# Patient Record
Sex: MALE | Race: OTHER | HISPANIC OR LATINO | Employment: FULL TIME | URBAN - METROPOLITAN AREA
[De-identification: names, ages, dates, MRNs, and addresses within clinical notes are randomized per-mention and may not be internally consistent; named-entity substitution may affect disease eponyms.]

---

## 2017-01-19 ENCOUNTER — ALLSCRIPTS OFFICE VISIT (OUTPATIENT)
Dept: OTHER | Facility: OTHER | Age: 62
End: 2017-01-19

## 2017-01-19 ENCOUNTER — TRANSCRIBE ORDERS (OUTPATIENT)
Dept: ADMINISTRATIVE | Facility: HOSPITAL | Age: 62
End: 2017-01-19

## 2017-01-19 ENCOUNTER — APPOINTMENT (OUTPATIENT)
Dept: LAB | Facility: HOSPITAL | Age: 62
End: 2017-01-19
Payer: COMMERCIAL

## 2017-01-19 DIAGNOSIS — N20.0 KIDNEY STONE: Primary | ICD-10-CM

## 2017-01-19 DIAGNOSIS — R31.9 HEMATURIA: ICD-10-CM

## 2017-01-19 LAB
BILIRUB UR QL STRIP: NEGATIVE
CLARITY UR: NORMAL
COLOR UR: YELLOW
GLUCOSE (HISTORICAL): NEGATIVE
HGB UR QL STRIP.AUTO: NORMAL
KETONES UR STRIP-MCNC: NEGATIVE MG/DL
LEUKOCYTE ESTERASE UR QL STRIP: NEGATIVE
NITRITE UR QL STRIP: NEGATIVE
PH UR STRIP.AUTO: 6 [PH]
PROT UR STRIP-MCNC: NORMAL MG/DL
SP GR UR STRIP.AUTO: 1.01
UROBILINOGEN UR QL STRIP.AUTO: 0.2

## 2017-01-19 PROCEDURE — 87086 URINE CULTURE/COLONY COUNT: CPT

## 2017-01-20 ENCOUNTER — GENERIC CONVERSION - ENCOUNTER (OUTPATIENT)
Dept: OTHER | Facility: OTHER | Age: 62
End: 2017-01-20

## 2017-01-20 LAB — BACTERIA UR CULT: NORMAL

## 2017-01-27 ENCOUNTER — HOSPITAL ENCOUNTER (OUTPATIENT)
Dept: RADIOLOGY | Facility: HOSPITAL | Age: 62
Discharge: HOME | End: 2017-01-27
Payer: COMMERCIAL

## 2017-01-27 DIAGNOSIS — N20.0 KIDNEY STONE: ICD-10-CM

## 2017-01-27 PROCEDURE — 74176 CT ABD & PELVIS W/O CONTRAST: CPT

## 2017-01-30 ENCOUNTER — GENERIC CONVERSION - ENCOUNTER (OUTPATIENT)
Dept: OTHER | Facility: OTHER | Age: 62
End: 2017-01-30

## 2017-02-06 ENCOUNTER — ALLSCRIPTS OFFICE VISIT (OUTPATIENT)
Dept: OTHER | Facility: OTHER | Age: 62
End: 2017-02-06

## 2017-02-16 ENCOUNTER — ALLSCRIPTS OFFICE VISIT (OUTPATIENT)
Dept: OTHER | Facility: OTHER | Age: 62
End: 2017-02-16

## 2017-02-16 LAB
BILIRUB UR QL STRIP: NORMAL
CLARITY UR: NORMAL
COLOR UR: YELLOW
GLUCOSE (HISTORICAL): NORMAL
HGB UR QL STRIP.AUTO: NORMAL
KETONES UR STRIP-MCNC: 5 MG/DL
LEUKOCYTE ESTERASE UR QL STRIP: NORMAL
NITRITE UR QL STRIP: NORMAL
PH UR STRIP.AUTO: 7 [PH]
PROT UR STRIP-MCNC: NORMAL MG/DL
SP GR UR STRIP.AUTO: 1
UROBILINOGEN UR QL STRIP.AUTO: NORMAL

## 2017-02-23 ENCOUNTER — ALLSCRIPTS OFFICE VISIT (OUTPATIENT)
Dept: OTHER | Facility: OTHER | Age: 62
End: 2017-02-23

## 2017-02-24 ENCOUNTER — ALLSCRIPTS OFFICE VISIT (OUTPATIENT)
Dept: OTHER | Facility: OTHER | Age: 62
End: 2017-02-24

## 2017-03-07 RX ORDER — ASPIRIN 325 MG
TABLET ORAL DAILY
COMMUNITY
End: 2017-07-28 | Stop reason: HOSPADM

## 2017-03-07 RX ORDER — LISINOPRIL 10 MG/1
TABLET ORAL
COMMUNITY
Start: 2016-12-26 | End: 2018-05-29 | Stop reason: SDUPTHER

## 2017-03-07 RX ORDER — ATORVASTATIN CALCIUM 40 MG/1
TABLET, FILM COATED ORAL
COMMUNITY
Start: 2014-02-07 | End: 2018-07-12 | Stop reason: SDUPTHER

## 2017-03-11 ENCOUNTER — TRANSCRIBE ORDERS (OUTPATIENT)
Dept: RADIOLOGY | Facility: HOSPITAL | Age: 62
End: 2017-03-11

## 2017-03-11 ENCOUNTER — APPOINTMENT (OUTPATIENT)
Dept: LAB | Facility: HOSPITAL | Age: 62
End: 2017-03-11
Attending: UROLOGY
Payer: COMMERCIAL

## 2017-03-11 ENCOUNTER — TRANSCRIBE ORDERS (OUTPATIENT)
Dept: LAB | Facility: HOSPITAL | Age: 62
End: 2017-03-11

## 2017-03-11 ENCOUNTER — HOSPITAL ENCOUNTER (OUTPATIENT)
Dept: RADIOLOGY | Facility: HOSPITAL | Age: 62
Discharge: HOME | End: 2017-03-11
Attending: UROLOGY
Payer: COMMERCIAL

## 2017-03-11 DIAGNOSIS — N20.0 URIC ACID NEPHROLITHIASIS: Primary | ICD-10-CM

## 2017-03-11 DIAGNOSIS — N20.0 CALCULUS OF KIDNEY: ICD-10-CM

## 2017-03-11 DIAGNOSIS — N20.0 URIC ACID NEPHROLITHIASIS: ICD-10-CM

## 2017-03-11 DIAGNOSIS — N20.0 CALCULUS OF KIDNEY: Primary | ICD-10-CM

## 2017-03-11 LAB
ANION GAP SERPL CALCULATED.3IONS-SCNC: 6 MMOL/L (ref 4–13)
APTT PPP: 27 SECONDS (ref 24–36)
BASOPHILS # BLD AUTO: 0.05 THOUSANDS/ΜL (ref 0–0.1)
BASOPHILS NFR BLD AUTO: 1 % (ref 0–1)
BUN SERPL-MCNC: 16 MG/DL (ref 5–25)
CALCIUM SERPL-MCNC: 8.7 MG/DL (ref 8.3–10.1)
CHLORIDE SERPL-SCNC: 108 MMOL/L (ref 100–108)
CO2 SERPL-SCNC: 31 MMOL/L (ref 21–32)
CREAT SERPL-MCNC: 0.93 MG/DL (ref 0.6–1.3)
EOSINOPHIL # BLD AUTO: 0.26 THOUSAND/ΜL (ref 0–0.61)
EOSINOPHIL NFR BLD AUTO: 4 % (ref 0–6)
ERYTHROCYTE [DISTWIDTH] IN BLOOD BY AUTOMATED COUNT: 13.8 % (ref 11.6–15.1)
GFR SERPL CREATININE-BSD FRML MDRD: >60 ML/MIN/1.73SQ M
GLUCOSE SERPL-MCNC: 128 MG/DL (ref 65–140)
HCT VFR BLD AUTO: 38.9 % (ref 36.5–49.3)
HGB BLD-MCNC: 13.1 G/DL (ref 12–17)
INR PPP: 0.92 (ref 0.86–1.16)
LYMPHOCYTES # BLD AUTO: 3.52 THOUSANDS/ΜL (ref 0.6–4.47)
LYMPHOCYTES NFR BLD AUTO: 50 % (ref 14–44)
MCH RBC QN AUTO: 31 PG (ref 26.8–34.3)
MCHC RBC AUTO-ENTMCNC: 33.7 G/DL (ref 31.4–37.4)
MCV RBC AUTO: 92 FL (ref 82–98)
MONOCYTES # BLD AUTO: 0.39 THOUSAND/ΜL (ref 0.17–1.22)
MONOCYTES NFR BLD AUTO: 6 % (ref 4–12)
NEUTROPHILS # BLD AUTO: 2.74 THOUSANDS/ΜL (ref 1.85–7.62)
NEUTS SEG NFR BLD AUTO: 39 % (ref 43–75)
NRBC BLD AUTO-RTO: 0 /100 WBCS
PLATELET # BLD AUTO: 282 THOUSANDS/UL (ref 149–390)
PMV BLD AUTO: 10.2 FL (ref 8.9–12.7)
POTASSIUM SERPL-SCNC: 4.1 MMOL/L (ref 3.5–5.3)
PROTHROMBIN TIME: 12.5 SECONDS (ref 12–14.3)
RBC # BLD AUTO: 4.22 MILLION/UL (ref 3.88–5.62)
SODIUM SERPL-SCNC: 145 MMOL/L (ref 136–145)
WBC # BLD AUTO: 6.96 THOUSAND/UL (ref 4.31–10.16)

## 2017-03-11 PROCEDURE — 85730 THROMBOPLASTIN TIME PARTIAL: CPT

## 2017-03-11 PROCEDURE — 71020 HB CHEST X-RAY 2VW FRONTAL&LATL: CPT

## 2017-03-11 PROCEDURE — 80048 BASIC METABOLIC PNL TOTAL CA: CPT

## 2017-03-11 PROCEDURE — 36415 COLL VENOUS BLD VENIPUNCTURE: CPT

## 2017-03-11 PROCEDURE — 85610 PROTHROMBIN TIME: CPT

## 2017-03-11 PROCEDURE — 85025 COMPLETE CBC W/AUTO DIFF WBC: CPT

## 2017-03-24 ENCOUNTER — ANESTHESIA EVENT (OUTPATIENT)
Dept: PERIOP | Facility: HOSPITAL | Age: 62
End: 2017-03-24
Payer: COMMERCIAL

## 2017-03-24 ENCOUNTER — HOSPITAL ENCOUNTER (OUTPATIENT)
Facility: HOSPITAL | Age: 62
Setting detail: OUTPATIENT SURGERY
Discharge: HOME | End: 2017-03-24
Attending: UROLOGY | Admitting: UROLOGY
Payer: COMMERCIAL

## 2017-03-24 ENCOUNTER — APPOINTMENT (OUTPATIENT)
Dept: RADIOLOGY | Facility: HOSPITAL | Age: 62
End: 2017-03-24
Payer: COMMERCIAL

## 2017-03-24 ENCOUNTER — ANESTHESIA (OUTPATIENT)
Dept: PERIOP | Facility: HOSPITAL | Age: 62
End: 2017-03-24
Payer: COMMERCIAL

## 2017-03-24 VITALS
RESPIRATION RATE: 16 BRPM | DIASTOLIC BLOOD PRESSURE: 67 MMHG | HEART RATE: 80 BPM | OXYGEN SATURATION: 96 % | WEIGHT: 196 LBS | TEMPERATURE: 98.5 F | HEIGHT: 66 IN | BODY MASS INDEX: 31.5 KG/M2 | SYSTOLIC BLOOD PRESSURE: 121 MMHG

## 2017-03-24 PROCEDURE — C1769 GUIDE WIRE: HCPCS | Performed by: UROLOGY

## 2017-03-24 PROCEDURE — 74450 X-RAY URETHRA/BLADDER: CPT

## 2017-03-24 PROCEDURE — C2617 STENT, NON-COR, TEM W/O DEL: HCPCS | Performed by: UROLOGY

## 2017-03-24 DEVICE — STENT URETERAL 6 FR 26CM INLAY OPTIMA
Type: IMPLANTABLE DEVICE | Site: URETER | Status: NON-FUNCTIONAL
Removed: 2017-07-28

## 2017-03-24 RX ORDER — METOCLOPRAMIDE HYDROCHLORIDE 5 MG/ML
10 INJECTION INTRAMUSCULAR; INTRAVENOUS ONCE AS NEEDED
Status: DISCONTINUED | OUTPATIENT
Start: 2017-03-24 | End: 2017-03-24 | Stop reason: HOSPADM

## 2017-03-24 RX ORDER — GLYCOPYRROLATE 0.2 MG/ML
INJECTION INTRAMUSCULAR; INTRAVENOUS AS NEEDED
Status: DISCONTINUED | OUTPATIENT
Start: 2017-03-24 | End: 2017-03-24 | Stop reason: SURG

## 2017-03-24 RX ORDER — FENTANYL CITRATE 50 UG/ML
INJECTION, SOLUTION INTRAMUSCULAR; INTRAVENOUS AS NEEDED
Status: DISCONTINUED | OUTPATIENT
Start: 2017-03-24 | End: 2017-03-24 | Stop reason: SURG

## 2017-03-24 RX ORDER — PROPOFOL 10 MG/ML
INJECTION, EMULSION INTRAVENOUS AS NEEDED
Status: DISCONTINUED | OUTPATIENT
Start: 2017-03-24 | End: 2017-03-24 | Stop reason: SURG

## 2017-03-24 RX ORDER — HYDROCODONE BITARTRATE AND ACETAMINOPHEN 5; 325 MG/1; MG/1
1 TABLET ORAL EVERY 4 HOURS PRN
Qty: 30 TABLET | Refills: 0 | Status: SHIPPED | OUTPATIENT
Start: 2017-03-24 | End: 2017-04-03

## 2017-03-24 RX ORDER — PROMETHAZINE HYDROCHLORIDE 25 MG/ML
25 INJECTION, SOLUTION INTRAMUSCULAR; INTRAVENOUS ONCE AS NEEDED
Status: DISCONTINUED | OUTPATIENT
Start: 2017-03-24 | End: 2017-03-24 | Stop reason: HOSPADM

## 2017-03-24 RX ORDER — MIDAZOLAM HYDROCHLORIDE 1 MG/ML
INJECTION INTRAMUSCULAR; INTRAVENOUS AS NEEDED
Status: DISCONTINUED | OUTPATIENT
Start: 2017-03-24 | End: 2017-03-24 | Stop reason: SURG

## 2017-03-24 RX ORDER — EPHEDRINE SULFATE 50 MG/ML
INJECTION, SOLUTION INTRAVENOUS AS NEEDED
Status: DISCONTINUED | OUTPATIENT
Start: 2017-03-24 | End: 2017-03-24 | Stop reason: SURG

## 2017-03-24 RX ORDER — CIPROFLOXACIN 500 MG/1
500 TABLET, FILM COATED ORAL 2 TIMES DAILY
Qty: 6 TABLET | Refills: 0 | Status: SHIPPED | OUTPATIENT
Start: 2017-03-24 | End: 2017-03-27

## 2017-03-24 RX ORDER — OXYBUTYNIN CHLORIDE 5 MG/1
5 TABLET ORAL ONCE
Status: COMPLETED | OUTPATIENT
Start: 2017-03-24 | End: 2017-03-24

## 2017-03-24 RX ORDER — ONDANSETRON 2 MG/ML
4 INJECTION INTRAMUSCULAR; INTRAVENOUS ONCE
Status: DISCONTINUED | OUTPATIENT
Start: 2017-03-24 | End: 2017-03-24 | Stop reason: HOSPADM

## 2017-03-24 RX ORDER — SODIUM CHLORIDE, SODIUM LACTATE, POTASSIUM CHLORIDE, CALCIUM CHLORIDE 600; 310; 30; 20 MG/100ML; MG/100ML; MG/100ML; MG/100ML
5 INJECTION, SOLUTION INTRAVENOUS CONTINUOUS
Status: DISCONTINUED | OUTPATIENT
Start: 2017-03-24 | End: 2017-03-24 | Stop reason: HOSPADM

## 2017-03-24 RX ORDER — HYDROCODONE BITARTRATE AND ACETAMINOPHEN 5; 325 MG/1; MG/1
1-2 TABLET ORAL EVERY 4 HOURS PRN
Status: DISCONTINUED | OUTPATIENT
Start: 2017-03-24 | End: 2017-03-24 | Stop reason: HOSPADM

## 2017-03-24 RX ORDER — FENTANYL CITRATE/PF 50 MCG/ML
25 SYRINGE (ML) INJECTION
Status: DISCONTINUED | OUTPATIENT
Start: 2017-03-24 | End: 2017-03-24 | Stop reason: HOSPADM

## 2017-03-24 RX ORDER — ONDANSETRON 2 MG/ML
INJECTION INTRAMUSCULAR; INTRAVENOUS AS NEEDED
Status: DISCONTINUED | OUTPATIENT
Start: 2017-03-24 | End: 2017-03-24 | Stop reason: SURG

## 2017-03-24 RX ORDER — ROCURONIUM BROMIDE 10 MG/ML
INJECTION, SOLUTION INTRAVENOUS AS NEEDED
Status: DISCONTINUED | OUTPATIENT
Start: 2017-03-24 | End: 2017-03-24 | Stop reason: SURG

## 2017-03-24 RX ORDER — OXYBUTYNIN CHLORIDE 5 MG/1
5 TABLET ORAL 3 TIMES DAILY PRN
Qty: 30 TABLET | Refills: 0 | Status: SHIPPED | OUTPATIENT
Start: 2017-03-24 | End: 2018-07-25 | Stop reason: HOSPADM

## 2017-03-24 RX ORDER — MAGNESIUM HYDROXIDE 1200 MG/15ML
LIQUID ORAL AS NEEDED
Status: DISCONTINUED | OUTPATIENT
Start: 2017-03-24 | End: 2017-03-24 | Stop reason: HOSPADM

## 2017-03-24 RX ADMIN — ROCURONIUM BROMIDE 10 MG: 10 INJECTION, SOLUTION INTRAVENOUS at 13:34

## 2017-03-24 RX ADMIN — ONDANSETRON 4 MG: 2 INJECTION INTRAMUSCULAR; INTRAVENOUS at 15:13

## 2017-03-24 RX ADMIN — MIDAZOLAM HYDROCHLORIDE 2 MG: 1 INJECTION, SOLUTION INTRAMUSCULAR; INTRAVENOUS at 12:45

## 2017-03-24 RX ADMIN — SODIUM CHLORIDE, SODIUM LACTATE, POTASSIUM CHLORIDE, AND CALCIUM CHLORIDE: .6; .31; .03; .02 INJECTION, SOLUTION INTRAVENOUS at 13:45

## 2017-03-24 RX ADMIN — FENTANYL CITRATE 25 MCG: 50 INJECTION, SOLUTION INTRAMUSCULAR; INTRAVENOUS at 16:01

## 2017-03-24 RX ADMIN — ROCURONIUM BROMIDE 10 MG: 10 INJECTION, SOLUTION INTRAVENOUS at 14:46

## 2017-03-24 RX ADMIN — FENTANYL CITRATE 50 MCG: 50 INJECTION, SOLUTION INTRAMUSCULAR; INTRAVENOUS at 13:16

## 2017-03-24 RX ADMIN — EPHEDRINE SULFATE 5 MG: 50 INJECTION, SOLUTION INTRAMUSCULAR; INTRAVENOUS; SUBCUTANEOUS at 13:30

## 2017-03-24 RX ADMIN — PROPOFOL 150 MG: 10 INJECTION, EMULSION INTRAVENOUS at 12:52

## 2017-03-24 RX ADMIN — FENTANYL CITRATE 25 MCG: 50 INJECTION, SOLUTION INTRAMUSCULAR; INTRAVENOUS at 16:16

## 2017-03-24 RX ADMIN — FENTANYL CITRATE 25 MCG: 50 INJECTION, SOLUTION INTRAMUSCULAR; INTRAVENOUS at 15:54

## 2017-03-24 RX ADMIN — ROCURONIUM BROMIDE 10 MG: 10 INJECTION, SOLUTION INTRAVENOUS at 14:17

## 2017-03-24 RX ADMIN — SODIUM CHLORIDE, SODIUM LACTATE, POTASSIUM CHLORIDE, AND CALCIUM CHLORIDE 5 ML/HR: .6; .31; .03; .02 INJECTION, SOLUTION INTRAVENOUS at 11:05

## 2017-03-24 RX ADMIN — EPHEDRINE SULFATE 5 MG: 50 INJECTION, SOLUTION INTRAMUSCULAR; INTRAVENOUS; SUBCUTANEOUS at 13:05

## 2017-03-24 RX ADMIN — SODIUM CHLORIDE, SODIUM LACTATE, POTASSIUM CHLORIDE, AND CALCIUM CHLORIDE 5 ML/HR: .6; .31; .03; .02 INJECTION, SOLUTION INTRAVENOUS at 16:19

## 2017-03-24 RX ADMIN — GLYCOPYRROLATE 0.6 MG: 0.2 INJECTION, SOLUTION INTRAMUSCULAR; INTRAVENOUS at 15:21

## 2017-03-24 RX ADMIN — FENTANYL CITRATE 25 MCG: 50 INJECTION, SOLUTION INTRAMUSCULAR; INTRAVENOUS at 15:46

## 2017-03-24 RX ADMIN — EPHEDRINE SULFATE 5 MG: 50 INJECTION, SOLUTION INTRAMUSCULAR; INTRAVENOUS; SUBCUTANEOUS at 14:25

## 2017-03-24 RX ADMIN — HYDROCODONE BITARTRATE AND ACETAMINOPHEN 1 TABLET: 5; 325 TABLET ORAL at 16:39

## 2017-03-24 RX ADMIN — OXYBUTYNIN CHLORIDE 5 MG: 5 TABLET ORAL at 16:26

## 2017-03-24 RX ADMIN — CEFAZOLIN SODIUM 2000 MG: 2 SOLUTION INTRAVENOUS at 12:57

## 2017-03-24 RX ADMIN — FENTANYL CITRATE 50 MCG: 50 INJECTION, SOLUTION INTRAMUSCULAR; INTRAVENOUS at 12:45

## 2017-03-24 RX ADMIN — ROCURONIUM BROMIDE 50 MG: 10 INJECTION, SOLUTION INTRAVENOUS at 12:52

## 2017-03-24 RX ADMIN — ROCURONIUM BROMIDE 10 MG: 10 INJECTION, SOLUTION INTRAVENOUS at 14:58

## 2017-03-24 RX ADMIN — NEOSTIGMINE METHYLSULFATE 3 MG: 1 INJECTION INTRAMUSCULAR; INTRAVENOUS; SUBCUTANEOUS at 15:21

## 2017-03-24 RX ADMIN — ROCURONIUM BROMIDE 10 MG: 10 INJECTION, SOLUTION INTRAVENOUS at 13:57

## 2017-03-27 DIAGNOSIS — N20.0 CALCULUS OF KIDNEY: ICD-10-CM

## 2017-04-07 ENCOUNTER — TRANSCRIBE ORDERS (OUTPATIENT)
Dept: RADIOLOGY | Facility: HOSPITAL | Age: 62
End: 2017-04-07

## 2017-04-07 ENCOUNTER — HOSPITAL ENCOUNTER (OUTPATIENT)
Dept: RADIOLOGY | Facility: HOSPITAL | Age: 62
Discharge: HOME | End: 2017-04-07
Attending: UROLOGY
Payer: COMMERCIAL

## 2017-04-07 DIAGNOSIS — N20.0 CALCULUS OF KIDNEY: ICD-10-CM

## 2017-04-07 PROCEDURE — 74000 HB X-RAY EXAM OF ABDOMEN (SINGLE ANTEROPOSTERIOR VIEW): CPT

## 2017-04-13 ENCOUNTER — ALLSCRIPTS OFFICE VISIT (OUTPATIENT)
Dept: OTHER | Facility: OTHER | Age: 62
End: 2017-04-13

## 2017-05-01 DIAGNOSIS — E66.9 OBESITY: ICD-10-CM

## 2017-05-01 DIAGNOSIS — N20.0 CALCULUS OF KIDNEY: ICD-10-CM

## 2017-05-01 DIAGNOSIS — E11.9 TYPE 2 DIABETES MELLITUS WITHOUT COMPLICATIONS (HCC): ICD-10-CM

## 2017-05-01 DIAGNOSIS — E78.5 HYPERLIPIDEMIA: ICD-10-CM

## 2017-05-06 ENCOUNTER — TRANSCRIBE ORDERS (OUTPATIENT)
Dept: LAB | Facility: HOSPITAL | Age: 62
End: 2017-05-06

## 2017-05-06 ENCOUNTER — APPOINTMENT (OUTPATIENT)
Dept: LAB | Facility: HOSPITAL | Age: 62
End: 2017-05-06
Attending: UROLOGY
Payer: COMMERCIAL

## 2017-05-06 DIAGNOSIS — N20.0 CALCULUS OF KIDNEY: ICD-10-CM

## 2017-05-06 LAB
ANION GAP SERPL CALCULATED.3IONS-SCNC: 3 MMOL/L (ref 4–13)
APTT PPP: 26 SECONDS (ref 23–35)
BACTERIA UR QL AUTO: ABNORMAL /HPF
BASOPHILS # BLD AUTO: 0.06 THOUSANDS/ΜL (ref 0–0.1)
BASOPHILS NFR BLD AUTO: 1 % (ref 0–1)
BILIRUB UR QL STRIP: NEGATIVE
BUN SERPL-MCNC: 11 MG/DL (ref 5–25)
CALCIUM SERPL-MCNC: 9.3 MG/DL (ref 8.3–10.1)
CHLORIDE SERPL-SCNC: 107 MMOL/L (ref 100–108)
CLARITY UR: CLEAR
CO2 SERPL-SCNC: 31 MMOL/L (ref 21–32)
COLOR UR: YELLOW
CREAT SERPL-MCNC: 1.03 MG/DL (ref 0.6–1.3)
EOSINOPHIL # BLD AUTO: 0.24 THOUSAND/ΜL (ref 0–0.61)
EOSINOPHIL NFR BLD AUTO: 4 % (ref 0–6)
ERYTHROCYTE [DISTWIDTH] IN BLOOD BY AUTOMATED COUNT: 13.7 % (ref 11.6–15.1)
GFR SERPL CREATININE-BSD FRML MDRD: >60 ML/MIN/1.73SQ M
GLUCOSE P FAST SERPL-MCNC: 131 MG/DL (ref 65–99)
GLUCOSE UR STRIP-MCNC: NEGATIVE MG/DL
HCT VFR BLD AUTO: 39.6 % (ref 36.5–49.3)
HGB BLD-MCNC: 13.2 G/DL (ref 12–17)
HGB UR QL STRIP.AUTO: ABNORMAL
HYALINE CASTS #/AREA URNS LPF: ABNORMAL /LPF
INR PPP: 0.91 (ref 0.86–1.16)
KETONES UR STRIP-MCNC: NEGATIVE MG/DL
LEUKOCYTE ESTERASE UR QL STRIP: ABNORMAL
LYMPHOCYTES # BLD AUTO: 2.04 THOUSANDS/ΜL (ref 0.6–4.47)
LYMPHOCYTES NFR BLD AUTO: 36 % (ref 14–44)
MCH RBC QN AUTO: 31.1 PG (ref 26.8–34.3)
MCHC RBC AUTO-ENTMCNC: 33.3 G/DL (ref 31.4–37.4)
MCV RBC AUTO: 93 FL (ref 82–98)
MONOCYTES # BLD AUTO: 0.38 THOUSAND/ΜL (ref 0.17–1.22)
MONOCYTES NFR BLD AUTO: 7 % (ref 4–12)
NEUTROPHILS # BLD AUTO: 2.95 THOUSANDS/ΜL (ref 1.85–7.62)
NEUTS SEG NFR BLD AUTO: 52 % (ref 43–75)
NITRITE UR QL STRIP: NEGATIVE
NON-SQ EPI CELLS URNS QL MICRO: ABNORMAL /HPF
NRBC BLD AUTO-RTO: 0 /100 WBCS
PH UR STRIP.AUTO: 7.5 [PH] (ref 4.5–8)
PLATELET # BLD AUTO: 310 THOUSANDS/UL (ref 149–390)
PMV BLD AUTO: 9.7 FL (ref 8.9–12.7)
POTASSIUM SERPL-SCNC: 4.9 MMOL/L (ref 3.5–5.3)
PROT UR STRIP-MCNC: ABNORMAL MG/DL
PROTHROMBIN TIME: 12.3 SECONDS (ref 12.1–14.4)
RBC # BLD AUTO: 4.24 MILLION/UL (ref 3.88–5.62)
RBC #/AREA URNS AUTO: ABNORMAL /HPF
SODIUM SERPL-SCNC: 141 MMOL/L (ref 136–145)
SP GR UR STRIP.AUTO: 1.01 (ref 1–1.03)
UROBILINOGEN UR QL STRIP.AUTO: 0.2 E.U./DL
WBC # BLD AUTO: 5.68 THOUSAND/UL (ref 4.31–10.16)
WBC #/AREA URNS AUTO: ABNORMAL /HPF

## 2017-05-06 PROCEDURE — 85610 PROTHROMBIN TIME: CPT

## 2017-05-06 PROCEDURE — 85025 COMPLETE CBC W/AUTO DIFF WBC: CPT

## 2017-05-06 PROCEDURE — 85730 THROMBOPLASTIN TIME PARTIAL: CPT

## 2017-05-06 PROCEDURE — 80048 BASIC METABOLIC PNL TOTAL CA: CPT

## 2017-05-06 PROCEDURE — 36415 COLL VENOUS BLD VENIPUNCTURE: CPT

## 2017-05-06 PROCEDURE — 81001 URINALYSIS AUTO W/SCOPE: CPT

## 2017-05-19 ENCOUNTER — ANESTHESIA (OUTPATIENT)
Dept: PERIOP | Facility: HOSPITAL | Age: 62
End: 2017-05-19

## 2017-05-19 ENCOUNTER — TRANSCRIBE ORDERS (OUTPATIENT)
Dept: ADMINISTRATIVE | Facility: HOSPITAL | Age: 62
End: 2017-05-19

## 2017-05-19 ENCOUNTER — ANESTHESIA EVENT (OUTPATIENT)
Dept: PERIOP | Facility: HOSPITAL | Age: 62
End: 2017-05-19

## 2017-05-19 DIAGNOSIS — N20.0 URIC ACID NEPHROLITHIASIS: Primary | ICD-10-CM

## 2017-05-25 ENCOUNTER — TRANSCRIBE ORDERS (OUTPATIENT)
Dept: RADIOLOGY | Facility: HOSPITAL | Age: 62
End: 2017-05-25

## 2017-05-25 ENCOUNTER — HOSPITAL ENCOUNTER (OUTPATIENT)
Dept: RADIOLOGY | Facility: HOSPITAL | Age: 62
Discharge: HOME | End: 2017-05-25
Attending: UROLOGY
Payer: COMMERCIAL

## 2017-05-25 DIAGNOSIS — N20.0 CALCULUS OF KIDNEY: ICD-10-CM

## 2017-05-25 PROCEDURE — 74000 HB X-RAY EXAM OF ABDOMEN (SINGLE ANTEROPOSTERIOR VIEW): CPT

## 2017-05-30 ENCOUNTER — ALLSCRIPTS OFFICE VISIT (OUTPATIENT)
Dept: OTHER | Facility: OTHER | Age: 62
End: 2017-05-30

## 2017-07-06 ENCOUNTER — GENERIC CONVERSION - ENCOUNTER (OUTPATIENT)
Dept: OTHER | Facility: OTHER | Age: 62
End: 2017-07-06

## 2017-07-17 ENCOUNTER — ALLSCRIPTS OFFICE VISIT (OUTPATIENT)
Dept: OTHER | Facility: OTHER | Age: 62
End: 2017-07-17

## 2017-07-28 ENCOUNTER — APPOINTMENT (OUTPATIENT)
Dept: RADIOLOGY | Facility: HOSPITAL | Age: 62
End: 2017-07-28
Payer: COMMERCIAL

## 2017-07-28 ENCOUNTER — ANESTHESIA EVENT (OUTPATIENT)
Dept: PERIOP | Facility: HOSPITAL | Age: 62
End: 2017-07-28
Payer: COMMERCIAL

## 2017-07-28 ENCOUNTER — GENERIC CONVERSION - ENCOUNTER (OUTPATIENT)
Dept: OTHER | Facility: OTHER | Age: 62
End: 2017-07-28

## 2017-07-28 ENCOUNTER — ANESTHESIA (OUTPATIENT)
Dept: PERIOP | Facility: HOSPITAL | Age: 62
End: 2017-07-28
Payer: COMMERCIAL

## 2017-07-28 ENCOUNTER — APPOINTMENT (OUTPATIENT)
Dept: RADIOLOGY | Facility: HOSPITAL | Age: 62
End: 2017-07-28
Attending: UROLOGY
Payer: COMMERCIAL

## 2017-07-28 ENCOUNTER — HOSPITAL ENCOUNTER (OUTPATIENT)
Facility: HOSPITAL | Age: 62
Setting detail: OUTPATIENT SURGERY
Discharge: HOME | End: 2017-07-28
Attending: UROLOGY | Admitting: UROLOGY
Payer: COMMERCIAL

## 2017-07-28 VITALS
HEART RATE: 60 BPM | WEIGHT: 189 LBS | DIASTOLIC BLOOD PRESSURE: 81 MMHG | RESPIRATION RATE: 16 BRPM | TEMPERATURE: 97.8 F | BODY MASS INDEX: 30.37 KG/M2 | OXYGEN SATURATION: 100 % | HEIGHT: 66 IN | SYSTOLIC BLOOD PRESSURE: 147 MMHG

## 2017-07-28 DIAGNOSIS — N20.0 CALCULUS OF KIDNEY: ICD-10-CM

## 2017-07-28 LAB
GLUCOSE SERPL-MCNC: 110 MG/DL (ref 65–140)
GLUCOSE SERPL-MCNC: 137 MG/DL (ref 65–140)

## 2017-07-28 PROCEDURE — 74000 HB X-RAY EXAM OF ABDOMEN (SINGLE ANTEROPOSTERIOR VIEW): CPT

## 2017-07-28 PROCEDURE — C1758 CATHETER, URETERAL: HCPCS | Performed by: UROLOGY

## 2017-07-28 PROCEDURE — C2617 STENT, NON-COR, TEM W/O DEL: HCPCS | Performed by: UROLOGY

## 2017-07-28 PROCEDURE — C1769 GUIDE WIRE: HCPCS | Performed by: UROLOGY

## 2017-07-28 PROCEDURE — 82948 REAGENT STRIP/BLOOD GLUCOSE: CPT

## 2017-07-28 DEVICE — STENT URETERAL 6 FR 26CM INLAY OPTIMA: Type: IMPLANTABLE DEVICE | Site: URETER | Status: FUNCTIONAL

## 2017-07-28 RX ORDER — ONDANSETRON 2 MG/ML
4 INJECTION INTRAMUSCULAR; INTRAVENOUS EVERY 4 HOURS PRN
Status: CANCELLED | OUTPATIENT
Start: 2017-07-28

## 2017-07-28 RX ORDER — GLYCOPYRROLATE 0.2 MG/ML
INJECTION INTRAMUSCULAR; INTRAVENOUS AS NEEDED
Status: DISCONTINUED | OUTPATIENT
Start: 2017-07-28 | End: 2017-07-28 | Stop reason: SURG

## 2017-07-28 RX ORDER — ONDANSETRON 2 MG/ML
INJECTION INTRAMUSCULAR; INTRAVENOUS AS NEEDED
Status: DISCONTINUED | OUTPATIENT
Start: 2017-07-28 | End: 2017-07-28 | Stop reason: SURG

## 2017-07-28 RX ORDER — PROPOFOL 10 MG/ML
INJECTION, EMULSION INTRAVENOUS AS NEEDED
Status: DISCONTINUED | OUTPATIENT
Start: 2017-07-28 | End: 2017-07-28 | Stop reason: SURG

## 2017-07-28 RX ORDER — TRAMADOL HYDROCHLORIDE 50 MG/1
50 TABLET ORAL EVERY 6 HOURS PRN
Qty: 30 TABLET | Refills: 0 | Status: SHIPPED | OUTPATIENT
Start: 2017-07-28 | End: 2017-08-07

## 2017-07-28 RX ORDER — FENTANYL CITRATE 50 UG/ML
INJECTION, SOLUTION INTRAMUSCULAR; INTRAVENOUS AS NEEDED
Status: DISCONTINUED | OUTPATIENT
Start: 2017-07-28 | End: 2017-07-28 | Stop reason: SURG

## 2017-07-28 RX ORDER — CIPROFLOXACIN 500 MG/1
500 TABLET, FILM COATED ORAL 2 TIMES DAILY
Qty: 6 TABLET | Refills: 0 | Status: SHIPPED | OUTPATIENT
Start: 2017-07-28 | End: 2017-07-31

## 2017-07-28 RX ORDER — ONDANSETRON 2 MG/ML
4 INJECTION INTRAMUSCULAR; INTRAVENOUS ONCE AS NEEDED
Status: DISCONTINUED | OUTPATIENT
Start: 2017-07-28 | End: 2017-07-28 | Stop reason: HOSPADM

## 2017-07-28 RX ORDER — SODIUM CHLORIDE, SODIUM LACTATE, POTASSIUM CHLORIDE, CALCIUM CHLORIDE 600; 310; 30; 20 MG/100ML; MG/100ML; MG/100ML; MG/100ML
50 INJECTION, SOLUTION INTRAVENOUS CONTINUOUS
Status: DISCONTINUED | OUTPATIENT
Start: 2017-07-28 | End: 2017-07-28 | Stop reason: HOSPADM

## 2017-07-28 RX ORDER — EPHEDRINE SULFATE 50 MG/ML
INJECTION, SOLUTION INTRAVENOUS AS NEEDED
Status: DISCONTINUED | OUTPATIENT
Start: 2017-07-28 | End: 2017-07-28 | Stop reason: SURG

## 2017-07-28 RX ORDER — MAGNESIUM HYDROXIDE 1200 MG/15ML
LIQUID ORAL AS NEEDED
Status: DISCONTINUED | OUTPATIENT
Start: 2017-07-28 | End: 2017-07-28 | Stop reason: HOSPADM

## 2017-07-28 RX ORDER — SODIUM CHLORIDE, SODIUM LACTATE, POTASSIUM CHLORIDE, CALCIUM CHLORIDE 600; 310; 30; 20 MG/100ML; MG/100ML; MG/100ML; MG/100ML
50 INJECTION, SOLUTION INTRAVENOUS CONTINUOUS
Status: CANCELLED | OUTPATIENT
Start: 2017-07-28

## 2017-07-28 RX ORDER — ACETAMINOPHEN 325 MG/1
650 TABLET ORAL EVERY 4 HOURS PRN
Status: CANCELLED | OUTPATIENT
Start: 2017-07-28

## 2017-07-28 RX ORDER — FENTANYL CITRATE/PF 50 MCG/ML
25 SYRINGE (ML) INJECTION
Status: DISCONTINUED | OUTPATIENT
Start: 2017-07-28 | End: 2017-07-28 | Stop reason: HOSPADM

## 2017-07-28 RX ADMIN — GLYCOPYRROLATE 0.2 MG: 0.2 INJECTION, SOLUTION INTRAMUSCULAR; INTRAVENOUS at 08:52

## 2017-07-28 RX ADMIN — SODIUM CHLORIDE, SODIUM LACTATE, POTASSIUM CHLORIDE, AND CALCIUM CHLORIDE: .6; .31; .03; .02 INJECTION, SOLUTION INTRAVENOUS at 08:46

## 2017-07-28 RX ADMIN — FENTANYL CITRATE 50 MCG: 50 INJECTION, SOLUTION INTRAMUSCULAR; INTRAVENOUS at 08:52

## 2017-07-28 RX ADMIN — SODIUM CHLORIDE, SODIUM LACTATE, POTASSIUM CHLORIDE, AND CALCIUM CHLORIDE 50 ML/HR: .6; .31; .03; .02 INJECTION, SOLUTION INTRAVENOUS at 08:24

## 2017-07-28 RX ADMIN — EPHEDRINE SULFATE 2.5 MG: 50 INJECTION, SOLUTION INTRAMUSCULAR; INTRAVENOUS; SUBCUTANEOUS at 09:23

## 2017-07-28 RX ADMIN — DEXAMETHASONE SODIUM PHOSPHATE 4 MG: 10 INJECTION INTRAMUSCULAR; INTRAVENOUS at 08:46

## 2017-07-28 RX ADMIN — ONDANSETRON 4 MG: 2 INJECTION INTRAMUSCULAR; INTRAVENOUS at 08:52

## 2017-07-28 RX ADMIN — CEFAZOLIN SODIUM 2000 MG: 2 SOLUTION INTRAVENOUS at 08:46

## 2017-07-28 RX ADMIN — FENTANYL CITRATE 50 MCG: 50 INJECTION, SOLUTION INTRAMUSCULAR; INTRAVENOUS at 09:14

## 2017-07-28 RX ADMIN — PROPOFOL 200 MG: 10 INJECTION, EMULSION INTRAVENOUS at 08:52

## 2017-08-01 ENCOUNTER — GENERIC CONVERSION - ENCOUNTER (OUTPATIENT)
Dept: OTHER | Facility: OTHER | Age: 62
End: 2017-08-01

## 2017-08-07 ENCOUNTER — GENERIC CONVERSION - ENCOUNTER (OUTPATIENT)
Dept: OTHER | Facility: OTHER | Age: 62
End: 2017-08-07

## 2017-08-07 DIAGNOSIS — N20.0 CALCULUS OF KIDNEY: ICD-10-CM

## 2017-08-19 ENCOUNTER — GENERIC CONVERSION - ENCOUNTER (OUTPATIENT)
Dept: OTHER | Facility: OTHER | Age: 62
End: 2017-08-19

## 2017-08-19 ENCOUNTER — APPOINTMENT (OUTPATIENT)
Dept: LAB | Facility: HOSPITAL | Age: 62
End: 2017-08-19
Payer: COMMERCIAL

## 2017-08-19 ENCOUNTER — TRANSCRIBE ORDERS (OUTPATIENT)
Dept: LAB | Facility: HOSPITAL | Age: 62
End: 2017-08-19

## 2017-08-19 DIAGNOSIS — N20.0 CALCULUS OF KIDNEY: ICD-10-CM

## 2017-08-19 DIAGNOSIS — E78.5 HYPERLIPIDEMIA: ICD-10-CM

## 2017-08-19 DIAGNOSIS — E66.9 OBESITY: ICD-10-CM

## 2017-08-19 DIAGNOSIS — E11.9 TYPE 2 DIABETES MELLITUS WITHOUT COMPLICATIONS (HCC): ICD-10-CM

## 2017-08-19 LAB
ALBUMIN SERPL BCP-MCNC: 3.8 G/DL (ref 3.5–5)
ALP SERPL-CCNC: 73 U/L (ref 46–116)
ALT SERPL W P-5'-P-CCNC: 22 U/L (ref 12–78)
ANION GAP SERPL CALCULATED.3IONS-SCNC: 6 MMOL/L (ref 4–13)
AST SERPL W P-5'-P-CCNC: 18 U/L (ref 5–45)
BILIRUB SERPL-MCNC: 0.43 MG/DL (ref 0.2–1)
BUN SERPL-MCNC: 11 MG/DL (ref 5–25)
CALCIUM SERPL-MCNC: 8.7 MG/DL (ref 8.3–10.1)
CHLORIDE SERPL-SCNC: 103 MMOL/L (ref 100–108)
CHOLEST SERPL-MCNC: 173 MG/DL (ref 50–200)
CO2 SERPL-SCNC: 28 MMOL/L (ref 21–32)
CREAT SERPL-MCNC: 1 MG/DL (ref 0.6–1.3)
EST. AVERAGE GLUCOSE BLD GHB EST-MCNC: 166 MG/DL
GFR SERPL CREATININE-BSD FRML MDRD: 81 ML/MIN/1.73SQ M
GLUCOSE P FAST SERPL-MCNC: 117 MG/DL (ref 65–99)
HBA1C MFR BLD: 7.4 % (ref 4.2–6.3)
HDLC SERPL-MCNC: 63 MG/DL (ref 40–60)
LDLC SERPL CALC-MCNC: 92 MG/DL (ref 0–100)
POTASSIUM SERPL-SCNC: 3.9 MMOL/L (ref 3.5–5.3)
PROT SERPL-MCNC: 7.1 G/DL (ref 6.4–8.2)
SODIUM SERPL-SCNC: 137 MMOL/L (ref 136–145)
TRIGL SERPL-MCNC: 92 MG/DL
TSH SERPL DL<=0.05 MIU/L-ACNC: 0.69 UIU/ML (ref 0.36–3.74)

## 2017-08-19 PROCEDURE — 80053 COMPREHEN METABOLIC PANEL: CPT

## 2017-08-19 PROCEDURE — 80061 LIPID PANEL: CPT

## 2017-08-19 PROCEDURE — 36415 COLL VENOUS BLD VENIPUNCTURE: CPT

## 2017-08-19 PROCEDURE — 84443 ASSAY THYROID STIM HORMONE: CPT

## 2017-08-19 PROCEDURE — 83036 HEMOGLOBIN GLYCOSYLATED A1C: CPT

## 2017-09-05 ENCOUNTER — TRANSCRIBE ORDERS (OUTPATIENT)
Dept: LAB | Facility: HOSPITAL | Age: 62
End: 2017-09-05

## 2017-09-05 ENCOUNTER — APPOINTMENT (OUTPATIENT)
Dept: LAB | Facility: HOSPITAL | Age: 62
End: 2017-09-05
Attending: UROLOGY
Payer: COMMERCIAL

## 2017-09-05 DIAGNOSIS — N20.0 CALCULUS OF KIDNEY: ICD-10-CM

## 2017-09-05 LAB
ALBUMIN SERPL BCP-MCNC: 3.7 G/DL (ref 3.5–5)
ALP SERPL-CCNC: 73 U/L (ref 46–116)
ALT SERPL W P-5'-P-CCNC: 29 U/L (ref 12–78)
ANION GAP SERPL CALCULATED.3IONS-SCNC: 5 MMOL/L (ref 4–13)
AST SERPL W P-5'-P-CCNC: 20 U/L (ref 5–45)
BASOPHILS # BLD AUTO: 0.04 THOUSANDS/ΜL (ref 0–0.1)
BASOPHILS NFR BLD AUTO: 1 % (ref 0–1)
BILIRUB SERPL-MCNC: 0.28 MG/DL (ref 0.2–1)
BUN SERPL-MCNC: 16 MG/DL (ref 5–25)
CALCIUM SERPL-MCNC: 8.8 MG/DL (ref 8.3–10.1)
CHLORIDE SERPL-SCNC: 106 MMOL/L (ref 100–108)
CO2 SERPL-SCNC: 26 MMOL/L (ref 21–32)
CREAT SERPL-MCNC: 1.02 MG/DL (ref 0.6–1.3)
EOSINOPHIL # BLD AUTO: 0.11 THOUSAND/ΜL (ref 0–0.61)
EOSINOPHIL NFR BLD AUTO: 1 % (ref 0–6)
ERYTHROCYTE [DISTWIDTH] IN BLOOD BY AUTOMATED COUNT: 13.8 % (ref 11.6–15.1)
EST. AVERAGE GLUCOSE BLD GHB EST-MCNC: 163 MG/DL
GFR SERPL CREATININE-BSD FRML MDRD: 79 ML/MIN/1.73SQ M
GLUCOSE P FAST SERPL-MCNC: 168 MG/DL (ref 65–99)
HBA1C MFR BLD: 7.3 % (ref 4.2–6.3)
HCT VFR BLD AUTO: 37.5 % (ref 36.5–49.3)
HGB BLD-MCNC: 12.5 G/DL (ref 12–17)
LYMPHOCYTES # BLD AUTO: 2.52 THOUSANDS/ΜL (ref 0.6–4.47)
LYMPHOCYTES NFR BLD AUTO: 29 % (ref 14–44)
MCH RBC QN AUTO: 30.7 PG (ref 26.8–34.3)
MCHC RBC AUTO-ENTMCNC: 33.3 G/DL (ref 31.4–37.4)
MCV RBC AUTO: 92 FL (ref 82–98)
MONOCYTES # BLD AUTO: 0.63 THOUSAND/ΜL (ref 0.17–1.22)
MONOCYTES NFR BLD AUTO: 7 % (ref 4–12)
NEUTROPHILS # BLD AUTO: 5.36 THOUSANDS/ΜL (ref 1.85–7.62)
NEUTS SEG NFR BLD AUTO: 62 % (ref 43–75)
NRBC BLD AUTO-RTO: 0 /100 WBCS
PLATELET # BLD AUTO: 298 THOUSANDS/UL (ref 149–390)
PMV BLD AUTO: 9.7 FL (ref 8.9–12.7)
POTASSIUM SERPL-SCNC: 3.8 MMOL/L (ref 3.5–5.3)
PROT SERPL-MCNC: 7.2 G/DL (ref 6.4–8.2)
RBC # BLD AUTO: 4.07 MILLION/UL (ref 3.88–5.62)
SODIUM SERPL-SCNC: 137 MMOL/L (ref 136–145)
WBC # BLD AUTO: 8.68 THOUSAND/UL (ref 4.31–10.16)

## 2017-09-05 PROCEDURE — 85025 COMPLETE CBC W/AUTO DIFF WBC: CPT

## 2017-09-05 PROCEDURE — 80053 COMPREHEN METABOLIC PANEL: CPT

## 2017-09-05 PROCEDURE — 83036 HEMOGLOBIN GLYCOSYLATED A1C: CPT

## 2017-09-05 PROCEDURE — 36415 COLL VENOUS BLD VENIPUNCTURE: CPT

## 2017-09-15 ENCOUNTER — ANESTHESIA EVENT (OUTPATIENT)
Dept: PERIOP | Facility: HOSPITAL | Age: 62
End: 2017-09-15
Payer: COMMERCIAL

## 2017-09-15 ENCOUNTER — HOSPITAL ENCOUNTER (OUTPATIENT)
Facility: HOSPITAL | Age: 62
Setting detail: OUTPATIENT SURGERY
Discharge: HOME | End: 2017-09-15
Attending: UROLOGY | Admitting: UROLOGY
Payer: COMMERCIAL

## 2017-09-15 ENCOUNTER — ANESTHESIA (OUTPATIENT)
Dept: PERIOP | Facility: HOSPITAL | Age: 62
End: 2017-09-15
Payer: COMMERCIAL

## 2017-09-15 ENCOUNTER — APPOINTMENT (OUTPATIENT)
Dept: RADIOLOGY | Facility: HOSPITAL | Age: 62
End: 2017-09-15
Attending: UROLOGY
Payer: COMMERCIAL

## 2017-09-15 VITALS
SYSTOLIC BLOOD PRESSURE: 157 MMHG | OXYGEN SATURATION: 100 % | HEART RATE: 62 BPM | HEIGHT: 66 IN | RESPIRATION RATE: 16 BRPM | WEIGHT: 186 LBS | DIASTOLIC BLOOD PRESSURE: 89 MMHG | TEMPERATURE: 97.7 F | BODY MASS INDEX: 29.89 KG/M2

## 2017-09-15 DIAGNOSIS — R51.9 HEADACHE: ICD-10-CM

## 2017-09-15 DIAGNOSIS — N20.0 CALCULUS OF KIDNEY: ICD-10-CM

## 2017-09-15 DIAGNOSIS — R39.15 URGENCY OF URINATION: ICD-10-CM

## 2017-09-15 LAB
ATRIAL RATE: 60 BPM
ATRIAL RATE: 60 BPM
BACTERIA UR QL AUTO: ABNORMAL /HPF
BILIRUB UR QL STRIP: NEGATIVE
CLARITY UR: ABNORMAL
COLOR UR: ABNORMAL
GLUCOSE SERPL-MCNC: 102 MG/DL (ref 65–140)
GLUCOSE SERPL-MCNC: 96 MG/DL (ref 65–140)
GLUCOSE UR STRIP-MCNC: NEGATIVE MG/DL
HGB UR QL STRIP.AUTO: ABNORMAL
HYALINE CASTS #/AREA URNS LPF: ABNORMAL /LPF
KETONES UR STRIP-MCNC: NEGATIVE MG/DL
LEUKOCYTE ESTERASE UR QL STRIP: ABNORMAL
NITRITE UR QL STRIP: NEGATIVE
NON-SQ EPI CELLS URNS QL MICRO: ABNORMAL /HPF
P AXIS: 54 DEGREES
P AXIS: 63 DEGREES
PH UR STRIP.AUTO: 7 [PH] (ref 4.5–8)
PR INTERVAL: 180 MS
PR INTERVAL: 182 MS
PROT UR STRIP-MCNC: ABNORMAL MG/DL
QRS AXIS: -2 DEGREES
QRS AXIS: -6 DEGREES
QRSD INTERVAL: 150 MS
QRSD INTERVAL: 152 MS
QT INTERVAL: 478 MS
QT INTERVAL: 478 MS
QTC INTERVAL: 478 MS
QTC INTERVAL: 478 MS
RBC #/AREA URNS AUTO: ABNORMAL /HPF
SP GR UR STRIP.AUTO: 1.02 (ref 1–1.03)
T WAVE AXIS: 29 DEGREES
T WAVE AXIS: 31 DEGREES
UROBILINOGEN UR QL STRIP.AUTO: 0.2 E.U./DL
VENTRICULAR RATE: 60 BPM
VENTRICULAR RATE: 60 BPM
WBC #/AREA URNS AUTO: ABNORMAL /HPF

## 2017-09-15 PROCEDURE — 93005 ELECTROCARDIOGRAM TRACING: CPT

## 2017-09-15 PROCEDURE — 82948 REAGENT STRIP/BLOOD GLUCOSE: CPT

## 2017-09-15 PROCEDURE — 74000 HB X-RAY EXAM OF ABDOMEN (SINGLE ANTEROPOSTERIOR VIEW): CPT

## 2017-09-15 PROCEDURE — 81001 URINALYSIS AUTO W/SCOPE: CPT | Performed by: UROLOGY

## 2017-09-15 RX ORDER — SODIUM CHLORIDE, SODIUM LACTATE, POTASSIUM CHLORIDE, CALCIUM CHLORIDE 600; 310; 30; 20 MG/100ML; MG/100ML; MG/100ML; MG/100ML
20 INJECTION, SOLUTION INTRAVENOUS CONTINUOUS
Status: DISCONTINUED | OUTPATIENT
Start: 2017-09-15 | End: 2017-09-15 | Stop reason: HOSPADM

## 2017-09-15 RX ORDER — ONDANSETRON 2 MG/ML
4 INJECTION INTRAMUSCULAR; INTRAVENOUS EVERY 4 HOURS PRN
Status: DISCONTINUED | OUTPATIENT
Start: 2017-09-15 | End: 2017-09-15 | Stop reason: HOSPADM

## 2017-09-15 RX ORDER — CIPROFLOXACIN 500 MG/1
500 TABLET, FILM COATED ORAL 2 TIMES DAILY
Qty: 6 TABLET | Refills: 0 | Status: SHIPPED | OUTPATIENT
Start: 2017-09-15 | End: 2017-09-18

## 2017-09-15 RX ORDER — ONDANSETRON 2 MG/ML
4 INJECTION INTRAMUSCULAR; INTRAVENOUS ONCE AS NEEDED
Status: DISCONTINUED | OUTPATIENT
Start: 2017-09-15 | End: 2017-09-15 | Stop reason: HOSPADM

## 2017-09-15 RX ORDER — TRAMADOL HYDROCHLORIDE 50 MG/1
50 TABLET ORAL EVERY 4 HOURS PRN
Status: DISCONTINUED | OUTPATIENT
Start: 2017-09-15 | End: 2017-09-15 | Stop reason: HOSPADM

## 2017-09-15 RX ORDER — PROPOFOL 10 MG/ML
INJECTION, EMULSION INTRAVENOUS AS NEEDED
Status: DISCONTINUED | OUTPATIENT
Start: 2017-09-15 | End: 2017-09-15 | Stop reason: SURG

## 2017-09-15 RX ORDER — FENTANYL CITRATE/PF 50 MCG/ML
50 SYRINGE (ML) INJECTION
Status: DISCONTINUED | OUTPATIENT
Start: 2017-09-15 | End: 2017-09-15 | Stop reason: HOSPADM

## 2017-09-15 RX ORDER — TRAMADOL HYDROCHLORIDE 50 MG/1
50 TABLET ORAL EVERY 6 HOURS PRN
Qty: 20 TABLET | Refills: 0 | Status: SHIPPED | OUTPATIENT
Start: 2017-09-15 | End: 2017-09-25

## 2017-09-15 RX ORDER — ONDANSETRON 2 MG/ML
INJECTION INTRAMUSCULAR; INTRAVENOUS AS NEEDED
Status: DISCONTINUED | OUTPATIENT
Start: 2017-09-15 | End: 2017-09-15 | Stop reason: SURG

## 2017-09-15 RX ORDER — ACETAMINOPHEN 325 MG/1
650 TABLET ORAL EVERY 4 HOURS PRN
Status: DISCONTINUED | OUTPATIENT
Start: 2017-09-15 | End: 2017-09-15 | Stop reason: HOSPADM

## 2017-09-15 RX ADMIN — PROPOFOL 200 MG: 10 INJECTION, EMULSION INTRAVENOUS at 10:15

## 2017-09-15 RX ADMIN — SODIUM CHLORIDE, SODIUM LACTATE, POTASSIUM CHLORIDE, AND CALCIUM CHLORIDE 20 ML/HR: .6; .31; .03; .02 INJECTION, SOLUTION INTRAVENOUS at 08:36

## 2017-09-15 RX ADMIN — PROPOFOL 100 MG: 10 INJECTION, EMULSION INTRAVENOUS at 10:16

## 2017-09-15 RX ADMIN — SODIUM CHLORIDE, SODIUM LACTATE, POTASSIUM CHLORIDE, AND CALCIUM CHLORIDE: .6; .31; .03; .02 INJECTION, SOLUTION INTRAVENOUS at 10:03

## 2017-09-15 RX ADMIN — SODIUM CHLORIDE, SODIUM LACTATE, POTASSIUM CHLORIDE, AND CALCIUM CHLORIDE 20 ML/HR: .6; .31; .03; .02 INJECTION, SOLUTION INTRAVENOUS at 12:10

## 2017-09-15 RX ADMIN — ONDANSETRON 4 MG: 2 INJECTION INTRAMUSCULAR; INTRAVENOUS at 10:27

## 2017-09-15 RX ADMIN — PROPOFOL 50 MG: 10 INJECTION, EMULSION INTRAVENOUS at 10:17

## 2017-10-04 ENCOUNTER — ALLSCRIPTS OFFICE VISIT (OUTPATIENT)
Dept: OTHER | Facility: OTHER | Age: 62
End: 2017-10-04

## 2017-10-05 NOTE — PROCEDURES
Assessment  1  Kidney stone (592 0) (N20 0)    Plan  Hematuria, Kidney stone    · (1) PSA, DIAGNOSTIC (FOLLOW-UP); Status:Active; Requested for:04Apr2018; Perform:[facility]; FCP:[id number];UMEKTYC;OYN:WEQZHFILJ, Kidney stone; Ordered By:Seb Rodriguez;  Kidney stone    · * XR ABDOMEN 1 VIEW KUB; Status:Active; Requested for:04Apr2018; Perform:[facility]; PWK:84AMD4308;EHCQVLO; For:Kidney stone; Ordered By:Seb Rodriguez;   · Follow-up visit in 6 months Evaluation and Treatment  Follow-up  Status: Complete   Done: 04Apr2018   Ordered; For: Kidney stone; Ordered By: Luna Calixto Performed:  Due: 46RPX5745; Last Updated By: Gerardo Everett; 10/4/2017 12:37:49 PM    Discussion/Summary  Discussion Summary:   Patient had uneventful cystoscopic extraction of the left ureteral stent  Dietary and hydration recommendations provided to help minimize future stone risk  He will return in 6 months with KUB and PSA prior to visit  Understands and agrees with treatment plan: The treatment plan was reviewed with the patient/guardian  The patient/guardian understands and agrees with the treatment plan      Chief Complaint  Chief Complaint Free Text Note Form: Patient presents for cysto / stent removal s/p (L) ESWL (9/15/2017)      History of Present Illness  HPI: 63 y/o male with a left ureteral calculus s/p left ureteral stent insertion (3/24/2017) and left ESWL presents for stent extraction  He complains of urinary frequency, incontinence, and intermittent hematuria  He denies any fevers  He is otherwise doing well  Review of Systems  Complete-Male Urology:   Constitutional: No fever or chills, feels well, no tiredness, no recent weight gain or weight loss  Respiratory: No complaints of shortness of breath, no wheezing, no cough, no SOB on exertion, no orthopnea or PND     Cardiovascular: No complaints of slow heart rate, no fast heart rate, no chest pain, no palpitations, no leg claudication, no lower extremity  Gastrointestinal: No complaints of abdominal pain, no constipation, no nausea or vomiting, no diarrhea or bloody stools  Genitourinary: No complaints of dysuria, no incontinence, no hesitancy, no nocturia, no genital lesion, no testicular pain-and-as noted in HPI  Musculoskeletal: No complaints of arthralgia, no myalgias, no joint swelling or stiffness, no limb pain or swelling  Integumentary: No complaints of skin rash or skin lesions, no itching, no skin wound, no dry skin  Hematologic/Lymphatic: No complaints of swollen glands, no swollen glands in the neck, does not bleed easily, no easy bruising  Neurological: No compliants of headache, no confusion, no convulsions, no numbness or tingling, no dizziness or fainting, no limb weakness, no difficulty walking  Active Problems  1  Arteriosclerosis of coronary artery (414 00) (I25 10)   2  CABG   3  Colon cancer screening (V76 51) (Z12 11)   4  Coronary artery disease involving native coronary artery of native heart without angina   pectoris (414 01) (I25 10)   5  Dermatitis (692 9) (L30 9)   6  Hematuria (599 70) (R31 9)   7  Hyperlipidemia (272 4) (E78 5)   8  Kidney stone (592 0) (N20 0)   9  Microalbuminuria (791 0) (R80 9)   10  Muscle spasm (728 85) (M62 838)   11  Need for influenza vaccination (V04 81) (Z23)   12  Non compliance with medical treatment (V15 81) (Z91 19)   13  Obesity (BMI 30 0-34 9) (278 00) (E66 9)   14  Onychomycosis (110 1) (B35 1)   15  Prostate cancer screening (V76 44) (Z12 5)   16  Stasis dermatitis (454 1) (I87 2)   17  Type 2 diabetes mellitus (250 00) (E11 9)   18  Type 2 Diabetes Mellitus With Manifestations (250 80)   19  Weak carotid pulse (785 9) (R09 89)    Past Medical History  1  History of Carpal tunnel syndrome, unspecified laterality (354 0) (G56 00)   2  History of Costochondritis (733 6) (M94 0)   3  History of chest pain (V13 89) (Z87 898)   4   History of shortness of breath (V13 89) (X47 852)   5  History of Nephrolithiasis (592 0) (N20 0)  Active Problems And Past Medical History Reviewed: The active problems and past medical history were reviewed and updated today  Surgical History  1  History of Arthroscopy Shoulder   2  CABG   3  History of Cataract Surgery   4  History of Cath Stent Placement Number Of Stents Placed:   5  History of Oral Surgery Tooth Extraction   6  History of Renal Lithotripsy   7  History of Rotator Cuff Repair  Surgical History Reviewed: The surgical history was reviewed and updated today  Family History  Sister    1  Family history of Type 2 Diabetes Mellitus   2  Family history of Type 2 Diabetes Mellitus   3  Family history of Type 2 Diabetes Mellitus  Maternal Uncle    4  Family history of Coronary Artery Disease (V17 49)   5  Family history of Diabetes Mellitus (V18 0)  Family History Reviewed: The family history was reviewed and updated today  Social History   · Being A Social Drinker   · Former smoker (E39 26) (U77 931)   · Former smoker (N05 66) (B22 333)  Social History Reviewed: The social history was reviewed and updated today  Current Meds   1  Aspirin 325 MG Oral Tablet; TAKE 1 TABLET DAILY; Therapy: (Recorded:48Jvc7365) to Recorded   2  Atorvastatin Calcium 40 MG Oral Tablet; TAKE 1 TABLET DAILY FOR CHOLESTEROL; Therapy: 30GNN0790 to (Evaluate:38Udk5734)  Requested for: 41Zfq4905; Last   Rx:58Lro5983 Ordered   3  Lisinopril 10 MG Oral Tablet; Take 1 tablet daily; Therapy: 19Kcq7043 to (Last Rx:66Qjo8505)  Requested for: 74Ddg4036 Ordered   4  MetFORMIN HCl - 500 MG Oral Tablet; take 2 tablet twice daily; Therapy: 96JTP7409 to (Evaluate:05Hxi6391)  Requested for: 79BEC4989; Last   Rx:07Dbw2842 Ordered   5  Triamcinolone Acetonide 0 5 % External Ointment; APPLY 2-3 TIMES DAILY TO   AFFECTED AREA(S);    Therapy: 46BDY4290 to (Evaluate:16Mar2017)  Requested for: 15Gxb0388; Last   Rx:81Qix4121 Ordered  Medication List Reviewed: The medication list was reviewed and updated today  Allergies  1  No Known Drug Allergies    Vitals  Vital Signs    Recorded: 26AIY2794 12:08PM   Heart Rate 66   Systolic 381   Diastolic 80   Height 5 ft 6 in   Weight 186 lb    BMI Calculated 30 02   BSA Calculated 1 94     Physical Exam    Constitutional   General appearance: No acute distress, well appearing and well nourished  Pulmonary   Respiratory effort: No increased work of breathing or signs of respiratory distress  Auscultation of lungs: Clear to auscultation  Cardiovascular   Palpation of heart: Normal PMI, no thrills  Auscultation of heart: Normal rate and rhythm, normal S1 and S2, without murmurs  Examination of extremities for edema and/or varicosities: Normal     Abdomen   Abdomen: Non-tender, no masses  Genitourinary   Scrotum contents: Normal size, no masses  Epididymis: Normal, no masses  Testes: Normal testes, no masses  Urethral meatus: Normal, no lesions  Penis: Normal, no lesions  Musculoskeletal   Gait and station: Normal     Skin   Skin and subcutaneous tissue: Normal without rashes or lesions  Procedure      Procedure: Cystoscopy / Stent Removal   After consent was obtained, the patient was placed in the supine position and prepped in the usual fashion  Flexible cystourethroscopy was performed and the indwelling left ureteral stent was identified  The flexible grasping forceps were then used to remove the stent without difficulty  The patient tolerated the procedure well and without complications  Future Appointments    Date/Time Provider Specialty Site   12/01/2017 08:20 AM VincentRanjith Roberts   11/17/2017 08:40 AM JABIER Moise   Cardiology [facility] CARDIOLOGY [place]   04/04/2018 09:00 AM Lennie Dee[address] FOR UROLOGY [facility]     Signatures   Electronically signed by : JABIER Mary ; Oct  4 2017 12:42PM EST                       (Author)

## 2017-10-13 ENCOUNTER — TRANSCRIBE ORDERS (OUTPATIENT)
Dept: LAB | Facility: HOSPITAL | Age: 62
End: 2017-10-13

## 2017-10-13 ENCOUNTER — APPOINTMENT (OUTPATIENT)
Dept: LAB | Facility: HOSPITAL | Age: 62
End: 2017-10-13
Attending: UROLOGY
Payer: COMMERCIAL

## 2017-10-13 ENCOUNTER — ALLSCRIPTS OFFICE VISIT (OUTPATIENT)
Dept: OTHER | Facility: OTHER | Age: 62
End: 2017-10-13

## 2017-10-13 DIAGNOSIS — R39.15 URGENCY OF URINATION: ICD-10-CM

## 2017-10-13 LAB
BACTERIA UR QL AUTO: NORMAL /HPF
BILIRUB UR QL STRIP: NEGATIVE
CLARITY UR: CLEAR
COLOR UR: YELLOW
GLUCOSE UR STRIP-MCNC: NEGATIVE MG/DL
HGB UR QL STRIP.AUTO: NEGATIVE
KETONES UR STRIP-MCNC: NEGATIVE MG/DL
LEUKOCYTE ESTERASE UR QL STRIP: NEGATIVE
NITRITE UR QL STRIP: NEGATIVE
NON-SQ EPI CELLS URNS QL MICRO: NORMAL /HPF
PH UR STRIP.AUTO: 6.5 [PH] (ref 4.5–8)
PROT UR STRIP-MCNC: NEGATIVE MG/DL
RBC #/AREA URNS AUTO: NORMAL /HPF
SP GR UR STRIP.AUTO: 1.02 (ref 1–1.03)
UROBILINOGEN UR QL STRIP.AUTO: 1 E.U./DL
WBC #/AREA URNS AUTO: NORMAL /HPF

## 2017-10-13 PROCEDURE — 81001 URINALYSIS AUTO W/SCOPE: CPT

## 2017-10-13 PROCEDURE — 87086 URINE CULTURE/COLONY COUNT: CPT

## 2017-10-14 LAB — BACTERIA UR CULT: NORMAL

## 2017-10-14 NOTE — PROGRESS NOTES
Assessment  1  Temporal headache (784 0) (R51)   2  Need for influenza vaccination (V04 81) (Z23)    Plan  Hyperlipidemia    · Atorvastatin Calcium 40 MG Oral Tablet; TAKE 1 TABLET DAILY FOR  CHOLESTEROL  Microalbuminuria, Type 2 diabetes mellitus    · Lisinopril 10 MG Oral Tablet; Take 1 tablet daily  Need for influenza vaccination, Temporal headache    · Fluzone Quadrivalent Intramuscular Suspension; INJECT 0 5  ML  Intramuscular; To Be Done: 22MGJ8082  Temporal headache    · (1) C-REACTIVE PROTEIN; Status:Active; Requested for:13Oct2017;    · (1) SED RATE; Status:Active; Requested for:13Oct2017;    · VAS TEMPORAL ARTERY DUPLEX; SIDE:Bilateral; Status:Hold For - Scheduling;  Requested for:13Oct2017;   Type 2 diabetes mellitus    · MetFORMIN HCl - 500 MG Oral Tablet; take 2 tablet twice daily  Unlinked    · Aspirin 325 MG Oral Tablet; TAKE 1 TABLET DAILY    Discussion/Summary    Will check labs  check temporal artery doppler to r/o temporal arteritis  take tylenol for pain  flu shot today  as scheduled  Possible side effects of new medications were reviewed with the patient/guardian today  The treatment plan was reviewed with the patient/guardian  The patient/guardian understands and agrees with the treatment plan      Chief Complaint  Patient presents with head pain, a headache for a week on and off, and he has trouble sleeping  History of Present Illness  HPI: Pt is here by himself  temporal headache for 1 week  Come and go  Shooting pain  Worse during night while working  He is working night shift  Always feels tired  some vision change recently  Saw opthalmology and got new glasses  right neck pain because he slept wrong way per pt  fever, SOB, CP, n/v/abd pain  numbness/tingling or weakness  OTC aspirin but no help  Review of Systems    Constitutional: no fever or chills, feels well, no tiredness, no recent weight loss or weight gain     Cardiovascular: no complaints of slow or fast heart rate, no chest pain, no palpitations, no leg claudication or lower extremity edema  Respiratory: no complaints of shortness of breath, no wheezing or cough, no dyspnea on exertion, no orthopnea or PND  Gastrointestinal: no complaints of abdominal pain, no constipation, no nausea or vomiting, no diarrhea or bloody stools  Musculoskeletal: no complaints of arthralgia, no myalgia, no joint swelling or stiffness, no limb pain or swelling  Neurological: as noted in HPI  Active Problems  1  Arteriosclerosis of coronary artery (414 00) (I25 10)   2  CABG   3  Colon cancer screening (V76 51) (Z12 11)   4  Coronary artery disease involving native coronary artery of native heart without angina   pectoris (414 01) (I25 10)   5  Dermatitis (692 9) (L30 9)   6  Hematuria (599 70) (R31 9)   7  Hyperlipidemia (272 4) (E78 5)   8  Kidney stone (592 0) (N20 0)   9  Microalbuminuria (791 0) (R80 9)   10  Muscle spasm (728 85) (M62 838)   11  Need for influenza vaccination (V04 81) (Z23)   12  Non compliance with medical treatment (V15 81) (Z91 19)   13  Obesity (BMI 30 0-34 9) (278 00) (E66 9)   14  Onychomycosis (110 1) (B35 1)   15  Prostate cancer screening (V76 44) (Z12 5)   16  Stasis dermatitis (454 1) (I87 2)   17  Type 2 diabetes mellitus (250 00) (E11 9)   18  Type 2 Diabetes Mellitus With Manifestations (250 80)   19  Urinary urgency (788 63) (R39 15)   20  Weak carotid pulse (785 9) (R09 89)    Past Medical History  1  History of Carpal tunnel syndrome, unspecified laterality (354 0) (G56 00)   2  History of Costochondritis (733 6) (M94 0)   3  History of chest pain (V13 89) (Z87 898)   4  History of headache (V13 89) (Z87 898)   5  History of shortness of breath (V13 89) (Z87 898)   6  History of Nephrolithiasis (592 0) (N20 0)    Family History  Sister    1  Family history of Type 2 Diabetes Mellitus   2  Family history of Type 2 Diabetes Mellitus   3  Family history of Type 2 Diabetes Mellitus  Maternal Uncle    4  Family history of Coronary Artery Disease (V17 49)   5  Family history of Diabetes Mellitus (V18 0)    Social History   · Being A Social Drinker   · Former smoker (S38 43) (E39 611)   · Former smoker (C81 12) (P62 213)    Surgical History  1  History of Arthroscopy Shoulder   2  CABG   3  History of Cataract Surgery   4  History of Cath Stent Placement Number Of Stents Placed:   5  History of Oral Surgery Tooth Extraction   6  History of Renal Lithotripsy   7  History of Rotator Cuff Repair    Current Meds   1  Aspirin 325 MG Oral Tablet; TAKE 1 TABLET DAILY; Therapy: (Recorded:13Oct2017) to Recorded   2  Atorvastatin Calcium 40 MG Oral Tablet; TAKE 1 TABLET DAILY FOR CHOLESTEROL; Therapy: 80FDR4370 to (Evaluate:89Nls8235)  Requested for: 92Hqe7145; Last   Rx:31Nyn0974 Ordered   3  Lisinopril 10 MG Oral Tablet; Take 1 tablet daily; Therapy: 53Lgb2729 to (Last Rx:75Rjp1407)  Requested for: 18Ony1685 Ordered   4  MetFORMIN HCl - 500 MG Oral Tablet; take 2 tablet twice daily; Therapy: 69TFR6729 to (Evaluate:13Afe4371)  Requested for: 39EHY1088; Last   Rx:16Jaw2112 Ordered   5  Triamcinolone Acetonide 0 5 % External Ointment; APPLY 2-3 TIMES DAILY TO   AFFECTED AREA(S); Therapy: 68ULE3544 to (Evaluate:16Mar2017)  Requested for: 44NXU0258; Last   Rx:00Olh3560 Ordered    Allergies  1  No Known Drug Allergies    Vitals   Recorded: 42MXP4722 11:02AM   Temperature 97 9 F, Tympanic   Heart Rate 84, L Radial   Pulse Quality Normal, L Radial   Respiration Quality Normal   Respiration 16   Systolic 746, LUE, Sitting   Diastolic 74, LUE, Sitting   Height 5 ft 6 in   Weight 191 lb 8 oz   BMI Calculated 30 91   BSA Calculated 1 96   Pain Scale 0     Physical Exam    Constitutional   General appearance: No acute distress, well appearing and well nourished  Ears, Nose, Mouth, and Throat   External inspection of ears and nose: Normal     Otoscopic examination: Tympanic membrance translucent with normal light reflex  Canals patent without erythema  Nasal mucosa, septum, and turbinates: Normal without edema or erythema  Oropharynx: Normal with no erythema, edema, exudate or lesions  Pulmonary   Respiratory effort: No increased work of breathing or signs of respiratory distress  Auscultation of lungs: Clear to auscultation, equal breath sounds bilaterally, no wheezes, no rales, no rhonci  Cardiovascular   Auscultation of heart: Normal rate and rhythm, normal S1 and S2, without murmurs  Examination of extremities for edema and/or varicosities: Normal     Carotid pulses: Normal     Abdomen   Abdomen: Non-tender, no masses  Liver and spleen: No hepatomegaly or splenomegaly  Lymphatic   Palpation of lymph nodes in neck: No lymphadenopathy  Musculoskeletal   Gait and station: Normal     Neurologic   Cranial nerves: Cranial nerves 2-12 intact  Additional Exam:  Temporal headache, no swollen or erythema          Results/Data  (1) URINALYSIS WITH MICROSCOPIC 13Oct2017 08:44AM Emily Holguin    Order Number: [id number]     Test Name Result Flag Reference   COLOR Yellow     CLARITY Clear     PH UA 6 5  4 5-8 0   LEUKOCYTE ESTERASE UA Negative  Negative   NITRITE UA Negative  Negative   PROTEIN UA Negative mg/dl  Negative   GLUCOSE UA Negative mg/dl  Negative   KETONES UA Negative mg/dl  Negative   UROBILINOGEN UA 1 0 E U /dl  0 2, 1 0 E U /dl   BILIRUBIN UA Negative  Negative   BLOOD UA Negative  Negative   SPECIFIC GRAVITY UA 1 020  1 003-1 030   WBC UA None Seen /hpf  None Seen, 0-5, 5-55, 5-65   RBC UA None Seen /hpf  None Seen, 0-5   BACTERIA None Seen /hpf  None Seen, Occasional   OTHER OBSERVATIONS      Occasional sperm present   Occasional sperm present   EPITHELIAL CELLS None Seen /hpf  None Seen, Occasional   COLOR Yellow     CLARITY Clear     PH UA 6 5  4 5-8 0   LEUKOCYTE ESTERASE UA Negative  Negative   NITRITE UA Negative  Negative   PROTEIN UA Negative mg/dl  Negative   GLUCOSE UA Negative mg/dl  Negative   KETONES UA Negative mg/dl  Negative   UROBILINOGEN UA 1 0 E U /dl  0 2, 1 0 E U /dl   BILIRUBIN UA Negative  Negative   BLOOD UA Negative  Negative   SPECIFIC GRAVITY UA 1 020  1 003-1 030   WBC UA None Seen /hpf  None Seen, 0-5, 5-55, 5-65   RBC UA None Seen /hpf  None Seen, 0-5   BACTERIA None Seen /hpf  None Seen, Occasional   OTHER OBSERVATIONS      Occasional sperm present   Occasional sperm present   EPITHELIAL CELLS None Seen /hpf  None Seen, Occasional                 Future Appointments    Date/Time Provider Specialty Site   12/01/2017 08:20 AM Shiv Hare MD [facility]   11/17/2017 08:40 AM JABIER Valero  Cardiology [facility] CARDIOLOGY [place]   04/04/2018 09:00 AM [facility] FOR UROLOGY Jannette Colón     Signatures   Electronically signed by :  Roxana Boyce MD; Oct 13 2017 11:34AM EST                       (Author)

## 2017-10-16 ENCOUNTER — TRANSCRIBE ORDERS (OUTPATIENT)
Dept: LAB | Facility: HOSPITAL | Age: 62
End: 2017-10-16

## 2017-10-16 ENCOUNTER — APPOINTMENT (OUTPATIENT)
Dept: LAB | Facility: HOSPITAL | Age: 62
End: 2017-10-16
Payer: COMMERCIAL

## 2017-10-16 DIAGNOSIS — R51.9 HEADACHE: ICD-10-CM

## 2017-10-16 LAB
CRP SERPL QL: <3 MG/L
ERYTHROCYTE [SEDIMENTATION RATE] IN BLOOD: 12 MM/HOUR (ref 0–10)

## 2017-10-16 PROCEDURE — 36415 COLL VENOUS BLD VENIPUNCTURE: CPT

## 2017-10-16 PROCEDURE — 86140 C-REACTIVE PROTEIN: CPT

## 2017-10-16 PROCEDURE — 85652 RBC SED RATE AUTOMATED: CPT

## 2017-10-17 ENCOUNTER — ALLSCRIPTS OFFICE VISIT (OUTPATIENT)
Dept: OTHER | Facility: OTHER | Age: 62
End: 2017-10-17

## 2017-11-02 ENCOUNTER — HOSPITAL ENCOUNTER (OUTPATIENT)
Dept: NON INVASIVE DIAGNOSTICS | Facility: CLINIC | Age: 62
Discharge: HOME | End: 2017-11-02
Payer: COMMERCIAL

## 2017-11-02 DIAGNOSIS — R51.9 HEADACHE: ICD-10-CM

## 2017-11-02 PROCEDURE — 93882 EXTRACRANIAL UNI/LTD STUDY: CPT

## 2017-11-03 ENCOUNTER — GENERIC CONVERSION - ENCOUNTER (OUTPATIENT)
Dept: OTHER | Facility: OTHER | Age: 62
End: 2017-11-03

## 2017-12-29 ENCOUNTER — ALLSCRIPTS OFFICE VISIT (OUTPATIENT)
Dept: OTHER | Facility: OTHER | Age: 62
End: 2017-12-29

## 2017-12-29 DIAGNOSIS — E66.9 OBESITY: ICD-10-CM

## 2017-12-29 DIAGNOSIS — E11.65 TYPE 2 DIABETES MELLITUS WITH HYPERGLYCEMIA (HCC): ICD-10-CM

## 2017-12-29 DIAGNOSIS — E78.5 HYPERLIPIDEMIA: ICD-10-CM

## 2018-01-09 NOTE — RESULT NOTES
Verified Results  (1) COMPREHENSIVE METABOLIC PANEL [id number] 51:36GT Osiris Beasleyine   TW Order Number: [id number]     Test Name Result Flag Reference   SODIUM 137 mmol/L  136-145   POTASSIUM 3 9 mmol/L  3 5-5 3   CHLORIDE 103 mmol/L  100-108   CARBON DIOXIDE 28 mmol/L  21-32   ANION GAP (CALC) 6 mmol/L  4-13   BLOOD UREA NITROGEN 11 mg/dL  5-25   CREATININE 1 00 mg/dL  0 60-1 30   Standardized to IDMS reference method   CALCIUM 8 7 mg/dL  8 3-10 1   BILI, TOTAL 0 43 mg/dL  0 20-1 00   ALK PHOSPHATAS 73 U/L     ALT (SGPT) 22 U/L  12-78   Specimen collection should occur prior to Sulfasalazine and/or Sulfapyridine administration due to the potential for falsely depressed results  AST(SGOT) 18 U/L  5-45   Specimen collection should occur prior to Sulfasalazine administration due to the potential for falsely depressed results  ALBUMIN 3 8 g/dL  3 5-5 0   TOTAL PROTEIN 7 1 g/dL  6 4-8 2   eGFR 81 ml/min/1 73sq m     National Kidney Disease Education Program recommendations are as follows:  GFR calculation is accurate only with a steady state creatinine  Chronic Kidney disease less than 60 ml/min/1 73 sq  meters  Kidney failure less than 15 ml/min/1 73 sq  meters  GLUCOSE FASTING 117 mg/dL H 65-99   Specimen collection should occur prior to Sulfasalazine administration due to the potential for falsely depressed results  Specimen collection should occur prior to Sulfapyridine administration due to the potential for falsely elevated results  (1) LIPID PANEL FASTING W DIRECT LDL REFLEX 27KFD7802 08:04AM Osiris Tabitha   TW Order Number: [id number]     Test Name Result Flag Reference   CHOLESTEROL 173 mg/dL     LDL CHOLESTEROL CALCULATED 92 mg/dL  0-100   Triglyceride:        Normal ??? ??? ??? ??? ??? ??? ??? <150 mg/dl   ??? ??? ???Borderline High ??? ??? 150-199 mg/dl   ??? ??? ? ?? High ??? ??? ??? ??? ??? ??? ??? 200-499 mg/dl   ??? ??? ? ??Very High ??? ??? ??? ??? ??? >499 mg/dl      Cholesterol:       Desirable ??? ??? ??? ??? <200 mg/dl   ??? ??? Borderline High ??? 200-239 mg/dl   ??? ??? High ??? ??? ??? ??? ??? ??? >239 mg/dl      HDL Cholesterol:       High ??? ???>59 mg/dL   ??? ??? Low ??? ??? <41 mg/dL      HDL Cholesterol:       High ??? ???>59 mg/dL   ??? ??? Low ??? ??? <41 mg/dL      This screening LDL is a calculated result  It does not have the accuracy of the Direct Measured LDL in the monitoring of patients with hyperlipidemia and/or statin therapy  Direct Measure LDL (LMV761) must be ordered separately in these patients  TRIGLYCERIDES 92 mg/dL  <=150   Specimen collection should occur prior to N-Acetylcysteine or Metamizole administration due to the potential for falsely depressed results  HDL,DIRECT 63 mg/dL H 40-60   Specimen collection should occur prior to Metamizole administration due to the potential for falsley depressed results  (1) TSH WITH FT4 REFLEX 27Olc4355 08:04AM [facility] Number: [id number]     Test Name Result Flag Reference   TSH 0 686 uIU/mL  0 358-3 740   Patients undergoing fluorescein dye angiography may retain small amounts of fluorescein in the body for 48-72 hours post procedure  Samples containing fluorescein can produce falsely depressed TSH values  If the patient had this procedure,a specimen should be resubmitted post fluorescein clearance  (1) HEMOGLOBIN A1C 27Iyw3465 08:04 [place] [facility] Order Number: [id number]     Test Name Result Flag Reference   HEMOGLOBIN A1C 7 4 % H 4 2-6 3   EST  AVG   GLUCOSE 166 mg/dl         Plan  Type 2 diabetes mellitus    · From  MetFORMIN HCl - 500 MG Oral Tablet Take 1 tablet every 12 hours To  MetFORMIN HCl - 500 MG Oral Tablet take 2 tablet twice daily

## 2018-01-10 NOTE — MISCELLANEOUS
Message  Return to work or school:   Soto Alicea is under my professional care  He was seen in my office on 7/28/17        Dr Meghana Diallo        Signatures   Electronically signed by : Octavia Majano, ; Jul 28 2017 12:30PM EST                       (Author)

## 2018-01-11 NOTE — RESULT NOTES
Verified Results  VAS TEMPORAL ARTERY DUPLEX 25CEK6972 04:17PM Tia Scanlon Order Number: [id number]    - Patient Instructions: To schedule this appointment, please contact Central Scheduling at [phone]  Test Name Result Flag Reference   VAS TEMPORAL ARTERY DUPLEX (Report)     [facility] REPORT   CLINICAL:   Indications:   Patient presents with off and on headaches that are mostly at night when he is   working  Denies any vision or jaw claudication  Recent blood work showed a   normal ESR at 12mm/hr and CRP <3mg/L  Risk Factors   The patient has history of HTN, Diabetes (NIDDM (oral meds)), hyperlipidemia,   CAD and smoking (current) 1 ppd  FINDINGS:      Segment           Rig    Left                              PSV EDV PSV EDV    Com  Carotid         96  36  97  28    ICA              66  22  55  25    Ext Carotid          72  20 102  25    Common Superficial Temporal       88  22    Middle Temporal Artery    52  21  45  14    Frontal            33  8  53  13    Parietal           54  17  77  25             CONCLUSION:   Impression   RIGHT SIDE:   There is no evidence of giant cell arteritis, aneurysm, or significant stenosis   noted in the superficial temporal artery and its branches  LEFT SIDE:   There is no evidence of giant cell arteritis, aneurysm, or significant stenosis   noted in the superficial temporal artery and its branches        SIGNATURE:   Electronically Signed by: Jerad Renee MD, [address] on 2017-11-02 11:03:09 PM

## 2018-01-12 VITALS
HEART RATE: 60 BPM | RESPIRATION RATE: 14 BRPM | HEIGHT: 66 IN | TEMPERATURE: 97.4 F | SYSTOLIC BLOOD PRESSURE: 122 MMHG | DIASTOLIC BLOOD PRESSURE: 80 MMHG | BODY MASS INDEX: 31.4 KG/M2 | WEIGHT: 195.4 LBS

## 2018-01-12 VITALS
WEIGHT: 191.5 LBS | HEART RATE: 84 BPM | RESPIRATION RATE: 16 BRPM | BODY MASS INDEX: 30.78 KG/M2 | SYSTOLIC BLOOD PRESSURE: 130 MMHG | DIASTOLIC BLOOD PRESSURE: 74 MMHG | TEMPERATURE: 97.9 F | HEIGHT: 66 IN

## 2018-01-13 VITALS
SYSTOLIC BLOOD PRESSURE: 132 MMHG | HEIGHT: 66 IN | DIASTOLIC BLOOD PRESSURE: 78 MMHG | BODY MASS INDEX: 31.1 KG/M2 | HEART RATE: 78 BPM | WEIGHT: 193.5 LBS

## 2018-01-13 VITALS
WEIGHT: 186 LBS | BODY MASS INDEX: 29.89 KG/M2 | DIASTOLIC BLOOD PRESSURE: 80 MMHG | HEART RATE: 66 BPM | HEIGHT: 66 IN | SYSTOLIC BLOOD PRESSURE: 130 MMHG

## 2018-01-13 VITALS
DIASTOLIC BLOOD PRESSURE: 76 MMHG | HEART RATE: 78 BPM | SYSTOLIC BLOOD PRESSURE: 126 MMHG | WEIGHT: 191 LBS | BODY MASS INDEX: 30.7 KG/M2 | HEIGHT: 66 IN | RESPIRATION RATE: 16 BRPM | TEMPERATURE: 97.6 F

## 2018-01-13 VITALS
WEIGHT: 192.5 LBS | HEIGHT: 66 IN | BODY MASS INDEX: 30.94 KG/M2 | RESPIRATION RATE: 16 BRPM | DIASTOLIC BLOOD PRESSURE: 80 MMHG | HEART RATE: 78 BPM | SYSTOLIC BLOOD PRESSURE: 126 MMHG | TEMPERATURE: 98.3 F

## 2018-01-13 NOTE — PROGRESS NOTES
Preliminary Nursing Report                Patient Information    Initial Encounter Entry Date:   2017 10:32 AM EST (Automated Transmission Automated Transmission)       Last Modified:   {Debo Martinez}              Legal Name: Du Conrad Number:        YOB: 1955        Age (years): 62        Gender: M        Body Mass Index (BMI): 31 kg/m2        Height: 66 in  Weight: 195 lbs (88 kgs)           Address:   [place] [address] [place]              Phone: [phone]   (consent to leave messages)        Email:        Ethnicity: Decline to State        Cheondoism:        Marital Status:        Preferred Language: English        Race: Other Race                    Patient Insurance Information        Primary Insurance Information Carrier Name: {Primary  CarrierName}           Carrier Address:   {Primary  CarrierAddress}              Carrier Phone: {Primary  CarrierPhone}          Group Number: {Primary  GroupNumber}          Policy Number: {Primary  PolicyNumber}          Insured Name: {Primary  InsuredName}          Insured : {Primary  InsuredDOB}          Relationship to Insured: {Primary  RelationshiptoInsured}           Secondary Insurance Information Carrier Name: {Secondary  CarrierName}           Carrier Address:   {Secondary  CarrierAddress}              Carrier Phone: {Secondary  CarrierPhone}          Group Number: {Secondary  GroupNumber}          Policy Number: {Secondary  PolicyNumber}          Insured Name: {Secondary  InsuredName}          Insured : {Secondary  InsuredDOB}          Relationship to Insured: {Secondary  RelationshiptoInsured}                       Health Profile   Booking #:   Nealjudith Corral #: [id number]               DOS: 2017    Surgery : CYSTOURETHROSCOPY, WITH URETEROSCOPY AND/OR PYELOSCOPY;    Add'l Procedures/Notes:     Surgery Risk: Minor          Precautions     Arteriosclerosis of coronary artery Coronary artery disease involving native coronary artery of native heart without angina pectoris       Type 2 diabetes mellitus                   Allergies    No Known Drug Allergies             Medications    Aspirin 325 MG Oral Tablet       Atorvastatin Calcium 40 MG Oral Tablet       Lisinopril 10 MG Oral Tablet       MetFORMIN HCl - 500 MG Oral Tablet       Triamcinolone Acetonide 0 5 % External Ointment               Conditions    Arteriosclerosis of coronary artery       Colon cancer screening       Coronary artery disease involving native coronary artery of native heart without angina pectoris       Dermatitis       Hematuria       Hyperlipidemia       Kidney stone       Microalbuminuria       Muscle spasm       Need for influenza vaccination       Non compliance with medical treatment       Obesity (BMI 30 0-34 9)       Onychomycosis       Prostate cancer screening       Stasis dermatitis       Type 2 diabetes mellitus       Type 2 Diabetes Mellitus With Manifestations       Weak carotid pulse               Family History    None             Surgical History    None             Social History    Being A Social Drinker       Former smoker                               Patient Instructions       Medical Procedure Risk  NPO Instructions   The day before surgery it is recommended to have a light dinner at your usual time and you are allowed a light snack early in the evening  Do not eat anything heavy or eat a big meal after 7pm  Do not eat or drink anything after midnight prior to your surgery  If you are supposed to take any of your medications, do so with a sip of water  Failure to follow these instructions can lead to an increased risk of lung complications and may result in a delay or cancellation of your procedure  If you have any questions, contact your institution for further instructions   No candy, no gum, no mints, no chewing tobacco          Lisinopril 10 MG Oral Tablet  Medication Instruction (ACE/ARB - Blood Pressure Medication) 1  Please continue the following medications up to the evening before surgery, but do not take it on the day of surgery  Please restart your medications as soon as clinically feasible  Aspirin 325 MG Oral Tablet  Medication Instruction (Aspirin - Blood Thinners) 112, 104, 105, 114, 113, 103, 110, 107, 108, 109, 111, 106  Please continue to take this medication on your normal schedule  If this is an oral medication and you take in the morning, you may do so with a sip of water  However, your surgeon may have you stop aspirin up to a week early if you are having intracranial, middle ear, posterior eye, spine surgery or prostate surgery  Triamcinolone Acetonide 0 5 % External Ointment  Medication Instruction (Steroids) 83, 84, 102  Please continue your steroid medications up to and including the day of surgery (with a sip of water, if oral medication)  Testing Considerations       ? Blood Glucose on Day of Surgery t  Please check the blood sugar on the morning of surgery  Triggered by: Type 2 diabetes mellitus               Consultations       ? Cardiac Consult (Major MI) c  If the patient has had a Myocardial Infarction within 30 days then a cardiac consult is indicated  Also, if the patient is having increasing frequency or intensity of chest pain then a cardiac consult is indicated  Otherwise, optimization of medical therapy is recommended under the direction of the PCP or cardiologist   Triggered by: Arteriosclerosis of coronary artery, Coronary artery disease involving native coronary artery of native heart without angina pectoris               Miscellaneous Questions         Question: Are you able to walk up a flight of stairs, walk up a hill or do heavy housework WITHOUT having chest pain or shortness of breath?        Answer: YES                   Allergies/Conditions/Medications Not Found        The following were not recognized by our system when generating the recommendations  Please consider if this would impact any preoperative protocols  ? Being A Social Drinker       ? Onychomycosis       ? Type 2 Diabetes Mellitus With Manifestations       ? Atorvastatin Calcium 40 MG Oral Tablet       ? MetFORMIN HCl - 500 MG Oral Tablet                  Appointment Information         Date:    07/28/2017        Location:    [facility]        Address:           Directions:                      Footnotes revision 14      ?? Denotes a free-text entry  Legal Disclaimer: Any and all recommendations and services provided herein are designed to assist in the preoperative care of the patient  Nothing contained herein is designed to replace, eliminate or alleviate the responsibility of the attending physician to supervise and determine the patient?s preoperative care and course of treatment  Failure to provide complete, accurate information may negatively impact the system?s ability to recommend the proper preoperative protocol  THE ATTENDING PHYSICIAN IS RESPONSIBLE TO REVIEW THE SUGGESTED PREOPERATIVE PROTOCOLS/COURSE OF TREATMENT AND PRESCRIBE THE FINAL COURSE OF PREOPERATIVE TREATMENT IN CONSULTATION WITH THE PATIENT  THE ePREOP SYSTEM AND ITS MATERIALS ARE PROVIDED ? AS IS? WITHOUT WARRANTY OF ANY KIND, EXPRESS OR IMPLIED, INCLUDING, BUT NOT LIMITED TO, WARRANTIES OF PERFORMANCE OR MERCHANTABILITY OR FITNESS FOR A PARTICULAR PURPOSE  PATIENT AND PHYSICIANS HEREBY AGREE THAT THEIR USE OF THE MATERIALS AND RESOURCES ACT AS A CONSENT TO RELEASE AND WAIVE ePREOP FROM ANY AND ALL CLAIMS OF WARRANTY, TORT OR CONTRACT LAW OF ANY KIND  Electronically signed Ann PATEL    Jul 7 2017 12:24PM EST

## 2018-01-14 VITALS
BODY MASS INDEX: 31.05 KG/M2 | SYSTOLIC BLOOD PRESSURE: 132 MMHG | WEIGHT: 193.19 LBS | HEART RATE: 86 BPM | TEMPERATURE: 98 F | RESPIRATION RATE: 16 BRPM | HEIGHT: 66 IN | DIASTOLIC BLOOD PRESSURE: 84 MMHG

## 2018-01-14 VITALS
HEIGHT: 66 IN | HEART RATE: 72 BPM | BODY MASS INDEX: 30.41 KG/M2 | SYSTOLIC BLOOD PRESSURE: 150 MMHG | WEIGHT: 189.25 LBS | DIASTOLIC BLOOD PRESSURE: 90 MMHG

## 2018-01-14 VITALS
WEIGHT: 193.38 LBS | SYSTOLIC BLOOD PRESSURE: 126 MMHG | HEIGHT: 66 IN | DIASTOLIC BLOOD PRESSURE: 84 MMHG | BODY MASS INDEX: 31.08 KG/M2 | HEART RATE: 76 BPM

## 2018-01-14 VITALS
HEART RATE: 60 BPM | BODY MASS INDEX: 30.37 KG/M2 | SYSTOLIC BLOOD PRESSURE: 124 MMHG | HEIGHT: 66 IN | DIASTOLIC BLOOD PRESSURE: 78 MMHG | WEIGHT: 189 LBS

## 2018-01-14 NOTE — RESULT NOTES
Message   Urine culture normal  Please ask pt to follow urology as discussed in [address]        Verified Results  (1) URINE CULTURE [id number] 02:32PM Tia Garrick   TW Order Number: [id number]     Test Name Result Flag Reference   CLINICAL REPORT (Report)     Test:        Urine culture  Specimen Type:   Urine  Specimen Date:   1/19/2017 2:32 PM  Result Date:    1/20/2017 10:56 AM  Result Status:   Final result  Resulting Lab:   [facility] [address] [place] [address]            Tel: [phone]      CULTURE                                       ------------------                                   No Growth <1000 cfu/mL

## 2018-01-14 NOTE — RESULT NOTES
Message   WBC normal   hg normal   Platelet normal   electrolytes normal   kidney function normal   liver enzymes normal   TSH normal   Total cholesterol 171 normal   triglycerides 54 normal   HDL 66 good   LDL 94 normal   HgA1C 6 9 Pt should follow low carb diet  Verified Results  (1) CBC/PLT/DIFF [id number] 08:34AM Shelia Sheikh   TW Order Number: [id number]     Test Name Result Flag Reference   WBC COUNT 7 92 Thousand/uL  4 31-10 16   RBC COUNT 4 24 Million/uL  3 88-5 62   HEMOGLOBIN 13 3 g/dL  12 0-17 0   HEMATOCRIT 39 1 %  36 5-49 3   MCV 92 fL  82-98   MCH 31 4 pg  26 8-34 3   MCHC 34 0 g/dL  31 4-37 4   RDW 13 3 %  11 6-15 1   MPV 10 4 fL  8 9-12 7   PLATELET COUNT 325 Thousands/uL  149-390   nRBC AUTOMATED 0 /100 WBCs     NEUTROPHILS RELATIVE PERCENT 53 %  43-75   LYMPHOCYTES RELATIVE PERCENT 39 %  14-44   MONOCYTES RELATIVE PERCENT 5 %  4-12   EOSINOPHILS RELATIVE PERCENT 2 %  0-6   BASOPHILS RELATIVE PERCENT 1 %  0-1   NEUTROPHILS ABSOLUTE COUNT 4 22 Thousands/?L  1 85-7 62   LYMPHOCYTES ABSOLUTE COUNT 3 07 Thousands/?L  0 60-4 47   MONOCYTES ABSOLUTE COUNT 0 37 Thousand/?L  0 17-1 22   EOSINOPHILS ABSOLUTE COUNT 0 19 Thousand/?L  0 00-0 61   BASOPHILS ABSOLUTE COUNT 0 05 Thousands/?L  0 00-0 10   - Patient Instructions: This bloodwork is non-fasting  Please drink two glasses of water morning of bloodwork  - Patient Instructions: This bloodwork is non-fasting  Please drink two glasses of water morning of bloodwork  (1) COMPREHENSIVE METABOLIC PANEL [id number] 08:98GK Shelia Sheikh    Order Number: [id number]     Test Name Result Flag Reference   GLUCOSE,RANDM 117 mg/dL     If the patient is fasting, the ADA then defines impaired fasting glucose as > 100 mg/dL and diabetes as > or equal to 123 mg/dL     SODIUM 136 mmol/L  136-145   POTASSIUM 3 6 mmol/L  3 5-5 3   CHLORIDE 101 mmol/L  100-108   CARBON DIOXIDE 29 mmol/L  21-32   ANION GAP (CALC) 6 mmol/L  4-13   BLOOD UREA NITROGEN 16 mg/dL  5-25   CREATININE 0 96 mg/dL  0 60-1 30   Standardized to IDMS reference method   CALCIUM 9 2 mg/dL  8 3-10 1   BILI, TOTAL 0 57 mg/dL  0 20-1 00   ALK PHOSPHATAS 63 U/L     ALT (SGPT) 35 U/L  12-78   AST(SGOT) 25 U/L  5-45   ALBUMIN 4 2 g/dL  3 5-5 0   TOTAL PROTEIN 7 3 g/dL  6 4-8 2   eGFR Non-African American      >60 0 ml/min/1 73sq m   - Patient Instructions: This is a fasting blood test  Water, black tea or black coffee only after 9:00pm the night before test Drink 2 glasses of water the morning of test   National Kidney Disease Education Program recommendations are as follows:  GFR calculation is accurate only with a steady state creatinine  Chronic Kidney disease less than 60 ml/min/1 73 sq  meters  Kidney failure less than 15 ml/min/1 73 sq  meters  (1) LIPID PANEL FASTING W DIRECT LDL REFLEX [id number] 08:34AM Jayla Strong    Order Number: [id number]     Test Name Result Flag Reference   CHOLESTEROL 171 mg/dL     LDL CHOLESTEROL CALCULATED 94 mg/dL  0-100   - Patient Instructions: This is a fasting blood test  Water, black tea or black coffee only after 9:00pm the night before test   Drink 2 glasses of water the morning of test     - Patient Instructions:  This is a fasting blood test  Water, black tea or black coffee only after 9:00pm the night before test Drink 2 glasses of water the morning of test   Triglyceride:         Normal              <150 mg/dl       Borderline High    150-199 mg/dl       High               200-499 mg/dl       Very High          >499 mg/dl  Cholesterol:         Desirable        <200 mg/dl      Borderline High  200-239 mg/dl      High             >239 mg/dl  HDL Cholesterol:        High    >59 mg/dL      Low     <41 mg/dL  LDL Cholesterol:        Optimal          <100 mg/dl        Near Optimal     100-129 mg/dl        Above Optimal          Borderline High   130-159 mg/dl          High              160-189 mg/dl          Very High >189 mg/dl  LDL CALCULATED:    This screening LDL is a calculated result  It does not have the accuracy of the Direct Measured LDL in the monitoring of patients with hyperlipidemia and/or statin therapy  Direct Measure LDL (XVK597) must be ordered separately in these patients  TRIGLYCERIDES 54 mg/dL  <=150   Specimen collection should occur prior to N-Acetylcysteine or Metamizole administration due to the potential for falsely depressed results  HDL,DIRECT 66 mg/dL H 40-60   Specimen collection should occur prior to Metamizole administration due to the potential for falsely depressed results  (1) TSH WITH FT4 REFLEX [id number] 08:34AM [facility] Order Number: [id number]     Test Name Result Flag Reference   TSH 0 991 uIU/mL  0 358-3 740   - Patient Instructions: This is a fasting blood test  Water, black tea or black coffee only after 9:00pm the night before test Drink 2 glasses of water the morning of test   Patients undergoing fluorescein dye angiography may retain small amounts of fluorescein in the body for 48-72 hours post procedure  Samples containing fluorescein can produce falsely depressed TSH values  If the patient had this procedure,a specimen should be resubmitted post fluorescein clearance  (1) HEMOGLOBIN A1C [id number] 08:34AM [facility] Order Number: [id number]     Test Name Result Flag Reference   HEMOGLOBIN A1C 6 9 % H 4 2-6 3   EST  AVG   GLUCOSE 151 mg/dl

## 2018-01-14 NOTE — RESULT NOTES
Message   HgA1C 7 1 still elevated   Pt should follow low carb diet  I will strongly suggest pt to start metformin  If pt agreed, let me know  electrolytes normal   liver enzymes normal   kidney function normal   Lipid panel normal     Verified Results  (1) COMPREHENSIVE METABOLIC PANEL [id number] 57:54VB [facility] Order Number: [id number]  TW Order Number: [id number] Order Number: [id number]     Test Name Result Flag Reference   GLUCOSE,RANDM 122 mg/dL     If the patient is fasting, the ADA then defines impaired fasting glucose as > 100 mg/dL and diabetes as > or equal to 123 mg/dL  SODIUM 140 mmol/L  136-145   POTASSIUM 4 2 mmol/L  3 5-5 3   CHLORIDE 105 mmol/L  100-108   CARBON DIOXIDE 29 mmol/L  21-32   ANION GAP (CALC) 6 mmol/L  4-13   BLOOD UREA NITROGEN 11 mg/dL  5-25   CREATININE 1 04 mg/dL  0 60-1 30   Standardized to IDMS reference method   CALCIUM 8 8 mg/dL  8 3-10 1   BILI, TOTAL 0 56 mg/dL  0 20-1 00   ALK PHOSPHATAS 68 U/L     ALT (SGPT) 29 U/L  12-78   AST(SGOT) 21 U/L  5-45   ALBUMIN 3 7 g/dL  3 5-5 0   TOTAL PROTEIN 7 1 g/dL  6 4-8 2   eGFR Non-African American      >60 0 ml/min/1 73sq [facility] Disease Education Program recommendations are as follows:  GFR calculation is accurate only with a steady state creatinine  Chronic Kidney disease less than 60 ml/min/1 73 sq  meters  Kidney failure less than 15 ml/min/1 73 sq  meters  (1) HEMOGLOBIN A1C [id number] 08:44AM [facility] Order Number: [id number]  TW Order Number: [id number]     Test Name Result Flag Reference   HEMOGLOBIN A1C 7 1 % H 4 2-6 3   EST  AVG   GLUCOSE 157 mg/dl       (1) LIPID PANEL FASTING W DIRECT LDL REFLEX [id number] 08:44AM [facility] Order Number: [id number]  TW Order Number: [id number] Order Number: [id number]     Test Name Result Flag Reference   CHOLESTEROL 153 mg/dL     LDL CHOLESTEROL CALCULATED 84 mg/dL  0-100   Triglyceride:         Normal              <150 mg/dl       Borderline High    150-199 mg/dl       High               200-499 mg/dl       Very High          >499 mg/dl  Cholesterol:         Desirable        <200 mg/dl      Borderline High  200-239 mg/dl      High             >239 mg/dl  HDL Cholesterol:        High    >59 mg/dL      Low     <41 mg/dL  LDL Cholesterol:        Optimal          <100 mg/dl        Near Optimal     100-129 mg/dl        Above Optimal          Borderline High   130-159 mg/dl          High              160-189 mg/dl          Very High        >189 mg/dl  LDL CALCULATED:    This screening LDL is a calculated result  It does not have the accuracy of the Direct Measured LDL in the monitoring of patients with hyperlipidemia and/or statin therapy  Direct Measure LDL (LHP132) must be ordered separately in these patients  TRIGLYCERIDES 76 mg/dL  <=150   Specimen collection should occur prior to N-Acetylcysteine or Metamizole administration due to the potential for falsely depressed results  HDL,DIRECT 54 mg/dL  40-60   Specimen collection should occur prior to Metamizole administration due to the potential for falsely depressed results

## 2018-01-15 NOTE — RESULT NOTES
Verified Results  (1) URINE CULTURE [id number] 12:02PM Chapin Freire Order Number: [id number]     Test Name Result Flag Reference   CLINICAL REPORT (Report)     Test:        Urine culture  Specimen Type:   Urine  Specimen Date:   8/23/2016 12:02 PM  Result Date:    8/24/2016 10:34 AM  Result Status:   Final result  Resulting Lab:   [address] [address]            Tel: [phone]      CULTURE                                       ------------------                                   No Growth <1000 cfu/mL

## 2018-01-15 NOTE — PROGRESS NOTES
Chief Complaint  Patient came in for his flu shot, patient tolerated the injection in his left deltoid  Lot #- [id number], [id number], Exp-6/30/18      Active Problems    1  Arteriosclerosis of coronary artery (414 00) (I25 10)   2  CABG   3  Colon cancer screening (V76 51) (Z12 11)   4  Coronary artery disease involving native coronary artery of native heart without angina   pectoris (414 01) (I25 10)   5  Dermatitis (692 9) (L30 9)   6  Hematuria (599 70) (R31 9)   7  Hyperlipidemia (272 4) (E78 5)   8  Kidney stone (592 0) (N20 0)   9  Microalbuminuria (791 0) (R80 9)   10  Muscle spasm (728 85) (M62 838)   11  Need for influenza vaccination (V04 81) (Z23)   12  Non compliance with medical treatment (V15 81) (Z91 19)   13  Obesity (BMI 30 0-34 9) (278 00) (E66 9)   14  Onychomycosis (110 1) (B35 1)   15  Prostate cancer screening (V76 44) (Z12 5)   16  Stasis dermatitis (454 1) (I87 2)   17  Temporal headache (784 0) (R51)   18  Type 2 diabetes mellitus (250 00) (E11 9)   19  Type 2 Diabetes Mellitus With Manifestations (250 80)   20  Urinary urgency (788 63) (R39 15)   21  Weak carotid pulse (785 9) (R09 89)    Current Meds   1  Aspirin 325 MG Oral Tablet; TAKE 1 TABLET DAILY; Therapy: (Recorded:13Oct2017) to Recorded   2  Atorvastatin Calcium 40 MG Oral Tablet; TAKE 1 TABLET DAILY FOR CHOLESTEROL; Therapy: 38POP8136 to ([facility])  Requested for: 33KSM2641; Last   Rx:43Tgs5512 Ordered   3  Lisinopril 10 MG Oral Tablet; Take 1 tablet daily; Therapy: 56Hfm1031 to (Last Rx:70Axy4159)  Requested for: 13Oct2017 Ordered   4  MetFORMIN HCl - 500 MG Oral Tablet; take 2 tablet twice daily; Therapy: 78CTM7480 to (Evaluate:72Ptj4553)  Requested for: 10ZIQ2246; Last   Rx:58Tpe3130 Ordered    Allergies    1   No Known Drug Allergies    Plan  Need for influenza vaccination, Temporal headache    · Fluzone Quadrivalent Intramuscular Suspension    Future Appointments    Date/Time Provider Specialty Site 12/01/2017 08:20 AM Radha Rosario  [facility]   11/17/2017 08:40 AM JABIER Rodrigez  Cardiology [facility] CARDIOLOGY [place]   04/04/2018 09:00 AM Cielo Blackwell, [address]  Urology [facility] FOR UROLOGY Erika Hand     Signatures   Electronically signed by :  Haroon Moncada MD; Oct 17 2017  4:54PM EST                       (Review)

## 2018-01-16 NOTE — RESULT NOTES
Message   CT renal study showed kidney stones, 1 4X1  9X3 cm, please ask pt to follow up to urology  Verified Results  CT RENAL STONE STUDY ABDOMEN PELVIS WO CONTRAST [id number] 09:31AM Yuliya Pasha     Test Name Result Flag Reference   CT RENAL STONE STUDY ABDOMEN PELVIS WO CONTRAST (Report)     CT ABDOMEN AND PELVIS WITHOUT IV CONTRAST - LOW DOSE RENAL STONE      INDICATION: 71-year-old male, left-sided flank pain, kidney stones    COMPARISON: 10/14/2016 CT     TECHNIQUE: Low dose thin section CT examination of the abdomen and pelvis was performed without intravenous or oral contrast according to a protocol specifically designed to evaluate for urinary tract calculus  Axial, sagittal and coronal reformatted    images were submitted for interpretation  This examination, like all CT scans performed in the [facility], was performed utilizing techniques to minimize radiation dose exposure, including the use of iterative reconstruction and    automated exposure control  Evaluation for pathology in the abdomen and pelvis that is unrelated to urinary tract calculi is limited  FINDINGS:     RIGHT KIDNEY AND URETER:   No urinary tract calculi  No hydronephrosis or hydroureter  No perinephric collection  LEFT KIDNEY AND URETER:   Persistent nonobstructive mid to lower pole calculus measuring approximately 1 4 cm x 1 9 cm x 3 cm, unchanged  Additional 3 mm nonobstructive lower pole calculus  No hydronephrosis or hydroureter  No perinephric collection  URINARY BLADDER:   Increased bladder wall thickening which is suspicious for hypertrophy and possible superimposed cystitis     No significant abnormality in the visualized lung bases  Several hepatic hypodensities, likely simple cysts, unchanged, although largest of which measures approximately 4 4 cm at the inferior right lobe       Limited low radiation dose noncontrast CT evaluation demonstrates no clinically significant abnormality of spleen, pancreas, or adrenal glands  No calcified gallstones or gallbladder wall thickening noted  No bowel obstruction  No ascites or lymphadenopathy  Limited evaluation demonstrates no evidence to suggest acute appendicitis  No acute fracture or destructive osseous lesion is identified           Mild degenerative spondylosis, bilateral foraminal stenosis, L4-5 and L5-S1, consistent with 10/20/2006 MRI       IMPRESSION:   Persistent left renal calculus measuring1 4 cm x 1 9 cm x 3 cm     3 mm lower pole left renal calculus     No evidence of hydronephrosis or hydroureter     Several persistent hepatic hypodensities, likely simple cysts          ##sigslh##sigslh       Workstation performed: [id number]     Signed by:   Gemma Nichols MD   1/30/17

## 2018-01-18 ENCOUNTER — ALLSCRIPTS OFFICE VISIT (OUTPATIENT)
Dept: OTHER | Facility: OTHER | Age: 63
End: 2018-01-18

## 2018-01-19 NOTE — PROGRESS NOTES
Assessment   Assessed    1  Coronary artery disease involving native coronary artery of native heart without angina     pectoris (414 01) (I25 10)   2  CABG   3  Hyperlipidemia (272 4) (E78 5)   4  Benign hypertension (401 1) (I10)    Plan   Benign hypertension, CABG, Coronary artery disease involving native coronary artery of    native heart without angina pectoris, Hyperlipidemia    · Follow-Up visit in 9 months Evaluation and Treatment  Follow-up  Status: Hold For -    Scheduling  Requested for: [id number]   Ordered; For: Benign hypertension, CABG, Coronary artery disease involving native coronary artery of native heart without angina pectoris, Hyperlipidemia; Ordered By: Sarah Mejia Performed:  Due: 71RWQ3524   · (1) LIPID PANEL FASTING W DIRECT LDL REFLEX; Status:Active; Requested    for:2018; Perform:[facility]; [id number];AFZFNIW; For:Benign hypertension, CABG, Coronary artery disease involving native coronary artery of native heart without angina pectoris, Hyperlipidemia; Ordered By:Elena Perez;  CABG, Coronary artery disease involving native coronary artery of native heart without    angina pectoris    · Rosuvastatin Calcium 40 MG Oral Tablet (Crestor); TAKE 1 TABLET DAILY   Rx By: Sarah Mejia; Dispense: 90 Days ; #:90 Tablet; Refill: 3;For: CABG, Coronary artery disease involving native coronary artery of native heart without angina pectoris; SARTHAK = N; Record  Coronary artery disease involving native coronary artery of native heart without angina    pectoris    · EKG/ECG- POC; Status:Complete;   Done: [id number]   Perform: In Office; Due:2019; Last Updated By:Brianna Humphreys; 2018 9:02:53 AM;Ordered; For:Coronary artery disease involving native coronary artery of native heart without angina pectoris; Ordered By:Elena Perze;  Hyperlipidemia    · Atorvastatin Calcium 40 MG Oral Tablet   Rx By: Sarah Mejia; Dispense: 90 Days ; #:90 TAB;  Refill: 3;For: Hyperlipidemia; SARTHAK = N; Sent To: [email or web address]    Discussion/Summary   Counseling Documentation With Imm: The patient was counseled regarding diagnostic results,-- instructions for management,-- risk factor reductions,-- prognosis,-- patient and family education,-- impressions,-- risks and benefits of treatment options,-- importance of compliance with treatment  Discussion Summary:    I had the pleasure of seeing Mr Sally Good presents for a followup visit  He is a very pleasant 70-year-old  gentleman with a history of dyslipidemia, coronary artery disease, prior percutaneous coronary interventions (last in 2005) and coronary artery bypass grafting X 3, in November 2012  We did not have any records regarding his bypass or coronary anatomy from [place]  baseline he was physically very active working out at the gym doing weights as well as running on the treadmill 30 minutes a few times a week without any symptoms, now busy with working night shift and not able to exercise but free of symptoms  Has had some renal calculi issues and needed a stent and has not exercised recently  started smoking about 1/2-3/4 ppd  He also had erectile dysfunction  physical exam is unremarkable  His lipid profile shows an elevated LDL at 92 in 8/17  His ECG shows a right bundle branch block     artery disease status post CABG: Continue aspirin, atorvastatin 40 - would change to Crestor 40 once his runs out of his current supply  Does not have HTN  Restart exercise  Last stress test in 2011 without ischemia and normal function  Physically quite active without symptoms   continue Atorvastatin, change to Crestor 40 once he runs out of his Atorvastatin, then check lipids   left carotid pulse: < 50% BL on dopplers - Dec 15   dysfunction: stable   tobacco use, started again recently - wants to do it himself -BY FEB 18TH 2018   in 6 months        Chief Complaint   Chief Complaint Free Text Note Form: Patient is here for 9 month follow up  Patient has no cardiac complaint  History of Present Illness   Cardiology HPI Free Text Note Form [facility]: I had the pleasure of seeing Mr  Marley Schneider presents for a followup visit  He is a very pleasant 23-year-old  gentleman with a history of dyslipidemia, coronary artery disease, prior percutaneous coronary interventions (last in 2005) and coronary artery bypass grafting X 3, in November 2012  We did not have any records regarding his bypass or coronary anatomy from [place]  baseline he was physically very active working out at the gym doing weights as well as running on the treadmill 30 minutes a few times a week without any symptoms, now busy with working night shift and not able to exercise but free of symptoms  Has had some renal calculi issues and needed a stent and has not exercised recently  started smoking about 1/2-3/4 ppd  He also had erectile dysfunction  Review of Systems   Cardiology Male ROS:         Cardiac: No complaints of chest pain, no palpitations, no fainiting  Skin: No complaints of nonhealing sores or skin rash  Genitourinary: No complaints of recurrent urinary tract infections, frequent urination at night, difficult urination, blood in urine, kidney stones, loss of bladder control, no kidney or prostate problems, no erectile dysfunction  Psychological: No complaints of feeling depressed, anxiety, panic attacks, or difficulty concentrating  General: No complaints of trouble sleeping, lack of energy, fatigue, appetite changes, weight changes, fever, frequent infections, or night sweats  Respiratory: No complaints of shortness of breath, cough with sputum, or wheezing  HEENT: No complaints of serious problems, hearing problems, nose problems, throat problems, or snoring        Gastrointestinal: No complaints of liver problems, nausea, vomiting, heartburn, constipation, bloody stools, diarrhea, problems swallowing, adbominal pain, or rectal bleeding  Hematologic: No complaints of bleeding disorders, anemia, blood clots, or excessive brusing  Neurological: No complaints of numbness, tingling, dizziness, weakness, seizures, headaches, syncope or fainting, AM fatigue, daytime sleepiness, no witnessed apnea episodes  Musculoskeletal: No complaints of arthritis, back pain, or painfull swelling  ROS Reviewed:    ROS reviewed  Active Problems   Problems    1  Arteriosclerosis of coronary artery (414 00) (I25 10)   2  CABG   3  Colon cancer screening (V76 51) (Z12 11)   4  Coronary artery disease involving native coronary artery of native heart without angina     pectoris (414 01) (I25 10)   5  Dermatitis (692 9) (L30 9)   6  Diabetes mellitus type 2, uncontrolled (250 02) (E11 65)   7  Hematuria (599 70) (R31 9)   8  Hyperlipidemia (272 4) (E78 5)   9  Kidney stone (592 0) (N20 0)   10  Microalbuminuria (791 0) (R80 9)   11  Muscle spasm (728 85) (M62 838)   12  Need for influenza vaccination (V04 81) (Z23)   13  Non compliance with medical treatment (V15 81) (Z91 19)   14  Obesity (BMI 30 0-34 9) (278 00) (E66 9)   15  Onychomycosis (110 1) (B35 1)   16  Prostate cancer screening (V76 44) (Z12 5)   17  Stasis dermatitis (454 1) (I87 2)   18  Temporal headache (784 0) (R51)   19  Type 2 Diabetes Mellitus With Manifestations (250 80)   20  Urinary urgency (788 63) (R39 15)   21  Weak carotid pulse (785 9) (R09 89)    Past Medical History   Problems    1  History of Carpal tunnel syndrome, unspecified laterality (354 0) (G56 00)   2  History of Costochondritis (733 6) (M94 0)   3  History of chest pain (V13 89) (Z87 898)   4  History of headache (V13 89) (Z87 898)   5  History of shortness of breath (V13 89) (Z87 898)   6  History of Nephrolithiasis (592 0) (N20 0)  Active Problems And Past Medical History Reviewed: The active problems and past medical history were reviewed and updated today        Surgical History   Problems 1  History of Arthroscopy Shoulder   2  CABG   3  History of Cataract Surgery   4  History of Cath Stent Placement Number Of Stents Placed:   5  History of Oral Surgery Tooth Extraction   6  History of Renal Lithotripsy   7  History of Rotator Cuff Repair  Surgical History Reviewed: The surgical history was reviewed and updated today  Family History   Sister    1  Family history of Type 2 Diabetes Mellitus   2  Family history of Type 2 Diabetes Mellitus   3  Family history of Type 2 Diabetes Mellitus  Maternal Uncle    4  Family history of Coronary Artery Disease (V17 49)   5  Family history of Diabetes Mellitus (V18 0)  Family History Reviewed: The family history was reviewed and updated today  Social History   Problems    · Being A Social Drinker   · Former smoker (J96 42) (Q46 407)   · Former smoker (O51 32) (K71 684)  Social History Reviewed: The social history was reviewed and updated today  The social history was reviewed and is unchanged  Current Meds    1  Aspirin 325 MG Oral Tablet; TAKE 1 TABLET DAILY; Therapy: (Recorded:13Oct2017) to Recorded   2  Atorvastatin Calcium 40 MG Oral Tablet; TAKE 1 TABLET DAILY FOR CHOLESTEROL; Therapy: 46GFI9120 to ([facility])  Requested for: 67Riy1817; Last     Rx:64Xrl2779 Ordered   3  Clobetasol Propionate 0 05 % External Cream; apply sparingly to areas BID x maximum     of 2 weeks; Therapy: 37FIN5456 to (96 [id number])  Requested for: 86Inu1725; Last     Rx:72Qbu7806 Ordered   4  Lisinopril 10 MG Oral Tablet; Take 1 tablet daily; Therapy: 60Olo4126 to (Last Rx:86Ugz7285)  Requested for: 09Jop0200 Ordered   5  MetFORMIN HCl - 500 MG Oral Tablet; take 2 tablet twice daily; Therapy: 72QMY5096 to (Evaluate:90Iij3554)  Requested for: 02) 4218 5224; Last     Rx:[id number] Ordered  Medication List Reviewed: The medication list was reviewed and updated today  Allergies   Medication    1   No Known Drug Allergies    Vitals   Vital Signs    Recorded: 78VIQ6207 08:50AM   Heart Rate 65   Systolic 421, RUE, Sitting   Diastolic 72, RUE, Sitting   Height 5 ft 6 in   Weight 194 lb    BMI Calculated 31 31   BSA Calculated 1 97     Physical Exam        Constitutional      General appearance: No acute distress, well appearing and well nourished  Eyes      Conjunctiva and Sclera examination: Conjunctiva pink, sclera anicteric  Ears, Nose, Mouth, and Throat - Oropharynx: Clear, nares are clear, mucous membranes are moist       Neck      Neck and thyroid: Normal, supple, trachea midline, no thyromegaly  Pulmonary      Respiratory effort: No increased work of breathing or signs of respiratory distress  Auscultation of lungs: Clear to auscultation, no rales, no rhonchi, no wheezing, good air movement  Cardiovascular      Auscultation of heart: Normal rate and rhythm, normal S1 and S2, no murmurs  Carotid pulses: Normal, 2+ bilaterally  Peripheral vascular exam: Normal pulses throughout, no tenderness, erythema or swelling  Pedal pulses: Normal, 2+ bilaterally  Examination of extremities for edema and/or varicosities: Normal        Abdomen      Abdomen: Non-tender and no distention  Liver and spleen: No hepatomegaly or splenomegaly  Musculoskeletal Gait and station: Normal gait  -- Digits and nails: Normal without clubbing or cyanosis  -- Inspection/palpation of joints, bones, and muscles: Normal, ROM normal        Skin - Skin and subcutaneous tissue: Normal without rashes or lesions  Skin is warm and well perfused, normal turgor  Neurologic - Cranial nerves: II - XII intact  -- Speech: Normal        Psychiatric - Orientation to person, place, and time: Normal -- Mood and affect: Normal       Results/Data   ECG Report:      Rhythm and rate:  normal sinus rhythm        QRS: right bundle branch block      Future Appointments      Date/Time Provider Specialty Site 07/03/2018 09:00 AM Madelin Solis MD Family Medicine [address] [place] [address] Drive   04/04/2018 09:00 AM Niurka Gallagher[address] [facility]     Signatures    Electronically signed by :  JABIER Cullen ; Jan 18 2018  9:28AM EST                       (Author)

## 2018-01-22 VITALS
HEART RATE: 65 BPM | DIASTOLIC BLOOD PRESSURE: 72 MMHG | BODY MASS INDEX: 31.18 KG/M2 | SYSTOLIC BLOOD PRESSURE: 130 MMHG | HEIGHT: 66 IN | WEIGHT: 194 LBS

## 2018-01-23 VITALS
RESPIRATION RATE: 16 BRPM | HEART RATE: 83 BPM | BODY MASS INDEX: 31.18 KG/M2 | SYSTOLIC BLOOD PRESSURE: 110 MMHG | TEMPERATURE: 97.4 F | OXYGEN SATURATION: 98 % | HEIGHT: 66 IN | DIASTOLIC BLOOD PRESSURE: 72 MMHG | WEIGHT: 194 LBS

## 2018-02-03 ENCOUNTER — APPOINTMENT (OUTPATIENT)
Dept: LAB | Facility: HOSPITAL | Age: 63
End: 2018-02-03
Payer: COMMERCIAL

## 2018-02-03 ENCOUNTER — TRANSCRIBE ORDERS (OUTPATIENT)
Dept: LAB | Facility: HOSPITAL | Age: 63
End: 2018-02-03

## 2018-02-03 DIAGNOSIS — E11.65 TYPE 2 DIABETES MELLITUS WITH HYPERGLYCEMIA (HCC): ICD-10-CM

## 2018-02-03 DIAGNOSIS — I25.10 ATHEROSCLEROTIC HEART DISEASE OF NATIVE CORONARY ARTERY WITHOUT ANGINA PECTORIS: ICD-10-CM

## 2018-02-03 DIAGNOSIS — I10 ESSENTIAL (PRIMARY) HYPERTENSION: ICD-10-CM

## 2018-02-03 DIAGNOSIS — E78.5 HYPERLIPIDEMIA: ICD-10-CM

## 2018-02-03 DIAGNOSIS — E66.9 OBESITY: ICD-10-CM

## 2018-02-03 LAB
ALBUMIN SERPL BCP-MCNC: 4.3 G/DL (ref 3.5–5)
ALP SERPL-CCNC: 83 U/L (ref 46–116)
ALT SERPL W P-5'-P-CCNC: 26 U/L (ref 12–78)
ANION GAP SERPL CALCULATED.3IONS-SCNC: 4 MMOL/L (ref 4–13)
AST SERPL W P-5'-P-CCNC: 20 U/L (ref 5–45)
BILIRUB SERPL-MCNC: 0.5 MG/DL (ref 0.2–1)
BUN SERPL-MCNC: 12 MG/DL (ref 5–25)
CALCIUM SERPL-MCNC: 8.9 MG/DL (ref 8.3–10.1)
CHLORIDE SERPL-SCNC: 103 MMOL/L (ref 100–108)
CHOLEST SERPL-MCNC: 167 MG/DL (ref 50–200)
CO2 SERPL-SCNC: 31 MMOL/L (ref 21–32)
CREAT SERPL-MCNC: 0.87 MG/DL (ref 0.6–1.3)
CREAT UR-MCNC: 54.2 MG/DL
EST. AVERAGE GLUCOSE BLD GHB EST-MCNC: 151 MG/DL
GFR SERPL CREATININE-BSD FRML MDRD: 93 ML/MIN/1.73SQ M
GLUCOSE P FAST SERPL-MCNC: 132 MG/DL (ref 65–99)
HBA1C MFR BLD: 6.9 % (ref 4.2–6.3)
HDLC SERPL-MCNC: 75 MG/DL (ref 40–60)
LDLC SERPL CALC-MCNC: 81 MG/DL (ref 0–100)
MICROALBUMIN UR-MCNC: 6.1 MG/L (ref 0–20)
MICROALBUMIN/CREAT 24H UR: 11 MG/G CREATININE (ref 0–30)
POTASSIUM SERPL-SCNC: 3.9 MMOL/L (ref 3.5–5.3)
PROT SERPL-MCNC: 8 G/DL (ref 6.4–8.2)
PSA SERPL-MCNC: 1.2 NG/ML (ref 0–4)
SODIUM SERPL-SCNC: 138 MMOL/L (ref 136–145)
TRIGL SERPL-MCNC: 55 MG/DL

## 2018-02-03 PROCEDURE — 80053 COMPREHEN METABOLIC PANEL: CPT

## 2018-02-03 PROCEDURE — 82043 UR ALBUMIN QUANTITATIVE: CPT

## 2018-02-03 PROCEDURE — G0103 PSA SCREENING: HCPCS

## 2018-02-03 PROCEDURE — 36415 COLL VENOUS BLD VENIPUNCTURE: CPT

## 2018-02-03 PROCEDURE — 82570 ASSAY OF URINE CREATININE: CPT

## 2018-02-03 PROCEDURE — 83036 HEMOGLOBIN GLYCOSYLATED A1C: CPT

## 2018-02-03 PROCEDURE — 3061F NEG MICROALBUMINURIA REV: CPT | Performed by: FAMILY MEDICINE

## 2018-02-03 PROCEDURE — 80061 LIPID PANEL: CPT

## 2018-02-27 DIAGNOSIS — E11.8 TYPE 2 DIABETES MELLITUS WITH COMPLICATION, WITHOUT LONG-TERM CURRENT USE OF INSULIN (HCC): Primary | ICD-10-CM

## 2018-04-04 DIAGNOSIS — R31.9 HEMATURIA: ICD-10-CM

## 2018-04-04 DIAGNOSIS — I25.10 ATHEROSCLEROTIC HEART DISEASE OF NATIVE CORONARY ARTERY WITHOUT ANGINA PECTORIS: ICD-10-CM

## 2018-04-04 DIAGNOSIS — E78.5 HYPERLIPIDEMIA: ICD-10-CM

## 2018-04-04 DIAGNOSIS — N20.0 CALCULUS OF KIDNEY: ICD-10-CM

## 2018-04-04 DIAGNOSIS — I10 ESSENTIAL (PRIMARY) HYPERTENSION: ICD-10-CM

## 2018-04-24 NOTE — PROGRESS NOTES
4/25/2018    Barbie Romero  1955  [id number]        Assessment  Nephrolithiasis s/p Left ESWL (9/15/2017)  Prostate cancer screening    Discussion  Balta Carrero is a 65 y o  male being managed by Dr Rodriguez  PSA is 1 2, previously 1 0  KUB was not obtained as previously ordered  He is doing well with no issues  He will return in 1 year for follow up with a PSA and KUB  He was instructed to call with any issues  History of Present Illness  65 y o  male with a history of nephrolithiasis s/p left ESWL (9/15/2017) presents today for 6 month follow up  He denies any stone episodes, gross hematuria, or flank pain  He denies any lower urinary tract symptoms  He has a good stream and complete bladder emptying  He denies any changes in his overall health  Review of Systems  Review of Systems   Constitutional: Negative  HENT: Negative  Respiratory: Negative  Cardiovascular: Negative  Gastrointestinal: Negative  Genitourinary:        As per HPI   Musculoskeletal: Negative  Skin: Negative  Neurological: Negative  Hematological: Negative  Urinary Incontinence Screening    Flowsheet Row Most Recent Value   Urinary Incontinence   Urinary Incontinence? No   Incomplete emptying? No   Urinary frequency? No   Urinary urgency? No   Urinary hesitancy? No   Dysuria (painful difficult urination)? No   Nocturia (waking up to use the bathroom)? No   Straining (having to push to go)? No   Weak stream?  No   Intermittent stream?  No   Post void dribbling?   No            Past Medical History  Past Medical History:   Diagnosis Date    Carpal tunnel syndrome     Coronary artery disease     Costochondritis     RESOLVED: [id number]    Diabetes mellitus (HCC)     Hematuria     HTN (hypertension)     Hyperlipidemia     Kidney stone     Weak carotid pulse        Past Surgical History  Past Surgical History:   Procedure Laterality Date    CATARACT EXTRACTION Left     CORONARY ANGIOPLASTY WITH STENT PLACEMENT  06/29/2005    RCA     CORONARY ANGIOPLASTY WITH STENT PLACEMENT  07/05/2005    Cx    CORONARY ARTERY BYPASS GRAFT      LAST ASSESSED: 66GVD7162    LITHOTRIPSY      RENAL    MOUTH SURGERY      OH CYSTO/URETERO W/LITHOTRIPSY &INDWELL STENT INSRT Left 3/24/2017    Procedure: CYSTOSCOPY, URETEROSCOPY, HOLMIUM LASER LITHOTRIPSY, BASKET STONE EXTRACTION, RETROGRADE PYELOGRAM, STENT INSERTION;  Surgeon: Gaviota Condon MD;  Location: [facility];  Service: Urology    OH CYSTO/URETERO W/LITHOTRIPSY &INDWELL STENT INSRT Left 7/28/2017    Procedure: CYSTOSCOPY, URETEROSCOPY WITH HOLMIUM LASER LITHOTRIPSY, AND STENT EXCHANGE;  Surgeon: Manuel Rosen MD;  [facility];  Service: Urology    OH FRAGMENT KIDNEY STONE/ ESWL Left 9/15/2017    Procedure: LITHROTRIPSY EXTRACORPORAL SHOCKWAVE (ESWL);   Surgeon: Manuel Rosen MD;  [facility];  Service: Urology    ROTATOR CUFF REPAIR Right     SHOULDER ARTHROSCOPY Left     TOOTH EXTRACTION          Past Family History  Family History   Problem Relation Age of Onset    Diabetes Sister      TYPE 2    Diabetes Mother     Heart disease Mother     Diabetes Brother     Diabetes Sister      TYPE 2    Heart disease Sister     Heart disease Sister     Coronary artery disease Maternal Uncle     Diabetes Maternal Uncle        Past Social history  Social History     Social History    Marital status: Single     Spouse name: N/A    Number of children: N/A    Years of education: N/A     Occupational History    laboror           Social History Main Topics    Smoking status: Current Every Day Smoker     Packs/day: 1 00     Years: 44 00    Smokeless tobacco: Never Used      Comment: up to 1ppd since age 13y/o; FORMER SMOKER AS PER ALL SCRIPTS     Alcohol use No      Comment: quit 23 years ago; SOCIAL DRINKER AS PER ALL SCRIPTS     Drug use: No    Sexual activity: Not on file     Other Topics Concern    Not on file     Social History Narrative    No narrative on file       Current Medications  Current Outpatient Prescriptions   Medication Sig Dispense Refill    aspirin 325 mg tablet Take 325 mg by mouth daily at bedtime        atorvastatin (LIPITOR) 40 mg tablet Take by mouth daily at bedtime        lisinopril (ZESTRIL) 10 mg tablet Take by mouth daily at bedtime        metFORMIN (GLUCOPHAGE) 500 mg tablet TAKE 2 TABLETS TWICE A  tablet 1    oxybutynin (DITROPAN) 5 mg tablet Take 1 tablet by mouth 3 (three) times a day as needed (stent discomfort) for up to 30 days 30 tablet 0     No current facility-administered medications for this visit  Allergies  Allergies   Allergen Reactions    Morphine GI Intolerance     vomiting       Past Medical History, Social History, Family History, medications and allergies were reviewed  Vitals  Vitals:    04/25/18 0907   BP: 110/60   Pulse: 80   Weight: 88 5 kg (195 lb)   Height: 5' 7" (1 702 m)       Physical Exam  Skin: warm, dry, intact  Cardiac:  Peripheral edema: negative  Pulmonary: Non-labored breathing  Abdomen: Soft, non-tender, non-distended  Musculoskeletal: AROM with no joint deformity or tenderness  Neurology: Alert and oriented  Genitourinary: Negative CVA tenderness, negative suprapubic tenderness      (Male):MARTI: patient refused      Results  Lab Results   Component Value Date    PSA 1 2 02/03/2018    PSA 1 0 10/14/2016    PSA 1 2 04/11/2015     Lab Results   Component Value Date    GLUCOSE 128 03/11/2017    CALCIUM 8 9 02/03/2018     02/03/2018    K 3 9 02/03/2018    CO2 31 02/03/2018     02/03/2018    BUN 12 02/03/2018    CREATININE 0 87 02/03/2018     Lab Results   Component Value Date    WBC 8 68 09/05/2017    HGB 12 5 09/05/2017    HCT 37 5 09/05/2017    MCV 92 09/05/2017     09/05/2017

## 2018-04-25 ENCOUNTER — OFFICE VISIT (OUTPATIENT)
Dept: UROLOGY | Facility: AMBULATORY SURGERY CENTER | Age: 63
End: 2018-04-25
Payer: COMMERCIAL

## 2018-04-25 VITALS
BODY MASS INDEX: 30.61 KG/M2 | DIASTOLIC BLOOD PRESSURE: 60 MMHG | WEIGHT: 195 LBS | HEART RATE: 80 BPM | HEIGHT: 67 IN | SYSTOLIC BLOOD PRESSURE: 110 MMHG

## 2018-04-25 DIAGNOSIS — N20.0 CALCULUS OF KIDNEY: ICD-10-CM

## 2018-04-25 DIAGNOSIS — Z12.5 SCREENING FOR PROSTATE CANCER: Primary | ICD-10-CM

## 2018-04-25 PROCEDURE — 99213 OFFICE O/P EST LOW 20 MIN: CPT | Performed by: NURSE PRACTITIONER

## 2018-05-14 ENCOUNTER — TELEPHONE (OUTPATIENT)
Dept: FAMILY MEDICINE CLINIC | Facility: CLINIC | Age: 63
End: 2018-05-14

## 2018-05-14 DIAGNOSIS — Z12.11 SCREEN FOR COLON CANCER: Primary | ICD-10-CM

## 2018-05-18 ENCOUNTER — TELEPHONE (OUTPATIENT)
Dept: FAMILY MEDICINE CLINIC | Facility: CLINIC | Age: 63
End: 2018-05-18

## 2018-05-29 DIAGNOSIS — I10 ESSENTIAL HYPERTENSION: Primary | ICD-10-CM

## 2018-05-29 PROCEDURE — 4010F ACE/ARB THERAPY RXD/TAKEN: CPT | Performed by: FAMILY MEDICINE

## 2018-05-29 RX ORDER — LISINOPRIL 10 MG/1
TABLET ORAL
Qty: 90 TABLET | Refills: 2 | Status: SHIPPED | OUTPATIENT
Start: 2018-05-29 | End: 2019-02-06 | Stop reason: SDUPTHER

## 2018-06-09 ENCOUNTER — TRANSCRIBE ORDERS (OUTPATIENT)
Dept: RADIOLOGY | Facility: HOSPITAL | Age: 63
End: 2018-06-09

## 2018-06-09 ENCOUNTER — HOSPITAL ENCOUNTER (OUTPATIENT)
Dept: RADIOLOGY | Facility: HOSPITAL | Age: 63
Discharge: HOME | End: 2018-06-09
Attending: UROLOGY
Payer: COMMERCIAL

## 2018-06-09 ENCOUNTER — APPOINTMENT (OUTPATIENT)
Dept: LAB | Facility: HOSPITAL | Age: 63
End: 2018-06-09
Attending: UROLOGY
Payer: COMMERCIAL

## 2018-06-09 DIAGNOSIS — R31.9 HEMATURIA: ICD-10-CM

## 2018-06-09 DIAGNOSIS — N20.0 CALCULUS OF KIDNEY: ICD-10-CM

## 2018-06-09 LAB — PSA SERPL-MCNC: 1.1 NG/ML (ref 0–4)

## 2018-06-09 PROCEDURE — 74018 RADEX ABDOMEN 1 VIEW: CPT

## 2018-06-09 PROCEDURE — 84153 ASSAY OF PSA TOTAL: CPT

## 2018-06-09 PROCEDURE — 36415 COLL VENOUS BLD VENIPUNCTURE: CPT

## 2018-06-18 ENCOUNTER — TELEPHONE (OUTPATIENT)
Dept: FAMILY MEDICINE CLINIC | Facility: CLINIC | Age: 63
End: 2018-06-18

## 2018-07-12 DIAGNOSIS — E78.5 HYPERLIPIDEMIA, UNSPECIFIED HYPERLIPIDEMIA TYPE: Primary | ICD-10-CM

## 2018-07-12 RX ORDER — ATORVASTATIN CALCIUM 40 MG/1
TABLET, FILM COATED ORAL
Qty: 90 TABLET | Refills: 3 | Status: SHIPPED | OUTPATIENT
Start: 2018-07-12 | End: 2018-11-01 | Stop reason: ALTCHOICE

## 2018-07-25 ENCOUNTER — OFFICE VISIT (OUTPATIENT)
Dept: FAMILY MEDICINE CLINIC | Facility: CLINIC | Age: 63
End: 2018-07-25
Payer: COMMERCIAL

## 2018-07-25 VITALS
HEIGHT: 67 IN | WEIGHT: 184.4 LBS | HEART RATE: 70 BPM | DIASTOLIC BLOOD PRESSURE: 78 MMHG | SYSTOLIC BLOOD PRESSURE: 124 MMHG | OXYGEN SATURATION: 97 % | RESPIRATION RATE: 12 BRPM | TEMPERATURE: 97.9 F | BODY MASS INDEX: 28.94 KG/M2

## 2018-07-25 DIAGNOSIS — I10 BENIGN HYPERTENSION: ICD-10-CM

## 2018-07-25 DIAGNOSIS — E11.65 UNCONTROLLED TYPE 2 DIABETES MELLITUS WITH HYPERGLYCEMIA, WITHOUT LONG-TERM CURRENT USE OF INSULIN (HCC): Primary | ICD-10-CM

## 2018-07-25 DIAGNOSIS — E78.5 HYPERLIPIDEMIA, UNSPECIFIED HYPERLIPIDEMIA TYPE: ICD-10-CM

## 2018-07-25 DIAGNOSIS — Z12.11 SCREEN FOR COLON CANCER: ICD-10-CM

## 2018-07-25 DIAGNOSIS — F17.210 CIGARETTE NICOTINE DEPENDENCE WITHOUT COMPLICATION: ICD-10-CM

## 2018-07-25 PROBLEM — L30.9 DERMATITIS: Status: ACTIVE | Noted: 2017-02-24

## 2018-07-25 PROBLEM — R51.9 TEMPORAL HEADACHE: Status: ACTIVE | Noted: 2017-10-13

## 2018-07-25 PROBLEM — IMO0002 DIABETES MELLITUS TYPE 2, UNCONTROLLED: Status: ACTIVE | Noted: 2017-12-29

## 2018-07-25 PROBLEM — R39.15 URINARY URGENCY: Status: ACTIVE | Noted: 2017-10-12

## 2018-07-25 LAB — SL AMB POCT HEMOGLOBIN AIC: NORMAL

## 2018-07-25 PROCEDURE — 99214 OFFICE O/P EST MOD 30 MIN: CPT | Performed by: FAMILY MEDICINE

## 2018-07-25 PROCEDURE — 3078F DIAST BP <80 MM HG: CPT | Performed by: FAMILY MEDICINE

## 2018-07-25 PROCEDURE — 83036 HEMOGLOBIN GLYCOSYLATED A1C: CPT | Performed by: FAMILY MEDICINE

## 2018-07-25 PROCEDURE — 3074F SYST BP LT 130 MM HG: CPT | Performed by: FAMILY MEDICINE

## 2018-07-25 NOTE — PROGRESS NOTES
Chief Complaint   Patient presents with    Follow-up     Routine     Assessment/Plan:  DM---Refused to do labs now  HgA1C in office today showed 6 7 better  Low carb diet  Continue metformin  FU opthalmology ASAP  HTN---controlled  Continue lisinopril  Hyperlipidemia---Low fat diet  Continue atorvastatin  Smoking---Strongly advised patient to quit smoking! Reviewed options for smoking cessation including patches, gum, zyban, chantix, "cold turkey", hotline; patient would like to try hotline  I spent 5 minutes in the room with patient to  quitting smoking  Refused pneumovax  Due for colonoscopy  Got referral to Dr Duane Yao but pt refused to go because he has to pay $80  He would like to see someone else  I give him another referral today  PSA per urology  6/2018 PSA 1 1 normal    RTO in 6 months with labs  Diagnoses and all orders for this visit:    Uncontrolled type 2 diabetes mellitus with hyperglycemia, without long-term current use of insulin (HCC)  -     POCT hemoglobin A1c  -     Comprehensive metabolic panel; Future  -     Hemoglobin A1C; Future  -     Microalbumin / creatinine urine ratio; Future    Benign hypertension  -     CBC; Future  -     Comprehensive metabolic panel; Future  -     TSH, 3rd generation; Future    Hyperlipidemia, unspecified hyperlipidemia type  -     Lipid panel; Future    Cigarette nicotine dependence without complication    Screen for colon cancer  -     Ambulatory referral to Gastroenterology; Future          Subjective:      Patient ID: Vito Lemus is a 65 y o  male  HPI    Pt is here by himself  DM2----2/2018 hgA1C 6 9  Pt states he takes metformin 1000mg bid  Denies side effects  Lost 11lbs in last 3 months  Pt states he eat healthy than before  Denies neuropathy  Tried to exercise regularly  He is legally blind of left eye since he was young  Saw opthalmology [facility]  Due for it  FU podiatry Dr Bertin Hartley before       CABG 2012, FU cardiology Dr Anayeli Gallagher Q 6 months  He is on lisinopril and atorvastatin and ASA  Denies SOB, CP, n/v/abd pain  HTN---on lisinopril  Denies SE  Does not check BP at home  BP today 124/78 good  Denies headache, chest pain, SOB or vision change  Hyperlipidemia---on atorvastatin 40mg qhs  Denies SE      FU urology for kidney stones/PSA  Smoke sometimes 1ppd for 30 years  Would like to quit but refused chantix  Quit alcohol for 1 year  No drugs  Denies depression  Feels stressful at home and at work  PHQ-9 score 8 today  The following portions of the patient's history were reviewed and updated as appropriate: allergies, current medications, past family history, past medical history, past social history, past surgical history and problem list     Review of Systems   Constitutional: Negative for appetite change, chills and fever  HENT: Negative for congestion, ear pain, sinus pain and sore throat  Eyes: Negative for discharge and itching  Respiratory: Negative for apnea, cough, chest tightness, shortness of breath and wheezing  Cardiovascular: Negative for chest pain, palpitations and leg swelling  Gastrointestinal: Negative for abdominal pain, anal bleeding, constipation, diarrhea, nausea and vomiting  Endocrine: Negative for cold intolerance, heat intolerance and polyuria  Genitourinary: Negative for difficulty urinating and dysuria  Musculoskeletal: Negative for arthralgias, back pain and myalgias  Skin: Negative for rash  Neurological: Negative for dizziness and headaches  Psychiatric/Behavioral: Negative for agitation  Objective:      /78 (BP Location: Left arm, Patient Position: Sitting, Cuff Size: Standard)   Pulse 70   Temp 97 9 °F (36 6 °C) (Tympanic)   Resp 12   Ht 5' 7" (1 702 m)   Wt 83 6 kg (184 lb 6 4 oz)   SpO2 97%   BMI 28 88 kg/m²          Physical Exam   Constitutional: He appears well-developed     HENT:   Head: Normocephalic and atraumatic  Eyes: Conjunctivae are normal  Pupils are equal, round, and reactive to light  Neck: Normal range of motion  Neck supple  Cardiovascular: Normal rate, regular rhythm, normal heart sounds and intact distal pulses  Pulses:       Dorsalis pedis pulses are 1+ on the right side, and 1+ on the left side  Pulmonary/Chest: Effort normal and breath sounds normal    Abdominal: Soft  Bowel sounds are normal    Musculoskeletal: Normal range of motion  Feet:   Right Foot:   Skin Integrity: Negative for ulcer, skin breakdown, erythema, warmth, callus or dry skin  Left Foot:   Skin Integrity: Negative for ulcer, skin breakdown, erythema, warmth, callus or dry skin  Neurological: He is alert  Patient's shoes and socks removed  Right Foot/Ankle   Right Foot Inspection  Skin Exam: skin normal and skin intact no dry skin, no warmth, no callus, no erythema, no maceration, no abnormal color, no pre-ulcer, no ulcer and no callus                            Sensory       Monofilament testing: intact  Vascular    The right DP pulse is 1+  Left Foot/Ankle  Left Foot Inspection  Skin Exam: skin normal and skin intactno dry skin, no warmth, no erythema, no maceration, normal color, no pre-ulcer, no ulcer and no callus                                         Sensory       Monofilament: intact  Vascular    The left DP pulse is 1+

## 2018-07-27 ENCOUNTER — CLINICAL SUPPORT (OUTPATIENT)
Dept: FAMILY MEDICINE CLINIC | Facility: CLINIC | Age: 63
End: 2018-07-27
Payer: COMMERCIAL

## 2018-07-27 DIAGNOSIS — Z23 NEED FOR PNEUMOCOCCAL VACCINATION: Primary | ICD-10-CM

## 2018-07-27 PROCEDURE — 90471 IMMUNIZATION ADMIN: CPT

## 2018-07-27 PROCEDURE — 90732 PPSV23 VACC 2 YRS+ SUBQ/IM: CPT

## 2018-07-27 NOTE — PROGRESS NOTES
Patient came in for his pneumovax-23 injection   Patient tolerated his injection in his left deltoid

## 2018-09-04 ENCOUNTER — TELEPHONE (OUTPATIENT)
Dept: UROLOGY | Facility: CLINIC | Age: 63
End: 2018-09-04

## 2018-09-04 NOTE — TELEPHONE ENCOUNTER
Received a message from patient stating that he wanted to talk to the nurse but he was on his way to work and just leave a message and we would talk tomorrow    LM for patient to call the office

## 2018-09-05 NOTE — TELEPHONE ENCOUNTER
Patient left message again today and tried to call him back   I had to LM again for patient to return call

## 2018-09-06 NOTE — TELEPHONE ENCOUNTER
[place] patient, managed by Dr Josiephine Kocher, patient left message returning call on [facility], returning Octavia's call and requesting call back  Contact # [phone]  Can you please follow up with patient  Thank you

## 2018-09-06 NOTE — TELEPHONE ENCOUNTER
Called and spoke with patient  He reports that he is in a relationship now and is noticing erectile dysfunction  He has been seen in the past for kidney stones and prostate cancer screening but has never discussed ED with a provider as of yet since he had not been in a relationship for many years  Since this is a new concern for the patient, suggested scheduling an appointment to discuss with a provider in the office  Patient is agreeable to this plan  He was scheduled 9/20 at 845 with Dr Brant Penn at the [address] [place] office  He needed a morning appt as he worked second shift  Patient confirmed appt details  He is not due for PSA or kub Follow up until 5/2019 and is currently asymptomatic for stones

## 2018-09-07 ENCOUNTER — TELEPHONE (OUTPATIENT)
Dept: UROLOGY | Facility: AMBULATORY SURGERY CENTER | Age: 63
End: 2018-09-07

## 2018-09-07 NOTE — TELEPHONE ENCOUNTER
Patient is scheduled to be seen by Dr Kraig Cooley at [address] [place] [address] Urology ([id number]) on 09/20/2018  Patient is in needed of a insurance referral, diagnosis to be used are as follows N52 9 and N20 0  Procedure to be used is 50945  Thanks for your time and have a great weekend

## 2018-09-13 LAB
LEFT EYE DIABETIC RETINOPATHY: NORMAL
RIGHT EYE DIABETIC RETINOPATHY: NORMAL

## 2018-09-13 PROCEDURE — 3072F LOW RISK FOR RETINOPATHY: CPT | Performed by: FAMILY MEDICINE

## 2018-09-17 NOTE — PROGRESS NOTES
9/20/2018    Dorthey Filler  1955  [id number]    Discussion and Plan    Etiology of erectile dysfunction discussed at length  In particular stressed the need for good glycemic control as well as hypertension management  Risks and benefits of Viagra reviewed and scripts sent to pharmacy  Patient will otherwise follow-up as scheduled next year with KUB and PSA prior to visit  All questions answered at this time  1  Erectile dysfunction, unspecified erectile dysfunction type  - sildenafil (VIAGRA) 50 MG tablet; Take 2 tablets (100 mg total) by mouth daily as needed for erectile dysfunction  Dispense: 6 tablet; Refill: 12    Assessment      Patient Active Problem List   Diagnosis    Screening for prostate cancer    Kidney stone    Arteriosclerosis of coronary artery    Benign hypertension    Hx of CABG    Coronary artery disease involving native coronary artery of native heart without angina pectoris    Dermatitis    Diabetes mellitus type 2, uncontrolled (HCC)    Hematuria    Hyperlipidemia    Microalbuminuria    Onychomycosis    Stasis dermatitis    Temporal headache    Type 2 diabetes mellitus (HCC)    Type 2 or unspecified type diabetes mellitus with other specified manifestations    Urinary urgency    Weak carotid pulse    Cigarette nicotine dependence without complication    Erectile dysfunction       History of Present Illness    Leslie Herr is a 59 y o  male seen today in regards to a history of nephrolithiasis s/p left ESWL (9/15/2017) presents today for follow up  He denies any lower urinary tract symptoms  He has a good stream and complete bladder emptying  He did pass a calculus since the time of his last visit which he was unable to retrieve  He denies any changes in his overall health  Seen today in regards to longstanding erectile dysfunction described as difficulty establishing and maintaining erections  He has not attempted treatment previously    Patient is a diabetic and hypertensive  Urinary Symptom Assessment        Past Medical History  Past Medical History:   Diagnosis Date    Carpal tunnel syndrome     Coronary artery disease     Costochondritis     RESOLVED: [id number]    Diabetes mellitus (HCC)     Hematuria     HTN (hypertension)     Hyperlipidemia     Kidney stone     Weak carotid pulse        Past Social History  Past Surgical History:   Procedure Laterality Date    CATARACT EXTRACTION Left     CORONARY ANGIOPLASTY WITH STENT PLACEMENT  06/29/2005    RCA     CORONARY ANGIOPLASTY WITH STENT PLACEMENT  07/05/2005    Cx    CORONARY ARTERY BYPASS GRAFT      LAST ASSESSED: [id number]    LITHOTRIPSY      RENAL    MOUTH SURGERY      KS CYSTO/URETERO W/LITHOTRIPSY &INDWELL STENT INSRT Left 3/24/2017    Procedure: CYSTOSCOPY, URETEROSCOPY, HOLMIUM LASER LITHOTRIPSY, BASKET STONE EXTRACTION, RETROGRADE PYELOGRAM, STENT INSERTION;  Surgeon: Karsten Aparicio MD;  Location: [facility];  Service: Urology    KS CYSTO/URETERO W/LITHOTRIPSY &INDWELL STENT INSRT Left 7/28/2017    Procedure: CYSTOSCOPY, URETEROSCOPY WITH HOLMIUM LASER LITHOTRIPSY, AND STENT EXCHANGE;  Surgeon: Maggie Hernández MD;  Location: [facility];  Service: Urology    [facility] ESW[facility] Left 9/15/2017    Procedure: LITHROTRIPSY EXTRACORPORAL SHOCKWAVE (ESWL);   Surgeon: Maggie Hernández MD;  [facility];  Service: Urology    ROTATOR CUFF REPAIR Right     SHOULDER ARTHROSCOPY Left     TOOTH EXTRACTION         Past Family History  Family History   Problem Relation Age of Onset    Diabetes Sister         TYPE 2    Diabetes Mother     Heart disease Mother     Diabetes Brother     Diabetes Sister         TYPE 2    Heart disease Sister     Heart disease Sister     Coronary artery disease Maternal Uncle     Diabetes Maternal Uncle        Past Social history  Social History     Social History    Marital status: Single     Spouse name: N/A    Number of children: N/A    Years of education: N/A     Occupational History    laboror           Social History Main Topics    Smoking status: Current Every Day Smoker     Packs/day: 1 00     Years: 44 00    Smokeless tobacco: Never Used      Comment: up to 1ppd since age 13y/o; FORMER SMOKER AS PER ALL SCRIPTS     Alcohol use No      Comment: quit 23 years ago; SOCIAL DRINKER AS PER ALL SCRIPTS     Drug use: No    Sexual activity: Not on file     Other Topics Concern    Not on file     Social History Narrative    No narrative on file       Current Medications  Current Outpatient Prescriptions   Medication Sig Dispense Refill    aspirin 325 mg tablet Take 325 mg by mouth daily at bedtime        atorvastatin (LIPITOR) 40 mg tablet TAKE 1 TABLET DAILY FOR CHOLESTEROL 90 tablet 3    lisinopril (ZESTRIL) 10 mg tablet TAKE 1 TABLET DAILY 90 tablet 2    metFORMIN (GLUCOPHAGE) 500 mg tablet TAKE 2 TABLETS TWICE A  tablet 1    sildenafil (VIAGRA) 50 MG tablet Take 2 tablets (100 mg total) by mouth daily as needed for erectile dysfunction 6 tablet 12     No current facility-administered medications for this visit  Allergies  Allergies   Allergen Reactions    Morphine GI Intolerance     vomiting       Past Medical History, Social History, Family History, medications and allergies were reviewed  Review of Systems  Review of Systems   Constitutional: Negative  HENT: Negative  Eyes: Negative  Respiratory: Negative  Cardiovascular: Negative  Gastrointestinal: Negative  Endocrine: Negative  Genitourinary: Negative for decreased urine volume, difficulty urinating, hematuria, testicular pain and urgency  Musculoskeletal: Negative  Skin: Negative  Neurological: Negative  Hematological: Negative  Psychiatric/Behavioral: Negative          Vitals  Vitals:    09/20/18 0816   Weight: 83 2 kg (183 lb 6 4 oz)   Height: 5' 6" (1 676 m)         Physical Exam    Physical Exam   Constitutional: He is oriented to person, place, and time  He appears well-developed and well-nourished  HENT:   Head: Normocephalic and atraumatic  Eyes: Pupils are equal, round, and reactive to light  Neck: Normal range of motion  Cardiovascular: Normal rate, regular rhythm and normal heart sounds  Pulmonary/Chest: Effort normal and breath sounds normal  No accessory muscle usage  No respiratory distress  Abdominal: Soft  Normal appearance and bowel sounds are normal  There is no tenderness  Musculoskeletal: Normal range of motion  Neurological: He is alert and oriented to person, place, and time  Skin: Skin is warm, dry and intact  Psychiatric: He has a normal mood and affect  His speech is normal  Cognition and memory are normal    Nursing note and vitals reviewed        Results    Below listed labs, pathology results, and radiology images were personally reviewed:    Lab Results   Component Value Date/Time    PSA 1 1 06/09/2018 10:44 AM    PSA 1 2 02/03/2018 08:44 AM    PSA 1 2 04/11/2015 08:23 AM     Lab Results   Component Value Date    GLUCOSE 118 10/17/2015    CALCIUM 8 9 02/03/2018     02/03/2018    K 3 9 02/03/2018    CO2 31 02/03/2018     02/03/2018    BUN 12 02/03/2018    CREATININE 0 87 02/03/2018     Lab Results   Component Value Date    WBC 8 68 09/05/2017    HGB 12 5 09/05/2017    HCT 37 5 09/05/2017    MCV 92 09/05/2017     09/05/2017       No results found for this or any previous visit (from the past 1 hour(s)) ]    Component      Latest Ref Rng & Units 10/14/2016 2/3/2018 6/9/2018   PROSTATE SPECIFIC ANTIGEN      0 0 - 4 0 ng/mL 1 0 1 2 1 1

## 2018-09-20 ENCOUNTER — OFFICE VISIT (OUTPATIENT)
Dept: UROLOGY | Facility: AMBULATORY SURGERY CENTER | Age: 63
End: 2018-09-20
Payer: COMMERCIAL

## 2018-09-20 ENCOUNTER — OFFICE VISIT (OUTPATIENT)
Dept: FAMILY MEDICINE CLINIC | Facility: CLINIC | Age: 63
End: 2018-09-20
Payer: COMMERCIAL

## 2018-09-20 VITALS
WEIGHT: 184 LBS | SYSTOLIC BLOOD PRESSURE: 112 MMHG | HEART RATE: 60 BPM | DIASTOLIC BLOOD PRESSURE: 80 MMHG | BODY MASS INDEX: 29.57 KG/M2 | OXYGEN SATURATION: 98 % | HEIGHT: 66 IN | TEMPERATURE: 97.8 F | RESPIRATION RATE: 16 BRPM

## 2018-09-20 VITALS — BODY MASS INDEX: 29.47 KG/M2 | WEIGHT: 183.4 LBS | HEIGHT: 66 IN

## 2018-09-20 DIAGNOSIS — R10.30 LOWER ABDOMINAL PAIN: Primary | ICD-10-CM

## 2018-09-20 DIAGNOSIS — N52.9 ERECTILE DYSFUNCTION, UNSPECIFIED ERECTILE DYSFUNCTION TYPE: Primary | ICD-10-CM

## 2018-09-20 LAB
SL AMB  POCT GLUCOSE, UA: NORMAL
SL AMB LEUKOCYTE ESTERASE,UA: NORMAL
SL AMB POCT BILIRUBIN,UA: NORMAL
SL AMB POCT BLOOD,UA: NORMAL
SL AMB POCT CLARITY,UA: CLEAR
SL AMB POCT COLOR,UA: YELLOW
SL AMB POCT KETONES,UA: NORMAL
SL AMB POCT NITRITE,UA: NORMAL
SL AMB POCT PH,UA: 6
SL AMB POCT SPECIFIC GRAVITY,UA: 1.01
SL AMB POCT URINE PROTEIN: NORMAL
SL AMB POCT UROBILINOGEN: NORMAL

## 2018-09-20 PROCEDURE — 99213 OFFICE O/P EST LOW 20 MIN: CPT | Performed by: FAMILY MEDICINE

## 2018-09-20 PROCEDURE — 3008F BODY MASS INDEX DOCD: CPT | Performed by: FAMILY MEDICINE

## 2018-09-20 PROCEDURE — 99214 OFFICE O/P EST MOD 30 MIN: CPT | Performed by: UROLOGY

## 2018-09-20 PROCEDURE — 81002 URINALYSIS NONAUTO W/O SCOPE: CPT | Performed by: FAMILY MEDICINE

## 2018-09-20 RX ORDER — SILDENAFIL 50 MG/1
100 TABLET, FILM COATED ORAL DAILY PRN
Qty: 6 TABLET | Refills: 12 | Status: SHIPPED | OUTPATIENT
Start: 2018-09-20 | End: 2018-10-03 | Stop reason: HOSPADM

## 2018-09-20 NOTE — PROGRESS NOTES
Chief Complaint   Patient presents with    Abdominal Pain     Under Umbilical Region     Assessment/Plan:    UA today showed neg for leuk, nit or blood  Will send for urine culture  Give work note  Call office if symptoms no improving or worse  Diagnoses and all orders for this visit:    Lower abdominal pain  -     POCT urine dip          Subjective:      Patient ID: Sanjuanita Singleton is a 59 y o  male  HPI    Pt is here by himself  C/o abdominal pain under umbilical region about 1 hour ago  Pt states he was driving to work and had lower abdominal pain suddenly  Sharp pain, 10/10  Last for 15 min  He is good now  No more pain  He states he needs a note for his work for today  Denies fever, Sob, chest pain, nausea, vomiting  Denies urinary problem  Denies BM problem  Last BM was yesterday and it was normal per pt  The following portions of the patient's history were reviewed and updated as appropriate: allergies, current medications, past family history, past medical history, past social history, past surgical history and problem list     Review of Systems   Constitutional: Negative for appetite change, chills and fever  HENT: Negative for congestion, ear pain, sinus pain and sore throat  Eyes: Negative for discharge and itching  Respiratory: Negative for apnea, cough, chest tightness, shortness of breath and wheezing  Cardiovascular: Negative for chest pain, palpitations and leg swelling  Gastrointestinal: Negative for abdominal pain, anal bleeding, constipation, diarrhea, nausea and vomiting  Endocrine: Negative for cold intolerance, heat intolerance and polyuria  Genitourinary: Negative for difficulty urinating and dysuria  Musculoskeletal: Negative for arthralgias, back pain and myalgias  Skin: Negative for rash  Neurological: Negative for dizziness and headaches  Psychiatric/Behavioral: Negative for agitation           Objective:      /80 (BP Location: Left arm, Patient Position: Sitting, Cuff Size: Adult)   Pulse 60   Temp 97 8 °F (36 6 °C) (Oral)   Resp 16   Ht 5' 6" (1 676 m)   Wt 83 5 kg (184 lb)   SpO2 98%   BMI 29 70 kg/m²          Physical Exam   Constitutional: He appears well-developed  HENT:   Head: Normocephalic and atraumatic  Eyes: Conjunctivae are normal  Pupils are equal, round, and reactive to light  Neck: Normal range of motion  Neck supple  Cardiovascular: Normal rate, regular rhythm, normal heart sounds and intact distal pulses  Pulmonary/Chest: Effort normal and breath sounds normal    Abdominal: Soft  Bowel sounds are normal  He exhibits no distension and no mass  There is no tenderness  There is no rebound and no guarding  Musculoskeletal: Normal range of motion  Neurological: He is alert

## 2018-09-20 NOTE — LETTER
September 20, 2018     Patient: Jean Rodriguez   YOB: 1955   Date of Visit: 9/20/2018       To Whom it May Concern:    Jean Rodriguez is under my professional care  He was seen in my office on 9/20/2018  He may return to work on 9/21/2018  If you have any questions or concerns, please don't hesitate to call           Sincerely,          Emmie Ceja MD        CC: No Recipients

## 2018-09-25 DIAGNOSIS — E11.8 TYPE 2 DIABETES MELLITUS WITH COMPLICATION, WITHOUT LONG-TERM CURRENT USE OF INSULIN (HCC): ICD-10-CM

## 2018-10-03 ENCOUNTER — HOSPITAL ENCOUNTER (OUTPATIENT)
Dept: RADIOLOGY | Facility: HOSPITAL | Age: 63
Discharge: HOME | End: 2018-10-03

## 2018-10-03 ENCOUNTER — OFFICE VISIT (OUTPATIENT)
Dept: FAMILY MEDICINE CLINIC | Facility: CLINIC | Age: 63
End: 2018-10-03
Payer: COMMERCIAL

## 2018-10-03 ENCOUNTER — TRANSCRIBE ORDERS (OUTPATIENT)
Dept: RADIOLOGY | Facility: HOSPITAL | Age: 63
End: 2018-10-03

## 2018-10-03 ENCOUNTER — LAB (OUTPATIENT)
Dept: LAB | Facility: HOSPITAL | Age: 63
End: 2018-10-03
Payer: COMMERCIAL

## 2018-10-03 VITALS
OXYGEN SATURATION: 98 % | HEIGHT: 66 IN | TEMPERATURE: 98.2 F | BODY MASS INDEX: 29.25 KG/M2 | SYSTOLIC BLOOD PRESSURE: 112 MMHG | WEIGHT: 182 LBS | HEART RATE: 60 BPM | RESPIRATION RATE: 16 BRPM | DIASTOLIC BLOOD PRESSURE: 78 MMHG

## 2018-10-03 DIAGNOSIS — M79.641 PAIN IN BOTH HANDS: ICD-10-CM

## 2018-10-03 DIAGNOSIS — M79.642 PAIN IN BOTH HANDS: ICD-10-CM

## 2018-10-03 DIAGNOSIS — M79.642 LEFT HAND PAIN: ICD-10-CM

## 2018-10-03 DIAGNOSIS — M79.641 PAIN OF RIGHT HAND: Primary | ICD-10-CM

## 2018-10-03 DIAGNOSIS — G56.01 CARPAL TUNNEL SYNDROME OF RIGHT WRIST: Primary | ICD-10-CM

## 2018-10-03 LAB
25(OH)D3 SERPL-MCNC: 21.8 NG/ML (ref 30–100)
ALBUMIN SERPL BCP-MCNC: 4 G/DL (ref 3.5–5)
ALP SERPL-CCNC: 75 U/L (ref 46–116)
ALT SERPL W P-5'-P-CCNC: 20 U/L (ref 12–78)
ANION GAP SERPL CALCULATED.3IONS-SCNC: 5 MMOL/L (ref 4–13)
AST SERPL W P-5'-P-CCNC: 16 U/L (ref 5–45)
BASOPHILS # BLD AUTO: 0.08 THOUSANDS/ΜL (ref 0–0.1)
BASOPHILS NFR BLD AUTO: 1 % (ref 0–1)
BILIRUB SERPL-MCNC: 0.38 MG/DL (ref 0.2–1)
BUN SERPL-MCNC: 17 MG/DL (ref 5–25)
CALCIUM SERPL-MCNC: 9.5 MG/DL (ref 8.3–10.1)
CHLORIDE SERPL-SCNC: 104 MMOL/L (ref 100–108)
CO2 SERPL-SCNC: 29 MMOL/L (ref 21–32)
CREAT SERPL-MCNC: 0.98 MG/DL (ref 0.6–1.3)
CRP SERPL QL: <3 MG/L
EOSINOPHIL # BLD AUTO: 0.13 THOUSAND/ΜL (ref 0–0.61)
EOSINOPHIL NFR BLD AUTO: 2 % (ref 0–6)
ERYTHROCYTE [DISTWIDTH] IN BLOOD BY AUTOMATED COUNT: 15 % (ref 11.6–15.1)
ERYTHROCYTE [SEDIMENTATION RATE] IN BLOOD: 19 MM/HOUR (ref 0–10)
GFR SERPL CREATININE-BSD FRML MDRD: 82 ML/MIN/1.73SQ M
GLUCOSE SERPL-MCNC: 122 MG/DL (ref 65–140)
HCT VFR BLD AUTO: 36.4 % (ref 36.5–49.3)
HGB BLD-MCNC: 11.3 G/DL (ref 12–17)
IMM GRANULOCYTES # BLD AUTO: 0.01 THOUSAND/UL (ref 0–0.2)
IMM GRANULOCYTES NFR BLD AUTO: 0 % (ref 0–2)
LYMPHOCYTES # BLD AUTO: 2.79 THOUSANDS/ΜL (ref 0.6–4.47)
LYMPHOCYTES NFR BLD AUTO: 37 % (ref 14–44)
MCH RBC QN AUTO: 28.5 PG (ref 26.8–34.3)
MCHC RBC AUTO-ENTMCNC: 31 G/DL (ref 31.4–37.4)
MCV RBC AUTO: 92 FL (ref 82–98)
MONOCYTES # BLD AUTO: 0.48 THOUSAND/ΜL (ref 0.17–1.22)
MONOCYTES NFR BLD AUTO: 6 % (ref 4–12)
NEUTROPHILS # BLD AUTO: 3.97 THOUSANDS/ΜL (ref 1.85–7.62)
NEUTS SEG NFR BLD AUTO: 54 % (ref 43–75)
NRBC BLD AUTO-RTO: 0 /100 WBCS
PLATELET # BLD AUTO: 302 THOUSANDS/UL (ref 149–390)
PMV BLD AUTO: 10.7 FL (ref 8.9–12.7)
POTASSIUM SERPL-SCNC: 4.4 MMOL/L (ref 3.5–5.3)
PROT SERPL-MCNC: 7.8 G/DL (ref 6.4–8.2)
RBC # BLD AUTO: 3.96 MILLION/UL (ref 3.88–5.62)
SODIUM SERPL-SCNC: 138 MMOL/L (ref 136–145)
WBC # BLD AUTO: 7.46 THOUSAND/UL (ref 4.31–10.16)

## 2018-10-03 PROCEDURE — 85652 RBC SED RATE AUTOMATED: CPT

## 2018-10-03 PROCEDURE — 86618 LYME DISEASE ANTIBODY: CPT

## 2018-10-03 PROCEDURE — 80053 COMPREHEN METABOLIC PANEL: CPT

## 2018-10-03 PROCEDURE — 85025 COMPLETE CBC W/AUTO DIFF WBC: CPT

## 2018-10-03 PROCEDURE — 86038 ANTINUCLEAR ANTIBODIES: CPT

## 2018-10-03 PROCEDURE — 86140 C-REACTIVE PROTEIN: CPT

## 2018-10-03 PROCEDURE — 36415 COLL VENOUS BLD VENIPUNCTURE: CPT

## 2018-10-03 PROCEDURE — 86430 RHEUMATOID FACTOR TEST QUAL: CPT

## 2018-10-03 PROCEDURE — 99214 OFFICE O/P EST MOD 30 MIN: CPT | Performed by: FAMILY MEDICINE

## 2018-10-03 PROCEDURE — 82306 VITAMIN D 25 HYDROXY: CPT

## 2018-10-03 NOTE — PROGRESS NOTES
Chief Complaint   Patient presents with    Arthritis     Carpal Tunnel, Hand pain     Health Maintenance   Topic Date Due    CRC Screening: Colonoscopy  1955    Lung Cancer Screening  08/13/2011    Diabetic Foot Exam  05/30/2018    INFLUENZA VACCINE  09/01/2018    HEMOGLOBIN A1C  01/25/2019    URINE MICROALBUMIN  02/03/2019    Depression Screening PHQ  07/25/2019    DM Eye Exam  09/13/2019    DTaP,Tdap,and Td Vaccines (2 - Td) 04/09/2026    Pneumococcal PPSV23 Medium Risk Adult  Completed     Assessment/Plan:    Carpal tunnel syndrome of right wrist---refer to orthopedics  B/l hands pain---Will check hands xrays and labs  If any labs positive, will refer to rheumatology  Diagnoses and all orders for this visit:    Carpal tunnel syndrome of right wrist    Pain in both hands  -     Ambulatory referral to Orthopedic Surgery; Future  -     XR hand 3+ vw right; Future  -     XR hand 3+ vw left; Future  -     BRYAN Screen w/ Reflex to Titer/Pattern; Future  -     CBC and differential; Future  -     Comprehensive metabolic panel; Future  -     C-reactive protein; Future  -     Lyme Antibody Profile with reflex to WB; Future  -     RF Screen w/ Reflex to Titer; Future  -     Sedimentation rate, automated; Future  -     Vitamin D 25 hydroxy; Future          Subjective:      Patient ID: Angela Mcgill is a 59 y o  male  HPI    Pt is here by himself  C/o b/l hands swollen and stiff for months  Pt states his b/l hands joints swollen and painful, come and go  No swollen now  Stiff in the morning, last for several minutes  Sometimes numbness/tingling of fingers  Hx of right capral tunnel per pt  No surgery before  Denies recent injury  Denies fever, SOB, CP, n/va/bd pain  Pt states his sister has rheumatoid arthritis        The following portions of the patient's history were reviewed and updated as appropriate: allergies, current medications, past family history, past medical history, past social history, past surgical history and problem list     Review of Systems   Constitutional: Negative for appetite change, chills and fever  HENT: Negative for congestion, ear pain, sinus pain and sore throat  Eyes: Negative for discharge and itching  Respiratory: Negative for apnea, cough, chest tightness, shortness of breath and wheezing  Cardiovascular: Negative for chest pain, palpitations and leg swelling  Gastrointestinal: Negative for abdominal pain, anal bleeding, constipation, diarrhea, nausea and vomiting  Endocrine: Negative for cold intolerance, heat intolerance and polyuria  Genitourinary: Negative for difficulty urinating and dysuria  Musculoskeletal: Positive for arthralgias  Negative for back pain and myalgias  Skin: Negative for rash  Neurological: Negative for dizziness and headaches  Psychiatric/Behavioral: Negative for agitation  Objective:      /78 (BP Location: Left arm, Patient Position: Sitting, Cuff Size: Adult)   Pulse 60   Temp 98 2 °F (36 8 °C) (Oral)   Resp 16   Ht 5' 6" (1 676 m)   Wt 82 6 kg (182 lb)   SpO2 98%   BMI 29 38 kg/m²          Physical Exam   Constitutional: He appears well-developed  HENT:   Head: Normocephalic and atraumatic  Eyes: Pupils are equal, round, and reactive to light  Conjunctivae are normal    Neck: Normal range of motion  Neck supple  Cardiovascular: Normal rate, regular rhythm, normal heart sounds and intact distal pulses  Pulmonary/Chest: Effort normal and breath sounds normal    Abdominal: Soft  Bowel sounds are normal    Musculoskeletal: Normal range of motion  Neurological: He is alert

## 2018-10-04 ENCOUNTER — HOSPITAL ENCOUNTER (OUTPATIENT)
Dept: RADIOLOGY | Facility: HOSPITAL | Age: 63
Discharge: HOME | End: 2018-10-04
Payer: COMMERCIAL

## 2018-10-04 DIAGNOSIS — M79.642 LEFT HAND PAIN: ICD-10-CM

## 2018-10-04 DIAGNOSIS — M79.641 PAIN OF RIGHT HAND: ICD-10-CM

## 2018-10-04 LAB
B BURGDOR IGG SER IA-ACNC: 0.07
B BURGDOR IGM SER IA-ACNC: 0.19
RHEUMATOID FACT SER QL LA: NEGATIVE

## 2018-10-04 PROCEDURE — 73130 X-RAY EXAM OF HAND: CPT

## 2018-10-05 DIAGNOSIS — R70.0 ELEVATED SED RATE: Primary | ICD-10-CM

## 2018-10-05 DIAGNOSIS — M79.641 PAIN IN BOTH HANDS: ICD-10-CM

## 2018-10-05 DIAGNOSIS — M79.642 PAIN IN BOTH HANDS: ICD-10-CM

## 2018-10-05 LAB — RYE IGE QN: NEGATIVE

## 2018-10-16 ENCOUNTER — TELEPHONE (OUTPATIENT)
Dept: FAMILY MEDICINE CLINIC | Facility: CLINIC | Age: 63
End: 2018-10-16

## 2018-10-16 NOTE — TELEPHONE ENCOUNTER
Dr Joseph Sampson referred No Hauser to Ortho Surgeon Dr Thedora Spatz , was diagnosed with arthritis in his hands  Could not get an appointment till 11-28-18  His hands are hurting and swollen   requesting pain medication to take till his appt  with Ortho ,unless, we can get him a sooner appt       Please advise  No Hauser at [phone]

## 2018-10-17 ENCOUNTER — OFFICE VISIT (OUTPATIENT)
Dept: FAMILY MEDICINE CLINIC | Facility: CLINIC | Age: 63
End: 2018-10-17
Payer: COMMERCIAL

## 2018-10-17 VITALS
SYSTOLIC BLOOD PRESSURE: 122 MMHG | HEART RATE: 64 BPM | WEIGHT: 183.6 LBS | BODY MASS INDEX: 29.63 KG/M2 | DIASTOLIC BLOOD PRESSURE: 88 MMHG | RESPIRATION RATE: 16 BRPM | TEMPERATURE: 98 F

## 2018-10-17 DIAGNOSIS — Z23 NEED FOR INFLUENZA VACCINATION: ICD-10-CM

## 2018-10-17 DIAGNOSIS — M79.642 PAIN IN BOTH HANDS: Primary | ICD-10-CM

## 2018-10-17 DIAGNOSIS — E55.9 VITAMIN D INSUFFICIENCY: ICD-10-CM

## 2018-10-17 DIAGNOSIS — R20.0 NUMBNESS OF RIGHT THUMB: ICD-10-CM

## 2018-10-17 DIAGNOSIS — M79.641 PAIN IN BOTH HANDS: Primary | ICD-10-CM

## 2018-10-17 PROCEDURE — 90471 IMMUNIZATION ADMIN: CPT

## 2018-10-17 PROCEDURE — 99214 OFFICE O/P EST MOD 30 MIN: CPT | Performed by: FAMILY MEDICINE

## 2018-10-17 PROCEDURE — 90682 RIV4 VACC RECOMBINANT DNA IM: CPT

## 2018-10-17 RX ORDER — IBUPROFEN 400 MG/1
400 TABLET ORAL EVERY 12 HOURS PRN
Qty: 60 TABLET | Refills: 0 | Status: SHIPPED | OUTPATIENT
Start: 2018-10-17 | End: 2020-05-14 | Stop reason: ALTCHOICE

## 2018-10-17 NOTE — PROGRESS NOTES
Chief Complaint   Patient presents with    Arthritis     Arthritis in hands, needs FMLA forms completed  Assessment/Plan:  Reviewed labs and xrays results with pt  Pain in both hands---Sed rate elevated  Pt will see rheumatology  Xray showed mild arthritis  Will see orthopedics  Give ibuprofen as needed  SE educated pt  Take it with food  Numbness of right thumb---Will check EMG  Vit D insufficiency---advised pt to take vitD 2000IU daily  Fill out FMLA form  Give flu shot today  RTO as scheduled  Diagnoses and all orders for this visit:    Pain in both hands  -     ibuprofen (MOTRIN) 400 mg tablet; Take 1 tablet (400 mg total) by mouth every 12 (twelve) hours as needed for mild pain or moderate pain for up to 30 days    Numbness of right thumb  -     EMG 2 Limb Upper Extremity; Future    Need for influenza vaccination  -     influenza vaccine, 7752-1256, quadrivalent, recombinant, PF, 0 5 mL, for patients 18 yr+ (FLUBLOK)          Subjective:      Patient ID: Elsa Thorne is a 59 y o  male  HPI    Pt is here by himself  C/o b/l hands swollen and stiff for months  Pt states his b/l hands joints swollen and painful, come and go  No swollen now  Stiff in the morning, last for several minutes  Sometimes numbness/tingling of fingers  Worse in right thumb  Hx of right capral tunnel per pt  No surgery before  Denies recent injury  Denies fever, SOB, CP, n/va/bd pain  Pt states his sister has rheumatoid arthritis  We did xray of both hands which showed osteoarthritis  We checked labs including BRYAN negative, CRP <3, Lyme negative, RF negative, but Sed rate 19 elevated and vitD 21 8 low  Pt plan to see orthopedics and rheumatology  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  His job requires him to  little things fast, sometimes if he had hands swollen and pain, he cannot do his job   Need FMLA form filled out           The following portions of the patient's history were reviewed and updated as appropriate: allergies, current medications, past family history, past medical history, past social history, past surgical history and problem list     Review of Systems   Constitutional: Negative for appetite change, chills and fever  HENT: Negative for congestion, ear pain, sinus pain and sore throat  Eyes: Negative for discharge and itching  Respiratory: Negative for apnea, cough, chest tightness, shortness of breath and wheezing  Cardiovascular: Negative for chest pain, palpitations and leg swelling  Gastrointestinal: Negative for abdominal pain, anal bleeding, constipation, diarrhea, nausea and vomiting  Endocrine: Negative for cold intolerance, heat intolerance and polyuria  Genitourinary: Negative for difficulty urinating and dysuria  Musculoskeletal: Negative for arthralgias, back pain and myalgias  Skin: Negative for rash  Neurological: Negative for dizziness and headaches  Psychiatric/Behavioral: Negative for agitation  Objective:      /88 (BP Location: Left arm, Patient Position: Sitting, Cuff Size: Standard)   Pulse 64   Temp 98 °F (36 7 °C) (Tympanic)   Resp 16   Wt 83 3 kg (183 lb 9 6 oz)   BMI 29 63 kg/m²          Physical Exam   Constitutional: He appears well-developed  HENT:   Head: Normocephalic and atraumatic  Eyes: Pupils are equal, round, and reactive to light  Conjunctivae are normal    Neck: Normal range of motion  Neck supple  Cardiovascular: Normal rate, regular rhythm, normal heart sounds and intact distal pulses  Pulmonary/Chest: Effort normal and breath sounds normal    Abdominal: Soft  Bowel sounds are normal    Musculoskeletal: Normal range of motion  No swollen today  Neurological: He is alert

## 2018-10-23 DIAGNOSIS — I10 BENIGN HYPERTENSION: Primary | ICD-10-CM

## 2018-10-23 DIAGNOSIS — E78.5 HYPERLIPIDEMIA, UNSPECIFIED HYPERLIPIDEMIA TYPE: ICD-10-CM

## 2018-10-23 DIAGNOSIS — I25.10 CORONARY ARTERY DISEASE INVOLVING NATIVE CORONARY ARTERY OF NATIVE HEART WITHOUT ANGINA PECTORIS: ICD-10-CM

## 2018-10-23 DIAGNOSIS — Z95.1 HX OF CABG: ICD-10-CM

## 2018-10-29 ENCOUNTER — OFFICE VISIT (OUTPATIENT)
Dept: FAMILY MEDICINE CLINIC | Facility: CLINIC | Age: 63
End: 2018-10-29
Payer: COMMERCIAL

## 2018-10-29 VITALS
WEIGHT: 183.8 LBS | SYSTOLIC BLOOD PRESSURE: 110 MMHG | HEIGHT: 66 IN | HEART RATE: 72 BPM | BODY MASS INDEX: 29.54 KG/M2 | TEMPERATURE: 97.7 F | RESPIRATION RATE: 16 BRPM | DIASTOLIC BLOOD PRESSURE: 74 MMHG

## 2018-10-29 DIAGNOSIS — B34.9 VIRAL SYNDROME: Primary | ICD-10-CM

## 2018-10-29 PROCEDURE — 99213 OFFICE O/P EST LOW 20 MIN: CPT | Performed by: FAMILY MEDICINE

## 2018-10-29 NOTE — LETTER
October 29, 2018     Patient: Cherrie Snell   YOB: 1955   Date of Visit: 10/29/2018       To Whom it May Concern:    Cherrie Snell is under my professional care  He was seen in my office on 10/29/2018  He may return to work on 10/30/2018  Symptoms start since 10/28/2018  If you have any questions or concerns, please don't hesitate to call           Sincerely,          Mordechai Nyhan, MD        CC: No Recipients

## 2018-10-29 NOTE — PROGRESS NOTES
Chief Complaint   Patient presents with    URI     Symptoms non productive cough for 1 week, feeling hot/cold, and diarrhea since yesterday  Assessment/Plan:    A lot of fluids and rest  Tylenol or motrin for fever  May use OTC mucinex  Give work note  Call office if symptoms no improving or worse  Diagnoses and all orders for this visit:    Viral syndrome          Subjective:      Patient ID: Erlinda Carey is a 59 y o  male  HPI  Pt is here by himself  C/o cough for 2 days  Dry cough, no wheezing  No Sob  Felt cold  Denies chest pain, n/v/abd pain  Had diarrhea once yesterday  No smoking  Denies Hx of asthma or COPD  Got flu shot 2 weeks ago  The following portions of the patient's history were reviewed and updated as appropriate: allergies, current medications, past family history, past medical history, past social history, past surgical history and problem list     Review of Systems   Constitutional: Negative for appetite change, chills and fever  HENT: Negative for congestion, ear pain, sinus pain and sore throat  Eyes: Negative for discharge and itching  Respiratory: Positive for cough  Negative for apnea, chest tightness, shortness of breath and wheezing  Cardiovascular: Negative for chest pain, palpitations and leg swelling  Gastrointestinal: Negative for abdominal pain, anal bleeding, constipation, diarrhea, nausea and vomiting  Endocrine: Negative for cold intolerance, heat intolerance and polyuria  Genitourinary: Negative for difficulty urinating and dysuria  Musculoskeletal: Negative for arthralgias, back pain and myalgias  Skin: Negative for rash  Neurological: Negative for dizziness and headaches  Psychiatric/Behavioral: Negative for agitation           Objective:      /74 (BP Location: Right arm, Patient Position: Sitting, Cuff Size: Standard)   Pulse 72   Temp 97 7 °F (36 5 °C) (Tympanic)   Resp 16   Ht 5' 6" (1 676 m) Wt 83 4 kg (183 lb 12 8 oz)   BMI 29 67 kg/m²          Physical Exam   Constitutional: He appears well-developed  HENT:   Head: Normocephalic and atraumatic  Right Ear: External ear normal    Left Ear: External ear normal    Nose: Nose normal    Mouth/Throat: Oropharynx is clear and moist    Eyes: Pupils are equal, round, and reactive to light  Conjunctivae are normal    Neck: Normal range of motion  Neck supple  Cardiovascular: Normal rate, regular rhythm, normal heart sounds and intact distal pulses  Pulmonary/Chest: Effort normal and breath sounds normal    Abdominal: Soft  Bowel sounds are normal    Musculoskeletal: Normal range of motion  Neurological: He is alert

## 2018-10-31 ENCOUNTER — TELEPHONE (OUTPATIENT)
Dept: CARDIOLOGY CLINIC | Facility: CLINIC | Age: 63
End: 2018-10-31

## 2018-10-31 NOTE — TELEPHONE ENCOUNTER
Sapphire Ghosh called, has appt tomorrow and did not receive labs- was told it was mailed  Advised Pt to come to appt tomorrow and will get blood drawn at a later time-- verbally understood

## 2018-11-01 ENCOUNTER — OFFICE VISIT (OUTPATIENT)
Dept: CARDIOLOGY CLINIC | Facility: CLINIC | Age: 63
End: 2018-11-01
Payer: COMMERCIAL

## 2018-11-01 VITALS
SYSTOLIC BLOOD PRESSURE: 120 MMHG | BODY MASS INDEX: 29.89 KG/M2 | WEIGHT: 186 LBS | HEIGHT: 66 IN | HEART RATE: 66 BPM | DIASTOLIC BLOOD PRESSURE: 76 MMHG

## 2018-11-01 DIAGNOSIS — Z95.1 HX OF CABG: ICD-10-CM

## 2018-11-01 DIAGNOSIS — R09.89 WEAK CAROTID PULSE: ICD-10-CM

## 2018-11-01 DIAGNOSIS — I25.10 CORONARY ARTERY DISEASE INVOLVING NATIVE CORONARY ARTERY OF NATIVE HEART WITHOUT ANGINA PECTORIS: Primary | ICD-10-CM

## 2018-11-01 DIAGNOSIS — I10 BENIGN HYPERTENSION: ICD-10-CM

## 2018-11-01 DIAGNOSIS — E78.5 HYPERLIPIDEMIA, UNSPECIFIED HYPERLIPIDEMIA TYPE: ICD-10-CM

## 2018-11-01 PROCEDURE — 93000 ELECTROCARDIOGRAM COMPLETE: CPT | Performed by: INTERNAL MEDICINE

## 2018-11-01 PROCEDURE — 99214 OFFICE O/P EST MOD 30 MIN: CPT | Performed by: INTERNAL MEDICINE

## 2018-11-01 RX ORDER — ROSUVASTATIN CALCIUM 40 MG/1
40 TABLET, COATED ORAL DAILY
Qty: 90 TABLET | Refills: 3 | Status: SHIPPED | OUTPATIENT
Start: 2018-11-01 | End: 2018-12-05 | Stop reason: SDUPTHER

## 2018-11-01 NOTE — PROGRESS NOTES
Cardiology Follow Up    Mandy Mcconnell  1955  [id number]  [address] [facility] [address] [place] [address]  [phone]    1  Benign hypertension  POCT ECG       I had the pleasure of seeing Mandy Mcconnell for a follow up visit  Interval History: none    History of the presenting illness, Discussion/Summary and My Plan are as follows:    He is a very pleasant 71-year-old  gentleman with a history of dyslipidemia, coronary artery disease, prior percutaneous coronary interventions (last in 2005) and coronary artery bypass grafting X 3, in November 2012  We did not have any records regarding his bypass or coronary anatomy from [place]  At   baseline he was physically very active working out at the gym doing weights as well as running on the treadmill 30 minutes a few times a week without any symptoms, now busy with working night shift and not able to exercise but free of symptoms  Has had some renal calculi issues and needed a stent and has not exercised recently  He started smoking about 1/2-3/4 ppd  He also had erectile dysfunction  Physical exam is unremarkable  His lipid profile shows an elevated LDL at 92 in 8/17  His ECG shows a right bundle branch block     Plan:    Coronary artery disease status post CABG: Continue aspirin, atorvastatin 40 - would change to Crestor 40 once his runs out of his current supply  Does not have HTN  Restart exercise  Last stress test in 2011 without ischemia and normal function  Physically quite active without symptoms     Dyslipidemia: continue Atorvastatin, change to Crestor 40 once he runs out of his Atorvastatin,Feb 2018: , TG 55, HDL 75, LDL 81    Decreased left carotid pulse: < 50% BL disease on dopplers - Dec 15     Erectile dysfunction: stable     Tobacco use:  started again but will quit TODAY    DM: a1c of 6 7    Follow up in 9 months       Patient Active Problem List   Diagnosis    Screening for prostate cancer    Kidney stone    Arteriosclerosis of coronary artery    Benign hypertension    Hx of CABG    Coronary artery disease involving native coronary artery of native heart without angina pectoris    Dermatitis    Diabetes mellitus type 2, uncontrolled (HCC)    Hematuria    Hyperlipidemia    Microalbuminuria    Onychomycosis    Stasis dermatitis    Temporal headache    Type 2 diabetes mellitus (HCC)    Type 2 or unspecified type diabetes mellitus with other specified manifestations    Urinary urgency    Weak carotid pulse    Cigarette nicotine dependence without complication    Erectile dysfunction    Elevated sed rate    Pain in both hands    Vitamin D insufficiency     Past Medical History:   Diagnosis Date    Carpal tunnel syndrome     Coronary artery disease     Costochondritis     RESOLVED: [id number]    Diabetes mellitus ([facility] [address] )     Hematuria     HTN (hypertension)     Hyperlipidemia     Kidney stone     Weak carotid pulse      Social History     Social History    Marital status: Single     Spouse name: N/A    Number of children: N/A    Years of education: N/A     Occupational History    laboror           Social History Main Topics    Smoking status: Current Every Day Smoker     Packs/day: 1 00     Years: 44 00    Smokeless tobacco: Never Used      Comment: up to 1ppd since age 13y/o; FORMER SMOKER AS PER ALL SCRIPTS     Alcohol use No      Comment: quit 23 years ago; SOCIAL DRINKER AS PER ALL SCRIPTS     Drug use: No    Sexual activity: Not on file     Other Topics Concern    Not on file     Social History Narrative    No narrative on file      Family History   Problem Relation Age of Onset    Diabetes Sister         TYPE 2    Diabetes Mother     Heart disease Mother     Diabetes Brother     Diabetes Sister         TYPE 2    Heart disease Sister     Heart disease Sister     Coronary artery disease Maternal Uncle     Diabetes Maternal Uncle      Past Surgical History:   Procedure Laterality Date    CATARACT EXTRACTION Left     CORONARY ANGIOPLASTY WITH STENT PLACEMENT  06/29/2005    RCA     CORONARY ANGIOPLASTY WITH STENT PLACEMENT  07/05/2005    Cx    CORONARY ARTERY BYPASS GRAFT      LAST ASSESSED: [id number]    LITHOTRIPSY      RENAL    MOUTH SURGERY      SC CYSTO/URETERO W/LITHOTRIPSY &INDWELL STENT INSRT Left 3/24/2017    Procedure: CYSTOSCOPY, URETEROSCOPY, HOLMIUM LASER LITHOTRIPSY, BASKET STONE EXTRACTION, RETROGRADE PYELOGRAM, STENT INSERTION;  Surgeon: Rozina Costello MD;  [facility];  Service: Urology    SC CYSTO/URETERO W/LITHOTRIPSY &INDWELL STENT INSRT Left 7/28/2017    Procedure: CYSTOSCOPY, URETEROSCOPY WITH HOLMIUM LASER LITHOTRIPSY, AND STENT EXCHANGE;  Surgeon: Marjorie Billings MD;  [facility];  Service: Urology    [facility] ESWL Left 9/15/2017    Procedure: LITHROTRIPSY EXTRACORPORAL SHOCKWAVE (ESWL);   Surgeon: Marjorie Billings MD;  [facility];  Service: Urology    ROTATOR CUFF REPAIR Right     SHOULDER ARTHROSCOPY Left     TOOTH EXTRACTION         Current Outpatient Prescriptions:     aspirin 81 MG tablet, Take 325 mg by mouth daily at bedtime  , Disp: , Rfl:     atorvastatin (LIPITOR) 40 mg tablet, TAKE 1 TABLET DAILY FOR CHOLESTEROL, Disp: 90 tablet, Rfl: 3    ibuprofen (MOTRIN) 400 mg tablet, Take 1 tablet (400 mg total) by mouth every 12 (twelve) hours as needed for mild pain or moderate pain for up to 30 days, Disp: 60 tablet, Rfl: 0    lisinopril (ZESTRIL) 10 mg tablet, TAKE 1 TABLET DAILY, Disp: 90 tablet, Rfl: 2    metFORMIN (GLUCOPHAGE) 500 mg tablet, TAKE 2 TABLETS TWICE A DAY, Disp: 360 tablet, Rfl: 1  Allergies   Allergen Reactions    Morphine GI Intolerance     vomiting       Labs:not applicable  Imaging: Xr Hand 3+ Vw Left    Result Date: 10/5/2018  Narrative: LEFT HAND INDICATION:   S36 288: Pain in left hand  COMPARISON:  Contralateral side VIEWS:  XR HAND 3+ VW LEFT For the purposes of institution wide universal language the following terms will apply: (thumb=1st digit/finger, index finger=2nd digit/finger, long finger=3rd digit/finger, ring=4th digit/finger and small finger=5th digit/finger) FINDINGS: There is no acute fracture or dislocation  There is mild joint space narrowing, sclerosis and hypertrophic changes involving the DIPs at the 2nd 3rd and 4th digits, greatest at the 2nd digit, most consistent with osteoarthritis  No erosions or subluxation  No lytic or blastic lesions are seen  Soft tissues are unremarkable  Impression: Mild degenerative changes involving the DIPs, greatest at the 2nd DIP, most consistent with the appearance of osteoarthritis  Workstation performed: [id number]     Xr Hand 3+ Vw Right    Result Date: 10/5/2018  Narrative: RIGHT HAND INDICATION:   M79 641: Pain in right hand  COMPARISON:  None VIEWS:  XR HAND 3+ VW RIGHT For the purposes of institution wide universal language the following terms will apply: (thumb=1st digit/finger, index finger=2nd digit/finger, long finger=3rd digit/finger, ring=4th digit/finger and small finger=5th digit/finger) FINDINGS: There is no acute fracture or dislocation  Very mild hypertrophic changes seen at the 1st DIP and to a much lesser degree the 2nd 3rd and 4th DIPs  There is also mild hypertrophic changes at the 5th PIP  No subluxation  Questionable tiny erosion at the radial corner distal 5th middle phalanx No lytic or blastic lesions are seen  Soft tissues are unremarkable  Impression: Degenerative changes as above  Workstation performed: [id number]       Review of Systems:  Review of Systems   Constitutional: Negative  HENT: Negative  Eyes: Negative  Respiratory: Negative  Cardiovascular: Negative  Gastrointestinal: Negative  Endocrine: Negative  Genitourinary: Negative  Musculoskeletal: Negative      Skin: Negative  Allergic/Immunologic: Negative  Neurological: Negative  Hematological: Negative  Psychiatric/Behavioral: Negative  Physical Exam:  Physical Exam   Constitutional: He appears well-developed and well-nourished  No distress  HENT:   Head: Normocephalic and atraumatic  Eyes: Pupils are equal, round, and reactive to light  Right eye exhibits no discharge  Left eye exhibits no discharge  Neck: Normal range of motion  No JVD present  No tracheal deviation present  No thyromegaly present  Cardiovascular: Normal rate and normal heart sounds  Exam reveals no friction rub  No murmur heard  Pulmonary/Chest: Effort normal  No stridor  No respiratory distress  He has no wheezes  He has no rales  Abdominal: Soft  He exhibits no distension  There is no tenderness  There is no rebound  Musculoskeletal: Normal range of motion  He exhibits no edema or deformity  Neurological: He is alert  No cranial nerve deficit  Coordination normal    Skin: Skin is warm  He is not diaphoretic

## 2018-11-28 ENCOUNTER — OFFICE VISIT (OUTPATIENT)
Dept: OBGYN CLINIC | Facility: HOSPITAL | Age: 63
End: 2018-11-28
Payer: COMMERCIAL

## 2018-11-28 VITALS
HEIGHT: 66 IN | SYSTOLIC BLOOD PRESSURE: 124 MMHG | HEART RATE: 65 BPM | DIASTOLIC BLOOD PRESSURE: 75 MMHG | BODY MASS INDEX: 29.38 KG/M2 | WEIGHT: 182.8 LBS

## 2018-11-28 DIAGNOSIS — M19.042 LOCALIZED OSTEOARTHRITIS OF HANDS, BILATERAL: Primary | ICD-10-CM

## 2018-11-28 DIAGNOSIS — M19.041 LOCALIZED OSTEOARTHRITIS OF HANDS, BILATERAL: Primary | ICD-10-CM

## 2018-11-28 PROCEDURE — 99203 OFFICE O/P NEW LOW 30 MIN: CPT | Performed by: ORTHOPAEDIC SURGERY

## 2018-11-28 NOTE — PROGRESS NOTES
ASSESSMENT/PLAN:    Assessment:   Arthritis of the PIP and DIP joints of both hands    Plan:   Resume activities as tolerated, NSAIDs, activity modification and range of motion exercises     Follow Up:  PRN    To Do Next Visit:       General Discussions:  Osteoarthritis:  The anatomy and physiology of osteoarthritis was discussed with the patient today in the office  Deterioration of the articular cartilage eventually leads to altered mobility at the joint, resulting in joint subluxation, osteophyte formation, cystic changes, as well as subchondral sclerosis  Eventually, pain, limited mobility, and compensatory hypermobility at surrounding joints may develop  While normal activity and usage of the joint may provide a painful experience to the patient, this typically does not result in damage to the limb  Treatment options include splints to decreased joint edema, pain, and inflammation  Therapy exercises to strengthen the surrounding musculature may relieve pain, but do not alter the overall continued development of osteoarthritis  Oral medications, topical medications, corticosteroid injections may decrease pain and increase overall function  Eventually, some patients may require surgical intervention  Operative Discussions:         _____________________________________________________  CHIEF COMPLAINT:  Chief Complaint   Patient presents with    Right Hand - Pain    Left Hand - Pain         SUBJECTIVE:  Saige Childs is a 59y o  year old male who presents with generalized diffuse pain of both hands related to activity  He works as a  at [employer] with significant lifting which has contributed to his symptoms  His pain is described as located in the PIP and DIP joints of multiple fingers of both hands  He has not been treated for this in the past and does not take any current medications    He has occasional numbness throughout his hands however he denies any consistent or significant neurovascular symptoms  PAST MEDICAL HISTORY:  Past Medical History:   Diagnosis Date    Carpal tunnel syndrome     Coronary artery disease     Costochondritis     RESOLVED: [id number]    Diabetes mellitus (HCC)     Hematuria     HTN (hypertension)     Hyperlipidemia     Kidney stone     Weak carotid pulse        PAST SURGICAL HISTORY:  Past Surgical History:   Procedure Laterality Date    CATARACT EXTRACTION Left     CORONARY ANGIOPLASTY WITH STENT PLACEMENT  06/29/2005    RCA     CORONARY ANGIOPLASTY WITH STENT PLACEMENT  07/05/2005    Cx    CORONARY ARTERY BYPASS GRAFT      LAST ASSESSED: [id number]    LITHOTRIPSY      RENAL    MOUTH SURGERY      [place] CYSTO/URETERO W/LITHOTRIPSY &INDWELL STENT INSRT Left 3/24/2017    Procedure: CYSTOSCOPY, URETEROSCOPY, HOLMIUM LASER LITHOTRIPSY, BASKET STONE EXTRACTION, RETROGRADE PYELOGRAM, STENT INSERTION;  Surgeon: Bossman Shankar MD;  [facility];  Service: Urology    [place] CYSTO/URETERO W/LITHOTRIPSY &INDWELL STENT INSRT Left 7/28/2017    Procedure: CYSTOSCOPY, URETEROSCOPY WITH HOLMIUM LASER LITHOTRIPSY, AND STENT EXCHANGE;  Surgeon: Linn Herrera MD;  [facility];  Service: Urology    [facility] ESWL Left 9/15/2017    Procedure: LITHROTRIPSY EXTRACORPORAL SHOCKWAVE (ESWL);   Surgeon: Linn Herrera MD;  [facility];  Service: Urology    ROTATOR CUFF REPAIR Right     SHOULDER ARTHROSCOPY Left     TOOTH EXTRACTION         FAMILY HISTORY:  Family History   Problem Relation Age of Onset    Diabetes Sister         TYPE 2    Diabetes Mother     Heart disease Mother     Diabetes Brother     Diabetes Sister         TYPE 2    Heart disease Sister     Heart disease Sister     Coronary artery disease Maternal Uncle     Diabetes Maternal Uncle        SOCIAL HISTORY:  Social History   Substance Use Topics    Smoking status: Current Every Day Smoker     Packs/day: 1 00     Years: 44 00    Smokeless tobacco: Never Used Comment: up to 1ppd since age 13y/o; FORMER SMOKER AS PER ALL SCRIPTS     Alcohol use No      Comment: quit 23 years ago; SOCIAL DRINKER AS PER ALL SCRIPTS        MEDICATIONS:    Current Outpatient Prescriptions:     aspirin 81 MG tablet, Take 325 mg by mouth daily at bedtime  , Disp: , Rfl:     lisinopril (ZESTRIL) 10 mg tablet, TAKE 1 TABLET DAILY, Disp: 90 tablet, Rfl: 2    metFORMIN (GLUCOPHAGE) 500 mg tablet, TAKE 2 TABLETS TWICE A DAY, Disp: 360 tablet, Rfl: 1    rosuvastatin (CRESTOR) 40 MG tablet, Take 1 tablet (40 mg total) by mouth daily, Disp: 90 tablet, Rfl: 3    ibuprofen (MOTRIN) 400 mg tablet, Take 1 tablet (400 mg total) by mouth every 12 (twelve) hours as needed for mild pain or moderate pain for up to 30 days, Disp: 60 tablet, Rfl: 0    ALLERGIES:  Allergies   Allergen Reactions    Morphine GI Intolerance     vomiting       REVIEW OF SYSTEMS:  Pertinent items are noted in HPI  A comprehensive review of systems was negative      LABS:  HgA1c:   Lab Results   Component Value Date    HGBA1C 6 7% 07/25/2018     BMP:   Lab Results   Component Value Date    GLUCOSE 118 10/17/2015    CALCIUM 9 5 10/03/2018     10/17/2015    K 4 4 10/03/2018    CO2 29 10/03/2018     10/03/2018    BUN 17 10/03/2018    CREATININE 0 98 10/03/2018         _____________________________________________________  PHYSICAL EXAMINATION:  General: well developed and well nourished, alert, oriented times 3 and appears comfortable  Psychiatric: Normal  HEENT: Trachea Midline, No torticollis  Cardiovascular: No discernable arrhythmia  Pulmonary: No wheezing or stridor  Skin: No masses, erthema, lacerations, fluctation, ulcerations  Neurovascular: Sensation Intact to the Median, Ulnar, Radial Nerve, Motor Intact to the Median, Ulnar, Radial Nerve and Pulses Intact    MUSCULOSKELETAL EXAMINATION:  Bilateral hands:  -diffuse pain more significant in the PIP joints of all digits, less significant pain elicited over the DIP joint, most significant at the index and long fingers bilaterally  -full range of motion all digits  -palpable pulses  -negative Tinel's over carpal tunnel bilaterally    _____________________________________________________  STUDIES REVIEWED:  Images were reviewd in PACS:  X-ray bilateral hands reviewed show mild degenerative changes at DIP and PIP joints bilaterally      PROCEDURES PERFORMED:  Procedures         I interviewed, took the history and examined the patient  I discuss the case with the resident and reviewed the resident's note  I supervised the resident and I agree with the resident management plan as it was presented to me  I was present in the clinic and examined the patient

## 2018-11-29 ENCOUNTER — OFFICE VISIT (OUTPATIENT)
Dept: FAMILY MEDICINE CLINIC | Facility: CLINIC | Age: 63
End: 2018-11-29
Payer: COMMERCIAL

## 2018-11-29 ENCOUNTER — TRANSCRIBE ORDERS (OUTPATIENT)
Dept: RADIOLOGY | Facility: HOSPITAL | Age: 63
End: 2018-11-29

## 2018-11-29 ENCOUNTER — HOSPITAL ENCOUNTER (OUTPATIENT)
Dept: RADIOLOGY | Facility: HOSPITAL | Age: 63
Discharge: HOME | End: 2018-11-29
Payer: COMMERCIAL

## 2018-11-29 VITALS
HEIGHT: 66 IN | DIASTOLIC BLOOD PRESSURE: 60 MMHG | WEIGHT: 183.2 LBS | HEART RATE: 60 BPM | TEMPERATURE: 97.6 F | SYSTOLIC BLOOD PRESSURE: 100 MMHG | BODY MASS INDEX: 29.44 KG/M2 | RESPIRATION RATE: 16 BRPM

## 2018-11-29 DIAGNOSIS — M25.561 ACUTE PAIN OF BOTH KNEES: ICD-10-CM

## 2018-11-29 DIAGNOSIS — M25.562 ACUTE PAIN OF BOTH KNEES: ICD-10-CM

## 2018-11-29 DIAGNOSIS — M25.562 ACUTE PAIN OF BOTH KNEES: Primary | ICD-10-CM

## 2018-11-29 DIAGNOSIS — B35.1 NAIL FUNGUS: ICD-10-CM

## 2018-11-29 DIAGNOSIS — M25.561 ACUTE PAIN OF BOTH KNEES: Primary | ICD-10-CM

## 2018-11-29 PROCEDURE — 73562 X-RAY EXAM OF KNEE 3: CPT

## 2018-11-29 PROCEDURE — 4004F PT TOBACCO SCREEN RCVD TLK: CPT | Performed by: NURSE PRACTITIONER

## 2018-11-29 PROCEDURE — 3008F BODY MASS INDEX DOCD: CPT | Performed by: NURSE PRACTITIONER

## 2018-11-29 PROCEDURE — 99213 OFFICE O/P EST LOW 20 MIN: CPT | Performed by: NURSE PRACTITIONER

## 2018-11-29 NOTE — PROGRESS NOTES
Chief Complaint   Patient presents with    Knee Pain     x`1 weeks and patient would like a referal to see a podiatrist     Assessment/Plan:    B/L knee pain-  Likely due to arthritic changes  Xrays of B/L knees ordered today  He would like to see Dr Jazmyn Katz with Orthopedics and I have provided him with a referral today  Declined a referral to Physical Therapy  May take OTC Ibuprofen or Tylenol prn (max tylenol of 3g/24 hours)  Rest and ice to knees    Toenail fungus- Referral for Podiatry, Dr Joo Arredondo provided to the patient today      Diagnoses and all orders for this visit:    Acute pain of both knees  -     Ambulatory referral to Orthopedic Surgery; Future  -     XR knee 4+ vw right injury; Future  -     XR knee 3 vw left non injury; Future    Nail fungus  -     Ambulatory referral to Podiatry; Future          Subjective:      Patient ID: Neha Moise is a 59 y o  male      HPI     Pt presents by himself today for an acute visit  C/O B/L knee pain for the past week  No injuries or falls   Pain is equal in both knees  No precipitating events to his knee pain but he notes he played football in high school and feels this has contributed to his knee pain   No H/o knee surgery     Described as a dull, achy pain   Worse with standing and walking   Walks/ stands at his job   Denies swelling, bruising or redness to knees   Denies knee/ leg numbness, tingling or weakness     Has Ibuprofen 400 mg for his hand pain, but hasn't taken in over a week    Pt was evaluated by Orthopedics yesterday, 11/28/18 for arthritis of the PIP and DIP joints of both hands     Pt also requesting a new referral to see Podiatry  He was following with [facility] for his toenail fungus but hasn't been seen in a few months  He would like to see a  Podiatrist       The following portions of the patient's history were reviewed and updated as appropriate: allergies, current medications, past medical history, past social history and problem list     Review of Systems   Constitutional: Negative for activity change, appetite change, chills, diaphoresis, fatigue, fever and unexpected weight change  Respiratory: Negative for chest tightness, shortness of breath and wheezing  Cardiovascular: Negative for chest pain, palpitations and leg swelling  Musculoskeletal: Positive for arthralgias  Negative for gait problem  As noted in HPI   Skin: Negative for color change, pallor, rash and wound  Neurological: Negative for dizziness, weakness, numbness and headaches  Hematological: Negative for adenopathy  Objective:      /60   Pulse 60   Temp 97 6 °F (36 4 °C) (Oral)   Resp 16   Ht 5' 6" (1 676 m)   Wt 83 1 kg (183 lb 3 2 oz)   BMI 29 57 kg/m²          Physical Exam   Constitutional: He is oriented to person, place, and time  He appears well-developed and well-nourished  No distress  HENT:   Head: Normocephalic and atraumatic  Cardiovascular: Normal rate, regular rhythm and normal heart sounds  No murmur heard  Pulmonary/Chest: Effort normal and breath sounds normal  No respiratory distress  He has no wheezes  Musculoskeletal: Normal range of motion  Bilateral knee tenderness of the anteromedial joint line  No swelling or effusions  No ecchymosis or erythema  Full ROM with B/L knees   Neurological: He is alert and oriented to person, place, and time  Skin: Skin is warm and dry  No rash noted  He is not diaphoretic  No erythema  No pallor  Psychiatric: He has a normal mood and affect

## 2018-12-05 DIAGNOSIS — I25.10 CORONARY ARTERY DISEASE INVOLVING NATIVE CORONARY ARTERY OF NATIVE HEART WITHOUT ANGINA PECTORIS: ICD-10-CM

## 2018-12-05 DIAGNOSIS — E78.5 HYPERLIPIDEMIA, UNSPECIFIED HYPERLIPIDEMIA TYPE: ICD-10-CM

## 2018-12-05 RX ORDER — ROSUVASTATIN CALCIUM 40 MG/1
40 TABLET, COATED ORAL DAILY
Qty: 90 TABLET | Refills: 0 | Status: SHIPPED | OUTPATIENT
Start: 2018-12-05 | End: 2019-05-28 | Stop reason: SDUPTHER

## 2018-12-12 ENCOUNTER — TRANSCRIBE ORDERS (OUTPATIENT)
Dept: GASTROENTEROLOGY | Facility: CLINIC | Age: 63
End: 2018-12-12

## 2018-12-20 ENCOUNTER — OFFICE VISIT (OUTPATIENT)
Dept: GASTROENTEROLOGY | Facility: CLINIC | Age: 63
End: 2018-12-20
Payer: COMMERCIAL

## 2018-12-20 VITALS
DIASTOLIC BLOOD PRESSURE: 74 MMHG | TEMPERATURE: 97.5 F | SYSTOLIC BLOOD PRESSURE: 121 MMHG | HEIGHT: 66 IN | HEART RATE: 84 BPM | BODY MASS INDEX: 29.54 KG/M2 | WEIGHT: 183.8 LBS

## 2018-12-20 DIAGNOSIS — Z12.11 COLON CANCER SCREENING: Primary | ICD-10-CM

## 2018-12-20 PROCEDURE — 99203 OFFICE O/P NEW LOW 30 MIN: CPT | Performed by: INTERNAL MEDICINE

## 2018-12-20 NOTE — PROGRESS NOTES
[facility] - Outpatient Consultation  Gerard Simmonds 59 y o  male MRN: [id number]  Encounter: [id number]          ASSESSMENT AND PLAN:    Gerard Simmonds is a 59 y o  male here for a colon cancer screening  1  Colon cancer screening  Last colonoscopy performed at age 49 was  reportedly normal  I will schedule him for screening colonoscopy to be performed at the hospital given his history of coronary artery disease  Risks and benefits of the procedure were discussed  Risks include but aren't limited to bleeding, perforation, missed lesion, infection  Patient is agreeable to the procedure  Bowel prep instructions given     ______________________________________________________________________    HPI:  Gerard Simmonds is a 59 y o  male here for a colon cancer screening  He is asymptomatic at this time, with no alarm symptoms  His last colonoscopy was at age 49 and it was negative  He had a triple bypass surgery over 10 years ago  No family history of colon or stomach cancer        REVIEW OF SYSTEMS:    CONSTITUTIONAL: Denies any fever, chills, rigors, and weight loss  HEENT: No earache or tinnitus  Denies hearing loss or visual disturbances  CARDIOVASCULAR: No chest pain or palpitations  RESPIRATORY: Denies any cough, hemoptysis, shortness of breath or dyspnea on exertion  GASTROINTESTINAL: As noted in the History of Present Illness  GENITOURINARY: No problems with urination  Denies any hematuria or dysuria  NEUROLOGIC: No dizziness or vertigo, denies headaches  MUSCULOSKELETAL: Denies any muscle or joint pain  SKIN: Denies skin rashes or itching  ENDOCRINE: Denies excessive thirst  Denies intolerance to heat or cold  PSYCHOSOCIAL: Denies depression or anxiety  Denies any recent memory loss         Historical Information   Past Medical History:   Diagnosis Date    Carpal tunnel syndrome     Coronary artery disease     Costochondritis     RESOLVED: [id number]    Diabetes mellitus ([facility] [address] )     Hematuria     HTN (hypertension)     Hyperlipidemia     Kidney stone     Weak carotid pulse      Past Surgical History:   Procedure Laterality Date    CATARACT EXTRACTION Left     CORONARY ANGIOPLASTY WITH STENT PLACEMENT  06/29/2005    RCA     CORONARY ANGIOPLASTY WITH STENT PLACEMENT  07/05/2005    Cx    CORONARY ARTERY BYPASS GRAFT      LAST ASSESSED: [id number]    LITHOTRIPSY      RENAL    MOUTH SURGERY      TN CYSTO/URETERO W/LITHOTRIPSY &INDWELL STENT INSRT Left 3/24/2017    Procedure: CYSTOSCOPY, URETEROSCOPY, HOLMIUM LASER LITHOTRIPSY, BASKET STONE EXTRACTION, RETROGRADE PYELOGRAM, STENT INSERTION;  Surgeon: Kareen Cabrera MD;  [facility];  Service: Urology    TN CYSTO/URETERO W/LITHOTRIPSY &INDWELL STENT INSRT Left 7/28/2017    Procedure: CYSTOSCOPY, URETEROSCOPY WITH HOLMIUM LASER LITHOTRIPSY, AND STENT EXCHANGE;  Surgeon: Ingrid Gunderson MD;  [facility];  Service: Urology    TN FRAGMENT KIDNEY STONE/ ESWL Left 9/15/2017    Procedure: LITHROTRIPSY EXTRACORPORAL SHOCKWAVE (ESWL);   Surgeon: Ingrid Gunderson MD;  [facility];  Service: Urology    ROTATOR CUFF REPAIR Right     SHOULDER ARTHROSCOPY Left     TOOTH EXTRACTION       Social History   History   Alcohol Use No     Comment: quit 23 years ago; SOCIAL DRINKER AS PER ALL SCRIPTS      History   Drug Use No     History   Smoking Status    Current Every Day Smoker    Packs/day: 1 00    Years: 44 00   Smokeless Tobacco    Never Used     Comment: up to 1ppd since age 15y/o; FORMER SMOKER AS PER ALL SCRIPTS      Family History   Problem Relation Age of Onset    Diabetes Sister         TYPE 2    Diabetes Mother     Heart disease Mother     Diabetes Brother     Diabetes Sister         TYPE 2    Heart disease Sister     Heart disease Sister     Coronary artery disease Maternal Uncle     Diabetes Maternal Uncle        Meds/Allergies       Current Outpatient Prescriptions:     aspirin 81 MG tablet    ibuprofen (MOTRIN) 400 mg tablet    lisinopril (ZESTRIL) 10 mg tablet    metFORMIN (GLUCOPHAGE) 500 mg tablet    rosuvastatin (CRESTOR) 40 MG tablet    Allergies   Allergen Reactions    Morphine GI Intolerance     vomiting           Objective     There were no vitals taken for this visit  There is no height or weight on file to calculate BMI  PHYSICAL EXAM:      General Appearance:   Alert, cooperative, no distress   HEENT:   Normocephalic, atraumatic, anicteric      Neck:  Supple, symmetrical, trachea midline   Lungs:   Clear to auscultation bilaterally; no rales, rhonchi or wheezing; respirations unlabored    Heart[de-identified]   Regular rate and rhythm; no murmur, rub, or gallop  Abdomen:   Soft, non-tender, non-distended; normal bowel sounds; no masses, no organomegaly    Genitalia:   Deferred    Rectal:   Deferred    Extremities:  No cyanosis, clubbing or edema    Pulses:  2+ and symmetric    Skin:  No jaundice, rashes, or lesions    Lymph nodes:  No palpable cervical lymphadenopathy        Lab Results:   No visits with results within 1 Day(s) from this visit     Latest known visit with results is:   Lab on 10/03/2018   Component Date Value    BRYAN 10/03/2018 Negative     WBC 10/03/2018 7 46     RBC 10/03/2018 3 96     Hemoglobin 10/03/2018 11 3*    Hematocrit 10/03/2018 36 4*    MCV 10/03/2018 92     MCH 10/03/2018 28 5     MCHC 10/03/2018 31 0*    RDW 10/03/2018 15 0     MPV 10/03/2018 10 7     Platelets 46/25/9913 302     nRBC 10/03/2018 0     Neutrophils Relative 10/03/2018 54     Immat GRANS % 10/03/2018 0     Lymphocytes Relative 10/03/2018 37     Monocytes Relative 10/03/2018 6     Eosinophils Relative 10/03/2018 2     Basophils Relative 10/03/2018 1     Neutrophils Absolute 10/03/2018 3 97     Immature Grans Absolute 10/03/2018 0 01     Lymphocytes Absolute 10/03/2018 2 79     Monocytes Absolute 10/03/2018 0 48     Eosinophils Absolute 10/03/2018 0 13     Basophils Absolute 10/03/2018 0 08     Sodium 10/03/2018 138     Potassium 10/03/2018 4 4     Chloride 10/03/2018 104     CO2 10/03/2018 29     ANION GAP 10/03/2018 5     BUN 10/03/2018 17     Creatinine 10/03/2018 0 98     Glucose 10/03/2018 122     Calcium 10/03/2018 9 5     AST 10/03/2018 16     ALT 10/03/2018 20     Alkaline Phosphatase 10/03/2018 75     Total Protein 10/03/2018 7 8     Albumin 10/03/2018 4 0     Total Bilirubin 10/03/2018 0 38     eGFR 10/03/2018 82     CRP 10/03/2018 <3 0     LYME AB IGG 10/03/2018 0 07     LYME AB IGM 10/03/2018 0 19     Rheumatoid Factor 10/03/2018 Negative     Sed Rate 10/03/2018 19*    Vit D, 25-Hydroxy 10/03/2018 21 8*         Radiology Results:   Xr Knee 3 Vw Left Non Injury    Result Date: 12/2/2018  Narrative: LEFT KNEE INDICATION:   M25 561: Pain in right knee M25 562: Pain in left knee  COMPARISON:  None VIEWS:  XR KNEE 3 VW LEFT NON INJURY FINDINGS: There is no acute fracture or dislocation  There is no joint effusion  There is peaking of the medial tibial spine and tiny patellofemoral osteophytes  No lytic or blastic lesions are seen  Soft tissues are unremarkable  Impression: Minimal degenerative change  Workstation performed: [id number]     Xr Knee 3 Vw Right Non Injury    Result Date: 12/2/2018  Narrative: RIGHT KNEE INDICATION:   M25 561: Pain in right knee M25 562: Pain in left knee  COMPARISON:  None VIEWS:  XR KNEE 3 VW RIGHT NON INJURY FINDINGS: There is no acute fracture or dislocation  There is no joint effusion  There is peaking of the medial tibial spine  Tiny patellofemoral osteophytes are seen  No lytic or blastic lesions are seen  Soft tissues are unremarkable  Impression: Minimal degenerative change   Workstation performed: [id number]     Attestation:   By signing my name below, Ana Cardenas, attest that this documentation has been prepared under the direction and in the presence of Francesca Correa MD  Electronically Signed: Enoch Humphrey  12/20/18      I, Alexandria Lucas, personally performed the services described in this documentation  All medical record entries made by the enoch were at my direction and in my presence  I have reviewed the chart and discharge instructions and agree that the record reflects my personal performance and is accurate and complete   Alexandria Lucas MD  12/20/18

## 2018-12-20 NOTE — LETTER
December 20, 2018     Reyna Kaur, [address]    Patient: Eliana Reynolds   YOB: 1955   Date of Visit: 12/20/2018       Dear Dr Claudean Seta: Thank you for referring Eliana Ryenolds to me for evaluation  Below are my notes for this consultation  If you have questions, please do not hesitate to call me  I look forward to following your patient along with you  Sincerely,        Ilsa Salas MD        CC: No Recipients  Ilsa Salas MD  12/20/2018  3:22 PM  Sign at close encounter  [facility] - Outpatient Consultation  Eliana Reynolds 59 y o  male MRN: [id number]  Encounter: [id number]          ASSESSMENT AND PLAN:    Eliana Reynolds is a 59 y o  male here for a colon cancer screening  1  Colon cancer screening  Last colonoscopy performed at age 49 was  reportedly normal  I will schedule him for screening colonoscopy to be performed at the hospital given his history of coronary artery disease  Risks and benefits of the procedure were discussed  Risks include but aren't limited to bleeding, perforation, missed lesion, infection  Patient is agreeable to the procedure  Bowel prep instructions given     ______________________________________________________________________    HPI:  Eliana Reynolds is a 59 y o  male here for a colon cancer screening  He is asymptomatic at this time, with no alarm symptoms  His last colonoscopy was at age 49 and it was negative  He had a triple bypass surgery over 10 years ago  No family history of colon or stomach cancer        REVIEW OF SYSTEMS:    CONSTITUTIONAL: Denies any fever, chills, rigors, and weight loss  HEENT: No earache or tinnitus  Denies hearing loss or visual disturbances  CARDIOVASCULAR: No chest pain or palpitations  RESPIRATORY: Denies any cough, hemoptysis, shortness of breath or dyspnea on exertion  GASTROINTESTINAL: As noted in the History of Present Illness     GENITOURINARY: No problems with urination  Denies any hematuria or dysuria  NEUROLOGIC: No dizziness or vertigo, denies headaches  MUSCULOSKELETAL: Denies any muscle or joint pain  SKIN: Denies skin rashes or itching  ENDOCRINE: Denies excessive thirst  Denies intolerance to heat or cold  PSYCHOSOCIAL: Denies depression or anxiety  Denies any recent memory loss  Historical Information   Past Medical History:   Diagnosis Date    Carpal tunnel syndrome     Coronary artery disease     Costochondritis     RESOLVED: [id number]    Diabetes mellitus (HCC)     Hematuria     HTN (hypertension)     Hyperlipidemia     Kidney stone     Weak carotid pulse      Past Surgical History:   Procedure Laterality Date    CATARACT EXTRACTION Left     CORONARY ANGIOPLASTY WITH STENT PLACEMENT  06/29/2005    RCA     CORONARY ANGIOPLASTY WITH STENT PLACEMENT  07/05/2005    Cx    CORONARY ARTERY BYPASS GRAFT      LAST ASSESSED: [id number]    LITHOTRIPSY      RENAL    MOUTH SURGERY      MO CYSTO/URETERO W/LITHOTRIPSY &INDWELL STENT INSRT Left 3/24/2017    Procedure: CYSTOSCOPY, URETEROSCOPY, HOLMIUM LASER LITHOTRIPSY, BASKET STONE EXTRACTION, RETROGRADE PYELOGRAM, STENT INSERTION;  Surgeon: Amrik Pruitt MD;  [facility];  Service: Urology    MO CYSTO/URETERO W/LITHOTRIPSY &INDWELL STENT INSRT Left 7/28/2017    Procedure: CYSTOSCOPY, URETEROSCOPY WITH HOLMIUM LASER LITHOTRIPSY, AND STENT EXCHANGE;  Surgeon: Vanessa Palmer MD;  [facility];  Service: Urology    MO FRAGMENT KIDNEY STONE/ ESWL Left 9/15/2017    Procedure: LITHROTRIPSY EXTRACORPORAL SHOCKWAVE (ESWL);   Surgeon: Vanessa Palmer MD;  [facility];  Service: Urology    ROTATOR CUFF REPAIR Right     SHOULDER ARTHROSCOPY Left     TOOTH EXTRACTION       Social History   History   Alcohol Use No     Comment: quit 23 years ago; SOCIAL DRINKER AS PER ALL SCRIPTS      History   Drug Use No     History   Smoking Status    Current Every Day Smoker    Packs/day: 1 00    Years: 44 00   Smokeless Tobacco    Never Used     Comment: up to 1ppd since age 13y/o; FORMER SMOKER AS PER ALL SCRIPTS      Family History   Problem Relation Age of Onset    Diabetes Sister         TYPE 2    Diabetes Mother     Heart disease Mother     Diabetes Brother     Diabetes Sister         TYPE 2    Heart disease Sister     Heart disease Sister     Coronary artery disease Maternal Uncle     Diabetes Maternal Uncle        Meds/Allergies       Current Outpatient Prescriptions:     aspirin 81 MG tablet    ibuprofen (MOTRIN) 400 mg tablet    lisinopril (ZESTRIL) 10 mg tablet    metFORMIN (GLUCOPHAGE) 500 mg tablet    rosuvastatin (CRESTOR) 40 MG tablet    Allergies   Allergen Reactions    Morphine GI Intolerance     vomiting           Objective     There were no vitals taken for this visit  There is no height or weight on file to calculate BMI  PHYSICAL EXAM:      General Appearance:   Alert, cooperative, no distress   HEENT:   Normocephalic, atraumatic, anicteric      Neck:  Supple, symmetrical, trachea midline   Lungs:   Clear to auscultation bilaterally; no rales, rhonchi or wheezing; respirations unlabored    Heart[de-identified]   Regular rate and rhythm; no murmur, rub, or gallop  Abdomen:   Soft, non-tender, non-distended; normal bowel sounds; no masses, no organomegaly    Genitalia:   Deferred    Rectal:   Deferred    Extremities:  No cyanosis, clubbing or edema    Pulses:  2+ and symmetric    Skin:  No jaundice, rashes, or lesions    Lymph nodes:  No palpable cervical lymphadenopathy        Lab Results:   No visits with results within 1 Day(s) from this visit     Latest known visit with results is:   Lab on 10/03/2018   Component Date Value    BRYAN 10/03/2018 Negative     WBC 10/03/2018 7 46     RBC 10/03/2018 3 96     Hemoglobin 10/03/2018 11 3*    Hematocrit 10/03/2018 36 4*    MCV 10/03/2018 92     MCH 10/03/2018 28 5     MCHC 10/03/2018 31 0*    RDW 10/03/2018 15 0     MPV 10/03/2018 10 7     Platelets 66/76/0332 302     nRBC 10/03/2018 0     Neutrophils Relative 10/03/2018 54     Immat GRANS % 10/03/2018 0     Lymphocytes Relative 10/03/2018 37     Monocytes Relative 10/03/2018 6     Eosinophils Relative 10/03/2018 2     Basophils Relative 10/03/2018 1     Neutrophils Absolute 10/03/2018 3 97     Immature Grans Absolute 10/03/2018 0 01     Lymphocytes Absolute 10/03/2018 2 79     Monocytes Absolute 10/03/2018 0 48     Eosinophils Absolute 10/03/2018 0 13     Basophils Absolute 10/03/2018 0 08     Sodium 10/03/2018 138     Potassium 10/03/2018 4 4     Chloride 10/03/2018 104     CO2 10/03/2018 29     ANION GAP 10/03/2018 5     BUN 10/03/2018 17     Creatinine 10/03/2018 0 98     Glucose 10/03/2018 122     Calcium 10/03/2018 9 5     AST 10/03/2018 16     ALT 10/03/2018 20     Alkaline Phosphatase 10/03/2018 75     Total Protein 10/03/2018 7 8     Albumin 10/03/2018 4 0     Total Bilirubin 10/03/2018 0 38     eGFR 10/03/2018 82     CRP 10/03/2018 <3 0     LYME AB IGG 10/03/2018 0 07     LYME AB IGM 10/03/2018 0 19     Rheumatoid Factor 10/03/2018 Negative     Sed Rate 10/03/2018 19*    Vit D, 25-Hydroxy 10/03/2018 21 8*         Radiology Results:   Xr Knee 3 Vw Left Non Injury    Result Date: 12/2/2018  Narrative: LEFT KNEE INDICATION:   M25 561: Pain in right knee M25 562: Pain in left knee  COMPARISON:  None VIEWS:  XR KNEE 3 VW LEFT NON INJURY FINDINGS: There is no acute fracture or dislocation  There is no joint effusion  There is peaking of the medial tibial spine and tiny patellofemoral osteophytes  No lytic or blastic lesions are seen  Soft tissues are unremarkable  Impression: Minimal degenerative change  Workstation performed: [id number]     Xr Knee 3 Vw Right Non Injury    Result Date: 12/2/2018  Narrative: RIGHT KNEE INDICATION:   M25 561: Pain in right knee M25 562: Pain in left knee   COMPARISON:  None VIEWS:  XR KNEE 3 VW RIGHT NON INJURY FINDINGS: There is no acute fracture or dislocation  There is no joint effusion  There is peaking of the medial tibial spine  Tiny patellofemoral osteophytes are seen  No lytic or blastic lesions are seen  Soft tissues are unremarkable  Impression: Minimal degenerative change  Workstation performed: [id number]     Attestation:   By signing my name below, Ana Theresa, attest that this documentation has been prepared under the direction and in the presence of Francesca Correa MD  Electronically Signed: Enoch Dimas  12/20/18      I, Francesca Correa, personally performed the services described in this documentation  All medical record entries made by the enoch were at my direction and in my presence  I have reviewed the chart and discharge instructions and agree that the record reflects my personal performance and is accurate and complete   Francesca Correa MD  12/20/18

## 2019-01-07 ENCOUNTER — OFFICE VISIT (OUTPATIENT)
Dept: FAMILY MEDICINE CLINIC | Facility: CLINIC | Age: 64
End: 2019-01-07
Payer: COMMERCIAL

## 2019-01-07 VITALS
HEIGHT: 66 IN | SYSTOLIC BLOOD PRESSURE: 130 MMHG | HEART RATE: 64 BPM | RESPIRATION RATE: 16 BRPM | BODY MASS INDEX: 29.41 KG/M2 | TEMPERATURE: 97.8 F | DIASTOLIC BLOOD PRESSURE: 84 MMHG | WEIGHT: 183 LBS

## 2019-01-07 DIAGNOSIS — J39.9 UPPER RESPIRATORY DISEASE: Primary | ICD-10-CM

## 2019-01-07 PROCEDURE — 3008F BODY MASS INDEX DOCD: CPT | Performed by: FAMILY MEDICINE

## 2019-01-07 PROCEDURE — 99213 OFFICE O/P EST LOW 20 MIN: CPT | Performed by: FAMILY MEDICINE

## 2019-01-07 NOTE — PROGRESS NOTES
Chief Complaint   Patient presents with    URI     Symptoms nasal congestion, non productive cough, fever, and loss of appetite since last night  Assessment/Plan:    A lot of fluids and rest  Tylenol or motrin for fever  May use Flonase  Side effects educated pt  Educated pt correct way to use it  Give work note  Call office if symptoms no improving or worse  Diagnoses and all orders for this visit:    Upper respiratory disease          Subjective:      Patient ID: Angela Mcgill is a 59 y o  male  HPI    Pt is here by himself  C/o chills for 1 day  Sweating at night  Feels tired  Got running nose  No ear pain, sore throat  No cough, chest pain, sOb  Denies n/v/abd pain  Tried dayquil but no help  No sick contact  Got flu shot this season  Smoke 0 5ppd for 40 years  The following portions of the patient's history were reviewed and updated as appropriate: allergies, current medications, past family history, past medical history, past social history, past surgical history and problem list     Review of Systems   Constitutional: Negative for appetite change, chills and fever  HENT: Negative for congestion, ear pain, sinus pain and sore throat  Eyes: Negative for discharge and itching  Respiratory: Negative for apnea, cough, chest tightness, shortness of breath and wheezing  Cardiovascular: Negative for chest pain, palpitations and leg swelling  Gastrointestinal: Negative for abdominal pain, anal bleeding, constipation, diarrhea, nausea and vomiting  Endocrine: Negative for cold intolerance, heat intolerance and polyuria  Genitourinary: Negative for difficulty urinating and dysuria  Musculoskeletal: Negative for arthralgias, back pain and myalgias  Skin: Negative for rash  Neurological: Negative for dizziness and headaches  Psychiatric/Behavioral: Negative for agitation           Objective:      /84 (BP Location: Left arm, Patient Position: Sitting, Cuff Size: Standard)   Pulse 64   Temp 97 8 °F (36 6 °C) (Oral)   Resp 16   Ht 5' 6" (1 676 m)   Wt 83 kg (183 lb)   BMI 29 54 kg/m²          Physical Exam   Constitutional: He appears well-developed  HENT:   Head: Normocephalic and atraumatic  Right Ear: External ear normal    Left Ear: External ear normal    Mouth/Throat: Oropharynx is clear and moist    B/l nasal swollen   Eyes: Pupils are equal, round, and reactive to light  Conjunctivae are normal    Neck: Normal range of motion  Neck supple  Cardiovascular: Normal rate, regular rhythm, normal heart sounds and intact distal pulses  Pulmonary/Chest: Effort normal and breath sounds normal    Abdominal: Soft  Bowel sounds are normal    Musculoskeletal: Normal range of motion  Neurological: He is alert

## 2019-01-07 NOTE — LETTER
January 7, 2019     Patient: Gerard Simmonds   YOB: 1955   Date of Visit: 1/7/2019       To Whom it May Concern:    Gerard Simmonds is under my professional care  He was seen in my office on 1/7/2019  He may return to work on 1/8/2019  Symptoms started on 1/6/2019  If you have any questions or concerns, please don't hesitate to call           Sincerely,          Kaushik Mckeon MD        CC: No Recipients

## 2019-01-07 NOTE — LETTER
January 7, 2019     Patient: Parvez Coughlin   YOB: 1955   Date of Visit: 1/7/2019       To Whom it May Concern:    Parvez Coughlin is under my professional care  He was seen in my office on 1/7/2019  He may return to work on 1/9/2019  Symptom started on 1/6/2019  If you have any questions or concerns, please don't hesitate to call           Sincerely,          Zunilda Santos MD        CC: No Recipients

## 2019-01-15 ENCOUNTER — OFFICE VISIT (OUTPATIENT)
Dept: OBGYN CLINIC | Facility: HOSPITAL | Age: 64
End: 2019-01-15
Payer: COMMERCIAL

## 2019-01-15 VITALS
SYSTOLIC BLOOD PRESSURE: 108 MMHG | WEIGHT: 183 LBS | HEART RATE: 67 BPM | HEIGHT: 66 IN | BODY MASS INDEX: 29.41 KG/M2 | DIASTOLIC BLOOD PRESSURE: 68 MMHG

## 2019-01-15 DIAGNOSIS — G89.29 CHRONIC PAIN OF LEFT KNEE: ICD-10-CM

## 2019-01-15 DIAGNOSIS — M25.561 CHRONIC PAIN OF RIGHT KNEE: ICD-10-CM

## 2019-01-15 DIAGNOSIS — M25.562 CHRONIC PAIN OF LEFT KNEE: ICD-10-CM

## 2019-01-15 DIAGNOSIS — M17.12 PRIMARY OSTEOARTHRITIS OF LEFT KNEE: Primary | ICD-10-CM

## 2019-01-15 DIAGNOSIS — G89.29 CHRONIC PAIN OF RIGHT KNEE: ICD-10-CM

## 2019-01-15 DIAGNOSIS — M17.11 PRIMARY OSTEOARTHRITIS OF RIGHT KNEE: ICD-10-CM

## 2019-01-15 PROCEDURE — 99214 OFFICE O/P EST MOD 30 MIN: CPT | Performed by: ORTHOPAEDIC SURGERY

## 2019-01-15 PROCEDURE — 20610 DRAIN/INJ JOINT/BURSA W/O US: CPT | Performed by: ORTHOPAEDIC SURGERY

## 2019-01-15 RX ORDER — LIDOCAINE HYDROCHLORIDE 20 MG/ML
2 INJECTION, SOLUTION EPIDURAL; INFILTRATION; INTRACAUDAL; PERINEURAL
Status: COMPLETED | OUTPATIENT
Start: 2019-01-15 | End: 2019-01-15

## 2019-01-15 RX ORDER — BUPIVACAINE HYDROCHLORIDE 2.5 MG/ML
2 INJECTION, SOLUTION INFILTRATION; PERINEURAL
Status: COMPLETED | OUTPATIENT
Start: 2019-01-15 | End: 2019-01-15

## 2019-01-15 RX ORDER — BETAMETHASONE SODIUM PHOSPHATE AND BETAMETHASONE ACETATE 3; 3 MG/ML; MG/ML
12 INJECTION, SUSPENSION INTRA-ARTICULAR; INTRALESIONAL; INTRAMUSCULAR; SOFT TISSUE
Status: COMPLETED | OUTPATIENT
Start: 2019-01-15 | End: 2019-01-15

## 2019-01-15 RX ADMIN — BETAMETHASONE SODIUM PHOSPHATE AND BETAMETHASONE ACETATE 12 MG: 3; 3 INJECTION, SUSPENSION INTRA-ARTICULAR; INTRALESIONAL; INTRAMUSCULAR; SOFT TISSUE at 09:55

## 2019-01-15 RX ADMIN — LIDOCAINE HYDROCHLORIDE 2 ML: 20 INJECTION, SOLUTION EPIDURAL; INFILTRATION; INTRACAUDAL; PERINEURAL at 09:55

## 2019-01-15 RX ADMIN — BUPIVACAINE HYDROCHLORIDE 2 ML: 2.5 INJECTION, SOLUTION INFILTRATION; PERINEURAL at 09:55

## 2019-01-15 NOTE — PROGRESS NOTES
Assessment:  1  Primary osteoarthritis of left knee  Large joint arthrocentesis   2  Chronic pain of left knee  Ambulatory referral to Orthopedic Surgery    Large joint arthrocentesis   3  Primary osteoarthritis of right knee  Large joint arthrocentesis   4  Chronic pain of right knee  Large joint arthrocentesis       Plan:  Previously taken diagnostics reviewed and physical exam performed  Diagnosis, treatment options and associated risks were discussed with the patient including no treatment, nonsurgical treatment and potential for surgical intervention  The patient was given the opportunity to ask questions regarding each  Patient was educated on maintaining a healthy lifestyle with proper weight management and physician recommended home exercise program/routine for regular fitness  Cortisone injections offered, accepted and performed to both knees today  Ice and post-injection protocol advised  Weight bearing and activities as tolerated  Maintain quadriceps strengthening exercises (short arc program)  To do next visit:  Return in about 3 months (around 4/15/2019) for re-check  The above stated was discussed in layman's terms and the patient expressed understanding  All questions were answered to the patient's satisfaction  Scribe Attestation    I,:   Luis Rai am acting as a scribe while in the presence of the attending physician :        I,:   Krissy Maddox MD personally performed the services described in this documentation    as scribed in my presence :              Subjective:   Gerard Simmonds is a 59 y o  male who presents evaluation of bilateral knees due to pain which has been chronic in nature  States his knees have been bothering him for greater than 6 months  He went to his primary care physician in November where x-rays were taken and referral was placed for today  He was offered a course of formal physical therapy at that time in which he declined    He states his knees are stiff when getting up after prolonged sedentary and weight-bearing positions  Denies any calf or thigh pain  The majority of his pain is anteromedial at both knees  Denies any previous knee surgeries  He states that 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  Review of systems negative unless otherwise specified in HPI    Past Medical History:   Diagnosis Date    Carpal tunnel syndrome     Coronary artery disease     Costochondritis     RESOLVED: [id number]    Diabetes mellitus (HCC)     Hematuria     HTN (hypertension)     Hyperlipidemia     Kidney stone     Weak carotid pulse        Past Surgical History:   Procedure Laterality Date    CATARACT EXTRACTION Left     COLONOSCOPY      CORONARY ANGIOPLASTY WITH STENT PLACEMENT  06/29/2005    RCA     CORONARY ANGIOPLASTY WITH STENT PLACEMENT  07/05/2005    Cx    CORONARY ARTERY BYPASS GRAFT      LAST ASSESSED: [id number]    LITHOTRIPSY      RENAL    MOUTH SURGERY      NJ CYSTO/URETERO W/LITHOTRIPSY &INDWELL STENT INSRT Left 3/24/2017    Procedure: CYSTOSCOPY, URETEROSCOPY, HOLMIUM LASER LITHOTRIPSY, BASKET STONE EXTRACTION, RETROGRADE PYELOGRAM, STENT INSERTION;  Surgeon: Sunil Costello MD;  [facility];  Service: Urology    NJ CYSTO/URETERO W/LITHOTRIPSY &INDWELL STENT INSRT Left 7/28/2017    Procedure: CYSTOSCOPY, URETEROSCOPY WITH HOLMIUM LASER LITHOTRIPSY, AND STENT EXCHANGE;  Surgeon: Bacilio Keller MD;  [facility];  Service: Urology    Ivis ESWL Left 9/15/2017    Procedure: Felipa Patch SHOCKWAVE (ESWL);   Surgeon: Bacilio Keller MD;  [facility];  Service: Urology    ROTATOR CUFF REPAIR Right     SHOULDER ARTHROSCOPY Left     TOOTH EXTRACTION         Family History   Problem Relation Age of Onset    Diabetes Sister         TYPE 2    Diabetes Mother     Heart disease Mother     Diabetes Brother     Diabetes Sister         TYPE 2    Heart disease Sister     Heart disease Sister     Coronary artery disease Maternal Uncle     Diabetes Maternal Uncle        Social History     Occupational History    laboror           Social History Main Topics    Smoking status: Current Every Day Smoker     Packs/day: 1 00     Years: 44 00    Smokeless tobacco: Never Used      Comment: up to 1ppd since age 15y/o; FORMER SMOKER AS PER ALL SCRIPTS     Alcohol use No      Comment: quit 23 years ago; SOCIAL DRINKER AS PER ALL SCRIPTS     Drug use: No    Sexual activity: Not on file         Current Outpatient Prescriptions:     aspirin 81 MG tablet, Take 325 mg by mouth daily at bedtime  , Disp: , Rfl:     ibuprofen (MOTRIN) 400 mg tablet, Take 1 tablet (400 mg total) by mouth every 12 (twelve) hours as needed for mild pain or moderate pain for up to 30 days, Disp: 60 tablet, Rfl: 0    lisinopril (ZESTRIL) 10 mg tablet, TAKE 1 TABLET DAILY, Disp: 90 tablet, Rfl: 2    metFORMIN (GLUCOPHAGE) 500 mg tablet, TAKE 2 TABLETS TWICE A DAY, Disp: 360 tablet, Rfl: 1    Na Sulfate-K Sulfate-Mg Sulf (SUPREP BOWEL PREP KIT) 17 5-3 13-1 6 GM/177ML SOLN, Take 177 mL by mouth once for 1 dose (Patient not taking: Reported on 1/7/2019 ), Disp: 2 Bottle, Rfl: 0    rosuvastatin (CRESTOR) 40 MG tablet, Take 1 tablet (40 mg total) by mouth daily, Disp: 90 tablet, Rfl: 0    Allergies   Allergen Reactions    Morphine GI Intolerance     vomiting            Vitals:    01/15/19 0916   BP: 108/68   Pulse: 67       Objective:                    Right Knee Exam     Tenderness   Right knee tenderness location: medial patellar facet, josephine-medial joint line  Range of Motion   The patient has normal right knee ROM  Right knee flexion: With crepitus  Muscle Strength     The patient has normal right knee strength      Tests   Jose:  Medial - negative Lateral - negative  Drawer:       Anterior - negative    Posterior - negative  Varus: negative  Valgus: negative    Other Erythema: absent  Sensation: normal  Swelling: mild  Other tests: no effusion present      Left Knee Exam     Tenderness   Left knee tenderness location: medial patellar facet, josephine-medial joint line  Range of Motion   The patient has normal left knee ROM  Left knee flexion: With crepitus  Muscle Strength     The patient has normal left knee strength  Tests   Jose:  Medial - negative Lateral - negative  Drawer:       Anterior - negative     Posterior - negative  Varus: negative  Valgus: negative    Other   Erythema: absent  Sensation: normal  Swelling: mild  Effusion: no effusion present    Comments: Both knees are in varus alignment with bony enlargement medially  Diagnostics, reviewed and taken today if performed as documented: The attending physician has personally reviewed the pertinent films in PACS and interpretation is as follows:    Right and left knee x-rays taken on 11/29/2018 were reviewed today and show:  Mild medial joint space narrowing with mild to moderate patellofemoral DJD of both knees  Subchondral sclerosis osteophyte formation present  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        Procedures, if performed today:    Large joint arthrocentesis  Date/Time: 1/15/2019 9:55 AM  Consent given by: patient  Site marked: site marked  Supporting Documentation  Indications: pain   Procedure Details  Location: knee - R knee  Preparation: Patient was prepped and draped in the usual sterile fashion  Needle size: 22 G  Ultrasound guidance: no  Medications administered: 12 mg betamethasone acetate-betamethasone sodium phosphate 6 (3-3) mg/mL; 2 mL bupivacaine 0 25 %; 2 mL lidocaine (PF) 2 %    Patient tolerance: patient tolerated the procedure well with no immediate complications  Dressing:  Sterile dressing applied  Large joint arthrocentesis  Date/Time: 1/15/2019 9:55 AM  Consent given by: patient  Site marked: site marked  Supporting Documentation  Indications: pain Procedure Details  Location: knee - L knee  Preparation: Patient was prepped and draped in the usual sterile fashion  Needle size: 22 G  Ultrasound guidance: no  Medications administered: 12 mg betamethasone acetate-betamethasone sodium phosphate 6 (3-3) mg/mL; 2 mL bupivacaine 0 25 %; 2 mL lidocaine (PF) 2 %    Patient tolerance: patient tolerated the procedure well with no immediate complications  Dressing:  Sterile dressing applied          Portions of the record may have been created with voice recognition software   Occasional wrong word or "sound a like" substitutions may have occurred due to the inherent limitations of voice recognition software   Read the chart carefully and recognize, using context, where substitutions have occurred

## 2019-01-22 ENCOUNTER — HOSPITAL ENCOUNTER (OUTPATIENT)
Dept: NEUROLOGY | Facility: AMBULATORY SURGERY CENTER | Age: 64
Discharge: HOME | End: 2019-01-22
Payer: COMMERCIAL

## 2019-01-22 DIAGNOSIS — G56.03 CARPAL TUNNEL SYNDROME, BILATERAL: Primary | ICD-10-CM

## 2019-01-22 DIAGNOSIS — R20.0 NUMBNESS OF RIGHT THUMB: ICD-10-CM

## 2019-01-22 PROCEDURE — 95911 NRV CNDJ TEST 9-10 STUDIES: CPT | Performed by: PSYCHIATRY & NEUROLOGY

## 2019-01-22 PROCEDURE — 95886 MUSC TEST DONE W/N TEST COMP: CPT | Performed by: PSYCHIATRY & NEUROLOGY

## 2019-01-25 ENCOUNTER — OFFICE VISIT (OUTPATIENT)
Dept: FAMILY MEDICINE CLINIC | Facility: CLINIC | Age: 64
End: 2019-01-25
Payer: COMMERCIAL

## 2019-01-25 VITALS
TEMPERATURE: 97.9 F | RESPIRATION RATE: 16 BRPM | SYSTOLIC BLOOD PRESSURE: 110 MMHG | HEART RATE: 64 BPM | BODY MASS INDEX: 29.96 KG/M2 | WEIGHT: 186.4 LBS | DIASTOLIC BLOOD PRESSURE: 72 MMHG | HEIGHT: 66 IN

## 2019-01-25 DIAGNOSIS — E55.9 VITAMIN D INSUFFICIENCY: ICD-10-CM

## 2019-01-25 DIAGNOSIS — E11.65 TYPE 2 DIABETES MELLITUS WITH HYPERGLYCEMIA, WITHOUT LONG-TERM CURRENT USE OF INSULIN (HCC): Primary | ICD-10-CM

## 2019-01-25 DIAGNOSIS — I25.10 ARTERIOSCLEROSIS OF CORONARY ARTERY: ICD-10-CM

## 2019-01-25 DIAGNOSIS — E78.5 HYPERLIPIDEMIA, UNSPECIFIED HYPERLIPIDEMIA TYPE: ICD-10-CM

## 2019-01-25 DIAGNOSIS — F17.210 CIGARETTE NICOTINE DEPENDENCE WITHOUT COMPLICATION: ICD-10-CM

## 2019-01-25 DIAGNOSIS — E11.65 UNCONTROLLED TYPE 2 DIABETES MELLITUS WITH HYPERGLYCEMIA (HCC): ICD-10-CM

## 2019-01-25 DIAGNOSIS — I10 BENIGN HYPERTENSION: ICD-10-CM

## 2019-01-25 PROBLEM — R39.15 URINARY URGENCY: Status: RESOLVED | Noted: 2017-10-12 | Resolved: 2019-01-25

## 2019-01-25 PROBLEM — R51.9 TEMPORAL HEADACHE: Status: RESOLVED | Noted: 2017-10-13 | Resolved: 2019-01-25

## 2019-01-25 LAB — SL AMB POCT HEMOGLOBIN AIC: 6.5 (ref ?–6.5)

## 2019-01-25 PROCEDURE — 3074F SYST BP LT 130 MM HG: CPT | Performed by: FAMILY MEDICINE

## 2019-01-25 PROCEDURE — 83036 HEMOGLOBIN GLYCOSYLATED A1C: CPT | Performed by: FAMILY MEDICINE

## 2019-01-25 PROCEDURE — 99214 OFFICE O/P EST MOD 30 MIN: CPT | Performed by: FAMILY MEDICINE

## 2019-01-25 PROCEDURE — 3044F HG A1C LEVEL LT 7.0%: CPT | Performed by: FAMILY MEDICINE

## 2019-01-25 PROCEDURE — 3078F DIAST BP <80 MM HG: CPT | Performed by: FAMILY MEDICINE

## 2019-01-25 NOTE — PROGRESS NOTES
Chief Complaint   Patient presents with    Follow-up     6 month follow up  Health Maintenance   Topic Date Due    Hepatitis C Screening  1955    CRC Screening: Colonoscopy  1955    Lung Cancer Screening  08/13/2011    Diabetic Foot Exam  05/30/2018    HEMOGLOBIN A1C  01/25/2019    URINE MICROALBUMIN  02/03/2019    DM Eye Exam  09/13/2019    Depression Screening PHQ  10/17/2019    DTaP,Tdap,and Td Vaccines (2 - Td) 04/09/2026    INFLUENZA VACCINE  Completed    Pneumococcal PPSV23 Medium Risk Adult  Completed     Assessment/Plan:    DM---HgA1C in office today showed 6 5 better  Low carb diet  Continue metformin  HTN---controlled  Continue lisinopril  Hyperlipidemia---Low fat diet  Continue atorvastatin  CAD---[facility] cardiology as scheduled  Smoking---Strongly advised patient to quit smoking! Pt refused now  VitD insufficiency----Pt states he is on vitD supplement daily       Got flu shot this season  Got pneumovax 7/2018  Colonoscopy 2/22/2019 scheduled with Dr Rainer Quach  PSA per urology  6/2018 PSA 1 1 normal    RTO in 6 months with labs  Diagnoses and all orders for this visit:    Uncontrolled type 2 diabetes mellitus with hyperglycemia ([facility] [address] )  -     POCT hemoglobin A1c  -     Comprehensive metabolic panel; Future  -     Microalbumin / creatinine urine ratio; Future    Benign hypertension    Hyperlipidemia, unspecified hyperlipidemia type  -     Lipid Panel with Direct LDL reflex; Future    Arteriosclerosis of coronary artery    Cigarette nicotine dependence without complication    Vitamin D insufficiency  -     Vitamin D 25 hydroxy; Future    Other orders  -     Cancel: Ambulatory referral to Gastroenterology; Future          Subjective:      Patient ID: Aileen Parents is a 59 y o  male  HPI    Pt is here by himself  DM2----Pt always states he DOES NOT have diabetes  7/2018 hgA1C 6 7    He is on metformin 1000mg bid  Denies side effects  Pt states he eat healthy  Goes to gym regularly  Denies neuropathy  Denies hypoglycemia  He is legally blind of left eye since he was young  [facility]ry Dr Macie Smith recently  Will get report        HTN---on lisinopril 10mg QD  Denies SE  Does not check BP at home  BP today 110/72 good  Denies headache, chest pain, SOB or vision change       Hyperlipidemia---on restor 40mg qhs  Denies SE  Hx of CABG 2012, [facility] cardiology Dr Dionicio Thornton Q 6 months  He is on lisinopril and atorvastatin and ASA  Denies SOB, CP, n/v/abd pain       [facility] urology for kidney stones/PSA        Smoke 1ppd for 30 years  He states he quit smoking cold turkey for 3 years before  Refused to quit now  Quit alcohol for 1 year  No drugs       Denies depression  Feels stressful at work  The following portions of the patient's history were reviewed and updated as appropriate: allergies, current medications, past family history, past medical history, past social history, past surgical history and problem list     Review of Systems   Constitutional: Negative for appetite change, chills and fever  HENT: Negative for congestion, ear pain, sinus pain and sore throat  Eyes: Negative for discharge and itching  Respiratory: Negative for apnea, cough, chest tightness, shortness of breath and wheezing  Cardiovascular: Negative for chest pain, palpitations and leg swelling  Gastrointestinal: Negative for abdominal pain, anal bleeding, constipation, diarrhea, nausea and vomiting  Endocrine: Negative for cold intolerance, heat intolerance and polyuria  Genitourinary: Negative for difficulty urinating and dysuria  Musculoskeletal: Negative for arthralgias, back pain and myalgias  Skin: Negative for rash  Neurological: Negative for dizziness and headaches  Psychiatric/Behavioral: Negative for agitation           Objective:      /72 (BP Location: Left arm, Patient Position: Sitting, Cuff Size: Standard)   Pulse 64   Temp 97 9 °F (36 6 °C) (Oral)   Resp 16   Ht 5' 6" (1 676 m)   Wt 84 6 kg (186 lb 6 4 oz)   BMI 30 09 kg/m²          Physical Exam   Constitutional: He appears well-developed  HENT:   Head: Normocephalic and atraumatic  Eyes: Pupils are equal, round, and reactive to light  Conjunctivae are normal    Neck: Normal range of motion  Neck supple  Cardiovascular: Normal rate, regular rhythm, normal heart sounds and intact distal pulses  Pulmonary/Chest: Effort normal and breath sounds normal    Abdominal: Soft  Bowel sounds are normal    Musculoskeletal: Normal range of motion  Neurological: He is alert

## 2019-01-26 ENCOUNTER — APPOINTMENT (OUTPATIENT)
Dept: LAB | Facility: HOSPITAL | Age: 64
End: 2019-01-26
Payer: COMMERCIAL

## 2019-01-26 DIAGNOSIS — I25.10 CORONARY ARTERY DISEASE INVOLVING NATIVE CORONARY ARTERY OF NATIVE HEART WITHOUT ANGINA PECTORIS: ICD-10-CM

## 2019-01-26 DIAGNOSIS — E78.5 HYPERLIPIDEMIA, UNSPECIFIED HYPERLIPIDEMIA TYPE: ICD-10-CM

## 2019-01-26 DIAGNOSIS — E11.65 UNCONTROLLED TYPE 2 DIABETES MELLITUS WITH HYPERGLYCEMIA (HCC): ICD-10-CM

## 2019-01-26 DIAGNOSIS — E55.9 VITAMIN D INSUFFICIENCY: ICD-10-CM

## 2019-01-26 DIAGNOSIS — Z95.1 HX OF CABG: ICD-10-CM

## 2019-01-26 DIAGNOSIS — I10 BENIGN HYPERTENSION: ICD-10-CM

## 2019-01-26 LAB
25(OH)D3 SERPL-MCNC: 34.1 NG/ML (ref 30–100)
CHOLEST SERPL-MCNC: 167 MG/DL (ref 50–200)
HDLC SERPL-MCNC: 75 MG/DL (ref 40–60)
LDLC SERPL CALC-MCNC: 84 MG/DL (ref 0–100)
TRIGL SERPL-MCNC: 42 MG/DL

## 2019-01-26 PROCEDURE — 82306 VITAMIN D 25 HYDROXY: CPT

## 2019-01-26 PROCEDURE — 80061 LIPID PANEL: CPT

## 2019-02-06 DIAGNOSIS — I10 ESSENTIAL HYPERTENSION: ICD-10-CM

## 2019-02-06 PROCEDURE — 4010F ACE/ARB THERAPY RXD/TAKEN: CPT | Performed by: FAMILY MEDICINE

## 2019-02-06 RX ORDER — LISINOPRIL 10 MG/1
TABLET ORAL
Qty: 90 TABLET | Refills: 3 | Status: SHIPPED | OUTPATIENT
Start: 2019-02-06 | End: 2020-02-28 | Stop reason: SDUPTHER

## 2019-02-21 ENCOUNTER — ANESTHESIA EVENT (OUTPATIENT)
Dept: GASTROENTEROLOGY | Facility: HOSPITAL | Age: 64
End: 2019-02-21
Payer: COMMERCIAL

## 2019-02-22 ENCOUNTER — ANESTHESIA (OUTPATIENT)
Dept: GASTROENTEROLOGY | Facility: HOSPITAL | Age: 64
End: 2019-02-22
Payer: COMMERCIAL

## 2019-02-22 ENCOUNTER — HOSPITAL ENCOUNTER (OUTPATIENT)
Facility: HOSPITAL | Age: 64
Setting detail: OUTPATIENT SURGERY
Discharge: HOME | End: 2019-02-22
Attending: INTERNAL MEDICINE | Admitting: INTERNAL MEDICINE
Payer: COMMERCIAL

## 2019-02-22 VITALS
OXYGEN SATURATION: 100 % | TEMPERATURE: 97 F | BODY MASS INDEX: 29.41 KG/M2 | RESPIRATION RATE: 16 BRPM | DIASTOLIC BLOOD PRESSURE: 65 MMHG | HEART RATE: 64 BPM | WEIGHT: 183 LBS | HEIGHT: 66 IN | SYSTOLIC BLOOD PRESSURE: 120 MMHG

## 2019-02-22 DIAGNOSIS — Z12.11 COLON CANCER SCREENING: ICD-10-CM

## 2019-02-22 PROCEDURE — 88305 TISSUE EXAM BY PATHOLOGIST: CPT | Performed by: PATHOLOGY

## 2019-02-22 PROCEDURE — 45385 COLONOSCOPY W/LESION REMOVAL: CPT | Performed by: INTERNAL MEDICINE

## 2019-02-22 RX ORDER — PROPOFOL 10 MG/ML
INJECTION, EMULSION INTRAVENOUS AS NEEDED
Status: DISCONTINUED | OUTPATIENT
Start: 2019-02-22 | End: 2019-02-22 | Stop reason: SURG

## 2019-02-22 RX ORDER — SODIUM CHLORIDE 9 MG/ML
50 INJECTION, SOLUTION INTRAVENOUS CONTINUOUS
Status: DISCONTINUED | OUTPATIENT
Start: 2019-02-22 | End: 2019-02-22 | Stop reason: HOSPADM

## 2019-02-22 RX ORDER — PROPOFOL 10 MG/ML
INJECTION, EMULSION INTRAVENOUS CONTINUOUS PRN
Status: DISCONTINUED | OUTPATIENT
Start: 2019-02-22 | End: 2019-02-22 | Stop reason: SURG

## 2019-02-22 RX ORDER — OMEGA-3-ACID ETHYL ESTERS 1 G/1
100 CAPSULE, LIQUID FILLED ORAL 2 TIMES DAILY
COMMUNITY

## 2019-02-22 RX ORDER — LIDOCAINE HYDROCHLORIDE 10 MG/ML
INJECTION, SOLUTION INFILTRATION; PERINEURAL AS NEEDED
Status: DISCONTINUED | OUTPATIENT
Start: 2019-02-22 | End: 2019-02-22 | Stop reason: SURG

## 2019-02-22 RX ADMIN — PROPOFOL 100 MCG/KG/MIN: 10 INJECTION, EMULSION INTRAVENOUS at 08:42

## 2019-02-22 RX ADMIN — LIDOCAINE HYDROCHLORIDE 100 MG: 10 INJECTION, SOLUTION INFILTRATION; PERINEURAL at 08:42

## 2019-02-22 RX ADMIN — PROPOFOL 100 MG: 10 INJECTION, EMULSION INTRAVENOUS at 08:42

## 2019-02-22 RX ADMIN — SODIUM CHLORIDE: 0.9 INJECTION, SOLUTION INTRAVENOUS at 08:32

## 2019-02-22 NOTE — DISCHARGE INSTRUCTIONS
OPERATIVE REPORT  PATIENT NAME: Saige Childs    :  1955  MRN: [id number]  Pt [facility]: [facility]    SURGERY DATE: 2019    Surgeon(s) and Role:     * Marlee Au MD - Primary    Colonoscopy Procedure Note    Procedure: Colonoscopy    Sedation: Monitored anesthesia care, check anesthesia records      ASA Class: 3    INDICATIONS:  Colon cancer screening    POST-OP DIAGNOSIS: See the impression below    Procedure Details     Prior colonoscopy: 10 years ago  Informed consent was obtained for the procedure, including sedation  Risks of perforation, hemorrhage, adverse drug reaction and aspiration were discussed  The patient was placed in the left lateral decubitus position  Based on the pre-procedure assessment, including review of the patient's medical history, medications, allergies, and review of systems, he had been deemed to be an appropriate candidate for conscious sedation; he was therefore sedated with the medications listed below  The patient was monitored continuously with telemetry, pulse oximetry, blood pressure monitoring, and direct observations  A rectal examination was performed  The colonoscope was inserted into the rectum and advanced under direct vision to the cecum, which was identified by the ileocecal valve and appendiceal orifice  The quality of the colonic preparation was good  A careful inspection was made as the colonoscope was withdrawn, including a retroflexed view of the rectum; findings and interventions are described below  Findings:  A 3 mm flat polyp was removed from sigmoid colon using cold biopsy forceps  Another 4 mm flat polyp was removed using Exacto cold snare  Mild diverticulosis in the sigmoid  Internal hemorrhoids  Complications: None; patient tolerated the procedure well  Impression:    Two small polyps were removed as outlined above  Small internal hemorrhoids  Mild diverticulosis      Recommendations:  Repeat colonoscopy in 5 years if polyps are adenomas  High-fiber diet  Avoid constipation  If you have abdominal pain, bleeding or fever call my office at [phone]  COMPLICATIONS:  None; patient tolerated the procedure well      SPECIMENS:    ID Type Source Tests Collected by Time Destination   1 : sigmoid Tissue Polyp, Colorectal TISSUE EXAM Andre Espinal MD 2/22/2019 5207    2 : sigmoid #2 Tissue Polyp, Colorectal TISSUE EXAM Andre Espinal MD 2/22/2019 0854        ESTIMATED BLOOD LOSS:  Minimal  SIGNATURE: Andre Espinal MD  DATE: February 22, 2019  TIME: 9:04 AM

## 2019-02-22 NOTE — ANESTHESIA PREPROCEDURE EVALUATION
Review of Systems/Medical History  Patient summary reviewed  Chart reviewed  No history of anesthetic complications     Cardiovascular  Exercise tolerance (METS): >4,  Hyperlipidemia, Hypertension ,    Pulmonary       GI/Hepatic       Kidney stones,        Endo/Other  Diabetes well controlled type 2 Insulin,      GYN       Hematology   Musculoskeletal       Neurology   Psychology           Physical Exam    Airway    Mallampati score: II  TM Distance: >3 FB       Dental   No notable dental hx     Cardiovascular      Pulmonary      Other Findings        Anesthesia Plan  ASA Score- 3     Anesthesia Type- IV sedation with anesthesia with ASA Monitors  Additional Monitors:   Airway Plan:         Plan Factors-    Induction- intravenous  Postoperative Plan-     Informed Consent- Anesthetic plan and risks discussed with patient  I personally reviewed this patient with the CRNA  Discussed and agreed on the Anesthesia Plan with the CRNA  Shawnee Gutierrez

## 2019-02-22 NOTE — H&P
History and Physical -  [facility]  Patty Henriquez 59 y o  male MRN: [id number]    HPI: Patty Henriquez is a 59y o  year old male who presents for screening colonoscopy  Last colonoscopy was more than 10 years ago      Review of Systems    Historical Information   Past Medical History:   Diagnosis Date    Arthritis     Carpal tunnel syndrome     Coronary artery disease     Costochondritis     RESOLVED: [id number]    Diabetes mellitus ([address] )     Hematuria     HTN (hypertension)     Hyperlipidemia     Kidney stone     Myocardial infarction (HCC)     Weak carotid pulse      Past Surgical History:   Procedure Laterality Date    CATARACT EXTRACTION Left     COLONOSCOPY      CORONARY ANGIOPLASTY WITH STENT PLACEMENT  06/29/2005    RCA     CORONARY ANGIOPLASTY WITH STENT PLACEMENT  07/05/2005    Cx    CORONARY ARTERY BYPASS GRAFT      LAST ASSESSED: [id number]    LITHOTRIPSY      RENAL    MOUTH SURGERY      [place] CYSTO/URETERO W/LITHOTRIPSY &INDWELL STENT INSRT Left 3/24/2017    Procedure: CYSTOSCOPY, URETEROSCOPY, HOLMIUM LASER LITHOTRIPSY, BASKET STONE EXTRACTION, RETROGRADE PYELOGRAM, STENT INSERTION;  Surgeon: Kareen Cabrera MD;  [facility];  Service: Urology    TN CYSTO/URETERO W/LITHOTRIPSY &INDWELL STENT INSRT Left 7/28/2017    Procedure: CYSTOSCOPY, URETEROSCOPY WITH HOLMIUM LASER LITHOTRIPSY, AND STENT EXCHANGE;  Surgeon: Ingrid Gunderson MD;  [facility];  Service: Urology    Ivis ESWL Left 9/15/2017    Procedure: Steffanie Moon SHOCKWAVE (ESWL);   Surgeon: Ingrid Gunderson MD;  [facility];  Service: Urology    ROTATOR CUFF REPAIR Right     SHOULDER ARTHROSCOPY Left     TOOTH EXTRACTION       Social History   Social History     Substance and Sexual Activity   Alcohol Use No    Comment: quit 23 years ago; SOCIAL DRINKER AS PER ALL SCRIPTS      Social History     Substance and Sexual Activity   Drug Use No     Social History     Tobacco Use   Smoking Status Former Smoker    Packs/day: 1 00    Years: 44 00    Pack years: 44 00   Smokeless Tobacco Never Used   Tobacco Comment    up to 1ppd since age 15y/o; [de-identified] SMOKER AS PER ALL SCRIPTS      Family History   Problem Relation Age of Onset    Diabetes Sister         TYPE 2    Diabetes Mother     Heart disease Mother     Diabetes Brother     Diabetes Sister         TYPE 2    Heart disease Sister     Heart disease Sister     Coronary artery disease Maternal Uncle     Diabetes Maternal Uncle        Meds/Allergies     Medications Prior to Admission   Medication    aspirin 81 MG tablet    lisinopril (ZESTRIL) 10 mg tablet    metFORMIN (GLUCOPHAGE) 500 mg tablet    omega-3-acid ethyl esters (LOVAZA) 1 g capsule    rosuvastatin (CRESTOR) 40 MG tablet    ibuprofen (MOTRIN) 400 mg tablet    Na Sulfate-K Sulfate-Mg Sulf (SUPREP BOWEL PREP KIT) 17 5-3 13-1 6 GM/177ML SOLN       Allergies   Allergen Reactions    Morphine GI Intolerance     vomiting       Objective     Blood pressure 131/74, pulse 64, temperature (!) 97 °F (36 1 °C), temperature source Tympanic, resp  rate 16, height 5' 6" (1 676 m), weight 83 kg (183 lb), SpO2 100 %        PHYSICAL EXAM    Gen: NAD  CV: RRR  CHEST: Clear  ABD: soft, NT/ND  EXT: no edema  Neuro: AAO      ASSESSMENT/PLAN:  This is a 59y o  year old male here for screening colonoscopy    PLAN:   Procedure:  Colonoscopy with biopsy and possible polypectomy

## 2019-02-22 NOTE — OP NOTE
OPERATIVE REPORT  PATIENT NAME: Haley Kiran    :  1955  MRN: [id number]  Pt [facility]    SURGERY DATE: 2019    Surgeon(s) and Role:     * Ravin Sorensen MD - Primary    Colonoscopy Procedure Note    Procedure: Colonoscopy    Sedation: Monitored anesthesia care, check anesthesia records      ASA Class: 3    INDICATIONS:  Colon cancer screening    POST-OP DIAGNOSIS: See the impression below    Procedure Details     Prior colonoscopy: 10 years ago  Informed consent was obtained for the procedure, including sedation  Risks of perforation, hemorrhage, adverse drug reaction and aspiration were discussed  The patient was placed in the left lateral decubitus position  Based on the pre-procedure assessment, including review of the patient's medical history, medications, allergies, and review of systems, he had been deemed to be an appropriate candidate for conscious sedation; he was therefore sedated with the medications listed below  The patient was monitored continuously with telemetry, pulse oximetry, blood pressure monitoring, and direct observations  A rectal examination was performed  The colonoscope was inserted into the rectum and advanced under direct vision to the cecum, which was identified by the ileocecal valve and appendiceal orifice  The quality of the colonic preparation was good  A careful inspection was made as the colonoscope was withdrawn, including a retroflexed view of the rectum; findings and interventions are described below  Findings:  A 3 mm flat polyp was removed from sigmoid colon using cold biopsy forceps  Another 4 mm flat polyp was removed using Exacto cold snare  Mild diverticulosis in the sigmoid  Internal hemorrhoids  Complications: None; patient tolerated the procedure well  Impression:    Two small polyps were removed as outlined above  Small internal hemorrhoids  Mild diverticulosis      Recommendations:  Repeat colonoscopy in 5 years if polyps are adenomas  High-fiber diet  Avoid constipation  If you have abdominal pain, bleeding or fever call my office at [phone]  COMPLICATIONS:  None; patient tolerated the procedure well      SPECIMENS:    ID Type Source Tests Collected by Time Destination   1 : sigmoid Tissue Polyp, Colorectal TISSUE EXAM Sofia Kat MD 2/22/2019 0525    2 : sigmoid #2 Tissue Polyp, Colorectal TISSUE EXAM Sofia Kat MD 2/22/2019 0854        ESTIMATED BLOOD LOSS:  Minimal  SIGNATURE: Sofia Kat MD  DATE: February 22, 2019  TIME: 9:04 AM

## 2019-02-22 NOTE — ANESTHESIA POSTPROCEDURE EVALUATION
Post-Op Assessment Note    CV Status:  Stable  Pain Score: 0    Pain management: adequate     Mental Status:  Sleepy   Hydration Status:  Stable   PONV Controlled:  Controlled   Airway Patency:  Patent   Post Op Vitals Reviewed: Yes      Staff: CRNA           /58 (02/22/19 0904)    Temp     Pulse 64 (02/22/19 0904)   Resp 16 (02/22/19 0904)    SpO2 100 % (02/22/19 0904)

## 2019-02-26 ENCOUNTER — TELEPHONE (OUTPATIENT)
Dept: GASTROENTEROLOGY | Facility: CLINIC | Age: 64
End: 2019-02-26

## 2019-02-26 NOTE — TELEPHONE ENCOUNTER
----- Message from Leslee Ríos MD sent at 2/25/2019 10:56 AM EST -----  Pathology showed 2 precancerous polyps  Both polyps were removed completely

## 2019-02-26 NOTE — TELEPHONE ENCOUNTER
Called and spoke to patient to relay results  Patient demonstrated understanding  Recall set for colonoscopy in 5 years

## 2019-03-22 ENCOUNTER — OFFICE VISIT (OUTPATIENT)
Dept: UROLOGY | Facility: AMBULATORY SURGERY CENTER | Age: 64
End: 2019-03-22
Payer: COMMERCIAL

## 2019-03-22 VITALS
BODY MASS INDEX: 29.73 KG/M2 | HEIGHT: 66 IN | WEIGHT: 185 LBS | SYSTOLIC BLOOD PRESSURE: 122 MMHG | DIASTOLIC BLOOD PRESSURE: 64 MMHG | HEART RATE: 66 BPM

## 2019-03-22 DIAGNOSIS — N20.0 CALCULUS OF KIDNEY: Primary | ICD-10-CM

## 2019-03-22 PROCEDURE — 99213 OFFICE O/P EST LOW 20 MIN: CPT | Performed by: PHYSICIAN ASSISTANT

## 2019-03-22 NOTE — PROGRESS NOTES
UROLOGY PROGRESS NOTE   Patient Identifiers: Mattie Romo (MRN [id number])  Date of Service: 3/22/2019    Subjective:    27-year-old man a history of BPH kidney stones and erectile dysfunction  He has not had any recent stones  His last PSA was 1 1  He was prescribed sildenafil 100 milligrams but did not fill the prescription  He has recently lost his job and his insurance and at the end of the week  No other complaints  Patient has  no complaints  Objective:     VITALS:    Vitals:    03/22/19 0842   BP: 122/64   Pulse: 66     AUA SYMPTOM SCORE      Most Recent Value   AUA SYMPTOM SCORE   How often have you had a sensation of not emptying your bladder completely after you finished urinating? 0   How often have you had to urinate again less than two hours after you finished urinating? 0   How often have you found you stopped and started again several times when you urinate?  0   How often have you found it difficult to postpone urination? 0   How often have you had a weak urinary stream?  0   How often have you had to push or strain to begin urination?   0   How many times did you most typically get up to urinate from the time you went to bed at night until the time you got up in the morning?  0   Quality of Life: If you were to spend the rest of your life with your urinary condition just the way it is now, how would you feel about that?  0   AUA SYMPTOM SCORE  0            LABS:  Lab Results   Component Value Date    HGB 12 0 01/26/2019    HCT 38 6 01/26/2019    WBC 6 38 01/26/2019     01/26/2019   ]    Lab Results   Component Value Date     10/17/2015    K 4 3 01/26/2019     01/26/2019    CO2 29 01/26/2019    BUN 18 01/26/2019    CREATININE 0 86 01/26/2019    CALCIUM 9 0 01/26/2019    GLUCOSE 118 10/17/2015   ]        INPATIENT MEDS:    Current Outpatient Medications:     aspirin 81 MG tablet, Take 325 mg by mouth daily at bedtime  , Disp: , Rfl:     lisinopril (ZESTRIL) 10 mg tablet, TAKE 1 TABLET DAILY, Disp: 90 tablet, Rfl: 3    metFORMIN (GLUCOPHAGE) 500 mg tablet, TAKE 2 TABLETS TWICE A DAY, Disp: 360 tablet, Rfl: 1    omega-3-acid ethyl esters (LOVAZA) 1 g capsule, Take 100 mg by mouth 2 (two) times a day, Disp: , Rfl:     rosuvastatin (CRESTOR) 40 MG tablet, Take 1 tablet (40 mg total) by mouth daily, Disp: 90 tablet, Rfl: 0    ibuprofen (MOTRIN) 400 mg tablet, Take 1 tablet (400 mg total) by mouth every 12 (twelve) hours as needed for mild pain or moderate pain for up to 30 days, Disp: 60 tablet, Rfl: 0    Na Sulfate-K Sulfate-Mg Sulf (SUPREP BOWEL PREP KIT) 17 5-3 13-1 6 GM/177ML SOLN, Take 177 mL by mouth once for 1 dose, Disp: 2 Bottle, Rfl: 0      Physical Exam:   /64 (BP Location: Left arm, Patient Position: Sitting, Cuff Size: Standard)   Pulse 66   Ht 5' 6" (1 676 m)   Wt 83 9 kg (185 lb)   BMI 29 86 kg/m²   GEN: no acute distress    RESP: breathing comfortably with no accessory muscle use    ABD: soft, non-tender, non-distended   INCISION:    EXT: no significant peripheral edema     RADIOLOGY:     None     Assessment:    1  BPH   2  Kidney stones   3  Erectile dysfunction     Plan:   - he will be losing his insurance and moving to [facility]  - follow-up p r n   Call with any questions or concerns  -  -

## 2019-03-28 ENCOUNTER — OFFICE VISIT (OUTPATIENT)
Dept: OBGYN CLINIC | Facility: HOSPITAL | Age: 64
End: 2019-03-28
Payer: COMMERCIAL

## 2019-03-28 VITALS
BODY MASS INDEX: 29.73 KG/M2 | WEIGHT: 184.97 LBS | HEART RATE: 84 BPM | SYSTOLIC BLOOD PRESSURE: 116 MMHG | DIASTOLIC BLOOD PRESSURE: 71 MMHG | HEIGHT: 66 IN

## 2019-03-28 DIAGNOSIS — G89.29 CHRONIC PAIN OF RIGHT KNEE: ICD-10-CM

## 2019-03-28 DIAGNOSIS — M17.11 PRIMARY OSTEOARTHRITIS OF RIGHT KNEE: ICD-10-CM

## 2019-03-28 DIAGNOSIS — M25.562 CHRONIC PAIN OF LEFT KNEE: Primary | ICD-10-CM

## 2019-03-28 DIAGNOSIS — M17.12 PRIMARY OSTEOARTHRITIS OF LEFT KNEE: ICD-10-CM

## 2019-03-28 DIAGNOSIS — G89.29 CHRONIC PAIN OF LEFT KNEE: Primary | ICD-10-CM

## 2019-03-28 DIAGNOSIS — M25.561 CHRONIC PAIN OF RIGHT KNEE: ICD-10-CM

## 2019-03-28 PROCEDURE — 99213 OFFICE O/P EST LOW 20 MIN: CPT | Performed by: ORTHOPAEDIC SURGERY

## 2019-03-28 PROCEDURE — 20610 DRAIN/INJ JOINT/BURSA W/O US: CPT | Performed by: ORTHOPAEDIC SURGERY

## 2019-03-28 RX ORDER — METHYLPREDNISOLONE ACETATE 40 MG/ML
2 INJECTION, SUSPENSION INTRA-ARTICULAR; INTRALESIONAL; INTRAMUSCULAR; SOFT TISSUE
Status: COMPLETED | OUTPATIENT
Start: 2019-03-28 | End: 2019-03-28

## 2019-03-28 RX ORDER — LIDOCAINE HYDROCHLORIDE 20 MG/ML
2 INJECTION, SOLUTION EPIDURAL; INFILTRATION; INTRACAUDAL; PERINEURAL
Status: COMPLETED | OUTPATIENT
Start: 2019-03-28 | End: 2019-03-28

## 2019-03-28 RX ORDER — BUPIVACAINE HYDROCHLORIDE 2.5 MG/ML
2 INJECTION, SOLUTION INFILTRATION; PERINEURAL
Status: COMPLETED | OUTPATIENT
Start: 2019-03-28 | End: 2019-03-28

## 2019-03-28 RX ADMIN — BUPIVACAINE HYDROCHLORIDE 2 ML: 2.5 INJECTION, SOLUTION INFILTRATION; PERINEURAL at 15:50

## 2019-03-28 RX ADMIN — LIDOCAINE HYDROCHLORIDE 2 ML: 20 INJECTION, SOLUTION EPIDURAL; INFILTRATION; INTRACAUDAL; PERINEURAL at 15:49

## 2019-03-28 RX ADMIN — BUPIVACAINE HYDROCHLORIDE 2 ML: 2.5 INJECTION, SOLUTION INFILTRATION; PERINEURAL at 15:49

## 2019-03-28 RX ADMIN — LIDOCAINE HYDROCHLORIDE 2 ML: 20 INJECTION, SOLUTION EPIDURAL; INFILTRATION; INTRACAUDAL; PERINEURAL at 15:50

## 2019-03-28 RX ADMIN — METHYLPREDNISOLONE ACETATE 2 ML: 40 INJECTION, SUSPENSION INTRA-ARTICULAR; INTRALESIONAL; INTRAMUSCULAR; SOFT TISSUE at 15:50

## 2019-03-28 RX ADMIN — METHYLPREDNISOLONE ACETATE 2 ML: 40 INJECTION, SUSPENSION INTRA-ARTICULAR; INTRALESIONAL; INTRAMUSCULAR; SOFT TISSUE at 15:49

## 2019-03-28 NOTE — PROGRESS NOTES
Assessment:  1  Chronic pain of left knee  Large joint arthrocentesis: L knee   2  Primary osteoarthritis of left knee  Large joint arthrocentesis: L knee   3  Chronic pain of right knee  Large joint arthrocentesis: R knee   4  Primary osteoarthritis of right knee  Large joint arthrocentesis: R knee       Plan:  Diagnosis, treatment options and associated risks were discussed with the patient including no treatment, nonsurgical treatment and potential for surgical intervention  The patient was given the opportunity to ask questions regarding each  In lieu of the patient not having insurance beginning 4/1, she went options were discussed to repeating cortisone injections with different medication (depo-medrol)  Patient was offered, accepted, performed cortisone injections to both knees for symptomatic relief  Patient advised to ice and post injection protocol advised  Weightbearing activities as tolerated  To do next visit:  Return in about 3 months (around 6/28/2019) for re-check if still around in the area  The above stated was discussed in layman's terms and the patient expressed understanding  All questions were answered to the patient's satisfaction  Scribe Attestation    I,:   Yodit Herrera am acting as a scribe while in the presence of the attending physician :        I,:   Analisa Westfall MD personally performed the services described in this documentation    as scribed in my presence :              Subjective:   Eliana Reynolds is a 59 y o  male who presents repeat evaluation of his bilateral knees known osteoarthritis  He found very little short-term relief with the cortisone injections ever performed at his last visit 10 weeks ago  He returns earlier today as he states he is losing his insurance coverage the end of this current month of March  He is contemplating picking up Medicare insurance to stay local versus moving to [facility] or [place]        Review of systems negative unless otherwise specified in HPI    Past Medical History:   Diagnosis Date    Arthritis     Carpal tunnel syndrome     Coronary artery disease     Costochondritis     RESOLVED: [id number]    Diabetes mellitus ([address] )     Hematuria     HTN (hypertension)     Hyperlipidemia     Kidney stone     Myocardial infarction (HCC)     Weak carotid pulse        Past Surgical History:   Procedure Laterality Date    CATARACT EXTRACTION Left     COLONOSCOPY      CORONARY ANGIOPLASTY WITH STENT PLACEMENT  06/29/2005    RCA     CORONARY ANGIOPLASTY WITH STENT PLACEMENT  07/05/2005    Cx    CORONARY ARTERY BYPASS GRAFT      LAST ASSESSED: [id number]    LITHOTRIPSY      RENAL    MOUTH SURGERY      VA COLONOSCOPY FLX DX W/COLLJ SPEC WHEN PFRMD N/A 2/22/2019    Procedure: COLONOSCOPY;  Surgeon: Luna Coffey MD;  [facility]: [facility]; Service: Gastroenterology    VA CYSTO/URETERO W/LITHOTRIPSY &INDWELL STENT INSRT Left 3/24/2017    Procedure: CYSTOSCOPY, URETEROSCOPY, HOLMIUM LASER LITHOTRIPSY, BASKET STONE EXTRACTION, RETROGRADE PYELOGRAM, STENT INSERTION;  Surgeon: Vianca Nolan MD;  [facility];  Service: Urology    VA CYSTO/URETERO W/LITHOTRIPSY &INDWELL STENT INSRT Left 7/28/2017    Procedure: CYSTOSCOPY, URETEROSCOPY WITH HOLMIUM LASER LITHOTRIPSY, AND STENT EXCHANGE;  Surgeon: Sharon Peter MD;  [facility];  Service: Urology    VA FRAGMENT KIDNEY STONE/ ESWL Left 9/15/2017    Procedure: Glory Alvarado SHOCKWAVE (ESWL);   Surgeon: Sharon Peter MD;  [facility];  Service: Urology    ROTATOR CUFF REPAIR Right     SHOULDER ARTHROSCOPY Left     TOOTH EXTRACTION         Family History   Problem Relation Age of Onset    Diabetes Sister         TYPE 2    Diabetes Mother     Heart disease Mother     Diabetes Brother     Diabetes Sister         TYPE 2    Heart disease Sister     Heart disease Sister     Coronary artery disease Maternal Uncle     Diabetes Maternal Uncle Social History     Occupational History    Occupation: laboror     Comment:    Tobacco Use    Smoking status: Former Smoker     Packs/day: 1 00     Years: 44 00     Pack years: 44 00    Smokeless tobacco: Never Used    Tobacco comment: up to 1ppd since age 15y/o; FORMER SMOKER AS PER ALL SCRIPTS    Substance and Sexual Activity    Alcohol use: No     Comment: quit 23 years ago; SOCIAL DRINKER AS PER ALL SCRIPTS     Drug use: No    Sexual activity: Not on file         Current Outpatient Medications:     aspirin 81 MG tablet, Take 325 mg by mouth daily at bedtime  , Disp: , Rfl:     ibuprofen (MOTRIN) 400 mg tablet, Take 1 tablet (400 mg total) by mouth every 12 (twelve) hours as needed for mild pain or moderate pain for up to 30 days, Disp: 60 tablet, Rfl: 0    lisinopril (ZESTRIL) 10 mg tablet, TAKE 1 TABLET DAILY, Disp: 90 tablet, Rfl: 3    metFORMIN (GLUCOPHAGE) 500 mg tablet, TAKE 2 TABLETS TWICE A DAY, Disp: 360 tablet, Rfl: 1    Na Sulfate-K Sulfate-Mg Sulf (SUPREP BOWEL PREP KIT) 17 5-3 13-1 6 GM/177ML SOLN, Take 177 mL by mouth once for 1 dose, Disp: 2 Bottle, Rfl: 0    omega-3-acid ethyl esters (LOVAZA) 1 g capsule, Take 100 mg by mouth 2 (two) times a day, Disp: , Rfl:     rosuvastatin (CRESTOR) 40 MG tablet, Take 1 tablet (40 mg total) by mouth daily, Disp: 90 tablet, Rfl: 0    Allergies   Allergen Reactions    Morphine GI Intolerance     vomiting            Vitals:    03/28/19 1504   BP: 116/71   Pulse: 84       Objective:                    Right Knee Exam     Muscle Strength   The patient has normal right knee strength  Tenderness   Right knee tenderness location: medial patellar facet, josephine-medial joint line  Range of Motion   The patient has normal right knee ROM  Right knee flexion: With crepitus       Other   Erythema: absent  Sensation: normal  Swelling: mild  Effusion: no effusion present      Left Knee Exam     Muscle Strength   The patient has normal left knee strength  Tenderness   Left knee tenderness location: medial patellar facet, josephine-medial joint line  Range of Motion   The patient has normal left knee ROM  Left knee flexion: With crepitus  Other   Erythema: absent  Sensation: normal  Swelling: mild  Effusion: no effusion present    Comments: Both knees are in varus alignment with bony enlargement medially              Diagnostics, reviewed and taken today if performed as documented:    None performed        Procedures, if performed today:    Large joint arthrocentesis: L knee  Date/Time: 3/28/2019 3:49 PM  Consent given by: patient  Site marked: site marked  Supporting Documentation  Indications: pain   Procedure Details  Location: knee - L knee  Preparation: Patient was prepped and draped in the usual sterile fashion  Needle size: 22 G  Ultrasound guidance: no  Medications administered: 2 mL bupivacaine 0 25 %; 2 mL lidocaine (PF) 2 %; 2 mL methylPREDNISolone acetate 40 mg/mL    Patient tolerance: patient tolerated the procedure well with no immediate complications  Dressing:  Sterile dressing applied    Large joint arthrocentesis: R knee  Date/Time: 3/28/2019 3:50 PM  Consent given by: patient  Site marked: site marked  Supporting Documentation  Indications: pain   Procedure Details  Location: knee - R knee  Preparation: Patient was prepped and draped in the usual sterile fashion  Needle size: 22 G  Ultrasound guidance: no  Medications administered: 2 mL bupivacaine 0 25 %; 2 mL lidocaine (PF) 2 %; 2 mL methylPREDNISolone acetate 40 mg/mL    Patient tolerance: patient tolerated the procedure well with no immediate complications  Dressing:  Sterile dressing applied            Portions of the record may have been created with voice recognition software   Occasional wrong word or "sound a like" substitutions may have occurred due to the inherent limitations of voice recognition software   Read the chart carefully and recognize, using context, where substitutions have occurred

## 2019-04-01 ENCOUNTER — TELEPHONE (OUTPATIENT)
Dept: OBGYN CLINIC | Facility: HOSPITAL | Age: 64
End: 2019-04-01

## 2019-05-28 ENCOUNTER — TELEPHONE (OUTPATIENT)
Dept: OBGYN CLINIC | Facility: HOSPITAL | Age: 64
End: 2019-05-28

## 2019-05-28 DIAGNOSIS — E78.5 HYPERLIPIDEMIA, UNSPECIFIED HYPERLIPIDEMIA TYPE: ICD-10-CM

## 2019-05-28 DIAGNOSIS — I25.10 CORONARY ARTERY DISEASE INVOLVING NATIVE CORONARY ARTERY OF NATIVE HEART WITHOUT ANGINA PECTORIS: ICD-10-CM

## 2019-05-28 RX ORDER — ROSUVASTATIN CALCIUM 40 MG/1
40 TABLET, COATED ORAL DAILY
Qty: 90 TABLET | Refills: 2 | Status: SHIPPED | OUTPATIENT
Start: 2019-05-28 | End: 2019-12-03 | Stop reason: SDUPTHER

## 2019-08-21 ENCOUNTER — TELEPHONE (OUTPATIENT)
Dept: CARDIOLOGY CLINIC | Facility: CLINIC | Age: 64
End: 2019-08-21

## 2019-08-26 NOTE — TELEPHONE ENCOUNTER
Brianna, could you see attach message with S & then call patient to pick his forms up after they are signed

## 2019-12-03 DIAGNOSIS — E78.5 HYPERLIPIDEMIA, UNSPECIFIED HYPERLIPIDEMIA TYPE: ICD-10-CM

## 2019-12-03 DIAGNOSIS — I25.10 CORONARY ARTERY DISEASE INVOLVING NATIVE CORONARY ARTERY OF NATIVE HEART WITHOUT ANGINA PECTORIS: ICD-10-CM

## 2019-12-03 RX ORDER — ROSUVASTATIN CALCIUM 40 MG/1
40 TABLET, COATED ORAL DAILY
Qty: 90 TABLET | Refills: 0 | Status: SHIPPED | OUTPATIENT
Start: 2019-12-03 | End: 2020-03-17 | Stop reason: SDUPTHER

## 2020-02-26 ENCOUNTER — TELEPHONE (OUTPATIENT)
Dept: INTERNAL MEDICINE CLINIC | Facility: CLINIC | Age: 65
End: 2020-02-26

## 2020-02-26 NOTE — TELEPHONE ENCOUNTER
Folder Color-Red    Name of Form-[facility]     Form to be filled out by-Joanna    Form to be picked by patient and scanned     Patient made aware of 10 business day policy  Form to stay in folder until he comes in

## 2020-02-28 ENCOUNTER — OFFICE VISIT (OUTPATIENT)
Dept: INTERNAL MEDICINE CLINIC | Facility: CLINIC | Age: 65
End: 2020-02-28

## 2020-02-28 ENCOUNTER — TELEPHONE (OUTPATIENT)
Dept: INTERNAL MEDICINE CLINIC | Facility: CLINIC | Age: 65
End: 2020-02-28

## 2020-02-28 VITALS
SYSTOLIC BLOOD PRESSURE: 140 MMHG | OXYGEN SATURATION: 97 % | HEART RATE: 63 BPM | BODY MASS INDEX: 29.27 KG/M2 | TEMPERATURE: 98 F | WEIGHT: 186.51 LBS | DIASTOLIC BLOOD PRESSURE: 74 MMHG | HEIGHT: 67 IN

## 2020-02-28 DIAGNOSIS — E11.65 TYPE 2 DIABETES MELLITUS WITH HYPERGLYCEMIA, WITHOUT LONG-TERM CURRENT USE OF INSULIN (HCC): Primary | ICD-10-CM

## 2020-02-28 DIAGNOSIS — E78.2 MIXED HYPERLIPIDEMIA: ICD-10-CM

## 2020-02-28 DIAGNOSIS — H54.40 BLINDNESS OF LEFT EYE, UNSPECIFIED RIGHT EYE VISUAL IMPAIRMENT CATEGORY: ICD-10-CM

## 2020-02-28 DIAGNOSIS — Z11.1 SCREENING FOR TUBERCULOSIS: ICD-10-CM

## 2020-02-28 DIAGNOSIS — I25.10 ARTERIOSCLEROSIS OF CORONARY ARTERY: ICD-10-CM

## 2020-02-28 DIAGNOSIS — I10 ESSENTIAL HYPERTENSION: ICD-10-CM

## 2020-02-28 DIAGNOSIS — Z95.1 HX OF CABG: ICD-10-CM

## 2020-02-28 DIAGNOSIS — Z91.89 NEED FOR DENTAL CARE: ICD-10-CM

## 2020-02-28 PROBLEM — E55.9 VITAMIN D INSUFFICIENCY: Status: RESOLVED | Noted: 2018-10-17 | Resolved: 2020-02-28

## 2020-02-28 PROBLEM — L30.9 DERMATITIS: Status: RESOLVED | Noted: 2017-02-24 | Resolved: 2020-02-28

## 2020-02-28 PROBLEM — R70.0 ELEVATED SED RATE: Status: RESOLVED | Noted: 2018-10-05 | Resolved: 2020-02-28

## 2020-02-28 PROCEDURE — 3078F DIAST BP <80 MM HG: CPT | Performed by: PHYSICIAN ASSISTANT

## 2020-02-28 PROCEDURE — 99214 OFFICE O/P EST MOD 30 MIN: CPT | Performed by: PHYSICIAN ASSISTANT

## 2020-02-28 PROCEDURE — 3066F NEPHROPATHY DOC TX: CPT | Performed by: PHYSICIAN ASSISTANT

## 2020-02-28 PROCEDURE — 86580 TB INTRADERMAL TEST: CPT | Performed by: PHYSICIAN ASSISTANT

## 2020-02-28 PROCEDURE — 3008F BODY MASS INDEX DOCD: CPT | Performed by: PHYSICIAN ASSISTANT

## 2020-02-28 PROCEDURE — 4010F ACE/ARB THERAPY RXD/TAKEN: CPT | Performed by: PHYSICIAN ASSISTANT

## 2020-02-28 PROCEDURE — 3077F SYST BP >= 140 MM HG: CPT | Performed by: PHYSICIAN ASSISTANT

## 2020-02-28 RX ORDER — LISINOPRIL 10 MG/1
10 TABLET ORAL DAILY
Qty: 90 TABLET | Refills: 1 | Status: SHIPPED | OUTPATIENT
Start: 2020-02-28 | End: 2020-03-17

## 2020-02-28 NOTE — PROGRESS NOTES
Assessment/Plan: We are starting your care today  Script provided to get all of your labs completed  You will continue your aspirin and  Crestor through the cardiologist   Atiya Fails are agreeable to restarting your lisinopril 10 mg 1 tablet daily for your blood pressure  Based on your labs will discuss at follow-up visit if metformin is appropriate to restart again  Order placed so you may be seen by our diabetic eye specialist for evaluation  PPD placed today you will return on Monday for reading  At your follow-up visit we will continue with your healthcare plan as well  No problem-specific Assessment & Plan notes found for this encounter  Diagnoses and all orders for this visit:    Type 2 diabetes mellitus with hyperglycemia, without long-term current use of insulin (HCC)  -     Comprehensive metabolic panel  -     Hemoglobin A1C  -     Lipid Panel with Direct LDL reflex  -     Microalbumin / creatinine urine ratio  -     Ambulatory referral to Ophthalmology; Future    Essential hypertension  -     lisinopril (ZESTRIL) 10 mg tablet; Take 1 tablet (10 mg total) by mouth daily  -     Comprehensive metabolic panel    Hx of CABG    Arteriosclerosis of coronary artery    Mixed hyperlipidemia    Need for dental care  -     Ambulatory referral to Dentistry; Future    Blindness of left eye, unspecified right eye visual impairment category    Screening for tuberculosis  -     TB Skin Test          Subjective:      Patient ID: Simona Mccormack is a 62 y o  male  Patient here today as new patient to establish care with our office  Patient had been following with family practice however he lost his insurance  Patient had also made the appointment to have employment physical completed with PPD  Did review the form and patient had completed a section he was not supposed to including checking boxes stating he did not have heart problems or diabetes however he is diagnosed with both      Lab review shows last A1c January 2019 was 7 5%  Per note from cardiologist at last visit CABG x3 in 2012, last office visit November 2018    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    Is taking the Crestor get from cardiologist gets script and using [facility]    Is active and exercises, was going to the gym until lost his job  States has metformin and lisinopril at home but unsure how much  Still smoking about 1-2 a day    Patient reports he was not taking the metformin because he heard that it was bad for his liver  We had longer discussion today about advice about how metformin works and also how lisinopril works and why the recommendation was made to get his blood pressure under control and also for kidneys  Patient reports understanding and also that we are going to get all new labs as his last testing was over a year ago to see what all his levels are and make appropriate recommendations for his health  Patient is getting the PPD placed will return on Monday March 2nd for the reading but aware that I will not be able to complete all of his forms until after the lab results are available  The following portions of the patient's history were reviewed and updated as appropriate: allergies, current medications, past family history, past medical history, past social history, past surgical history and problem list     Review of Systems   Constitutional: Negative for appetite change, chills, fever and unexpected weight change  HENT: Negative for hearing loss and trouble swallowing  Last dental thinks about 1 year ago   Eyes:        Glasses  Last exam 2019  Cataract R eye    L eye injury as child says blind in L eye   Respiratory: Negative  Cardiovascular: Negative  Negative for chest pain, palpitations and leg swelling  Gastrointestinal: Negative  Negative for abdominal pain, constipation and diarrhea  Endocrine: Negative for polydipsia, polyphagia and polyuria  Genitourinary: Negative for dysuria, frequency and urgency  No nocturia   Musculoskeletal: Negative  Skin: Negative  Negative for rash  Neurological: Negative  Negative for dizziness, light-headedness and headaches  Psychiatric/Behavioral: Negative  Objective:      /74 (BP Location: Right arm, Patient Position: Sitting, Cuff Size: Adult)   Pulse 63   Temp 98 °F (36 7 °C) (Temporal)   Ht 5' 7" (1 702 m)   Wt 84 6 kg (186 lb 8 2 oz)   SpO2 97%   BMI 29 21 kg/m²          Physical Exam   Constitutional: He appears well-nourished  HENT:   Head: Normocephalic  Eyes: Conjunctivae are normal    Neck: Normal range of motion  Neck supple  No JVD present  Cardiovascular: Normal rate, regular rhythm and normal heart sounds  No murmur heard  Pulmonary/Chest: Effort normal and breath sounds normal    Abdominal: Soft  Bowel sounds are normal    Musculoskeletal: He exhibits no edema  Neurological: He is alert  No sensory deficit  Skin: Skin is warm and dry  Psychiatric: He has a normal mood and affect  His behavior is normal  Thought content normal    Nursing note and vitals reviewed  PHQ-9 Depression Screening    PHQ-9:    Frequency of the following problems over the past two weeks:       Little interest or pleasure in doing things:  0 - not at all  Feeling down, depressed, or hopeless:  0 - not at all  PHQ-2 Score:  0       BMI Counseling: Body mass index is 29 21 kg/m²  The BMI is above normal  Nutrition recommendations include reducing portion sizes, 3-5 servings of fruits/vegetables daily, consuming healthier snacks, moderation in carbohydrate intake, reducing intake of saturated fat and trans fat and reducing intake of cholesterol  Exercise recommendations include exercising 3-5 times per week

## 2020-02-28 NOTE — PATIENT INSTRUCTIONS
We are starting your care today  Script provided to get all of your labs completed  You will continue your aspirin and  Crestor through the cardiologist   Chiquita Shaw are agreeable to restarting your lisinopril 10 mg 1 tablet daily for your blood pressure  Based on your labs will discuss at follow-up visit if metformin is appropriate to restart again  Order placed so you may be seen by our diabetic eye specialist for evaluation  PPD placed today you will return on Monday for reading  At your follow-up visit we will continue with your healthcare plan as well  Weight Management   AMBULATORY CARE:   Why it is important to manage your weight:  Being overweight increases your risk of health conditions such as heart disease, high blood pressure, type 2 diabetes, and certain types of cancer  It can also increase your risk for osteoarthritis, sleep apnea, and other respiratory problems  Aim for a slow, steady weight loss  Even a small amount of weight loss can lower your risk of health problems  How to lose weight safely:  A safe and healthy way to lose weight is to eat fewer calories and get regular exercise  You can lose up about 1 pound a week by decreasing the number of calories you eat by 500 calories each day  You can decrease calories by eating smaller portion sizes or by cutting out high-calorie foods  Read labels to find out how many calories are in the foods you eat  You can also burn calories with exercise such as walking, swimming, or biking  You will be more likely to keep weight off if you make these changes part of your lifestyle  Healthy meal plan for weight management:  A healthy meal plan includes a variety of foods, contains fewer calories, and helps you stay healthy  A healthy meal plan includes the following:  · Eat whole-grain foods more often  A healthy meal plan should contain fiber  Fiber is the part of grains, fruits, and vegetables that is not broken down by your body   Whole-grain foods are healthy and provide extra fiber in your diet  Some examples of whole-grain foods are whole-wheat breads and pastas, oatmeal, brown rice, and bulgur  · Eat a variety of vegetables every day  Include dark, leafy greens such as spinach, kale, travis greens, and mustard greens  Eat yellow and orange vegetables such as carrots, sweet potatoes, and winter squash  · Eat a variety of fruits every day  Choose fresh or canned fruit (canned in its own juice or light syrup) instead of juice  Fruit juice has very little or no fiber  · Eat low-fat dairy foods  Drink fat-free (skim) milk or 1% milk  Eat fat-free yogurt and low-fat cottage cheese  Try low-fat cheeses such as mozzarella and other reduced-fat cheeses  · Choose meat and other protein foods that are low in fat  Choose beans or other legumes such as split peas or lentils  Choose fish, skinless poultry (chicken or turkey), or lean cuts of red meat (beef or pork)  Before you cook meat or poultry, cut off any visible fat  · Use less fat and oil  Try baking foods instead of frying them  Add less fat, such as margarine, sour cream, regular salad dressing and mayonnaise to foods  Eat fewer high-fat foods  Some examples of high-fat foods include french fries, doughnuts, ice cream, and cakes  · Eat fewer sweets  Limit foods and drinks that are high in sugar  This includes candy, cookies, regular soda, and sweetened drinks  Ways to decrease calories:   · Eat smaller portions  ¨ Use a small plate with smaller servings  ¨ Do not eat second helpings  ¨ When you eat at a restaurant, ask for a box and place half of your meal in the box before you eat  ¨ Share an entrée with someone else  · Replace high-calorie snacks with healthy, low-calorie snacks  ¨ Choose fresh fruit, vegetables, fat-free rice cakes, or air-popped popcorn instead of potato chips, nuts, or chocolate      ¨ Choose water or calorie-free drinks instead of soda or sweetened drinks  · Eat regular meals  Skipping meals can lead to overeating later in the day  Eat a healthy snack in place of a meal if you do not have time to eat a regular meal      · Do not shop for groceries when you are hungry  You may be more likely to make unhealthy food choices  Take a grocery list of healthy foods and shop after you have eaten  Exercise:  Exercise at least 30 minutes per day on most days of the week  Some examples of exercise include walking, biking, dancing, and swimming  You can also fit in more physical activity by taking the stairs instead of the elevator or parking farther away from stores  Ask your healthcare provider about the best exercise plan for you  Other things to consider as you try to lose weight:   · Be aware of situations that may give you the urge to overeat, such as eating while watching television  Find ways to avoid these situations  For example, read a book, go for a walk, or do crafts  · Meet with a weight loss support group or friends who are also trying to lose weight  This may help you stay motivated to continue working on your weight loss goals  © 2017 [facility] Information is for End User's use only and may not be sold, redistributed or otherwise used for commercial purposes  All illustrations and images included in CareNotes® are the copyrighted property of [employer]  or Dave Yepez  The above information is an  only  It is not intended as medical advice for individual conditions or treatments  Talk to your doctor, nurse or pharmacist before following any medical regimen to see if it is safe and effective for you  Low Fat Diet   AMBULATORY CARE:   A low-fat diet  is an eating plan that is low in total fat, unhealthy fat, and cholesterol  You may need to follow a low-fat diet if you have trouble digesting or absorbing fat  You may also need to follow this diet if you have high cholesterol   You can also lower your cholesterol by increasing the amount of fiber in your diet  Soluble fiber is a type of fiber that helps to decrease cholesterol levels  Different types of fat in food:   · Limit unhealthy fats  A diet that is high in cholesterol, saturated fat, and trans fat may cause unhealthy cholesterol levels  Unhealthy cholesterol levels increase your risk of heart disease  ¨ Cholesterol:  Limit intake of cholesterol to less than 200 mg per day  Cholesterol is found in meat, eggs, and dairy  ¨ Saturated fat:  Limit saturated fat to less than 7% of your total daily calories  Ask your dietitian how many calories you need each day  Saturated fat is found in butter, cheese, ice cream, whole milk, and palm oil  Saturated fat is also found in meat, such as beef, pork, chicken skin, and processed meats  Processed meats include sausage, hot dogs, and bologna  ¨ Trans fat:  Avoid trans fat as much as possible  Trans fat is used in fried and baked foods  Foods that say trans fat free on the label may still have up to 0 5 grams of trans fat per serving  · Include healthy fats  Replace foods that are high in saturated and trans fat with foods high in healthy fats  This may help to decrease high cholesterol levels  ¨ Monounsaturated fats: These are found in avocados, nuts, and vegetable oils, such as olive, canola, and sunflower oil  ¨ Polyunsaturated fats: These can be found in vegetable oils, such as soybean or corn oil  Omega-3 fats can help to decrease the risk of heart disease  Omega-3 fats are found in fish, such as salmon, herring, trout, and tuna  Omega-3 fats can also be found in plant foods, such as walnuts, flaxseed, soybeans, and canola oil    Foods to limit or avoid:   · Grains:      ¨ Snacks that are made with partially hydrogenated oils, such as chips, regular crackers, and butter-flavored popcorn    ¨ High-fat baked goods, such as biscuits, croissants, doughnuts, pies, cookies, and pastries    · Dairy:      ¨ Whole milk, 2% milk, and yogurt and ice cream made with whole milk    ¨ Half and half creamer, heavy cream, and whipping cream    ¨ Cheese, cream cheese, and sour cream    · Meats and proteins:      ¨ High-fat cuts of meat (T-bone steak, regular hamburger, and ribs)    ¨ Fried meat, poultry (turkey and chicken), and fish    ¨ Poultry (chicken and turkey) with skin    ¨ Cold cuts (salami or bologna), hot dogs, borrego, and sausage    ¨ Whole eggs and egg yolks    · Vegetables and fruits with added fat:      ¨ Fried vegetables or vegetables in butter or high-fat sauces, such as cream or cheese sauces    ¨ Fried fruit or fruit served with butter or cream    · Fats:      ¨ Butter, stick margarine, and shortening    ¨ Coconut, palm oil, and palm kernel oil  Foods to include:   · Grains:      ¨ Whole-grain breads, cereals, pasta, and brown rice    ¨ Low-fat crackers and pretzels    · Vegetables and fruits:      ¨ Fresh, frozen, or canned vegetables (no salt or low-sodium)    ¨ Fresh, frozen, dried, or canned fruit (canned in light syrup or fruit juice)    ¨ Avocado    · Low-fat dairy products:      ¨ Nonfat (skim) or 1% milk    ¨ Nonfat or low-fat cheese, yogurt, and cottage cheese    · Meats and proteins:      ¨ Chicken or turkey with no skin    ¨ Baked or broiled fish    ¨ Lean beef and pork (loin, round, extra lean hamburger)    ¨ Beans and peas, unsalted nuts, soy products    ¨ Egg whites and substitutes    ¨ Seeds and nuts    · Fats:      ¨ Unsaturated oil, such as canola, olive, peanut, soybean, or sunflower oil    ¨ Soft or liquid margarine and vegetable oil spread    ¨ Low-fat salad dressing  Other ways to decrease fat:   · Read food labels before you buy foods  Choose foods that have less than 30% of calories from fat  Choose low-fat or fat-free dairy products  Remember that fat free does not mean calorie free   These foods still contain calories, and too many calories can lead to weight gain      · Trim fat from meat and avoid fried food  Trim all visible fat from meat before you cook it  Remove the skin from poultry  Do not reid meat, fish, or poultry  Bake, roast, boil, or broil these foods instead  Avoid fried foods  Eat a baked potato instead of Western [place] fries  Steam vegetables instead of sautéing them in butter  · Add less fat to foods  Use imitation borrego bits on salads and baked potatoes instead of regular borrego bits  Use fat-free or low-fat salad dressings instead of regular dressings  Use low-fat or nonfat butter-flavored topping instead of regular butter or margarine on popcorn and other foods  Ways to decrease fat in recipes:  Replace high-fat ingredients with low-fat or nonfat ones  This may cause baked goods to be drier than usual  You may need to use nonfat cooking spray on pans to prevent food from sticking  You also may need to change the amount of other ingredients, such as water, in the recipe  Try the following:  · Use low-fat or light margarine instead of regular margarine or shortening  · Use lean ground turkey breast or chicken, or lean ground beef (less than 5% fat) instead of hamburger  · Add 1 teaspoon of canola oil to 8 ounces of skim milk instead of using cream or half and half  · Use grated zucchini, carrots, or apples in breads instead of coconut  · Use blenderized, low-fat cottage cheese, plain tofu, or low-fat ricotta cheese instead of cream cheese  · Use 1 egg white and 1 teaspoon of canola oil, or use ¼ cup (2 ounces) of fat-free egg substitute instead of a whole egg  · Replace half of the oil that is called for in a recipe with applesauce when you bake  Use 3 tablespoons of cocoa powder and 1 tablespoon of canola oil instead of a square of baking chocolate  How to increase fiber:  Eat enough high-fiber foods to get 20 to 30 grams of fiber every day   Slowly increase your fiber intake to avoid stomach cramps, gas, and other problems  · Eat 3 ounces of whole-grain foods each day  An ounce is about 1 slice of bread  Eat whole-grain breads, such as whole-wheat bread  Whole wheat, whole-wheat flour, or other whole grains should be listed as the first ingredient on the food label  Replace white flour with whole-grain flour or use half of each in recipes  Whole-grain flour is heavier than white flour, so you may have to add more yeast or baking powder  · Eat a high-fiber cereal for breakfast   Oatmeal is a good source of soluble fiber  Look for cereals that have bran or fiber in the name  Choose whole-grain products, such as brown rice, barley, and whole-wheat pasta  · Eat more beans, peas, and lentils  For example, add beans to soups or salads  Eat at least 5 cups of fruits and vegetables each day  Eat fruits and vegetables with the peel because the peel is high in fiber  © 2017 [address] [employer] Information is for End User's use only and may not be sold, redistributed or otherwise used for commercial purposes  All illustrations and images included in CareNotes® are the copyrighted property of [employer]  or Dave Yepez  The above information is an  only  It is not intended as medical advice for individual conditions or treatments  Talk to your doctor, nurse or pharmacist before following any medical regimen to see if it is safe and effective for you  Heart Healthy Diet   AMBULATORY CARE:   A heart healthy diet  is an eating plan low in total fat, unhealthy fats, and sodium (salt)  A heart healthy diet helps decrease your risk for heart disease and stroke  Limit the amount of fat you eat to 25% to 35% of your total daily calories  Limit sodium to less than 2,300 mg each day  Healthy fats:  Healthy fats can help improve cholesterol levels   The risk for heart disease is decreased when cholesterol levels are normal  Choose healthy fats, such as the following:  · Unsaturated fat  is found in foods such as soybean, canola, olive, corn, and safflower oils  It is also found in soft tub margarine that is made with liquid vegetable oil  · Omega-3 fat  is found in certain fish, such as salmon, tuna, and trout, and in walnuts and flaxseed  Unhealthy fats:  Unhealthy fats can cause unhealthy cholesterol levels in your blood and increase your risk of heart disease  Limit unhealthy fats, such as the following:  · Cholesterol  is found in animal foods, such as eggs and lobster, and in dairy products made from whole milk  Limit cholesterol to less than 300 milligrams (mg) each day  You may need to limit cholesterol to 200 mg each day if you have heart disease  · Saturated fat  is found in meats, such as borrego and hamburger  It is also found in chicken or turkey skin, whole milk, and butter  Limit saturated fat to less than 7% of your total daily calories  Limit saturated fat to less than 6% if you have heart disease or are at increased risk for it  · Trans fat  is found in packaged foods, such as potato chips and cookies  It is also in hard margarine, some fried foods, and shortening  Avoid trans fats as much as possible    Heart healthy foods and drinks to include:  Ask your dietitian or healthcare provider how many servings to have from each of the following food groups:  · Grains:      ¨ Whole-wheat breads, cereals, and pastas, and brown rice    ¨ Low-fat, low-sodium crackers and chips    · Vegetables:      ¨ Broccoli, green beans, green peas, and spinach    ¨ Collards, kale, and lima beans    ¨ Carrots, sweet potatoes, tomatoes, and peppers    ¨ Canned vegetables with no salt added    · Fruits:      ¨ Bananas, peaches, pears, and pineapple    ¨ Grapes, raisins, and dates    ¨ Oranges, tangerines, grapefruit, orange juice, and grapefruit juice    ¨ Apricots, mangoes, melons, and papaya    ¨ Raspberries and strawberries    ¨ Canned fruit with no added sugar    · Low-fat dairy products: ¨ Nonfat (skim) milk, 1% milk, and low-fat almond, cashew, or soy milks fortified with calcium    ¨ Low-fat cheese, regular or frozen yogurt, and cottage cheese    · Meats and proteins , such as lean cuts of beef and pork (loin, leg, round), skinless chicken and turkey, legumes, soy products, egg whites, and nuts  Foods and drinks to limit or avoid:  Ask your dietitian or healthcare provider about these and other foods that are high in unhealthy fat, sodium, and sugar:  · Snack or packaged foods , such as frozen dinners, cookies, macaroni and cheese, and cereals with more than 300 mg of sodium per serving    · Canned or dry mixes  for cakes, soups, sauces, or gravies    · Vegetables with added sodium , such as instant potatoes, vegetables with added sauces, or regular canned vegetables    · Other foods high in sodium , such as ketchup, barbecue sauce, salad dressing, pickles, olives, soy sauce, and miso    · High-fat dairy foods  such as whole or 2% milk, cream cheese, or sour cream, and cheeses     · High-fat protein foods  such as high-fat cuts of beef (T-bone steaks, ribs), chicken or turkey with skin, and organ meats, such as liver    · Cured or smoked meats , such as hot dogs, borrego, and sausage    · Unhealthy fats and oils , such as butter, stick margarine, shortening, and cooking oils such as coconut or palm oil    · Food and drinks high in sugar , such as soft drinks (soda), sports drinks, sweetened tea, candy, cake, cookies, pies, and doughnuts  Other diet guidelines to follow:   · Eat more foods containing omega-3 fats  Eat fish high in omega-3 fats at least 2 times a week  · Limit alcohol  Too much alcohol can damage your heart and raise your blood pressure  Women should limit alcohol to 1 drink a day  Men should limit alcohol to 2 drinks a day  A drink of alcohol is 12 ounces of beer, 5 ounces of wine, or 1½ ounces of liquor  · Choose low-sodium foods    High-sodium foods can lead to high blood pressure  Add little or no salt to food you prepare  Use herbs and spices in place of salt  · Eat more fiber  to help lower cholesterol levels  Eat at least 5 servings of fruits and vegetables each day  Eat 3 ounces of whole-grain foods each day  Legumes (beans) are also a good source of fiber  Lifestyle guidelines:   · Do not smoke  Nicotine and other chemicals in cigarettes and cigars can cause lung and heart damage  Ask your healthcare provider for information if you currently smoke and need help to quit  E-cigarettes or smokeless tobacco still contain nicotine  Talk to your healthcare provider before you use these products  · Exercise regularly  to help you maintain a healthy weight and improve your blood pressure and cholesterol levels  Ask your healthcare provider about the best exercise plan for you  Do not start an exercise program without asking your healthcare provider  Follow up with your healthcare provider as directed:  Write down your questions so you remember to ask them during your visits  © 2017 [address] [employer]  Information is for End User's use only and may not be sold, redistributed or otherwise used for commercial purposes  All illustrations and images included in CareNotes® are the copyrighted property of [employer]  or Dave Yepez  The above information is an  only  It is not intended as medical advice for individual conditions or treatments  Talk to your doctor, nurse or pharmacist before following any medical regimen to see if it is safe and effective for you

## 2020-02-28 NOTE — TELEPHONE ENCOUNTER
Patient Came in for a Work Physical  Form is in [facility] awaiting labs and PPD reading  PPD placed on Left Lower Forearm at 9:00 AM  Patient to return on Monday 3/2/2020 before 9 AM to have it read  Form will continue to stay in [place] [id number] folder until lab results are in

## 2020-03-02 ENCOUNTER — APPOINTMENT (OUTPATIENT)
Dept: LAB | Facility: CLINIC | Age: 65
End: 2020-03-02

## 2020-03-02 LAB
ALBUMIN SERPL BCP-MCNC: 4.4 G/DL (ref 3.5–5)
ALP SERPL-CCNC: 68 U/L (ref 46–116)
ALT SERPL W P-5'-P-CCNC: 22 U/L (ref 12–78)
ANION GAP SERPL CALCULATED.3IONS-SCNC: 5 MMOL/L (ref 4–13)
AST SERPL W P-5'-P-CCNC: 14 U/L (ref 5–45)
BILIRUB SERPL-MCNC: 0.52 MG/DL (ref 0.2–1)
BUN SERPL-MCNC: 14 MG/DL (ref 5–25)
CALCIUM SERPL-MCNC: 9.1 MG/DL (ref 8.3–10.1)
CHLORIDE SERPL-SCNC: 108 MMOL/L (ref 100–108)
CHOLEST SERPL-MCNC: 165 MG/DL (ref 50–200)
CO2 SERPL-SCNC: 26 MMOL/L (ref 21–32)
CREAT SERPL-MCNC: 0.96 MG/DL (ref 0.6–1.3)
CREAT UR-MCNC: 87.5 MG/DL
EST. AVERAGE GLUCOSE BLD GHB EST-MCNC: 154 MG/DL
GFR SERPL CREATININE-BSD FRML MDRD: 84 ML/MIN/1.73SQ M
GLUCOSE P FAST SERPL-MCNC: 135 MG/DL (ref 65–99)
HBA1C MFR BLD: 7 %
HDLC SERPL-MCNC: 60 MG/DL
INDURATION: 0 MM
LDLC SERPL CALC-MCNC: 85 MG/DL (ref 0–100)
MICROALBUMIN UR-MCNC: 17.1 MG/L (ref 0–20)
MICROALBUMIN/CREAT 24H UR: 20 MG/G CREATININE (ref 0–30)
POTASSIUM SERPL-SCNC: 4.1 MMOL/L (ref 3.5–5.3)
PROT SERPL-MCNC: 7.4 G/DL (ref 6.4–8.2)
SODIUM SERPL-SCNC: 139 MMOL/L (ref 136–145)
TB SKIN TEST: NEGATIVE
TRIGL SERPL-MCNC: 99 MG/DL

## 2020-03-02 PROCEDURE — 82043 UR ALBUMIN QUANTITATIVE: CPT | Performed by: PHYSICIAN ASSISTANT

## 2020-03-02 PROCEDURE — 80061 LIPID PANEL: CPT | Performed by: PHYSICIAN ASSISTANT

## 2020-03-02 PROCEDURE — 80053 COMPREHEN METABOLIC PANEL: CPT | Performed by: PHYSICIAN ASSISTANT

## 2020-03-02 PROCEDURE — 82570 ASSAY OF URINE CREATININE: CPT | Performed by: PHYSICIAN ASSISTANT

## 2020-03-02 PROCEDURE — 83036 HEMOGLOBIN GLYCOSYLATED A1C: CPT | Performed by: PHYSICIAN ASSISTANT

## 2020-03-02 PROCEDURE — 36415 COLL VENOUS BLD VENIPUNCTURE: CPT | Performed by: PHYSICIAN ASSISTANT

## 2020-03-03 DIAGNOSIS — E78.2 MIXED HYPERLIPIDEMIA: ICD-10-CM

## 2020-03-03 DIAGNOSIS — E11.65 TYPE 2 DIABETES MELLITUS WITH HYPERGLYCEMIA, WITHOUT LONG-TERM CURRENT USE OF INSULIN (HCC): Primary | ICD-10-CM

## 2020-03-03 DIAGNOSIS — I10 BENIGN HYPERTENSION: ICD-10-CM

## 2020-03-03 RX ORDER — METFORMIN HYDROCHLORIDE 500 MG/1
500 TABLET, EXTENDED RELEASE ORAL
Qty: 90 TABLET | Refills: 1 | Status: SHIPPED | OUTPATIENT
Start: 2020-03-03 | End: 2020-09-16 | Stop reason: RX

## 2020-03-03 NOTE — TELEPHONE ENCOUNTER
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

## 2020-03-03 NOTE — TELEPHONE ENCOUNTER
Attempted made to contact patient to made him aware  Can an nurse visit been scheduled for him for his Snellen eye exam  Thanks

## 2020-03-04 ENCOUNTER — CLINICAL SUPPORT (OUTPATIENT)
Dept: INTERNAL MEDICINE CLINIC | Facility: CLINIC | Age: 65
End: 2020-03-04

## 2020-03-04 DIAGNOSIS — Z02.1 PRE-EMPLOYMENT HEALTH SCREENING EXAMINATION: Primary | ICD-10-CM

## 2020-03-04 NOTE — TELEPHONE ENCOUNTER
Snellen done on patient  Left eye 0 as patient is blind in left eye  Right eye 20/40  Form scanned in and original given to patient

## 2020-03-04 NOTE — PROGRESS NOTES
Patient came into the office for a nurse visit for snellen vision needed for employment form   Form filled out and scanned and original given to patient

## 2020-03-16 ENCOUNTER — TELEPHONE (OUTPATIENT)
Dept: CARDIOLOGY CLINIC | Facility: CLINIC | Age: 65
End: 2020-03-16

## 2020-03-16 NOTE — TELEPHONE ENCOUNTER
Patient called looking for refills ? He explained you where aware of his situation that he has no insurance & He is unable to come in & he said you would keep filling medication?

## 2020-03-17 DIAGNOSIS — I25.10 CORONARY ARTERY DISEASE INVOLVING NATIVE CORONARY ARTERY OF NATIVE HEART WITHOUT ANGINA PECTORIS: ICD-10-CM

## 2020-03-17 DIAGNOSIS — E78.5 HYPERLIPIDEMIA, UNSPECIFIED HYPERLIPIDEMIA TYPE: ICD-10-CM

## 2020-03-17 DIAGNOSIS — I10 ESSENTIAL HYPERTENSION: ICD-10-CM

## 2020-03-17 RX ORDER — ROSUVASTATIN CALCIUM 40 MG/1
40 TABLET, COATED ORAL DAILY
Qty: 90 TABLET | Refills: 0 | Status: SHIPPED | OUTPATIENT
Start: 2020-03-17 | End: 2020-03-17 | Stop reason: SDUPTHER

## 2020-03-17 NOTE — TELEPHONE ENCOUNTER
Requested Prescriptions     Pending Prescriptions Disp Refills    rosuvastatin (CRESTOR) 40 MG tablet 90 tablet 0     Sig: Take 1 tablet (40 mg total) by mouth daily

## 2020-03-18 RX ORDER — LISINOPRIL 10 MG/1
10 TABLET ORAL DAILY
Qty: 90 TABLET | Refills: 0 | Status: SHIPPED | OUTPATIENT
Start: 2020-03-18 | End: 2020-06-30 | Stop reason: SDUPTHER

## 2020-03-18 RX ORDER — ROSUVASTATIN CALCIUM 40 MG/1
40 TABLET, COATED ORAL DAILY
Qty: 90 TABLET | Refills: 0 | Status: SHIPPED | OUTPATIENT
Start: 2020-03-18 | End: 2020-06-30 | Stop reason: SDUPTHER

## 2020-04-29 ENCOUNTER — TELEPHONE (OUTPATIENT)
Dept: INTERNAL MEDICINE CLINIC | Facility: CLINIC | Age: 65
End: 2020-04-29

## 2020-04-29 DIAGNOSIS — G89.29 CHRONIC LEFT SHOULDER PAIN: Primary | ICD-10-CM

## 2020-04-29 DIAGNOSIS — M25.512 CHRONIC LEFT SHOULDER PAIN: Primary | ICD-10-CM

## 2020-04-30 PROBLEM — G89.29 CHRONIC LEFT SHOULDER PAIN: Status: ACTIVE | Noted: 2020-04-30

## 2020-04-30 PROBLEM — M25.512 CHRONIC LEFT SHOULDER PAIN: Status: ACTIVE | Noted: 2020-04-30

## 2020-05-01 ENCOUNTER — HOSPITAL ENCOUNTER (OUTPATIENT)
Dept: RADIOLOGY | Facility: HOSPITAL | Age: 65
Discharge: HOME | End: 2020-05-01

## 2020-05-01 ENCOUNTER — TRANSCRIBE ORDERS (OUTPATIENT)
Dept: RADIOLOGY | Facility: HOSPITAL | Age: 65
End: 2020-05-01

## 2020-05-01 DIAGNOSIS — G89.29 CHRONIC LEFT SHOULDER PAIN: ICD-10-CM

## 2020-05-01 DIAGNOSIS — M25.512 CHRONIC LEFT SHOULDER PAIN: ICD-10-CM

## 2020-05-01 PROCEDURE — 73030 X-RAY EXAM OF SHOULDER: CPT

## 2020-05-14 ENCOUNTER — TELEMEDICINE (OUTPATIENT)
Dept: INTERNAL MEDICINE CLINIC | Facility: CLINIC | Age: 65
End: 2020-05-14

## 2020-05-14 ENCOUNTER — TELEPHONE (OUTPATIENT)
Dept: INTERNAL MEDICINE CLINIC | Facility: CLINIC | Age: 65
End: 2020-05-14

## 2020-05-14 DIAGNOSIS — M25.512 CHRONIC LEFT SHOULDER PAIN: Primary | ICD-10-CM

## 2020-05-14 DIAGNOSIS — G89.29 CHRONIC LEFT SHOULDER PAIN: Primary | ICD-10-CM

## 2020-05-14 PROCEDURE — G2012 BRIEF CHECK IN BY MD/QHP: HCPCS | Performed by: PHYSICIAN ASSISTANT

## 2020-05-29 ENCOUNTER — TELEPHONE (OUTPATIENT)
Dept: INTERNAL MEDICINE CLINIC | Facility: CLINIC | Age: 65
End: 2020-05-29

## 2020-06-11 ENCOUNTER — OFFICE VISIT (OUTPATIENT)
Dept: OBGYN CLINIC | Facility: HOSPITAL | Age: 65
End: 2020-06-11

## 2020-06-11 VITALS
WEIGHT: 182 LBS | HEART RATE: 91 BPM | SYSTOLIC BLOOD PRESSURE: 159 MMHG | BODY MASS INDEX: 28.56 KG/M2 | DIASTOLIC BLOOD PRESSURE: 85 MMHG | HEIGHT: 67 IN

## 2020-06-11 DIAGNOSIS — M25.512 ACUTE PAIN OF LEFT SHOULDER: Primary | ICD-10-CM

## 2020-06-11 DIAGNOSIS — M75.42 IMPINGEMENT SYNDROME OF LEFT SHOULDER: ICD-10-CM

## 2020-06-11 PROCEDURE — 3079F DIAST BP 80-89 MM HG: CPT | Performed by: ORTHOPAEDIC SURGERY

## 2020-06-11 PROCEDURE — 20610 DRAIN/INJ JOINT/BURSA W/O US: CPT | Performed by: ORTHOPAEDIC SURGERY

## 2020-06-11 PROCEDURE — 3008F BODY MASS INDEX DOCD: CPT | Performed by: ORTHOPAEDIC SURGERY

## 2020-06-11 PROCEDURE — 3066F NEPHROPATHY DOC TX: CPT | Performed by: ORTHOPAEDIC SURGERY

## 2020-06-11 PROCEDURE — 3051F HG A1C>EQUAL 7.0%<8.0%: CPT | Performed by: ORTHOPAEDIC SURGERY

## 2020-06-11 PROCEDURE — 3077F SYST BP >= 140 MM HG: CPT | Performed by: ORTHOPAEDIC SURGERY

## 2020-06-11 PROCEDURE — 99213 OFFICE O/P EST LOW 20 MIN: CPT | Performed by: ORTHOPAEDIC SURGERY

## 2020-06-11 RX ORDER — BUPIVACAINE HYDROCHLORIDE 2.5 MG/ML
2 INJECTION, SOLUTION INFILTRATION; PERINEURAL
Status: COMPLETED | OUTPATIENT
Start: 2020-06-11 | End: 2020-06-11

## 2020-06-11 RX ORDER — BETAMETHASONE SODIUM PHOSPHATE AND BETAMETHASONE ACETATE 3; 3 MG/ML; MG/ML
12 INJECTION, SUSPENSION INTRA-ARTICULAR; INTRALESIONAL; INTRAMUSCULAR; SOFT TISSUE
Status: COMPLETED | OUTPATIENT
Start: 2020-06-11 | End: 2020-06-11

## 2020-06-11 RX ORDER — LIDOCAINE HYDROCHLORIDE 10 MG/ML
2 INJECTION, SOLUTION INFILTRATION; PERINEURAL
Status: COMPLETED | OUTPATIENT
Start: 2020-06-11 | End: 2020-06-11

## 2020-06-11 RX ADMIN — LIDOCAINE HYDROCHLORIDE 2 ML: 10 INJECTION, SOLUTION INFILTRATION; PERINEURAL at 14:47

## 2020-06-11 RX ADMIN — BETAMETHASONE SODIUM PHOSPHATE AND BETAMETHASONE ACETATE 12 MG: 3; 3 INJECTION, SUSPENSION INTRA-ARTICULAR; INTRALESIONAL; INTRAMUSCULAR; SOFT TISSUE at 14:47

## 2020-06-11 RX ADMIN — BUPIVACAINE HYDROCHLORIDE 2 ML: 2.5 INJECTION, SOLUTION INFILTRATION; PERINEURAL at 14:47

## 2020-06-30 DIAGNOSIS — I10 ESSENTIAL HYPERTENSION: ICD-10-CM

## 2020-06-30 DIAGNOSIS — E78.5 HYPERLIPIDEMIA, UNSPECIFIED HYPERLIPIDEMIA TYPE: ICD-10-CM

## 2020-06-30 DIAGNOSIS — I25.10 CORONARY ARTERY DISEASE INVOLVING NATIVE CORONARY ARTERY OF NATIVE HEART WITHOUT ANGINA PECTORIS: ICD-10-CM

## 2020-06-30 RX ORDER — LISINOPRIL 10 MG/1
10 TABLET ORAL DAILY
Qty: 90 TABLET | Refills: 0 | Status: SHIPPED | OUTPATIENT
Start: 2020-06-30 | End: 2020-09-16 | Stop reason: ALTCHOICE

## 2020-06-30 RX ORDER — ROSUVASTATIN CALCIUM 40 MG/1
40 TABLET, COATED ORAL DAILY
Qty: 90 TABLET | Refills: 0 | Status: SHIPPED | OUTPATIENT
Start: 2020-06-30 | End: 2020-11-23 | Stop reason: SDUPTHER

## 2020-09-16 ENCOUNTER — OFFICE VISIT (OUTPATIENT)
Dept: INTERNAL MEDICINE CLINIC | Facility: CLINIC | Age: 65
End: 2020-09-16

## 2020-09-16 VITALS
TEMPERATURE: 97.8 F | DIASTOLIC BLOOD PRESSURE: 70 MMHG | HEIGHT: 67 IN | BODY MASS INDEX: 28.1 KG/M2 | WEIGHT: 179.01 LBS | OXYGEN SATURATION: 97 % | HEART RATE: 90 BPM | SYSTOLIC BLOOD PRESSURE: 100 MMHG

## 2020-09-16 DIAGNOSIS — I25.10 CORONARY ARTERY DISEASE INVOLVING NATIVE CORONARY ARTERY OF NATIVE HEART WITHOUT ANGINA PECTORIS: ICD-10-CM

## 2020-09-16 DIAGNOSIS — E78.2 MIXED HYPERLIPIDEMIA: ICD-10-CM

## 2020-09-16 DIAGNOSIS — E11.65 TYPE 2 DIABETES MELLITUS WITH HYPERGLYCEMIA, WITHOUT LONG-TERM CURRENT USE OF INSULIN (HCC): ICD-10-CM

## 2020-09-16 DIAGNOSIS — I95.1 ORTHOSTATIC HYPOTENSION: Primary | ICD-10-CM

## 2020-09-16 DIAGNOSIS — Z95.1 HX OF CABG: ICD-10-CM

## 2020-09-16 PROCEDURE — 99213 OFFICE O/P EST LOW 20 MIN: CPT | Performed by: PHYSICIAN ASSISTANT

## 2020-09-16 NOTE — PROGRESS NOTES
Assessment/Plan:  As we discussed the symptoms you are having is because your blood pressure is too low  When you are bending over and standing up to quickly this causes a sudden drop in your blood pressure triggering your symptoms  For this reason we are stopping your lisinopril  As we discussed you will continue your Crestor which she received through your cardiologist's office  You also report that you are not taking the metformin due to the recall  Will not restart you on any other medications until after you get your labs completed and discuss at your follow-up visit  In the meantime continue to drink plenty of water and as we discussed please change positions more slowly allowing the blood to get to her head to reduce the symptoms of lightheadedness and dizziness  You report you already received your flu vaccine through your employer [employer]  No problem-specific Assessment & Plan notes found for this encounter  Diagnoses and all orders for this visit:    Orthostatic hypotension    Type 2 diabetes mellitus with hyperglycemia, without long-term current use of insulin (HCC)    Coronary artery disease involving native coronary artery of native heart without angina pectoris    Mixed hyperlipidemia    Hx of CABG          Subjective:      Patient ID: Cherrie Snell is a 60 y o  male  Pt here for 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    When asked he does seem to believe that this change in position is what has triggered his episodes  As noted in patient's initial visit back in February his blood pressure was significantly elevated and diabetic and therefore he was started on lisinopril  Patient confirms taking all his meds and his cardiologist did refill his crestor  Has H/O CAD 2012  states no symptoms at all can walk for miles without any symptoms  Patient states he does not get any lightheadedness or dizziness with walking  Patient also then informs me that he is not taking the metformin  Reviewed that he states he really never started it because of the risk of cancer  Reviewed patient that the extended release version was recalled but that the regular version does not have the same mornings  Patient states he does not want to take this at this time and unlikely that he was taking it at all  Will wait until he gets his labs completed and at follow-up will discuss if he needs to be restarted on medication  Patient was due for his 6 month follow-up labs earlier this month and these have been reprinted to get completed  Need to evaluate if his diabetes has gotten any worse as he is not taking any medications  Patient was advised to increase water and change position more slowly until his blood pressure increases after stopping the lisinopril  Patient admits he does drink a lot of water but denies that it is because he is thirsty  Will have patient get his testing done and schedule him for follow-up of current symptoms as well as his diabetic checkup  Patient reports he received his flu vaccine through [employer] where he works  The following portions of the patient's history were reviewed and updated as appropriate: allergies, current medications, past family history, past medical history, past social history, past surgical history and problem list     Review of Systems   Constitutional: Negative for chills and fever  HENT: Negative      Eyes: Positive for visual disturbance (As noted in HPI patient experienced some floaters with initial onset of lightheadedness  )  Respiratory: Negative  Negative for cough, chest tightness and shortness of breath  Cardiovascular: Negative  Negative for chest pain  Gastrointestinal: Negative  Negative for abdominal pain  Skin: Negative  Neurological: Positive for dizziness and light-headedness  Negative for tremors, syncope, weakness and headaches  Psychiatric/Behavioral: Negative  Objective:      /70 (BP Location: Left arm, Patient Position: Sitting, Cuff Size: Standard)   Pulse 90   Temp 97 8 °F (36 6 °C) (Temporal)   Ht 5' 7" (1 702 m)   Wt 81 2 kg (179 lb 0 2 oz)   SpO2 97%   BMI 28 04 kg/m²          Physical Exam  Vitals signs and nursing note reviewed  Constitutional:       General: He is not in acute distress  HENT:      Head: Normocephalic  Eyes:      Conjunctiva/sclera: Conjunctivae normal       Pupils: Pupils are equal, round, and reactive to light  Neck:      Musculoskeletal: Neck supple  Vascular: No carotid bruit  Cardiovascular:      Rate and Rhythm: Normal rate and regular rhythm  Pulses: Normal pulses  Heart sounds: Normal heart sounds  Comments: On evaluation had patient stand up quickly from a seated position  Blood pressure decreased to 92/68 and patient confirmed reproduction of his lightheadedness  Patient did not have any visual floaters at this time and admits that it was not as significant but similar  Pulmonary:      Effort: Pulmonary effort is normal       Breath sounds: Normal breath sounds  Abdominal:      General: Bowel sounds are normal       Palpations: Abdomen is soft  Tenderness: There is no abdominal tenderness  Neurological:      General: No focal deficit present  Mental Status: He is alert  Cranial Nerves: No cranial nerve deficit  Sensory: No sensory deficit        Coordination: Coordination normal       Deep Tendon Reflexes: Reflexes normal    Psychiatric:         Attention and Perception: Attention normal          Speech: Speech is rapid and pressured  Speech is not tangential          Thought Content:  Thought content normal          Cognition and Memory: Cognition normal

## 2020-09-16 NOTE — PATIENT INSTRUCTIONS
As we discussed the symptoms you are having is because your blood pressure is too low  When you are bending over and standing up to quickly this causes a sudden drop in your blood pressure triggering your symptoms  For this reason we are stopping your lisinopril  As we discussed you will continue your Crestor which she received through your cardiologist's office  You also report that you are not taking the metformin due to the recall  Will not restart you on any other medications until after you get your labs completed and discuss at your follow-up visit  In the meantime continue to drink plenty of water and as we discussed please change positions more slowly allowing the blood to get to her head to reduce the symptoms of lightheadedness and dizziness  You report you already received your flu vaccine through your employer [employer]

## 2020-09-17 ENCOUNTER — APPOINTMENT (OUTPATIENT)
Dept: LAB | Facility: HOSPITAL | Age: 65
End: 2020-09-17

## 2020-09-17 DIAGNOSIS — E11.65 TYPE 2 DIABETES MELLITUS WITH HYPERGLYCEMIA, WITHOUT LONG-TERM CURRENT USE OF INSULIN (HCC): ICD-10-CM

## 2020-09-17 DIAGNOSIS — E78.2 MIXED HYPERLIPIDEMIA: ICD-10-CM

## 2020-09-17 LAB
ALBUMIN SERPL BCP-MCNC: 3.8 G/DL (ref 3.5–5)
ALP SERPL-CCNC: 60 U/L (ref 46–116)
ALT SERPL W P-5'-P-CCNC: 22 U/L (ref 12–78)
ANION GAP SERPL CALCULATED.3IONS-SCNC: 2 MMOL/L (ref 4–13)
AST SERPL W P-5'-P-CCNC: 15 U/L (ref 5–45)
BILIRUB SERPL-MCNC: 0.42 MG/DL (ref 0.2–1)
BUN SERPL-MCNC: 9 MG/DL (ref 5–25)
CALCIUM SERPL-MCNC: 9.3 MG/DL (ref 8.3–10.1)
CHLORIDE SERPL-SCNC: 110 MMOL/L (ref 100–108)
CHOLEST SERPL-MCNC: 147 MG/DL (ref 50–200)
CO2 SERPL-SCNC: 29 MMOL/L (ref 21–32)
CREAT SERPL-MCNC: 0.86 MG/DL (ref 0.6–1.3)
EST. AVERAGE GLUCOSE BLD GHB EST-MCNC: 154 MG/DL
GFR SERPL CREATININE-BSD FRML MDRD: 92 ML/MIN/1.73SQ M
GLUCOSE P FAST SERPL-MCNC: 136 MG/DL (ref 65–99)
HBA1C MFR BLD: 7 %
HDLC SERPL-MCNC: 64 MG/DL
LDLC SERPL CALC-MCNC: 71 MG/DL (ref 0–100)
POTASSIUM SERPL-SCNC: 3.9 MMOL/L (ref 3.5–5.3)
PROT SERPL-MCNC: 7.1 G/DL (ref 6.4–8.2)
SODIUM SERPL-SCNC: 141 MMOL/L (ref 136–145)
TRIGL SERPL-MCNC: 59 MG/DL

## 2020-09-17 PROCEDURE — 36415 COLL VENOUS BLD VENIPUNCTURE: CPT

## 2020-09-17 PROCEDURE — 80053 COMPREHEN METABOLIC PANEL: CPT

## 2020-09-17 PROCEDURE — 80061 LIPID PANEL: CPT

## 2020-09-17 PROCEDURE — 83036 HEMOGLOBIN GLYCOSYLATED A1C: CPT

## 2020-11-23 DIAGNOSIS — I25.10 CORONARY ARTERY DISEASE INVOLVING NATIVE CORONARY ARTERY OF NATIVE HEART WITHOUT ANGINA PECTORIS: ICD-10-CM

## 2020-11-23 DIAGNOSIS — E78.5 HYPERLIPIDEMIA, UNSPECIFIED HYPERLIPIDEMIA TYPE: ICD-10-CM

## 2020-11-23 RX ORDER — ROSUVASTATIN CALCIUM 40 MG/1
40 TABLET, COATED ORAL DAILY
Qty: 90 TABLET | Refills: 1 | Status: SHIPPED | OUTPATIENT
Start: 2020-11-23 | End: 2021-05-19 | Stop reason: SDUPTHER

## 2021-01-05 ENCOUNTER — CONSULT (OUTPATIENT)
Dept: MULTI SPECIALTY CLINIC | Facility: CLINIC | Age: 66
End: 2021-01-05

## 2021-01-05 ENCOUNTER — OFFICE VISIT (OUTPATIENT)
Dept: INTERNAL MEDICINE CLINIC | Facility: CLINIC | Age: 66
End: 2021-01-05

## 2021-01-05 VITALS
TEMPERATURE: 95.7 F | HEART RATE: 92 BPM | SYSTOLIC BLOOD PRESSURE: 112 MMHG | DIASTOLIC BLOOD PRESSURE: 76 MMHG | WEIGHT: 179.4 LBS | BODY MASS INDEX: 28.1 KG/M2

## 2021-01-05 DIAGNOSIS — B35.1 ONYCHOMYCOSIS: Primary | ICD-10-CM

## 2021-01-05 DIAGNOSIS — I25.10 CORONARY ARTERY DISEASE INVOLVING NATIVE CORONARY ARTERY OF NATIVE HEART WITHOUT ANGINA PECTORIS: ICD-10-CM

## 2021-01-05 DIAGNOSIS — Z12.5 SCREENING FOR PROSTATE CANCER: ICD-10-CM

## 2021-01-05 DIAGNOSIS — E11.65 TYPE 2 DIABETES MELLITUS WITH HYPERGLYCEMIA, WITHOUT LONG-TERM CURRENT USE OF INSULIN (HCC): Primary | ICD-10-CM

## 2021-01-05 DIAGNOSIS — B35.1 ONYCHOMYCOSIS: ICD-10-CM

## 2021-01-05 DIAGNOSIS — E11.65 TYPE 2 DIABETES MELLITUS WITH HYPERGLYCEMIA, WITHOUT LONG-TERM CURRENT USE OF INSULIN (HCC): ICD-10-CM

## 2021-01-05 DIAGNOSIS — E78.00 PURE HYPERCHOLESTEROLEMIA: ICD-10-CM

## 2021-01-05 PROCEDURE — 99213 OFFICE O/P EST LOW 20 MIN: CPT | Performed by: PHYSICIAN ASSISTANT

## 2021-01-05 NOTE — PROGRESS NOTES
At [address] 60 y o  male MRN: [id number]  Encounter: [id number]      Assessment/Plan        Diagnoses and all orders for this visit:    Onychomycosis    Type 2 diabetes mellitus with hyperglycemia, without long-term current use of insulin ([facility] )           Plan:   Patient was seen/examined  All questions and concerns addressed   Nails x10 were sharply trimmed to normal length and thickness with a large nail nipper without incident   Offered to prescribe patient ketoconazole for tinea pedis, patient declined wanting to treat his tinea pedis   Stressed the importance of glycemic control, shoe gear, and proper diabetic foot care   RTC in 1 year(s)    Dr Rafael Molina was present during this entire procedure  History of Present Illness     HPI:  Dev Deras is a 60 y o  male who presents with elongated toenails and type 2 diabetes  States that their nails are painful, elongated  They have difficulty applying their socks and shoes  The pressure within their shoe gear is painful and they have been unable to cut their nails adequately  Patient denies numbness/tingling  They state their last blood glucose level was around 100mg/dL  The patient denies any nausea, vomiting, fever, chills, shortness of breath, or chest pains  Review of Systems   Constitutional: Negative  HENT: Negative  Eyes: Negative  Respiratory: Negative  Cardiovascular: Negative  Gastrointestinal: Negative  Musculoskeletal: Negative   Skin: elongated thickened toenails  Neurological: Negative          Historical Information   Past Medical History:   Diagnosis Date    Arthritis     Carpal tunnel syndrome     Coronary artery disease     Costochondritis     RESOLVED: [id number]    Diabetes mellitus (HCC)     Hematuria     HTN (hypertension)     Hyperlipidemia     Kidney stone     [facility] infarction ([facility] )     Weak carotid pulse      Past Surgical History:   Procedure Laterality Date    CATARACT EXTRACTION Left     COLONOSCOPY      CORONARY ANGIOPLASTY WITH STENT PLACEMENT  06/29/2005    RCA     CORONARY ANGIOPLASTY WITH STENT PLACEMENT  07/05/2005    Cx    CORONARY ARTERY BYPASS GRAFT      LAST ASSESSED: [id number]    LITHOTRIPSY      RENAL    MOUTH SURGERY      AR COLONOSCOPY FLX DX W/COLLJ SPEC WHEN PFRMD N/A 2/22/2019    Procedure: COLONOSCOPY;  Surgeon: Shira Voss MD;  [facility]; Service: Gastroenterology    AR CYSTO/URETERO W/LITHOTRIPSY &INDWELL STENT INSRT Left 3/24/2017    Procedure: CYSTOSCOPY, URETEROSCOPY, HOLMIUM LASER LITHOTRIPSY, BASKET STONE EXTRACTION, RETROGRADE PYELOGRAM, STENT INSERTION;  Surgeon: Farhad Giles MD;  [facility];  Service: Urology    AR CYSTO/URETERO W/LITHOTRIPSY &INDWELL STENT INSRT Left 7/28/2017    Procedure: CYSTOSCOPY, URETEROSCOPY WITH HOLMIUM LASER LITHOTRIPSY, AND STENT EXCHANGE;  Surgeon: Chico Hrer MD;  [facility];  Service: Urology    AR FRAGMENT KIDNEY STONE/ ESWL Left 9/15/2017    Procedure: Harini Morris SHOCKWAVE (ESWL);   Surgeon: Chico Herr MD;  [facility];  Service: Urology    ROTATOR CUFF REPAIR Right     SHOULDER ARTHROSCOPY Left     TOOTH EXTRACTION       Social History   Social History     Substance and Sexual Activity   Alcohol Use Not Currently    Comment: quit 23 years ago; SOCIAL DRINKER AS PER ALL SCRIPTS      Social History     Substance and Sexual Activity   Drug Use Never     Social History     Tobacco Use   Smoking Status Light Tobacco Smoker    Packs/day: 1 00    Years: 44 00    Pack years: 44 00   Smokeless Tobacco Never Used   Tobacco Comment    up to 1ppd since age 15y/o; FORMER SMOKER AS PER ALL SCRIPTS      Family History:   Family History   Problem Relation Age of Onset    Diabetes Sister         TYPE 2    Diabetes Mother     Heart disease Mother     Diabetes Brother     Diabetes Sister         TYPE 2    Heart disease Sister     Heart disease Sister     Coronary artery disease Maternal Uncle     Diabetes Maternal Uncle        Meds/Allergies   (Not in a hospital admission)    Allergies   Allergen Reactions    Morphine GI Intolerance     vomiting       Objective     Current Vitals: There were no vitals taken for this visit  Lower Extremity Exam:    Diabetic Foot Exam    Musculoskeletal:  MMT is 5/5 to all compartments of the LE, +0/4 edema B/L, Hallux limitus is present  Equinus is present  Biomechanical Exam of LE:  Hip ROM WNL Bilateral, external and internal rotation about equal at 45° b/l  Knee ROM WNL Bilateral, no hyperextension noted b/l, no genu varum/valgum noted b/l  Ankle dorsiflexion with knee extended is limited and unable to get pass vertical on right and limited and unable to get pass vertical on left  Ankle dorsiflexion with knee flexed is limited and unable to get pass vertical on right and limited and unable to get pass vertical on left  Malleolar positioning is WNL b/l  STJ ROM WNL b/l, heel inversion is approximately 20° on right and 20° on left; heel eversion is approximately 10° on right and 10° on left; neutral calcaneal stance position is 0°   1st ray ROM WNL b/l, able to dorsiflex/plantarflex b/l  Tibial varum is 0° b/l, Resting calcaneal stance position is valgus and approximately 4° on right and valgus and approximately 4° on left  Gait analysis: WNL  Gross deformity noted: pes planus         Vascular:   R DP is +2/4, R PT is +2/4, L DP is +2/4, L PT is +2/4, CFT< 3sec to all digits  Pedal hair is Absent  regular rate and rhythm  Skin:  No open Lesions  Skin of the LE is normal texture, temperature, turgor  Interdigital maceration is not present  Nails elongated and thickened with subungual debris x10  Xerotic skin is noted of the plantar surface of the feet bilaterally   Patient's plantar feet bilaterally are peeling, slightly erythematous, scaly, and pruritic, these findings are consistent with tinea pedis  Neurologic:  Gross sensation is intact  Protective sensation is Intact  Vibratory sensation is Intact  Sharp sensation is present

## 2021-01-05 NOTE — PROGRESS NOTES
Assessment/Plan: On your visit today we reviewed your labs that were completed back in September  A1c is 7 percent you are aware goal is to get it below 7 percent  After discussion you are agreeable to restarting metformin 1 tablet daily with food  You will continue your rosuvastatin and aspirin daily your cholesterol levels are good  On your diabetic foot exam you do have thickened elongated toenails and calluses an will be seeing the podiatrist today as well  Script provided for follow-up labs as dated in 6 months then appointment here for review  New referral also provided today to schedule your follow-up with cardiologist   Joi 23 have not seen them since 2018  No problem-specific Assessment & Plan notes found for this encounter  Diagnoses and all orders for this visit:    Type 2 diabetes mellitus with hyperglycemia, without long-term current use of insulin (HCC)  -     metFORMIN (GLUCOPHAGE) 500 mg tablet; Take 1 tablet (500 mg total) by mouth daily with breakfast  -     Comprehensive metabolic panel; Future  -     Hemoglobin A1C; Future  -     Microalbumin / creatinine urine ratio; Future    Coronary artery disease involving native coronary artery of native heart without angina pectoris  -     Lipid Panel with Direct LDL reflex; Future  -     Ambulatory referral to Cardiology; Future    Screening for prostate cancer  -     PSA, Total Screen; Future    Onychomycosis    Pure hypercholesterolemia  -     Lipid Panel with Direct LDL reflex; Future          Subjective:      Patient ID: Angela Case is a 60 y o  male  Patient presents today for follow-up and lab review  Patient did complete his labs back in September after his last visit and was contacted however he refused to schedule an appointment at that time    Pack in September patient had stopped taking the metformin for his diabetes months earlier secondary to recall and was advised that we would await those new results before discussing restarting him on medication  Back in September A1c was exactly the same 7 0 percent  Patient is taking the cholesterol medication and does note improvement once he was switched to rosuvastatin  At previous visit patient's blood pressure was the low end of normal and he was having orthostatic symptoms therefore his blood pressure medication was held  As noted he was supposed to return in a few weeks but it has now been over 3 months  Was on Lisinopril 10mg  Patient is status post CABG x3 in 2012, patient still has not scheduled follow-up with his cardiologist last visit was November of 2018  /76 after rechecked    Patient continues to remain hesitant about medications both a combination of cost and does not really like to take meds but after discussion agreeable to restarting metformin 500 milligrams once daily  He does consistently take his cholesterol and aspirin  Diabetic foot exam does show thickened elongated toenails as well as bilateral medial great toe bunions  Podiatry had an opening and patient agreeable to seeing them today as well  The following portions of the patient's history were reviewed and updated as appropriate: allergies, current medications, past family history, past medical history, past social history, past surgical history and problem list     Review of Systems   Constitutional: Negative  Negative for chills and fever  Respiratory: Negative  Negative for cough and shortness of breath  Cardiovascular: Negative  Negative for chest pain and palpitations  Gastrointestinal: Negative  Negative for abdominal pain, constipation and diarrhea  Endocrine: Negative for polydipsia, polyphagia and polyuria  Musculoskeletal: Negative  Skin: Negative  Neurological: Negative  Negative for dizziness, syncope, light-headedness and headaches  Psychiatric/Behavioral: Negative            Objective:      /76 (BP Location: Left arm)   Pulse 92   Temp Kyree Enrique ) 95 7 °F (35 4 °C) (Temporal)   Wt 81 4 kg (179 lb 6 4 oz)   BMI 28 10 kg/m²          Physical Exam  Vitals signs and nursing note reviewed  Constitutional:       General: He is not in acute distress  Appearance: He is not ill-appearing  HENT:      Head: Normocephalic  Cardiovascular:      Rate and Rhythm: Normal rate and regular rhythm  Pulses: Pulses are weak  Dorsalis pedis pulses are 1+ on the right side and 1+ on the left side  Heart sounds: Normal heart sounds  Pulmonary:      Effort: Pulmonary effort is normal       Breath sounds: Normal breath sounds  No wheezing  Abdominal:      General: Bowel sounds are normal       Tenderness: There is no abdominal tenderness  Musculoskeletal:      Right lower leg: No edema  Left lower leg: No edema  Feet:      Right foot:      Skin integrity: Callus and dry skin present  Left foot:      Skin integrity: Callus and dry skin present  Skin:     Findings: No erythema or rash  Neurological:      General: No focal deficit present  Mental Status: He is alert  Psychiatric:         Mood and Affect: Mood normal        Patient's shoes and socks removed  Right Foot/Ankle   Right Foot Inspection  Skin Exam: dry skin, callus and callus                      Callus Size (cm):2      Sensory     Proprioception: intact   Monofilament testing: intact  Vascular    The right DP pulse is 1+  Left Foot/Ankle  Left Foot Inspection  Skin Exam: dry skin and callus                     Callus Size (cm): 1 5                    Sensory     Proprioception: intact  Monofilament: intact  Vascular    The left DP pulse is 1+  Assign Risk Category:  No deformity present;  No loss of protective sensation; Weak pulses       Risk: 1

## 2021-01-05 NOTE — PATIENT INSTRUCTIONS
Diabetes and Your Skin   WHAT YOU NEED TO KNOW:   Diabetes can affect every part of your body, including your skin  Diabetes that is not well controlled can damage blood vessels and nerves  Damage to blood vessels can make it hard for blood to flow to tissues and organs  A lack of blood flow to your skin can cause ulcers that are difficult to heal  Skin ulcers are also called sores  People with diabetes can also have more bacterial skin infections than other people  Most skin conditions can be prevented with good blood sugar control  Skin sores can be hard to heal, or become life or limb-threatening, if not treated early  DISCHARGE INSTRUCTIONS:   Call your doctor or care team provider if:   · You get a severe burn or cut  · You have a sore that is painful, warm to the touch, or has redness around it  · Your sore does not get better or seems to get worse  · You have a fever  · Your blood sugar levels continue to be higher than they should be  · You have questions or concerns about your condition or care  Prevent skin sores:   · Keep your blood sugars within target range  Your care team provider will tell you what your blood sugar levels should be  High blood sugar levels increase your risk for skin infections and poor wound healing  · Keep your skin clean  Do not take hot baths or showers  They can cause your skin to get dry  Do not take a bubble bath if you have dry skin  Use moisturizing soaps  · Keep your skin from becoming too dry  Apply moisturizing lotion after baths or showers, especially in cold, dry weather  When you scratch dry, itchy skin, you can cause your skin to be open to infection  Bathe less during cold weather and use lotion to moisturize  Do not put lotion between your toes  Moisture between your toes could lead to skin breakdown  Use a humidifier to keep air in your home from being dry  · Keep areas where skin touches skin dry    Use talcum powder in areas such as armpits and groin  You may also need it under your breasts, and between your toes  Moisture in these areas can cause a fungal infection  · Treat cuts immediately  Clean minor cuts with soap and water  Cover them with sterile gauze  Follow up with your doctor or diabetes care team providers as directed:  Write down your questions so you remember to ask them during your visits  © Copyright [address] Information is for End User's use only and may not be sold, redistributed or otherwise used for commercial purposes  All illustrations and images included in CareNotes® are the copyrighted property of [employer]  or [address] [place] [address]  The above information is an  only  It is not intended as medical advice for individual conditions or treatments  Talk to your doctor, nurse or pharmacist before following any medical regimen to see if it is safe and effective for you

## 2021-01-05 NOTE — PATIENT INSTRUCTIONS
On your visit today we reviewed your labs that were completed back in September  A1c is 7 percent you are aware goal is to get it below 7 percent  After discussion you are agreeable to restarting metformin 1 tablet daily with food  You will continue your rosuvastatin and aspirin daily your cholesterol levels are good  On your diabetic foot exam you do have thickened elongated toenails and calluses an will be seeing the podiatrist today as well  Script provided for follow-up labs as dated in 6 months then appointment here for review  New referral also provided today to schedule your follow-up with cardiologist   Joi 23 have not seen them since 2018

## 2021-01-06 ENCOUNTER — TRANSCRIBE ORDERS (OUTPATIENT)
Dept: LAB | Facility: HOSPITAL | Age: 66
End: 2021-01-06

## 2021-01-07 ENCOUNTER — LAB (OUTPATIENT)
Dept: LAB | Facility: HOSPITAL | Age: 66
End: 2021-01-07

## 2021-01-07 DIAGNOSIS — Z12.5 SCREENING FOR PROSTATE CANCER: ICD-10-CM

## 2021-01-07 DIAGNOSIS — I25.10 CORONARY ARTERY DISEASE INVOLVING NATIVE CORONARY ARTERY OF NATIVE HEART WITHOUT ANGINA PECTORIS: ICD-10-CM

## 2021-01-07 DIAGNOSIS — E11.65 TYPE 2 DIABETES MELLITUS WITH HYPERGLYCEMIA, WITHOUT LONG-TERM CURRENT USE OF INSULIN (HCC): ICD-10-CM

## 2021-01-07 DIAGNOSIS — E78.00 PURE HYPERCHOLESTEROLEMIA: ICD-10-CM

## 2021-01-07 LAB
ALBUMIN SERPL BCP-MCNC: 4.1 G/DL (ref 3.5–5)
ALP SERPL-CCNC: 77 U/L (ref 46–116)
ALT SERPL W P-5'-P-CCNC: 23 U/L (ref 12–78)
ANION GAP SERPL CALCULATED.3IONS-SCNC: 2 MMOL/L (ref 4–13)
AST SERPL W P-5'-P-CCNC: 14 U/L (ref 5–45)
BILIRUB SERPL-MCNC: 0.48 MG/DL (ref 0.2–1)
BUN SERPL-MCNC: 10 MG/DL (ref 5–25)
CALCIUM SERPL-MCNC: 9.8 MG/DL (ref 8.3–10.1)
CHLORIDE SERPL-SCNC: 110 MMOL/L (ref 100–108)
CHOLEST SERPL-MCNC: 160 MG/DL (ref 50–200)
CO2 SERPL-SCNC: 28 MMOL/L (ref 21–32)
CREAT SERPL-MCNC: 1.04 MG/DL (ref 0.6–1.3)
CREAT UR-MCNC: 133 MG/DL
EST. AVERAGE GLUCOSE BLD GHB EST-MCNC: 146 MG/DL
GFR SERPL CREATININE-BSD FRML MDRD: 76 ML/MIN/1.73SQ M
GLUCOSE P FAST SERPL-MCNC: 131 MG/DL (ref 65–99)
HBA1C MFR BLD: 6.7 %
HDLC SERPL-MCNC: 70 MG/DL
LDLC SERPL CALC-MCNC: 74 MG/DL (ref 0–100)
MICROALBUMIN UR-MCNC: 33.7 MG/L (ref 0–20)
MICROALBUMIN/CREAT 24H UR: 25 MG/G CREATININE (ref 0–30)
POTASSIUM SERPL-SCNC: 4.2 MMOL/L (ref 3.5–5.3)
PROT SERPL-MCNC: 7.7 G/DL (ref 6.4–8.2)
PSA SERPL-MCNC: 1 NG/ML (ref 0–4)
SODIUM SERPL-SCNC: 140 MMOL/L (ref 136–145)
TRIGL SERPL-MCNC: 79 MG/DL

## 2021-01-07 PROCEDURE — 82570 ASSAY OF URINE CREATININE: CPT

## 2021-01-07 PROCEDURE — 80053 COMPREHEN METABOLIC PANEL: CPT

## 2021-01-07 PROCEDURE — G0103 PSA SCREENING: HCPCS

## 2021-01-07 PROCEDURE — 36415 COLL VENOUS BLD VENIPUNCTURE: CPT

## 2021-01-07 PROCEDURE — 82043 UR ALBUMIN QUANTITATIVE: CPT

## 2021-01-07 PROCEDURE — 80061 LIPID PANEL: CPT

## 2021-01-07 PROCEDURE — 83036 HEMOGLOBIN GLYCOSYLATED A1C: CPT

## 2021-01-11 DIAGNOSIS — E11.65 TYPE 2 DIABETES MELLITUS WITH HYPERGLYCEMIA, WITHOUT LONG-TERM CURRENT USE OF INSULIN (HCC): Primary | ICD-10-CM

## 2021-01-11 DIAGNOSIS — I10 BENIGN HYPERTENSION: ICD-10-CM

## 2021-03-30 ENCOUNTER — OFFICE VISIT (OUTPATIENT)
Dept: CARDIOLOGY CLINIC | Facility: CLINIC | Age: 66
End: 2021-03-30

## 2021-03-30 VITALS
SYSTOLIC BLOOD PRESSURE: 114 MMHG | BODY MASS INDEX: 27.51 KG/M2 | HEART RATE: 77 BPM | HEIGHT: 67 IN | DIASTOLIC BLOOD PRESSURE: 68 MMHG | WEIGHT: 175.3 LBS

## 2021-03-30 DIAGNOSIS — R09.89 WEAK CAROTID PULSE: ICD-10-CM

## 2021-03-30 DIAGNOSIS — I10 BENIGN HYPERTENSION: Primary | ICD-10-CM

## 2021-03-30 DIAGNOSIS — I25.10 CORONARY ARTERY DISEASE INVOLVING NATIVE CORONARY ARTERY OF NATIVE HEART WITHOUT ANGINA PECTORIS: ICD-10-CM

## 2021-03-30 DIAGNOSIS — Z23 ENCOUNTER FOR IMMUNIZATION: ICD-10-CM

## 2021-03-30 DIAGNOSIS — Z95.1 HX OF CABG: ICD-10-CM

## 2021-03-30 DIAGNOSIS — E78.00 PURE HYPERCHOLESTEROLEMIA: ICD-10-CM

## 2021-03-30 PROCEDURE — 99214 OFFICE O/P EST MOD 30 MIN: CPT | Performed by: INTERNAL MEDICINE

## 2021-03-30 PROCEDURE — 93000 ELECTROCARDIOGRAM COMPLETE: CPT | Performed by: INTERNAL MEDICINE

## 2021-03-30 NOTE — PROGRESS NOTES
Cardiology Follow Up    Prince Angel  1955  [id number]  [address]  [phone]    1  Coronary artery disease involving native coronary artery of native heart without angina pectoris  Ambulatory referral to Cardiology       Diagnoses and all orders for this visit:    Coronary artery disease involving native coronary artery of native heart without angina pectoris  -     Ambulatory referral to Cardiology    Other orders  -     POCT ECG      I had the pleasure of seeing Prince Agnel for a follow up visit  INTERVAL HISTORY: none, hasn't been here since 2018    History of the presenting illness, Discussion/Summary and My Plan are as follows:::    He is a very pleasant 78-year-old  gentleman with a history of dyslipidemia, coronary artery disease, prior percutaneous coronary interventions (last in 2005) and coronary artery bypass grafting X 3, in November 2012  We did not have any records regarding his bypass or coronary anatomy from [place]  At baseline he was physically very active working out at the gym doing weights and treadmill, then injured his shoulder, now walking and doing weights at home, now trying to find a part time job  Has had some renal calculi issues and needed a stent and has not exercised recently  He quit smoking  He also had erectile dysfunction       Physical exam is unremarkable  His ECG shows a right bundle branch block unchanged from prior     Plan:     Coronary artery disease status post CABG: Continue aspirin, rosuvastatin 40  Does not have HTN  Restart exercise  Last stress test in 2011 without ischemia and normal function   Physically quite active without symptoms      Dyslipidemia: continue Crestor 40 LDL of 74 (Atorva was suboptimal)     Decreased left carotid pulse: < 50% BL disease on dopplers - Dec 15      Erectile dysfunction: stable      Tobacco use:  quit completely     DM: a1c of 6 7 - Jan 2021     Follow up in 9 months  Results for [facility] (MRN [id number]) as of 3/30/2021 11:02   Ref  Range 1/7/2021 08:24   Cholesterol Latest Ref Range: 50 - 200 mg/dL 160   Triglycerides Latest Ref Range: <=150 mg/dL 79   HDL Latest Ref Range: >=40 mg/dL 70   LDL Calculated Latest Ref Range: 0 - 100 mg/dL 74   Results for [facility] (MRN [id number]) as of 3/30/2021 11:02   Ref   Range 1/7/2021 08:24   Hemoglobin A1C  6 7 (H)       Patient Active Problem List   Diagnosis    Screening for prostate cancer    Kidney stone    Arteriosclerosis of coronary artery    Benign hypertension    Hx of CABG    Coronary artery disease involving native coronary artery of native heart without angina pectoris    Type 2 diabetes mellitus with hyperglycemia, without long-term current use of insulin (HCC)    Hematuria    Hyperlipidemia    Microalbuminuria    Onychomycosis    Weak carotid pulse    Cigarette nicotine dependence without complication    Erectile dysfunction    Pain in both hands    Localized osteoarthritis of hands, bilateral    Primary osteoarthritis of left knee    Chronic pain of left knee    Primary osteoarthritis of right knee    Carpal tunnel syndrome, bilateral    Blind left eye    Chronic left shoulder pain     Past Medical History:   Diagnosis Date    Arthritis     Carpal tunnel syndrome     Coronary artery disease     Costochondritis     RESOLVED: [id number]    Diabetes mellitus ([address] )     Hematuria     HTN (hypertension)     Hyperlipidemia     Kidney stone     Myocardial infarction [facility] [address]     Weak carotid pulse      Social History     Socioeconomic History    Marital status:      Spouse name: Not on file    Number of children: Not on file    Years of education: Not on file    Highest education level: Not on file   Occupational History    Occupation: laboror     Comment:  Social Needs    Financial resource strain: Not hard at all   [employer] insecurity     Worry: Never true     Inability: Never true   [employer] needs     Medical: No     Non-medical: No   Tobacco Use    Smoking status: Light Tobacco Smoker     Packs/day: 1 00     Years: 44 00     Pack years: 44 00    Smokeless tobacco: Never Used    Tobacco comment: up to 1ppd since age 13y/o; FORMER SMOKER AS PER ALL SCRIPTS    Substance and Sexual Activity    Alcohol use: Not Currently     Comment: quit 23 years ago; SOCIAL DRINKER AS PER ALL SCRIPTS     Drug use: Never    Sexual activity: Not Currently   Lifestyle    Physical activity     Days per week: 0 days     Minutes per session: 0 min    Stress: Not at all   Relationships    Social connections     Talks on phone: More than three times a week     Gets together:  Three times a week     Attends Jewish service: Never     Active member of club or organization: No     Attends meetings of clubs or organizations: Never     Relationship status:     Intimate partner violence     Fear of current or ex partner: No     Emotionally abused: No     Physically abused: No     Forced sexual activity: No   Other Topics Concern    Not on file   Social History Narrative    Not on file      Family History   Problem Relation Age of Onset    Diabetes Sister         TYPE 2    Diabetes Mother     Heart disease Mother     Diabetes Brother     Diabetes Sister         TYPE 2    Heart disease Sister     Heart disease Sister     Coronary artery disease Maternal Uncle     Diabetes Maternal Uncle      Past Surgical History:   Procedure Laterality Date    CATARACT EXTRACTION Left     COLONOSCOPY      CORONARY ANGIOPLASTY WITH STENT PLACEMENT  06/29/2005    RCA     CORONARY ANGIOPLASTY WITH STENT PLACEMENT  07/05/2005    Cx    CORONARY ARTERY BYPASS GRAFT      LAST ASSESSED: [id number]    LITHOTRIPSY      RENAL    MOUTH SURGERY      OR COLONOSCOPY FLX DX W/COLLJ SPEC WHEN PFRMD N/A 2/22/2019    Procedure: COLONOSCOPY;  Surgeon: Lacie Shi MD;  [facility]; Service: Gastroenterology    MA CYSTO/URETERO W/LITHOTRIPSY &INDWELL STENT INSRT Left 3/24/2017    Procedure: CYSTOSCOPY, URETEROSCOPY, HOLMIUM LASER LITHOTRIPSY, BASKET STONE EXTRACTION, RETROGRADE PYELOGRAM, STENT INSERTION;  Surgeon: Debby Montanez MD;  [facility];  Service: Urology    MA CYSTO/URETERO W/LITHOTRIPSY &INDWELL STENT INSRT Left 7/28/2017    Procedure: CYSTOSCOPY, URETEROSCOPY WITH HOLMIUM LASER LITHOTRIPSY, AND STENT EXCHANGE;  Surgeon: Marjorie Beasley MD;  [facility];  Service: Urology    MA FRAGMENT KIDNEY STONE/ ESWL Left 9/15/2017    Procedure: Sundar Zhang SHOCKWAVE (ESWL); Surgeon: Marjorie Beasley MD;  [facility];  Service: Urology    ROTATOR CUFF REPAIR Right     SHOULDER ARTHROSCOPY Left     TOOTH EXTRACTION         Current Outpatient Medications:     aspirin 81 MG tablet, Take 81 mg by mouth daily at bedtime , Disp: , Rfl:     metFORMIN (GLUCOPHAGE) 500 mg tablet, Take 1 tablet (500 mg total) by mouth daily with breakfast, Disp: 90 tablet, Rfl: 1    omega-3-acid ethyl esters (LOVAZA) 1 g capsule, Take 100 mg by mouth 2 (two) times a day, Disp: , Rfl:     rosuvastatin (CRESTOR) 40 MG tablet, Take 1 tablet (40 mg total) by mouth daily, Disp: 90 tablet, Rfl: 1  Allergies   Allergen Reactions    Morphine GI Intolerance     vomiting       Imaging: No results found  Review of Systems:  Review of Systems   Constitutional: Negative  HENT: Negative  Eyes: Negative  Respiratory: Negative  Cardiovascular: Negative  Musculoskeletal:        Left shoulder pain - labral tear   Allergic/Immunologic: Negative  Neurological: Negative          Physical Exam:  /68 (BP Location: Right arm, Patient Position: Sitting, Cuff Size: Standard)   Pulse 77   Ht 5' 7" (1 702 m)   Wt 79 5 kg (175 lb 4 8 oz)   BMI 27 46 kg/m²   Physical Exam  Constitutional:       General: He is not in acute distress  Appearance: Normal appearance  He is not ill-appearing  HENT:      Head: Normocephalic  Nose: Nose normal  No congestion or rhinorrhea  Mouth/Throat:      Mouth: Mucous membranes are moist       Pharynx: No oropharyngeal exudate or posterior oropharyngeal erythema  Eyes:      General: No scleral icterus  Right eye: No discharge  Left eye: No discharge  Pupils: Pupils are equal, round, and reactive to light  Neck:      Musculoskeletal: Normal range of motion  No neck rigidity or muscular tenderness  Vascular: No carotid bruit  Cardiovascular:      Rate and Rhythm: Normal rate  Heart sounds: No murmur  No friction rub  Pulmonary:      Effort: No respiratory distress  Breath sounds: No stridor  No wheezing or rhonchi  Abdominal:      General: Bowel sounds are normal  There is no distension  Palpations: There is no mass  Tenderness: There is no abdominal tenderness  Hernia: No hernia is present  Musculoskeletal: Normal range of motion  General: No swelling, tenderness, deformity or signs of injury  Lymphadenopathy:      Cervical: No cervical adenopathy  Neurological:      Mental Status: He is alert  This note was completed in part utilizing [employer] direct voice recognition software  Grammatical errors, random word insertion, spelling mistakes, occasional wrong word or "sound-alike" substitutions and incomplete sentences may be an occasional consequence of the system secondary to software limitations, ambient noise and hardware issues  At the time of dictation, efforts were made to edit, clarify and /or correct errors  Please read the chart carefully and recognize, using context, where substitutions have occurred    If you have any questions or concerns about the context, text or information contained within the body of this dictation, please contact myself, the provider, for further clarification

## 2021-04-08 ENCOUNTER — TELEPHONE (OUTPATIENT)
Dept: CARDIOLOGY CLINIC | Facility: CLINIC | Age: 66
End: 2021-04-08

## 2021-04-08 NOTE — TELEPHONE ENCOUNTER
Isaiah Stratton lost his prescription sunglasses  Checked with MA, check-in & checkout, no sunglasses here

## 2021-04-14 ENCOUNTER — IMMUNIZATIONS (OUTPATIENT)
Dept: FAMILY MEDICINE CLINIC | Facility: HOSPITAL | Age: 66
End: 2021-04-14

## 2021-04-14 DIAGNOSIS — Z23 ENCOUNTER FOR IMMUNIZATION: Primary | ICD-10-CM

## 2021-04-14 PROCEDURE — 0002A SARS-COV-2 / COVID-19 MRNA VACCINE (PFIZER-BIONTECH) 30 MCG: CPT

## 2021-04-14 PROCEDURE — 91300 SARS-COV-2 / COVID-19 MRNA VACCINE (PFIZER-BIONTECH) 30 MCG: CPT

## 2021-05-08 ENCOUNTER — IMMUNIZATIONS (OUTPATIENT)
Dept: FAMILY MEDICINE CLINIC | Facility: HOSPITAL | Age: 66
End: 2021-05-08

## 2021-05-08 DIAGNOSIS — Z23 ENCOUNTER FOR IMMUNIZATION: Primary | ICD-10-CM

## 2021-05-08 PROCEDURE — 0002A SARS-COV-2 / COVID-19 MRNA VACCINE (PFIZER-BIONTECH) 30 MCG: CPT

## 2021-05-08 PROCEDURE — 91300 SARS-COV-2 / COVID-19 MRNA VACCINE (PFIZER-BIONTECH) 30 MCG: CPT

## 2021-05-19 DIAGNOSIS — I25.10 CORONARY ARTERY DISEASE INVOLVING NATIVE CORONARY ARTERY OF NATIVE HEART WITHOUT ANGINA PECTORIS: ICD-10-CM

## 2021-05-19 DIAGNOSIS — E78.5 HYPERLIPIDEMIA, UNSPECIFIED HYPERLIPIDEMIA TYPE: ICD-10-CM

## 2021-05-19 RX ORDER — ROSUVASTATIN CALCIUM 40 MG/1
40 TABLET, COATED ORAL DAILY
Qty: 90 TABLET | Refills: 1 | Status: SHIPPED | OUTPATIENT
Start: 2021-05-19 | End: 2021-12-01 | Stop reason: SDUPTHER

## 2021-05-19 NOTE — TELEPHONE ENCOUNTER
Name of medication, dose, quantity and frequency: Rosuvastatin 40 mg    Number of refills left: 0    Amount of medication left:    Pharmacy verified and updated: [facility]      Additional information:

## 2021-06-08 DIAGNOSIS — E11.65 TYPE 2 DIABETES MELLITUS WITH HYPERGLYCEMIA, WITHOUT LONG-TERM CURRENT USE OF INSULIN (HCC): ICD-10-CM

## 2021-06-08 NOTE — TELEPHONE ENCOUNTER
Patient needs refills  Per patient he was told he still had a script at [facility] and patient is at pharmacy now calling and is very upset there is no refills there  Patient states he is not walking back here to  medication, please send asap

## 2021-06-28 ENCOUNTER — APPOINTMENT (OUTPATIENT)
Dept: LAB | Facility: CLINIC | Age: 66
End: 2021-06-28

## 2021-06-28 DIAGNOSIS — E11.65 TYPE 2 DIABETES MELLITUS WITH HYPERGLYCEMIA, WITHOUT LONG-TERM CURRENT USE OF INSULIN (HCC): ICD-10-CM

## 2021-06-28 LAB
ALBUMIN SERPL BCP-MCNC: 3.8 G/DL (ref 3.5–5)
ALP SERPL-CCNC: 63 U/L (ref 46–116)
ALT SERPL W P-5'-P-CCNC: 30 U/L (ref 12–78)
ANION GAP SERPL CALCULATED.3IONS-SCNC: 6 MMOL/L (ref 4–13)
AST SERPL W P-5'-P-CCNC: 18 U/L (ref 5–45)
BILIRUB SERPL-MCNC: 0.62 MG/DL (ref 0.2–1)
BUN SERPL-MCNC: 10 MG/DL (ref 5–25)
CALCIUM SERPL-MCNC: 8.9 MG/DL (ref 8.3–10.1)
CHLORIDE SERPL-SCNC: 107 MMOL/L (ref 100–108)
CHOLEST SERPL-MCNC: 153 MG/DL (ref 50–200)
CO2 SERPL-SCNC: 27 MMOL/L (ref 21–32)
CREAT SERPL-MCNC: 0.9 MG/DL (ref 0.6–1.3)
EST. AVERAGE GLUCOSE BLD GHB EST-MCNC: 146 MG/DL
GFR SERPL CREATININE-BSD FRML MDRD: 90 ML/MIN/1.73SQ M
GLUCOSE P FAST SERPL-MCNC: 120 MG/DL (ref 65–99)
HBA1C MFR BLD: 6.7 %
HDLC SERPL-MCNC: 58 MG/DL
LDLC SERPL CALC-MCNC: 71 MG/DL (ref 0–100)
POTASSIUM SERPL-SCNC: 3.9 MMOL/L (ref 3.5–5.3)
PROT SERPL-MCNC: 7.1 G/DL (ref 6.4–8.2)
SODIUM SERPL-SCNC: 140 MMOL/L (ref 136–145)
TRIGL SERPL-MCNC: 121 MG/DL

## 2021-06-28 PROCEDURE — 80053 COMPREHEN METABOLIC PANEL: CPT

## 2021-06-28 PROCEDURE — 36415 COLL VENOUS BLD VENIPUNCTURE: CPT

## 2021-06-28 PROCEDURE — 83036 HEMOGLOBIN GLYCOSYLATED A1C: CPT

## 2021-06-28 PROCEDURE — 80061 LIPID PANEL: CPT

## 2021-07-07 ENCOUNTER — OFFICE VISIT (OUTPATIENT)
Dept: INTERNAL MEDICINE CLINIC | Facility: CLINIC | Age: 66
End: 2021-07-07

## 2021-07-07 VITALS
TEMPERATURE: 97.9 F | HEART RATE: 75 BPM | SYSTOLIC BLOOD PRESSURE: 104 MMHG | DIASTOLIC BLOOD PRESSURE: 73 MMHG | BODY MASS INDEX: 27.47 KG/M2 | WEIGHT: 175 LBS | HEIGHT: 67 IN

## 2021-07-07 DIAGNOSIS — E78.2 MIXED HYPERLIPIDEMIA: ICD-10-CM

## 2021-07-07 DIAGNOSIS — Z95.1 HX OF CABG: ICD-10-CM

## 2021-07-07 DIAGNOSIS — E66.3 OVERWEIGHT (BMI 25.0-29.9): ICD-10-CM

## 2021-07-07 DIAGNOSIS — F17.210 CIGARETTE NICOTINE DEPENDENCE WITHOUT COMPLICATION: ICD-10-CM

## 2021-07-07 DIAGNOSIS — E11.9 CONTROLLED TYPE 2 DIABETES MELLITUS WITHOUT COMPLICATION, WITHOUT LONG-TERM CURRENT USE OF INSULIN (HCC): Primary | ICD-10-CM

## 2021-07-07 PROBLEM — I10 BENIGN HYPERTENSION: Status: RESOLVED | Noted: 2018-01-18 | Resolved: 2021-07-07

## 2021-07-07 PROCEDURE — 99213 OFFICE O/P EST LOW 20 MIN: CPT | Performed by: PHYSICIAN ASSISTANT

## 2021-07-07 NOTE — PROGRESS NOTES
Assessment/Plan: On your visit today we reviewed your labs  Diabetes remains well controlled continue your metformin daily  Referral placed today for the diabetic eye specialist   They will contact you to schedule the appointment  Cholesterol also well controlled continue your atorvastatin and for protection with known history of coronary artery disease continue your aspirin and your omega-3 as well  Continue follow-up with cardiology as scheduled  Script provided today for follow-up labs as dated in 6 months  As per discussion you are waiting until you turn 66 next month to be able to get Medicare and will then get you scheduled for a Medicare visit and be able to order your updated screening as you will have insurance to cover the cost     As per our discussion you can schedule to speak with the financial counselors to help assist you in this process  We discussed that you have significantly reduced your smoking but still 3-4 per week and encourage you to not smoke at all  You have received your COVID vaccine series  Colonoscopy due 2029  No problem-specific Assessment & Plan notes found for this encounter  Diagnoses and all orders for this visit:    Controlled type 2 diabetes mellitus without complication, without long-term current use of insulin ([facility] [address] )  -     Ambulatory referral to Ophthalmology; Future  -     Comprehensive metabolic panel; Future  -     Hemoglobin A1C; Future    Mixed hyperlipidemia  -     Lipid Panel with Direct LDL reflex; Future    Hx of CABG    Cigarette nicotine dependence without complication    Overweight (BMI 25 0-29  9)          Subjective:      Patient ID: Gregory Ferreira is a 60 y o  male  Patient presents for routine six-month follow-up and lab review for chronic medical conditions  Patient followed for type 2 diabetes and hyperlipidemia  Labs reviewed show A1c remains well controlled unchanged at 6 7%      Patient is currently on metformin 1 tablet daily  Lipid profile also well controlled on his rosuvastatin  Patient also follows with cardiology for known coronary artery disease  Patient last saw cardiologist March 2021  No new or acute symptomology  No adjustments to medications were indicated patient to continue his aspirin and rosuvastatin and to follow-up in 9-12 months  Patient states overall feeling well     States has significantly reduced smoking states maybe 3-4 a week  State no coughing no SOB    Patient currently still does not have any insurance  He reports he is waiting until he turns 73 which will actually be next month  Once he has Medicare patient then would be more likely agreeable to the lung CT scan that he is declining at this time because he cannot afford to pay the cost       Patient also deferring hepatitis-C and HIV screening tests at this time but will reconsider once he has insurance coverage  The following portions of the patient's history were reviewed and updated as appropriate: allergies, current medications, past family history, past medical history, past social history, past surgical history and problem list     Review of Systems   Constitutional: Negative  Negative for chills and fever  Eyes: Negative  Glasses   Respiratory: Negative  Negative for cough, chest tightness and shortness of breath  Cardiovascular: Negative for chest pain and palpitations  Gastrointestinal: Negative  Negative for abdominal pain  Endocrine: Negative  Negative for polydipsia, polyphagia and polyuria  Musculoskeletal: Positive for arthralgias (states known OA sometimes Kees bad other days OK)  Skin: Negative  Neurological: Negative  Psychiatric/Behavioral: Negative            Objective:      /73 (BP Location: Left arm, Patient Position: Sitting, Cuff Size: Adult)   Pulse 75   Temp 97 9 °F (36 6 °C) (Temporal)   Ht 5' 7" (1 702 m)   Wt 79 4 kg (175 lb)   BMI 27 41 kg/m²          Physical Exam  Vitals and nursing note reviewed  Constitutional:       General: He is not in acute distress  HENT:      Head: Normocephalic  Eyes:      Conjunctiva/sclera: Conjunctivae normal       Comments: Vision decreased significantly on L secondary to injury on L as child  Cardiovascular:      Rate and Rhythm: Normal rate and regular rhythm  Heart sounds: Normal heart sounds  Pulmonary:      Effort: Pulmonary effort is normal       Breath sounds: Normal breath sounds  Abdominal:      General: Bowel sounds are normal    Musculoskeletal:         General: Normal range of motion  Neurological:      General: No focal deficit present  Mental Status: He is alert  Psychiatric:         Mood and Affect: Mood normal          Thought Content: Thought content normal          BMI Counseling: Body mass index is 27 41 kg/m²  The BMI is above normal  Nutrition recommendations include reducing portion sizes, 3-5 servings of fruits/vegetables daily, decreasing soda and/or juice intake, increasing intake of lean protein, reducing intake of saturated fat and trans fat and reducing intake of cholesterol  Exercise recommendations include vigorous aerobic physical activity for 75 minutes/week

## 2021-07-07 NOTE — PATIENT INSTRUCTIONS
On your visit today we reviewed your labs  Diabetes remains well controlled continue your metformin daily  Referral placed today for the diabetic eye specialist   They will contact you to schedule the appointment  Cholesterol also well controlled continue your atorvastatin and for protection with known history of coronary artery disease continue your aspirin and your omega-3 as well  Continue follow-up with cardiology as scheduled  Script provided today for follow-up labs as dated in 6 months  As per discussion you are waiting until you turn 66 next month to be able to get Medicare and will then get you scheduled for a Medicare visit and be able to order your updated screening as you will have insurance to cover the cost     As per our discussion you can schedule to speak with the financial counselors to help assist you in this process  We discussed that you have significantly reduced your smoking but still 3-4 per week and encourage you to not smoke at all  You have received your COVID vaccine series    Colonoscopy due 2029

## 2021-12-01 DIAGNOSIS — E78.5 HYPERLIPIDEMIA, UNSPECIFIED HYPERLIPIDEMIA TYPE: ICD-10-CM

## 2021-12-01 DIAGNOSIS — E11.65 TYPE 2 DIABETES MELLITUS WITH HYPERGLYCEMIA, WITHOUT LONG-TERM CURRENT USE OF INSULIN (HCC): ICD-10-CM

## 2021-12-01 DIAGNOSIS — I25.10 CORONARY ARTERY DISEASE INVOLVING NATIVE CORONARY ARTERY OF NATIVE HEART WITHOUT ANGINA PECTORIS: ICD-10-CM

## 2021-12-01 RX ORDER — ROSUVASTATIN CALCIUM 40 MG/1
40 TABLET, COATED ORAL DAILY
Qty: 90 TABLET | Refills: 1 | Status: SHIPPED | OUTPATIENT
Start: 2021-12-01 | End: 2022-05-11

## 2022-01-13 ENCOUNTER — APPOINTMENT (OUTPATIENT)
Dept: LAB | Facility: CLINIC | Age: 67
End: 2022-01-13

## 2022-01-13 DIAGNOSIS — E11.9 CONTROLLED TYPE 2 DIABETES MELLITUS WITHOUT COMPLICATION, WITHOUT LONG-TERM CURRENT USE OF INSULIN (HCC): ICD-10-CM

## 2022-01-13 DIAGNOSIS — E78.2 MIXED HYPERLIPIDEMIA: ICD-10-CM

## 2022-01-13 LAB
ALBUMIN SERPL BCP-MCNC: 4.1 G/DL (ref 3.5–5)
ALP SERPL-CCNC: 56 U/L (ref 46–116)
ALT SERPL W P-5'-P-CCNC: 23 U/L (ref 12–78)
ANION GAP SERPL CALCULATED.3IONS-SCNC: 4 MMOL/L (ref 4–13)
AST SERPL W P-5'-P-CCNC: 12 U/L (ref 5–45)
BILIRUB SERPL-MCNC: 0.3 MG/DL (ref 0.2–1)
BUN SERPL-MCNC: 18 MG/DL (ref 5–25)
CALCIUM SERPL-MCNC: 9.6 MG/DL (ref 8.3–10.1)
CHLORIDE SERPL-SCNC: 111 MMOL/L (ref 100–108)
CHOLEST SERPL-MCNC: 174 MG/DL
CO2 SERPL-SCNC: 26 MMOL/L (ref 21–32)
CREAT SERPL-MCNC: 0.87 MG/DL (ref 0.6–1.3)
EST. AVERAGE GLUCOSE BLD GHB EST-MCNC: 143 MG/DL
GFR SERPL CREATININE-BSD FRML MDRD: 90 ML/MIN/1.73SQ M
GLUCOSE P FAST SERPL-MCNC: 143 MG/DL (ref 65–99)
HBA1C MFR BLD: 6.6 %
HDLC SERPL-MCNC: 75 MG/DL
LDLC SERPL CALC-MCNC: 86 MG/DL (ref 0–100)
POTASSIUM SERPL-SCNC: 4.1 MMOL/L (ref 3.5–5.3)
PROT SERPL-MCNC: 7.8 G/DL (ref 6.4–8.2)
SODIUM SERPL-SCNC: 141 MMOL/L (ref 136–145)
TRIGL SERPL-MCNC: 64 MG/DL

## 2022-01-13 PROCEDURE — 80061 LIPID PANEL: CPT

## 2022-01-13 PROCEDURE — 83036 HEMOGLOBIN GLYCOSYLATED A1C: CPT

## 2022-01-13 PROCEDURE — 80053 COMPREHEN METABOLIC PANEL: CPT

## 2022-01-13 PROCEDURE — 36415 COLL VENOUS BLD VENIPUNCTURE: CPT

## 2022-01-17 ENCOUNTER — OFFICE VISIT (OUTPATIENT)
Dept: INTERNAL MEDICINE CLINIC | Facility: CLINIC | Age: 67
End: 2022-01-17

## 2022-01-17 VITALS
WEIGHT: 175 LBS | HEART RATE: 91 BPM | BODY MASS INDEX: 27.47 KG/M2 | TEMPERATURE: 97.8 F | SYSTOLIC BLOOD PRESSURE: 140 MMHG | DIASTOLIC BLOOD PRESSURE: 88 MMHG | HEIGHT: 67 IN

## 2022-01-17 DIAGNOSIS — F02.80 DEMENTIA ASSOCIATED WITH OTHER UNDERLYING DISEASE WITHOUT BEHAVIORAL DISTURBANCE (HCC): ICD-10-CM

## 2022-01-17 DIAGNOSIS — E11.9 CONTROLLED TYPE 2 DIABETES MELLITUS WITHOUT COMPLICATION, WITHOUT LONG-TERM CURRENT USE OF INSULIN (HCC): Primary | ICD-10-CM

## 2022-01-17 DIAGNOSIS — E78.2 MIXED HYPERLIPIDEMIA: ICD-10-CM

## 2022-01-17 PROBLEM — E11.65 TYPE 2 DIABETES MELLITUS WITH HYPERGLYCEMIA, WITHOUT LONG-TERM CURRENT USE OF INSULIN (HCC): Status: RESOLVED | Noted: 2017-12-29 | Resolved: 2022-01-17

## 2022-01-17 LAB
LEFT EYE DIABETIC RETINOPATHY: NORMAL
LEFT EYE IMAGE QUALITY: NORMAL
LEFT EYE MACULAR EDEMA: NORMAL
LEFT EYE OTHER RETINOPATHY: NORMAL
RIGHT EYE DIABETIC RETINOPATHY: NORMAL
RIGHT EYE IMAGE QUALITY: NORMAL
RIGHT EYE MACULAR EDEMA: NORMAL
RIGHT EYE OTHER RETINOPATHY: NORMAL
SEVERITY (EYE EXAM): NORMAL

## 2022-01-17 PROCEDURE — 99213 OFFICE O/P EST LOW 20 MIN: CPT | Performed by: PHYSICIAN ASSISTANT

## 2022-01-17 NOTE — PROGRESS NOTES
Assessment/Plan:      Diagnoses and all orders for this visit:    Controlled type 2 diabetes mellitus without complication, without long-term current use of insulin (HCC)  -     CBC and differential; Future  -     Comprehensive metabolic panel; Future  -     Hemoglobin A1C; Future  -     Microalbumin / creatinine urine ratio; Future  -     IRIS Diabetic eye exam    Mixed hyperlipidemia  -     CBC and differential; Future  -     Lipid panel; Future    Dementia associated with other underlying disease without behavioral disturbance [facility])      45-year-old male presenting today for routine follow-up to his chronic conditions at the recommendation of his former PCP who has since left practice  Overall patient reports he is doing very well and has no specific complaints or concerns today  Blood work results reviewed with patient as follows:    GFR 90, Cr 0 87,   A1C 6 6  total 174, HDL 75, LDL 86 (previous 71)    Patient's diabetes remains stable and he will continue the metformin 500 mg once a day  Would appreciate LDL below 70 with his diabetes and previously was 71, and currently 86  Otherwise his cholesterol levels look good  He will continue rosuvastatin 40 mg daily and recommended he take the fish well twice a day  Also encouraged low-fat/low-cholesterol diet and would recommend monitoring  He will also continue aspirin 81 mg daily  I reviewed patient's chart and appears he saw [facility] physician group geriatrics in November  I reviewed the note and it appears he scored very low 8/30 on the [address] status testing and has been diagnosed with dementia  When I question patient about this he states that appointment was made by his sister and family as they want his money    Patient does express that he plans on moving to [place] sometime this year after the winter months to live with 1 of his son, Johnie Fears it and it does appear that information regarding the visit was Pharmacy email for refill  Last refilled 10/26/20  Last seen 7/2/19  Future appointment scheduled 12/28/20  1 refill sent to get patient to appointment. sent by the geriatric doctor to his son regarding their concerns  Patient does not feel he has cognitive issues and it is difficult to tell as I have only met this patient once  He does not seem to be an immediate threat to himself  He does not drive  I encouraged that he continue following with geriatrics and he will continue communication with his family as well  I will have patient return in 6 months for routine follow-up however I have encouraged him to follow up sooner if there are any concerns  Patient expresses understanding and agrees with plan  I did encourage him to schedule his COVID booster as he is due  IRIS eye exam performed today and unfortunately was not great double  I had did have a conversation with patient regarding issues with transportation as he would benefit from seeing ophthalmologist through [facility] for [facility] Dr Sandra Malone since he is self pay though he states [address] [place] [address] where they are located are too far  I sent in referral and will have staff contact him and we can have  get involved if needed to assist with transportation  Blood work/urine micro orders were given to patient today to get done prior to his next follow-up  Chief Complaint   Patient presents with    Follow-up     diabetes         Subjective:     Patient ID: Ravin Burleson is a 73 y o  male  65y/o male here today for general f/u at request by PCP for his T2DM and hyperlipidemia   He was seen by [facility] geriatrics for concern of confusion  Pt states his sister made appt because "they want my money"  States he doesn't want to go back  He doesn't feel he has issues  States family made appt for him because he got confused one time of where he was supposed to go  States at some point he plans on moving to [place] with son Jayna Smith this year after winter concludes  He states he feels great today and has no concerns or questions      He reports taking fish oil from [employer] once a day  He is taking crestor once a day and metformin for diabetes  States he has not had eye exam in years - states was told had cataract in right eye and is already blind in left eye he states  Appears was referred to ophthalmology last July but no appt  Review of Systems   Constitutional: Negative  Eyes:        As in HPI   Respiratory: Negative  Cardiovascular: Negative  Gastrointestinal: Negative  Genitourinary: Negative  Musculoskeletal: Negative  Skin: Negative  Neurological: Negative  Psychiatric/Behavioral:        Pt declines any cognitive declines or significant memory issues  The following portions of the patient's history were reviewed and updated as appropriate: allergies, current medications, past family history, past medical history, past social history, past surgical history and problem list       Objective:     Physical Exam  Vitals reviewed  Constitutional:       General: He is not in acute distress  Appearance: Normal appearance  He is not ill-appearing or toxic-appearing  HENT:      Head: Normocephalic and atraumatic  Neck:      Vascular: No carotid bruit  Cardiovascular:      Rate and Rhythm: Normal rate and regular rhythm  Pulses: no weak pulses          Carotid pulses are 2+ on the right side and 2+ on the left side  Radial pulses are 2+ on the right side and 2+ on the left side  Posterior tibial pulses are 2+ on the right side and 2+ on the left side  Heart sounds: Normal heart sounds  Pulmonary:      Effort: Pulmonary effort is normal       Breath sounds: Normal breath sounds  Abdominal:      General: Abdomen is flat  Bowel sounds are normal       Tenderness: There is no abdominal tenderness  Musculoskeletal:      Cervical back: Neck supple  Right lower leg: No edema  Left lower leg: No edema  Feet:      Right foot:      Skin integrity: Dry skin (plantar surface) present   No ulcer, skin breakdown, erythema, warmth or callus  Left foot:      Skin integrity: Dry skin (plantar surface) present  No ulcer, skin breakdown, erythema, warmth or callus  Lymphadenopathy:      Head:      Right side of head: No submandibular or tonsillar adenopathy  Left side of head: No submandibular or tonsillar adenopathy  Neurological:      Mental Status: He is alert and oriented to person, place, and time  Psychiatric:         Mood and Affect: Mood normal  Mood is not anxious or depressed  Speech: Speech normal          Behavior: Behavior normal  Behavior is cooperative  Vitals:    01/17/22 0821   BP: 140/88   BP Location: Right arm   Patient Position: Sitting   Cuff Size: Large   Pulse: 91   Temp: 97 8 °F (36 6 °C)   TempSrc: Temporal   Weight: 79 4 kg (175 lb)   Height: 5' 7" (1 702 m)     Patient's shoes and socks removed  Right Foot/Ankle   Right Foot Inspection  Skin Exam: dry skin (plantar surface)  No warmth, no callus, no erythema, no maceration, no abnormal color, no pre-ulcer, no ulcer and no callus  Toe Exam: ROM and strength within normal limits  Sensory   Proprioception: intact  Monofilament testing: intact    Vascular  Capillary refills: < 3 seconds  The right PT pulse is 2+  Left Foot/Ankle  Left Foot Inspection  Skin Exam: dry skin (plantar surface)  No warmth, no erythema, no maceration, normal color, no pre-ulcer, no ulcer and no callus  Toe Exam: ROM and strength within normal limits  Sensory   Proprioception: intact  Monofilament testing: intact    Vascular  Capillary refills: < 3 seconds  The left PT pulse is 2+       Assign Risk Category  No deformity present  No loss of protective sensation  No weak pulses  Risk: 0

## 2022-03-01 ENCOUNTER — OFFICE VISIT (OUTPATIENT)
Dept: CARDIOLOGY CLINIC | Facility: CLINIC | Age: 67
End: 2022-03-01

## 2022-03-01 VITALS
BODY MASS INDEX: 28.36 KG/M2 | HEIGHT: 67 IN | OXYGEN SATURATION: 97 % | WEIGHT: 180.7 LBS | DIASTOLIC BLOOD PRESSURE: 60 MMHG | SYSTOLIC BLOOD PRESSURE: 108 MMHG | HEART RATE: 94 BPM

## 2022-03-01 DIAGNOSIS — I25.10 CORONARY ARTERY DISEASE INVOLVING NATIVE CORONARY ARTERY OF NATIVE HEART WITHOUT ANGINA PECTORIS: ICD-10-CM

## 2022-03-01 DIAGNOSIS — E78.00 PURE HYPERCHOLESTEROLEMIA: Primary | ICD-10-CM

## 2022-03-01 DIAGNOSIS — Z95.1 HX OF CABG: ICD-10-CM

## 2022-03-01 PROCEDURE — 99214 OFFICE O/P EST MOD 30 MIN: CPT | Performed by: INTERNAL MEDICINE

## 2022-03-01 NOTE — PROGRESS NOTES
Cardiology Follow Up    Angela Mcgill  1955  [id number]  [facility]  [address] [address] [phone]    No diagnosis found  There are no diagnoses linked to this encounter  I had the pleasure of seeing Angela Mcgill for a follow up visit  INTERVAL HISTORY: none, hasn't been here since 2018    History of the presenting illness, Discussion/Summary and My Plan are as follows:::    He is a very pleasant 62-year-old  gentleman with a history of dyslipidemia, coronary artery disease, prior percutaneous coronary interventions (last in 2005) and coronary artery bypass grafting X 3, in November 2012  We did not have any records regarding his bypass or coronary anatomy from [place]  At baseline walking and doing light weights at home, no symptoms  Has had some renal calculi issues and needed a stent and has not exercised recently  He quit smoking  He also had erectile dysfunction       Physical exam is unremarkable  His ECGs have shown right bundle branch block      Plan:     Coronary artery disease status post CABG: Continue aspirin, rosuvastatin 40  Does not have HTN  Increased activity level  Last stress test in 2011 without ischemia and normal function  Physically quite active without symptoms      Dyslipidemia: continue Crestor 40 LDL was 74 (Atorva was suboptimal) but now rising again  Will simply recheck     Decreased left carotid pulse: < 50% BL disease on dopplers - Dec 2015      Erectile dysfunction: stable      Tobacco use:  quit completely     DM: a1c of 6 6 - Jan 2022     Follow up in 9 months  [place] moving [place], will find an MD there if he moves  Results for Sam Rockari (MRN [id number]) as of 3/1/2022 08:49   Ref   Range 1/7/2021 08:24 6/28/2021 08:50 1/13/2022 09:13   Cholesterol Latest Ref Range: See Comment mg/dL 160 153 174 Triglycerides Latest Ref Range: See Comment mg/dL 79 121 64   HDL Latest Ref Range: >=40 mg/dL 70 58 75   LDL Calculated Latest Ref Range: 0 - 100 mg/dL 74 71 86     Results for Renee Poag (MRN [id number]) as of 3/1/2022 08:49   Ref  Range 1/7/2021 08:24 6/28/2021 08:50 1/13/2022 09:13   Hemoglobin A1C  6 7 (H) 6 7 (H) 6 6 (H)         Patient Active Problem List   Diagnosis    Screening for prostate cancer    Kidney stone    Arteriosclerosis of coronary artery    Hx of CABG    Coronary artery disease involving native coronary artery of native heart without angina pectoris    Hematuria    Hyperlipidemia    Microalbuminuria    Onychomycosis    Weak carotid pulse    Cigarette nicotine dependence without complication    Erectile dysfunction    Pain in both hands    Localized osteoarthritis of hands, bilateral    Primary osteoarthritis of left knee    Chronic pain of left knee    Primary osteoarthritis of right knee    Carpal tunnel syndrome, bilateral    Blind left eye    Chronic left shoulder pain    Controlled type 2 diabetes mellitus without complication, without long-term current use of insulin (HCC)    Overweight (BMI 25 0-29  9)    Dementia associated with other underlying disease without behavioral disturbance ([address] )     Past Medical History:   Diagnosis Date    Arthritis     Benign hypertension 1/18/2018    Carpal tunnel syndrome     Coronary artery disease     Costochondritis     RESOLVED: [id number]    Diabetes mellitus (HCC)     Hematuria     HTN (hypertension)     Hyperlipidemia     Kidney stone     Myocardial infarction ([address] )     Weak carotid pulse      Social History     Socioeconomic History    Marital status:      Spouse name: Not on file    Number of children: Not on file    Years of education: Not on file    Highest education level: Not on file   Occupational History    Occupation: laboror     Comment:    Tobacco Use    Smoking status: Former Smoker     Packs/day: 1 00     Years: 44 00     Pack years: 44 00    Smokeless tobacco: Never Used    Tobacco comment: up to 1ppd since age 15y/o; FORMER SMOKER AS PER ALL SCRIPTS    Vaping Use    Vaping Use: Never used   Substance and Sexual Activity    Alcohol use: Not Currently     Comment: quit 23 years ago; SOCIAL DRINKER AS PER ALL SCRIPTS     Drug use: Never    Sexual activity: Not Currently   Other Topics Concern    Not on file   Social History Narrative    Not on file     Social Determinants of Health     Financial Resource Strain: Not on file   Food Insecurity: Not on file   Transportation Needs: Not on file   Physical Activity: Not on file   Stress: Not on file   Social Connections: Not on file   Intimate Partner Violence: Not on file   Housing Stability: Not on file      Family History   Problem Relation Age of Onset    Diabetes Sister         TYPE 2    Diabetes Mother     Heart disease Mother     Diabetes Brother     Diabetes Sister         TYPE 2    Heart disease Sister     Heart disease Sister     Coronary artery disease Maternal Uncle     Diabetes Maternal Uncle      Past Surgical History:   Procedure Laterality Date    CATARACT EXTRACTION Left     COLONOSCOPY      CORONARY ANGIOPLASTY WITH STENT PLACEMENT  06/29/2005    RCA     CORONARY ANGIOPLASTY WITH STENT PLACEMENT  07/05/2005    Cx    CORONARY ARTERY BYPASS GRAFT      LAST ASSESSED: [id number]    LITHOTRIPSY      RENAL    MOUTH SURGERY      ID COLONOSCOPY FLX DX W/COLLJ SPEC WHEN PFRMD N/A 2/22/2019    Procedure: COLONOSCOPY;  Surgeon: Lainey Shelton MD;  [facility]: [facility];   Service: Gastroenterology    ID CYSTO/URETERO W/LITHOTRIPSY &INDWELL STENT INSRT Left 3/24/2017    Procedure: CYSTOSCOPY, URETEROSCOPY, HOLMIUM LASER LITHOTRIPSY, BASKET STONE EXTRACTION, RETROGRADE PYELOGRAM, STENT INSERTION;  Surgeon: Bossman Shankar MD;  Location: [facility] OR;  Service: Urology    ID CYSTO/URETERO [address] [place] [address] &INDWELL STENT INSRT Left 7/28/2017    Procedure: CYSTOSCOPY, URETEROSCOPY WITH HOLMIUM LASER LITHOTRIPSY, AND STENT EXCHANGE;  Surgeon: Chavez Wilder MD;  [facility];  Service: Urology    GA FRAGMENT KIDNEY STONE/ ESWL Left 9/15/2017    Procedure: Zan Stiles SHOCKWAVE (ESWL); Surgeon: Chavez Wilder MD;  [facility];  Service: Urology    ROTATOR CUFF REPAIR Right     SHOULDER ARTHROSCOPY Left     TOOTH EXTRACTION         Current Outpatient Medications:     aspirin 81 MG tablet, Take 81 mg by mouth daily at bedtime , Disp: , Rfl:     metFORMIN (GLUCOPHAGE) 500 mg tablet, Take 1 tablet (500 mg total) by mouth daily with breakfast, Disp: 90 tablet, Rfl: 1    omega-3-acid ethyl esters (LOVAZA) 1 g capsule, Take 100 mg by mouth 2 (two) times a day, Disp: , Rfl:     rosuvastatin (CRESTOR) 40 MG tablet, Take 1 tablet (40 mg total) by mouth daily, Disp: 90 tablet, Rfl: 1  Allergies   Allergen Reactions    Morphine GI Intolerance     vomiting       Imaging: No results found  Review of Systems:  Review of Systems   Constitutional: Negative  HENT: Negative  Eyes: Negative  Respiratory: Negative  Cardiovascular: Negative  Musculoskeletal:        Left shoulder pain - labral tear   Allergic/Immunologic: Negative  Neurological: Negative  Physical Exam:  /60 (BP Location: Left arm, Patient Position: Sitting, Cuff Size: Standard)   Pulse 94   Ht 5' 7" (1 702 m)   Wt 82 kg (180 lb 11 2 oz)   SpO2 97%   BMI 28 30 kg/m²   Physical Exam  Constitutional:       General: He is not in acute distress  Appearance: Normal appearance  He is not ill-appearing  HENT:      Head: Normocephalic  Nose: Nose normal  No congestion or rhinorrhea  Mouth/Throat:      Mouth: Mucous membranes are moist       Pharynx: No oropharyngeal exudate or posterior oropharyngeal erythema  Eyes:      General: No scleral icterus  Right eye: No discharge           Left eye: No discharge  Pupils: Pupils are equal, round, and reactive to light  Neck:      Vascular: No carotid bruit  Cardiovascular:      Rate and Rhythm: Normal rate  Heart sounds: No murmur heard  No friction rub  Pulmonary:      Effort: No respiratory distress  Breath sounds: No stridor  No wheezing or rhonchi  Abdominal:      General: Bowel sounds are normal  There is no distension  Palpations: There is no mass  Tenderness: There is no abdominal tenderness  Hernia: No hernia is present  Musculoskeletal:         General: No swelling, tenderness, deformity or signs of injury  Normal range of motion  Cervical back: Normal range of motion  No rigidity  No muscular tenderness  Lymphadenopathy:      Cervical: No cervical adenopathy  Neurological:      Mental Status: He is alert  This note was completed in part utilizing [employer] direct voice recognition software  Grammatical errors, random word insertion, spelling mistakes, occasional wrong word or "sound-alike" substitutions and incomplete sentences may be an occasional consequence of the system secondary to software limitations, ambient noise and hardware issues  At the time of dictation, efforts were made to edit, clarify and /or correct errors  Please read the chart carefully and recognize, using context, where substitutions have occurred  If you have any questions or concerns about the context, text or information contained within the body of this dictation, please contact myself, the provider, for further clarification

## 2022-06-14 ENCOUNTER — TELEPHONE (OUTPATIENT)
Dept: FAMILY MEDICINE CLINIC | Facility: CLINIC | Age: 67
End: 2022-06-14

## 2022-08-31 NOTE — TELEPHONE ENCOUNTER
Patient stop in the office he would like Nicotine Patch he signed up for assistance care  He is currently unemployed & unable to pay for them  They are going to fax a form for you to fill out & fax back      I explained we would give the form for you to sign  Delivery

## 2022-09-20 ENCOUNTER — OFFICE VISIT (OUTPATIENT)
Dept: INTERNAL MEDICINE CLINIC | Facility: CLINIC | Age: 67
End: 2022-09-20

## 2022-09-20 ENCOUNTER — TELEPHONE (OUTPATIENT)
Dept: INTERNAL MEDICINE CLINIC | Facility: CLINIC | Age: 67
End: 2022-09-20

## 2022-09-20 VITALS
SYSTOLIC BLOOD PRESSURE: 127 MMHG | HEART RATE: 69 BPM | HEIGHT: 67 IN | OXYGEN SATURATION: 98 % | DIASTOLIC BLOOD PRESSURE: 69 MMHG | BODY MASS INDEX: 29.54 KG/M2 | TEMPERATURE: 98.3 F | WEIGHT: 188.2 LBS

## 2022-09-20 DIAGNOSIS — E78.2 MIXED HYPERLIPIDEMIA: ICD-10-CM

## 2022-09-20 DIAGNOSIS — Z11.59 ENCOUNTER FOR HEPATITIS C SCREENING TEST FOR LOW RISK PATIENT: ICD-10-CM

## 2022-09-20 DIAGNOSIS — Z23 ENCOUNTER FOR IMMUNIZATION: ICD-10-CM

## 2022-09-20 DIAGNOSIS — E11.9 TYPE 2 DIABETES MELLITUS WITHOUT COMPLICATION, WITHOUT LONG-TERM CURRENT USE OF INSULIN (HCC): Primary | ICD-10-CM

## 2022-09-20 PROBLEM — Z12.5 SCREENING FOR PROSTATE CANCER: Status: RESOLVED | Noted: 2018-04-25 | Resolved: 2022-09-20

## 2022-09-20 LAB — SL AMB POCT HEMOGLOBIN AIC: 6.8 (ref ?–6.5)

## 2022-09-20 PROCEDURE — 99214 OFFICE O/P EST MOD 30 MIN: CPT | Performed by: PHYSICIAN ASSISTANT

## 2022-09-20 PROCEDURE — 90471 IMMUNIZATION ADMIN: CPT

## 2022-09-20 PROCEDURE — 83036 HEMOGLOBIN GLYCOSYLATED A1C: CPT | Performed by: PHYSICIAN ASSISTANT

## 2022-09-20 PROCEDURE — 90677 PCV20 VACCINE IM: CPT

## 2022-09-20 NOTE — PROGRESS NOTES
Name: Ted Akers      : 1955      MRN: [id number]  Encounter Provider: Sergei Champion PA-C  Encounter Date: 2022   Encounter department: [address] [place] [address]    Assessment & Plan     1  Type 2 diabetes mellitus without complication, without long-term current use of insulin ([facility] )  -     Ambulatory Referral to Ophthalmology; Future  -     POCT hemoglobin A1c  -     CBC and differential; Future  -     Comprehensive metabolic panel; Future  -     Microalbumin / creatinine urine ratio    2  Mixed hyperlipidemia  Assessment & Plan:  Controlled on meds  Will recheck lipid panel  Continue Crestor 40 mg daily  Low fat diet  3  Body mass index (BMI) 29 0-29 9, adult  Assessment & Plan:  See BMI counseling  4  Encounter for immunization  -     Pneumococcal Conjugate Vaccine 20-valent (Pcv20)    5  Encounter for hepatitis C screening test for low risk patient  -     Hepatitis C antibody; Future    BMI Counseling: Body mass index is 29 48 kg/m²  The BMI is above normal  Nutrition recommendations include decreasing portion sizes, encouraging healthy choices of fruits and vegetables, decreasing fast food intake, consuming healthier snacks, limiting drinks that contain sugar, moderation in carbohydrate intake, increasing intake of lean protein, reducing intake of saturated and trans fat and reducing intake of cholesterol  Exercise recommendations include moderate physical activity 150 minutes/week, exercising 3-5 times per week and strength training exercises  No pharmacotherapy was ordered  Rationale for BMI follow-up plan is due to patient being overweight or obese  Depression Screening and Follow-up Plan: Patient was screened for depression during today's encounter  They screened negative with a PHQ-2 score of 0  Subjective      Patient is a 78year old male with a PMH of T2DM, CAD, and HLD presenting for follow up  He offers no complaints at this time     Has been a T2DM for approximately 7 years  He does not check his sugar  Denies any hypoglycemic or hyperglycemic events  Denies polyuria, polyphagia, and polydipsia  Denies changes in appetite or weight  Denies visual changes  He is blind in his left eye  Denies numbness, tingling, or weakness  States he has no difficulty with memory or cognition  His family had him diagnosed with dementia  Patient adamantly denies any issues  Review of Systems   Constitutional: Negative for activity change, appetite change, chills, diaphoresis, fatigue, fever and unexpected weight change  HENT: Negative for congestion, hearing loss, sore throat, tinnitus and trouble swallowing  Eyes: Negative for photophobia and visual disturbance  Respiratory: Negative for cough, chest tightness, shortness of breath and wheezing  Cardiovascular: Negative for chest pain, palpitations and leg swelling  Gastrointestinal: Negative for abdominal pain, blood in stool, constipation, diarrhea, nausea and vomiting  Endocrine: Negative for cold intolerance, heat intolerance, polydipsia, polyphagia and polyuria  Genitourinary: Negative for decreased urine volume, dysuria, frequency, hematuria and urgency  Musculoskeletal: Negative for arthralgias, back pain, gait problem, joint swelling and myalgias  Skin: Negative for color change, pallor, rash and wound  Neurological: Negative for dizziness, tremors, seizures, syncope, weakness, light-headedness, numbness and headaches  Hematological: Negative for adenopathy  Psychiatric/Behavioral: Negative for agitation, behavioral problems, confusion, dysphoric mood, self-injury, sleep disturbance and suicidal ideas  The patient is not nervous/anxious          Current Outpatient Medications on File Prior to Visit   Medication Sig    aspirin 81 MG tablet Take 81 mg by mouth daily at bedtime     metFORMIN (GLUCOPHAGE) 500 mg tablet TAKE ONE TABLET BY MOUTH EVERY DAY WITH BREAKFAST    omega-3-acid ethyl esters (LOVAZA) 1 g capsule Take 100 mg by mouth 2 (two) times a day    rosuvastatin (CRESTOR) 40 MG tablet TAKE ONE TABLET BY MOUTH EVERY DAY       Objective     /69 (BP Location: Left arm, Patient Position: Sitting, Cuff Size: Standard)   Pulse 69   Temp 98 3 °F (36 8 °C) (Temporal)   Ht 5' 7" (1 702 m)   Wt 85 4 kg (188 lb 3 2 oz)   SpO2 98%   BMI 29 48 kg/m²     Physical Exam  Vitals and nursing note reviewed  Constitutional:       General: He is not in acute distress  Appearance: Normal appearance  He is well-developed, well-groomed and overweight  He is not ill-appearing  HENT:      Head: Normocephalic and atraumatic  Eyes:      General: No scleral icterus  Conjunctiva/sclera: Conjunctivae normal    Cardiovascular:      Rate and Rhythm: Normal rate and regular rhythm  Pulses: Normal pulses  Heart sounds: Normal heart sounds  No murmur heard  Pulmonary:      Effort: Pulmonary effort is normal  No respiratory distress  Breath sounds: Normal breath sounds and air entry  No decreased air movement  No decreased breath sounds, wheezing, rhonchi or rales  Abdominal:      General: Abdomen is flat  Bowel sounds are normal  There is no distension  Palpations: Abdomen is soft  There is no mass  Tenderness: There is no abdominal tenderness  There is no right CVA tenderness, left CVA tenderness, guarding or rebound  Hernia: No hernia is present  Musculoskeletal:         General: Normal range of motion  Cervical back: Normal range of motion and neck supple  Right lower leg: No edema  Left lower leg: No edema  Lymphadenopathy:      Cervical: No cervical adenopathy  Skin:     General: Skin is warm  Capillary Refill: Capillary refill takes less than 2 seconds  Coloration: Skin is not jaundiced  Findings: No rash  Neurological:      General: No focal deficit present        Mental Status: He is alert and oriented to person, place, and time  Mental status is at baseline  Sensory: Sensation is intact  Motor: Motor function is intact  Coordination: Coordination is intact  Gait: Gait is intact  Psychiatric:         Attention and Perception: Attention and perception normal          Mood and Affect: Mood and affect normal          Speech: Speech normal          Behavior: Behavior normal  Behavior is cooperative  Thought Content:  Thought content normal          Cognition and Memory: Cognition and memory normal          Judgment: Judgment normal        Jihan Fay PA-C

## 2022-09-22 ENCOUNTER — APPOINTMENT (OUTPATIENT)
Dept: LAB | Facility: CLINIC | Age: 67
End: 2022-09-22

## 2022-09-22 DIAGNOSIS — E11.9 TYPE 2 DIABETES MELLITUS WITHOUT COMPLICATION, WITHOUT LONG-TERM CURRENT USE OF INSULIN (HCC): ICD-10-CM

## 2022-09-22 DIAGNOSIS — E78.00 PURE HYPERCHOLESTEROLEMIA: ICD-10-CM

## 2022-09-22 DIAGNOSIS — D50.8 OTHER IRON DEFICIENCY ANEMIA: ICD-10-CM

## 2022-09-22 DIAGNOSIS — E11.9 CONTROLLED TYPE 2 DIABETES MELLITUS WITHOUT COMPLICATION, WITHOUT LONG-TERM CURRENT USE OF INSULIN (HCC): ICD-10-CM

## 2022-09-22 DIAGNOSIS — Z11.59 ENCOUNTER FOR HEPATITIS C SCREENING TEST FOR LOW RISK PATIENT: ICD-10-CM

## 2022-09-22 DIAGNOSIS — E78.2 MIXED HYPERLIPIDEMIA: ICD-10-CM

## 2022-09-22 LAB
ALBUMIN SERPL BCP-MCNC: 3.9 G/DL (ref 3.5–5)
ALP SERPL-CCNC: 58 U/L (ref 46–116)
ALT SERPL W P-5'-P-CCNC: 27 U/L (ref 12–78)
ANION GAP SERPL CALCULATED.3IONS-SCNC: 7 MMOL/L (ref 4–13)
AST SERPL W P-5'-P-CCNC: 18 U/L (ref 5–45)
BASOPHILS # BLD AUTO: 0.08 THOUSANDS/ΜL (ref 0–0.1)
BASOPHILS NFR BLD AUTO: 1 % (ref 0–1)
BILIRUB SERPL-MCNC: 0.63 MG/DL (ref 0.2–1)
BUN SERPL-MCNC: 16 MG/DL (ref 5–25)
CALCIUM SERPL-MCNC: 9.3 MG/DL (ref 8.3–10.1)
CHLORIDE SERPL-SCNC: 108 MMOL/L (ref 96–108)
CHOLEST SERPL-MCNC: 149 MG/DL
CO2 SERPL-SCNC: 24 MMOL/L (ref 21–32)
CREAT SERPL-MCNC: 0.97 MG/DL (ref 0.6–1.3)
CREAT UR-MCNC: 160 MG/DL
EOSINOPHIL # BLD AUTO: 0.09 THOUSAND/ΜL (ref 0–0.61)
EOSINOPHIL NFR BLD AUTO: 2 % (ref 0–6)
ERYTHROCYTE [DISTWIDTH] IN BLOOD BY AUTOMATED COUNT: 18.5 % (ref 11.6–15.1)
EST. AVERAGE GLUCOSE BLD GHB EST-MCNC: 148 MG/DL
GFR SERPL CREATININE-BSD FRML MDRD: 81 ML/MIN/1.73SQ M
GLUCOSE P FAST SERPL-MCNC: 170 MG/DL (ref 65–99)
HBA1C MFR BLD: 6.8 %
HCT VFR BLD AUTO: 28.4 % (ref 36.5–49.3)
HDLC SERPL-MCNC: 68 MG/DL
HGB BLD-MCNC: 8.1 G/DL (ref 12–17)
IMM GRANULOCYTES # BLD AUTO: 0.01 THOUSAND/UL (ref 0–0.2)
IMM GRANULOCYTES NFR BLD AUTO: 0 % (ref 0–2)
LDLC SERPL CALC-MCNC: 65 MG/DL (ref 0–100)
LYMPHOCYTES # BLD AUTO: 1.52 THOUSANDS/ΜL (ref 0.6–4.47)
LYMPHOCYTES NFR BLD AUTO: 26 % (ref 14–44)
MCH RBC QN AUTO: 21 PG (ref 26.8–34.3)
MCHC RBC AUTO-ENTMCNC: 28.5 G/DL (ref 31.4–37.4)
MCV RBC AUTO: 74 FL (ref 82–98)
MICROALBUMIN UR-MCNC: 483 MG/L (ref 0–20)
MICROALBUMIN/CREAT 24H UR: 302 MG/G CREATININE (ref 0–30)
MONOCYTES # BLD AUTO: 0.28 THOUSAND/ΜL (ref 0.17–1.22)
MONOCYTES NFR BLD AUTO: 5 % (ref 4–12)
NEUTROPHILS # BLD AUTO: 3.95 THOUSANDS/ΜL (ref 1.85–7.62)
NEUTS SEG NFR BLD AUTO: 66 % (ref 43–75)
NRBC BLD AUTO-RTO: 0 /100 WBCS
PLATELET # BLD AUTO: 294 THOUSANDS/UL (ref 149–390)
PMV BLD AUTO: 9.9 FL (ref 8.9–12.7)
POTASSIUM SERPL-SCNC: 3.9 MMOL/L (ref 3.5–5.3)
PROT SERPL-MCNC: 7.6 G/DL (ref 6.4–8.4)
RBC # BLD AUTO: 3.86 MILLION/UL (ref 3.88–5.62)
SODIUM SERPL-SCNC: 139 MMOL/L (ref 135–147)
TRIGL SERPL-MCNC: 78 MG/DL
VIT B12 SERPL-MCNC: 608 PG/ML (ref 100–900)
WBC # BLD AUTO: 5.93 THOUSAND/UL (ref 4.31–10.16)

## 2022-09-22 PROCEDURE — 80061 LIPID PANEL: CPT

## 2022-09-22 PROCEDURE — 83550 IRON BINDING TEST: CPT

## 2022-09-22 PROCEDURE — 80053 COMPREHEN METABOLIC PANEL: CPT

## 2022-09-22 PROCEDURE — 83540 ASSAY OF IRON: CPT

## 2022-09-22 PROCEDURE — 82570 ASSAY OF URINE CREATININE: CPT | Performed by: PHYSICIAN ASSISTANT

## 2022-09-22 PROCEDURE — 36415 COLL VENOUS BLD VENIPUNCTURE: CPT

## 2022-09-22 PROCEDURE — 85025 COMPLETE CBC W/AUTO DIFF WBC: CPT

## 2022-09-22 PROCEDURE — 86803 HEPATITIS C AB TEST: CPT

## 2022-09-22 PROCEDURE — 82728 ASSAY OF FERRITIN: CPT

## 2022-09-22 PROCEDURE — 83036 HEMOGLOBIN GLYCOSYLATED A1C: CPT

## 2022-09-22 PROCEDURE — 82043 UR ALBUMIN QUANTITATIVE: CPT | Performed by: PHYSICIAN ASSISTANT

## 2022-09-22 PROCEDURE — 82607 VITAMIN B-12: CPT

## 2022-09-23 ENCOUNTER — TELEPHONE (OUTPATIENT)
Dept: CARDIOLOGY CLINIC | Facility: CLINIC | Age: 67
End: 2022-09-23

## 2022-09-23 DIAGNOSIS — D50.8 OTHER IRON DEFICIENCY ANEMIA: Primary | ICD-10-CM

## 2022-09-23 PROBLEM — N20.0 KIDNEY STONE: Status: RESOLVED | Noted: 2018-04-25 | Resolved: 2022-09-23

## 2022-09-23 PROBLEM — G89.29 CHRONIC PAIN OF LEFT KNEE: Status: RESOLVED | Noted: 2019-01-15 | Resolved: 2022-09-23

## 2022-09-23 PROBLEM — F17.210 CIGARETTE NICOTINE DEPENDENCE WITHOUT COMPLICATION: Status: RESOLVED | Noted: 2018-07-25 | Resolved: 2022-09-23

## 2022-09-23 PROBLEM — E66.3 OVERWEIGHT (BMI 25.0-29.9): Status: RESOLVED | Noted: 2021-07-07 | Resolved: 2022-09-23

## 2022-09-23 PROBLEM — G89.29 CHRONIC LEFT SHOULDER PAIN: Status: RESOLVED | Noted: 2020-04-30 | Resolved: 2022-09-23

## 2022-09-23 PROBLEM — M25.562 CHRONIC PAIN OF LEFT KNEE: Status: RESOLVED | Noted: 2019-01-15 | Resolved: 2022-09-23

## 2022-09-23 PROBLEM — M79.642 PAIN IN BOTH HANDS: Status: RESOLVED | Noted: 2018-10-05 | Resolved: 2022-09-23

## 2022-09-23 PROBLEM — M79.641 PAIN IN BOTH HANDS: Status: RESOLVED | Noted: 2018-10-05 | Resolved: 2022-09-23

## 2022-09-23 PROBLEM — M25.512 CHRONIC LEFT SHOULDER PAIN: Status: RESOLVED | Noted: 2020-04-30 | Resolved: 2022-09-23

## 2022-09-23 LAB
FERRITIN SERPL-MCNC: 7 NG/ML (ref 8–388)
HCV AB SER QL: NORMAL
IRON SATN MFR SERPL: 4 % (ref 20–50)
IRON SERPL-MCNC: 26 UG/DL (ref 65–175)
TIBC SERPL-MCNC: 601 UG/DL (ref 250–450)

## 2022-09-23 RX ORDER — FERROUS SULFATE TAB EC 324 MG (65 MG FE EQUIVALENT) 324 (65 FE) MG
324 TABLET DELAYED RESPONSE ORAL
Qty: 90 TABLET | Refills: 1 | Status: SHIPPED | OUTPATIENT
Start: 2022-09-23 | End: 2023-05-01 | Stop reason: SDUPTHER

## 2022-09-23 NOTE — ASSESSMENT & PLAN NOTE
A1c in office today 6 8  Diabetes is well controlled  Continue metformin 500 mg daily as well as lifestyle changes   Follow up in 3 months for physical

## 2022-09-23 NOTE — TELEPHONE ENCOUNTER
----- Message from Jax Yao MD sent at 9/23/2022 11:04 AM EDT -----  Please let him know that his cholesterol levels are good, iron levels are low, to discuss with his primary care physician

## 2022-09-26 DIAGNOSIS — R80.9 MICROALBUMINURIA: Primary | ICD-10-CM

## 2022-09-26 RX ORDER — LISINOPRIL 2.5 MG/1
2.5 TABLET ORAL DAILY
Qty: 30 TABLET | Refills: 5 | Status: SHIPPED | OUTPATIENT
Start: 2022-09-26 | End: 2023-03-01

## 2022-12-17 ENCOUNTER — TELEPHONE (OUTPATIENT)
Dept: OTHER | Facility: OTHER | Age: 67
End: 2022-12-17

## 2022-12-17 NOTE — TELEPHONE ENCOUNTER
Patient would like to reschedule his appointment  His sister has past away and he missed a appointment

## 2023-01-20 ENCOUNTER — TELEPHONE (OUTPATIENT)
Dept: CARDIOLOGY CLINIC | Facility: CLINIC | Age: 68
End: 2023-01-20

## 2023-03-28 DIAGNOSIS — I25.10 CORONARY ARTERY DISEASE INVOLVING NATIVE CORONARY ARTERY OF NATIVE HEART WITHOUT ANGINA PECTORIS: ICD-10-CM

## 2023-03-28 DIAGNOSIS — E11.65 TYPE 2 DIABETES MELLITUS WITH HYPERGLYCEMIA, WITHOUT LONG-TERM CURRENT USE OF INSULIN (HCC): ICD-10-CM

## 2023-03-28 DIAGNOSIS — E78.5 HYPERLIPIDEMIA, UNSPECIFIED HYPERLIPIDEMIA TYPE: ICD-10-CM

## 2023-03-28 RX ORDER — ROSUVASTATIN CALCIUM 40 MG/1
TABLET, COATED ORAL
Qty: 90 TABLET | Refills: 3 | Status: SHIPPED | OUTPATIENT
Start: 2023-03-28

## 2023-05-01 ENCOUNTER — APPOINTMENT (OUTPATIENT)
Dept: LAB | Facility: CLINIC | Age: 68
End: 2023-05-01

## 2023-05-01 ENCOUNTER — OFFICE VISIT (OUTPATIENT)
Dept: INTERNAL MEDICINE CLINIC | Facility: CLINIC | Age: 68
End: 2023-05-01

## 2023-05-01 VITALS
WEIGHT: 200.6 LBS | DIASTOLIC BLOOD PRESSURE: 90 MMHG | TEMPERATURE: 97.9 F | SYSTOLIC BLOOD PRESSURE: 158 MMHG | OXYGEN SATURATION: 99 % | HEIGHT: 67 IN | BODY MASS INDEX: 31.48 KG/M2 | HEART RATE: 73 BPM

## 2023-05-01 DIAGNOSIS — E78.2 MIXED HYPERLIPIDEMIA: ICD-10-CM

## 2023-05-01 DIAGNOSIS — D53.9 NUTRITIONAL ANEMIA, UNSPECIFIED: ICD-10-CM

## 2023-05-01 DIAGNOSIS — E11.9 TYPE 2 DIABETES MELLITUS WITHOUT COMPLICATION, WITHOUT LONG-TERM CURRENT USE OF INSULIN (HCC): Primary | ICD-10-CM

## 2023-05-01 DIAGNOSIS — D50.8 IRON DEFICIENCY ANEMIA SECONDARY TO INADEQUATE DIETARY IRON INTAKE: ICD-10-CM

## 2023-05-01 DIAGNOSIS — F02.80 DEMENTIA ASSOCIATED WITH OTHER UNDERLYING DISEASE WITHOUT BEHAVIORAL DISTURBANCE (HCC): ICD-10-CM

## 2023-05-01 DIAGNOSIS — R03.0 ELEVATED BLOOD PRESSURE READING: ICD-10-CM

## 2023-05-01 DIAGNOSIS — E11.9 TYPE 2 DIABETES MELLITUS WITHOUT COMPLICATION, WITHOUT LONG-TERM CURRENT USE OF INSULIN (HCC): ICD-10-CM

## 2023-05-01 DIAGNOSIS — D50.8 OTHER IRON DEFICIENCY ANEMIA: ICD-10-CM

## 2023-05-01 LAB
ALBUMIN SERPL BCP-MCNC: 4.2 G/DL (ref 3.5–5)
ALP SERPL-CCNC: 58 U/L (ref 46–116)
ALT SERPL W P-5'-P-CCNC: 30 U/L (ref 12–78)
ANION GAP SERPL CALCULATED.3IONS-SCNC: 5 MMOL/L (ref 4–13)
AST SERPL W P-5'-P-CCNC: 23 U/L (ref 5–45)
BASOPHILS # BLD AUTO: 0.09 THOUSANDS/ΜL (ref 0–0.1)
BASOPHILS NFR BLD AUTO: 2 % (ref 0–1)
BILIRUB SERPL-MCNC: 0.6 MG/DL (ref 0.2–1)
BUN SERPL-MCNC: 15 MG/DL (ref 5–25)
CALCIUM SERPL-MCNC: 9.6 MG/DL (ref 8.3–10.1)
CHLORIDE SERPL-SCNC: 105 MMOL/L (ref 96–108)
CO2 SERPL-SCNC: 26 MMOL/L (ref 21–32)
CREAT SERPL-MCNC: 1.06 MG/DL (ref 0.6–1.3)
EOSINOPHIL # BLD AUTO: 0.03 THOUSAND/ΜL (ref 0–0.61)
EOSINOPHIL NFR BLD AUTO: 1 % (ref 0–6)
ERYTHROCYTE [DISTWIDTH] IN BLOOD BY AUTOMATED COUNT: 18.8 % (ref 11.6–15.1)
ERYTHROCYTE [SEDIMENTATION RATE] IN BLOOD: 31 MM/HOUR (ref 0–19)
FERRITIN SERPL-MCNC: 9 NG/ML (ref 8–388)
GFR SERPL CREATININE-BSD FRML MDRD: 72 ML/MIN/1.73SQ M
GLUCOSE P FAST SERPL-MCNC: 237 MG/DL (ref 65–99)
HCT VFR BLD AUTO: 32.4 % (ref 36.5–49.3)
HGB BLD-MCNC: 9.3 G/DL (ref 12–17)
IMM GRANULOCYTES # BLD AUTO: 0.02 THOUSAND/UL (ref 0–0.2)
IMM GRANULOCYTES NFR BLD AUTO: 0 % (ref 0–2)
IRON SATN MFR SERPL: 3 % (ref 20–50)
IRON SERPL-MCNC: 19 UG/DL (ref 65–175)
LYMPHOCYTES # BLD AUTO: 1.44 THOUSANDS/ΜL (ref 0.6–4.47)
LYMPHOCYTES NFR BLD AUTO: 23 % (ref 14–44)
MCH RBC QN AUTO: 21.3 PG (ref 26.8–34.3)
MCHC RBC AUTO-ENTMCNC: 28.7 G/DL (ref 31.4–37.4)
MCV RBC AUTO: 74 FL (ref 82–98)
MONOCYTES # BLD AUTO: 0.36 THOUSAND/ΜL (ref 0.17–1.22)
MONOCYTES NFR BLD AUTO: 6 % (ref 4–12)
NEUTROPHILS # BLD AUTO: 4.25 THOUSANDS/ΜL (ref 1.85–7.62)
NEUTS SEG NFR BLD AUTO: 68 % (ref 43–75)
NRBC BLD AUTO-RTO: 0 /100 WBCS
PLATELET # BLD AUTO: 337 THOUSANDS/UL (ref 149–390)
PMV BLD AUTO: 9.7 FL (ref 8.9–12.7)
POTASSIUM SERPL-SCNC: 4.5 MMOL/L (ref 3.5–5.3)
PROT SERPL-MCNC: 8.1 G/DL (ref 6.4–8.4)
RBC # BLD AUTO: 4.36 MILLION/UL (ref 3.88–5.62)
SL AMB POCT HEMOGLOBIN AIC: 8.6 (ref ?–6.5)
SODIUM SERPL-SCNC: 136 MMOL/L (ref 135–147)
TIBC SERPL-MCNC: 623 UG/DL (ref 250–450)
TREPONEMA PALLIDUM IGG+IGM AB [PRESENCE] IN SERUM OR PLASMA BY IMMUNOASSAY: NORMAL
TSH SERPL DL<=0.05 MIU/L-ACNC: 1.09 UIU/ML (ref 0.45–4.5)
VIT B12 SERPL-MCNC: 551 PG/ML (ref 100–900)
WBC # BLD AUTO: 6.19 THOUSAND/UL (ref 4.31–10.16)

## 2023-05-01 RX ORDER — METFORMIN HYDROCHLORIDE 750 MG/1
750 TABLET, EXTENDED RELEASE ORAL 2 TIMES DAILY
Qty: 180 TABLET | Refills: 1 | Status: SHIPPED | OUTPATIENT
Start: 2023-05-01

## 2023-05-01 RX ORDER — FERROUS SULFATE TAB EC 324 MG (65 MG FE EQUIVALENT) 324 (65 FE) MG
324 TABLET DELAYED RESPONSE ORAL
Qty: 180 TABLET | Refills: 1 | Status: SHIPPED | OUTPATIENT
Start: 2023-05-01 | End: 2023-05-31

## 2023-05-01 RX ORDER — DOCUSATE SODIUM 100 MG/1
100 CAPSULE, LIQUID FILLED ORAL 2 TIMES DAILY
Qty: 60 CAPSULE | Refills: 2 | Status: SHIPPED | OUTPATIENT
Start: 2023-05-01 | End: 2023-08-01

## 2023-05-01 NOTE — ASSESSMENT & PLAN NOTE
Hgb of 8 1 discovered incidentally after his last visit  He reported no symptoms  States he has been compliant with his ferrous sulfate  Will recheck CBC and iron panel  He has yet to follow up with GI  Discussed again the need for GI consult  He may need a colonoscopy and EGD to r/o occult bleed  ER precautions given

## 2023-05-01 NOTE — ASSESSMENT & PLAN NOTE
BP Readings from Last 3 Encounters:   05/01/23 158/90   09/20/22 127/69   03/01/22 108/60     Previously normal  No history of HTN  Will check labs and have patient check BP daily at home  Given BP cuff in the office  Record in journal  Follow up in 2-4 weeks for recheck  If still uncontrolled  Will increase lisinopril

## 2023-05-01 NOTE — ASSESSMENT & PLAN NOTE
Previous diagnosis of dementia per geriatrics (Patient seen at [facility] - able to view in [facility])  I reviewed the note and it appears he scored very low 8/30 on the [address] status testing and has been diagnosed with dementia  Patient does not feel he has cognitive issues and it is difficult to tell as I have only met him one other time  His family appears to be in denial as well  They more so feel his symptoms are due to living alone and being lonely  He does not appear to be an immediate threat to himself  He does not drive  He is agreeable to see neurology  Will send message to referral team for patient to be seen asap  Will order labs to r/o reversible causes

## 2023-05-01 NOTE — PROGRESS NOTES
Name: Jethro Guzman      : 1955      MRN: [id number]  Encounter Provider: Gisell Ríos PA-C  Encounter Date: 2023   Encounter department: [address] [place] [address]    Assessment & Plan     1  Type 2 diabetes mellitus without complication, without long-term current use of insulin ([facility])  Assessment & Plan:    Lab Results   Component Value Date    HGBA1C 8 6 (A) 2023     A1c increased from 6 8 to 8 6  Patient denies any recent changes in activity or eating habits  Reviewed nutrition and exercise recommendations  Declines diabetes education referral  Patient has been struggling with memory issues, possibly eating more than he used to due to forgetfulness  Will increase metformin from 500 mg daily to 750 mg twice daily  Patient declines checking his glucose at home  Foot exam done today  He declined diabetic foot care and nail care  Reminded patient to schedule his eye exam  Recheck a1c in 3 months  Orders:  -     POCT hemoglobin A1c  -     TSH, 3rd generation with Free T4 reflex; Future  -     metFORMIN (GLUCOPHAGE-XR) 750 mg 24 hr tablet; Take 1 tablet (750 mg total) by mouth 2 (two) times a day    2  Dementia associated with other underlying disease without behavioral disturbance [facility])  Assessment & Plan:  Previous diagnosis of dementia per geriatrics (Patient seen at [facility] - able to view in [facility])  I reviewed the note and it appears he scored very low 8/30 on the [address] status testing and has been diagnosed with dementia  Patient does not feel he has cognitive issues and it is difficult to tell as I have only met him one other time  His family appears to be in denial as well  They more so feel his symptoms are due to living alone and being lonely  He does not appear to be an immediate threat to himself  He does not drive  He is agreeable to see neurology  Will send message to referral team for patient to be seen asap   Will order labs to r/o reversible causes  Orders:  -     Comprehensive metabolic panel; Future  -     Sedimentation rate, automated; Future  -     RPR-Syphilis Screening (Total Syphilis IGG/IGM); Future  -     Ambulatory Referral to Neurology; Future    3  Elevated blood pressure reading  Assessment & Plan:  BP Readings from Last 3 Encounters:   05/01/23 158/90   09/20/22 127/69   03/01/22 108/60     Previously normal  No history of HTN  Will check labs and have patient check BP daily at home  Given BP cuff in the office  Record in journal  Follow up in 2-4 weeks for recheck  If still uncontrolled  Will increase lisinopril  4  Mixed hyperlipidemia  Assessment & Plan:  Lab Results   Component Value Date    CHOLESTEROL 149 09/22/2022    CHOLESTEROL 174 01/13/2022    CHOLESTEROL 153 06/28/2021     Lab Results   Component Value Date    HDL 68 09/22/2022    HDL 75 01/13/2022    HDL 58 06/28/2021     Lab Results   Component Value Date    TRIG 78 09/22/2022    TRIG 64 01/13/2022    TRIG 121 06/28/2021     No results found for: Katia   Lab Results   Component Value Date    LDLCALC 65 09/22/2022     Controlled  Continue rosuvastatin 40 mg daily  Low fat diet  5  Iron deficiency anemia secondary to inadequate dietary iron intake  Assessment & Plan:  Hgb of 8 1 discovered incidentally after his last visit  He reported no symptoms  States he has been compliant with his ferrous sulfate  Will recheck CBC and iron panel  He has yet to follow up with GI  Discussed again the need for GI consult  He may need a colonoscopy and EGD to r/o occult bleed  ER precautions given  Orders:  -     CBC and differential; Future  -     Iron Panel (Includes Ferritin, Iron Sat%, Iron, and TIBC); Future  -     Vitamin B12; Future  -     Ambulatory Referral to Gastroenterology; Future    6  Nutritional anemia, unspecified  -     Vitamin B12; Future    7  BMI 31 0-31 9,adult    BMI Counseling: Body mass index is 31 42 kg/m²   The BMI is above normal  Nutrition recommendations include decreasing portion sizes, encouraging healthy choices of fruits and vegetables, decreasing fast food intake, consuming healthier snacks, limiting drinks that contain sugar, moderation in carbohydrate intake, increasing intake of lean protein, reducing intake of saturated and trans fat and reducing intake of cholesterol  Exercise recommendations include moderate physical activity 150 minutes/week, exercising 3-5 times per week and strength training exercises  No pharmacotherapy was ordered  Rationale for BMI follow-up plan is due to patient being overweight or obese  Depression Screening and Follow-up Plan: Patient was screened for depression during today's encounter  They screened negative with a PHQ-2 score of 0  Subjective      Patient is a 78year old male with a PMH of T2DM, CAD, and HLD presenting for follow up  He is here with his friend  They were never  or together, but they had a child together and have been friends since  She has been looking after him recently and is concerned  States she has been stopping over almost every day  One of her kids stops by a couple days a week as well  States his memory has been poor  He has been forgetting to pay his rent  They found this out recently when he was almost evicted  They state he seems confused sometimes as well  She can not give other examples  Patient states he feels fine and he has no concerns  He remembers addresses and phone numbers and doesn't get lost  She states he walks to her house almost every week and never has issues  She thinks he is lonely and this is why he's having issues  He has lived alone for almost 20 years  He states he has been compliant with his medications  Denies any medication side effects  He does not check his sugar  Denies any hypoglycemic or hyperglycemic events  Denies polyuria, polyphagia, and polydipsia  Denies changes in appetite or weight  Denies visual changes   He is blind in his left eye  Denies numbness, tingling, or weakness  Review of Systems   Constitutional: Negative for activity change, appetite change, chills, diaphoresis, fatigue, fever and unexpected weight change  HENT: Negative for congestion, hearing loss, sore throat, tinnitus and trouble swallowing  Eyes: Negative for photophobia and visual disturbance  Respiratory: Negative for cough, chest tightness, shortness of breath and wheezing  Cardiovascular: Negative for chest pain, palpitations and leg swelling  Gastrointestinal: Negative for abdominal pain, blood in stool, constipation, diarrhea, nausea and vomiting  Endocrine: Negative for cold intolerance, heat intolerance, polydipsia, polyphagia and polyuria  Genitourinary: Negative for decreased urine volume, dysuria, frequency, hematuria and urgency  Musculoskeletal: Negative for arthralgias and myalgias  Skin: Negative for rash  Neurological: Negative for dizziness, tremors, weakness, light-headedness, numbness and headaches  Hematological: Negative for adenopathy  Psychiatric/Behavioral: Positive for confusion  Negative for agitation, behavioral problems, dysphoric mood, hallucinations, self-injury, sleep disturbance and suicidal ideas  The patient is not nervous/anxious          Current Outpatient Medications on File Prior to Visit   Medication Sig    aspirin 81 MG tablet Take 81 mg by mouth daily at bedtime     ferrous sulfate 324 (65 Fe) mg Take 1 tablet (324 mg total) by mouth daily before breakfast    lisinopril (ZESTRIL) 2 5 mg tablet TAKE ONE TABLET BY MOUTH EVERY DAY    omega-3-acid ethyl esters (LOVAZA) 1 g capsule Take 100 mg by mouth 2 (two) times a day    rosuvastatin (CRESTOR) 40 MG tablet TAKE ONE TABLET BY MOUTH EVERY DAY    [DISCONTINUED] metFORMIN (GLUCOPHAGE) 500 mg tablet TAKE ONE TABLET BY MOUTH EVERY DAY WITH BREAKFAST       Objective     /90 (BP Location: Right arm, Patient Position: Sitting, Cuff "Size: Standard)   Pulse 73   Temp 97 9 °F (36 6 °C) (Temporal)   Ht 5' 7\" (1 702 m)   Wt 91 kg (200 lb 9 6 oz)   SpO2 99%   BMI 31 42 kg/m²     Physical Exam  Vitals and nursing note reviewed  Constitutional:       General: He is not in acute distress  Appearance: Normal appearance  He is well-developed, well-groomed and overweight  He is not ill-appearing  HENT:      Head: Normocephalic and atraumatic  Eyes:      General: No scleral icterus  Conjunctiva/sclera: Conjunctivae normal    Cardiovascular:      Rate and Rhythm: Normal rate and regular rhythm  Pulses: Normal pulses  no weak pulses          Dorsalis pedis pulses are 2+ on the right side and 2+ on the left side  Posterior tibial pulses are 2+ on the right side and 2+ on the left side  Heart sounds: Normal heart sounds  No murmur heard  Pulmonary:      Effort: Pulmonary effort is normal  No respiratory distress  Breath sounds: Normal breath sounds and air entry  No decreased air movement  No decreased breath sounds, wheezing, rhonchi or rales  Abdominal:      General: Abdomen is flat  Bowel sounds are normal  There is no distension  Palpations: Abdomen is soft  There is no mass  Tenderness: There is no abdominal tenderness  There is no right CVA tenderness, left CVA tenderness, guarding or rebound  Hernia: No hernia is present  Musculoskeletal:         General: Normal range of motion  Cervical back: Normal range of motion and neck supple  Right lower leg: No edema  Left lower leg: No edema  Feet:      Right foot:      Skin integrity: No ulcer, skin breakdown, erythema, warmth, callus or dry skin  Toenail Condition: Right toenails are abnormally thick and long  Fungal disease present  Left foot:      Skin integrity: No ulcer, skin breakdown, erythema, warmth, callus or dry skin  Toenail Condition: Left toenails are abnormally thick and long   Fungal disease " present  Lymphadenopathy:      Cervical: No cervical adenopathy  Skin:     General: Skin is warm  Capillary Refill: Capillary refill takes less than 2 seconds  Coloration: Skin is not jaundiced  Findings: No rash  Neurological:      General: No focal deficit present  Mental Status: He is alert and oriented to person, place, and time  Mental status is at baseline  Sensory: Sensation is intact  Motor: Motor function is intact  Coordination: Coordination is intact  Gait: Gait is intact  Psychiatric:         Attention and Perception: Attention and perception normal          Mood and Affect: Mood and affect normal          Speech: Speech normal          Behavior: Behavior normal  Behavior is cooperative  Thought Content: Thought content normal          Cognition and Memory: Cognition and memory normal          Judgment: Judgment normal           Patient's shoes and socks removed  Right Foot/Ankle   Right Foot Inspection  Skin Exam: skin normal and skin intact  No dry skin, no warmth, no callus, no erythema, no maceration, no abnormal color, no pre-ulcer, no ulcer and no callus  Toe Exam: ROM and strength within normal limits  Sensory   Proprioception: intact  Monofilament testing: intact    Vascular  Capillary refills: < 3 seconds  The right DP pulse is 2+  The right PT pulse is 2+  Left Foot/Ankle  Left Foot Inspection  Skin Exam: skin normal and skin intact  No dry skin, no warmth, no erythema, no maceration, normal color, no pre-ulcer, no ulcer and no callus  Toe Exam: ROM and strength within normal limits  Sensory   Proprioception: intact  Monofilament testing: intact    Vascular  Capillary refills: < 3 seconds  The left DP pulse is 2+  The left PT pulse is 2+       Assign Risk Category  No deformity present  No loss of protective sensation  No weak pulses  Risk: Elizabeth Lee 399, PA-C

## 2023-05-01 NOTE — ASSESSMENT & PLAN NOTE
Lab Results   Component Value Date    HGBA1C 8 6 (A) 05/01/2023     A1c increased from 6 8 to 8 6  Patient denies any recent changes in activity or eating habits  Reviewed nutrition and exercise recommendations  Declines diabetes education referral  Patient has been struggling with memory issues, possibly eating more than he used to due to forgetfulness  Will increase metformin from 500 mg daily to 750 mg twice daily  Patient declines checking his glucose at home  Foot exam done today  He declined diabetic foot care and nail care  Reminded patient to schedule his eye exam  Recheck a1c in 3 months

## 2023-05-01 NOTE — ASSESSMENT & PLAN NOTE
Lab Results   Component Value Date    CHOLESTEROL 149 09/22/2022    CHOLESTEROL 174 01/13/2022    CHOLESTEROL 153 06/28/2021     Lab Results   Component Value Date    HDL 68 09/22/2022    HDL 75 01/13/2022    HDL 58 06/28/2021     Lab Results   Component Value Date    TRIG 78 09/22/2022    TRIG 64 01/13/2022    TRIG 121 06/28/2021     No results found for: Katia   Lab Results   Component Value Date    LDLCALC 65 09/22/2022     Controlled  Continue rosuvastatin 40 mg daily  Low fat diet

## 2023-05-02 DIAGNOSIS — F02.80 DEMENTIA ASSOCIATED WITH OTHER UNDERLYING DISEASE WITHOUT BEHAVIORAL DISTURBANCE (HCC): Primary | ICD-10-CM

## 2023-05-03 ENCOUNTER — PATIENT OUTREACH (OUTPATIENT)
Dept: INTERNAL MEDICINE CLINIC | Facility: CLINIC | Age: 68
End: 2023-05-03

## 2023-05-03 DIAGNOSIS — Z78.9 NEEDS ASSISTANCE WITH COMMUNITY RESOURCES: Primary | ICD-10-CM

## 2023-05-03 NOTE — PROGRESS NOTES
"MISSY has received request from Provider to see if pt would be receptive to any [employer] as pt appears to be having memory issues which may be affecting his health and social functioning  Provider is trying to have pt evaluated by neurology and [facility] ASAP  Pt ALMOST  had a recent eviction due to not paying rent  Per Provider however, \"pt has  been diagnosed with dementia  Patient does not feel he has cognitive issues  Fracisco Tranevette Fracisco Tranevette His family appears to be in denial as well  They more so feel his symptoms are due to living alone and being lonely  [facility] does not appear to be an immediate threat to himself  [facility] does not drive  He is agreeable to see neurology  \"    MISSY has reached out to his Friend Rosendo Minor ( they share a child together)  who had accompanied pt to his last PCP visit  (Communication Consent on file )     MISSY has also called pt but had to leave a message  MISSY will f/u and assist as indicated      "

## 2023-05-04 ENCOUNTER — PATIENT OUTREACH (OUTPATIENT)
Dept: INTERNAL MEDICINE CLINIC | Facility: CLINIC | Age: 68
End: 2023-05-04

## 2023-05-04 ENCOUNTER — TELEPHONE (OUTPATIENT)
Dept: NEUROLOGY | Facility: CLINIC | Age: 68
End: 2023-05-04

## 2023-05-04 ENCOUNTER — TELEPHONE (OUTPATIENT)
Age: 68
End: 2023-05-04

## 2023-05-04 NOTE — PROGRESS NOTES
SW has received a return call from pt's Friend Eileen De La Garza ( Mother of their shared son Juice Guevara)  SW explained that SW was calling to try to assist with [employer]  She shares the family is noticing increased forgetfulness in pt's behavior  She shares pt has a Court Case today with a possible eviction   Pt had written out checks but did not get all of then into the mail   The Court case indicated he was behind for his Feb/ March/ April  Only the March check was cashed  They are prepared to pay up today  She shared that she and his son Juice Guevara are getting paperwork done with a  to get a POA  The bank has suggested getting a family member added to his account  She share they want this son to be added  ( SW also reviewed that the Social Security Office can help set up a Representive payee to help manage benefits if needed)  SW suggested they share they are working on becoming a POA and getting on his bank account to assist and they the rent would be up to date moving forward  They might also share he is undergoing medical care and will be see neurology to assess and further treat his memory issues  She has also shared the Pt is usually very ambulatory  In fact he often walks up to 2 miles away from his home  He will often walk to her home which is 16 blocks away  Family is unaware if he has ever gotten lost     She and son are checking on pt more frequently as they see he is forgetting things he knew before  He was not recognizing  objects, unable to read things  losing his keys sn getting himself locked out of his apt and has not locked up the back door  In addition, she shares that pt is not cleaning his apt as before   (she indicated that he is NOT hoarding things)  She has been given # for the Neurology Office and she will attempt to get an appointment with the ASAP  She also asked Provider to call her back re pt's blood work results        She shares that pt has another son who lives in [place]  She shares she is recently  and is  trying to help but can not take pt in or be there everyday  His local sonworks and is also trying to help  He has indicated he does NOT want pt to go to a Nursing Home  Sw has advised friend the pt appears to need more suppervision  SW has reviewed the [employer]  Pt has initailly declined need of extra help  SW has offered to review with pt's son and pt  She relates she will f/u with SW re Court date and resources  MISSY hs reviewed case with Provider who agrees we can follow and see what pt is agreeable to  He does not appear to be at  immediate risk at this time  MISSY has asked her to call Friend with his Blood work results  SW to remain available to assist as indicated  ADDENDUM:  MISSY received update from pt's Friend Supriya that pt was able to pay up his rent at the [facility] hearing and he is not being evicted  She also shares pt is not taking hs blood pressure medications and they would like pt to get [facility]  SW returned kerri but had to leave a message to ask if pt will accept [facility] and or PA Waiver referral   SW to f/u and assist as indicated

## 2023-05-04 NOTE — TELEPHONE ENCOUNTER
[facility]  [address], [address] [place] [address], [address] [place] [address]    [phone]    Telephone Intake: Geriatric Assessment     Referral source: Fabio Oscar, PCP    Caller who is scheduling/relationship to pt: Jaden Garibay, son  Caller's phone number: [phone]    Reason for referral: Patient concerns , Family member concerns and Provider concerns regarding memory concerns and mood concerns  If there are behavioral concerns, is the pt prescribed medications to manage these? no   If so, how many? no   Has the patient ever had an inpatient psychiatric hospitalization? No, no  What is the goal of the visit? Son has realized pt has showered less, feels like he is disengaged  Pt is not really taking care of himself  Initial diagnosis, see if there is any memory concerns with patient  Initial assessment  Son explains that father will forget where door handle is, they are not sure if it is dementia or if the patient cannot see  Has the patient been seen by a Neurologist or Geriatrician? Yes, per chart review @ [facility]   If yes, is this appointment for a second opinion? No  Has the patient ever been diagnosed with dementia? Yes, per chart review at [facility] 11/21)   Has the patient had an [address] within the last 1 year? No (If so, please route to provider to determine if assessment + conference are needed or if only assessment should be scheduled)      Preferred language? English  Highest education level? Associates Degree  Does the patient wear glasses? Yes   Does the patient use hearing aids? No     Is there a living will/healthcare POA in place/If so, who? No, planning to set it up    Does the pt/caregiver have access for a virtual visit (computer/smart phone with audio/video)? No    Caller was informed:    Please make sure the pt is accompanied by someone who knows them well / caregiver / family member to participate in this appointment     Who will accompany the pt (name and relationship)? Lenora Blake (son) or Richy Dason (Juan's mother - not  to Lenora Blake)  Phone number of person accompanying pt: Lenora Blake ([phone])  Linda [place] ([phone])   Please make sure the pt attends all appointments, including the assessment, care conference, follow-up, whether in-person or virtual    For virtual visits, pt must be physically present in [facility]  Office packet mailed out to: [address]  Added to wait list for sooner appointments? No      :  Has this patient been seen by our department in the last 3 years?  No    Please route to provider for chart review prior to scheduling and let the caller know that this phone intake will be reviewed IF -   Pt was recently hospitalized   Pt is prescribed medications for behavior management or has a history of psychiatric hospitalization   Pt plans to attend alone

## 2023-05-04 NOTE — TELEPHONE ENCOUNTER
Patient's emergency contact calling to schedule new patient appointment for dementia  No testing done  Triage intake sent

## 2023-05-05 ENCOUNTER — HOME HEALTH ADMISSION (OUTPATIENT)
Dept: HOME HEALTH SERVICES | Facility: HOME HEALTHCARE | Age: 68
End: 2023-05-05

## 2023-05-05 DIAGNOSIS — F02.80 DEMENTIA ASSOCIATED WITH OTHER UNDERLYING DISEASE WITHOUT BEHAVIORAL DISTURBANCE (HCC): ICD-10-CM

## 2023-05-05 DIAGNOSIS — E11.9 TYPE 2 DIABETES MELLITUS WITHOUT COMPLICATION, WITHOUT LONG-TERM CURRENT USE OF INSULIN (HCC): ICD-10-CM

## 2023-05-05 DIAGNOSIS — D50.8 OTHER IRON DEFICIENCY ANEMIA: Primary | ICD-10-CM

## 2023-05-07 ENCOUNTER — HOME CARE VISIT (OUTPATIENT)
Dept: HOME HEALTH SERVICES | Facility: HOME HEALTHCARE | Age: 68
End: 2023-05-07

## 2023-05-09 ENCOUNTER — PATIENT OUTREACH (OUTPATIENT)
Dept: INTERNAL MEDICINE CLINIC | Facility: CLINIC | Age: 68
End: 2023-05-09

## 2023-05-09 NOTE — PROGRESS NOTES
MISSY has returned call to pt's Kassandra Elana and she shares that pt did very well with the [facility] RN who came as he had organized his medications and took them that day  She share she does not know if he did well the next day  He is also not home bound so they can not follow  Missy has reviewed the PA Waiver Program again and she relates pt has agreed to same  MISSY will ask Provider if she can complete the Physician Certification Form which ATTENDING needs to sign which is needed for same  She will return call to SW when pt is also available so SW can review withpt as well     Pt son's who is also Supriya's son arrived during our conversation but he did not have any questions for MISSY Duarte did encourage pt to go to the  [facility]231[facility]  She shares they will attend and pt' s other son from [place] will also be there  They are working with a  to have a POA obtained,  SW did share that the Geriatric Physician can also assist with determining if pt has capability to give his POA  And taht if possible it will be helpful to do same  They are very greatful pt was not evicted for nonpayment of rent  MISSY to create Task for Provider for Physician Certification Form  SW to f/u and assist as indicated

## 2023-05-11 ENCOUNTER — TELEPHONE (OUTPATIENT)
Dept: INTERNAL MEDICINE CLINIC | Facility: CLINIC | Age: 68
End: 2023-05-11

## 2023-05-11 NOTE — TELEPHONE ENCOUNTER
Folder Color- Purple     Name of Form- PA Physician cert form     Form to be filled out by- Dr Hollis Dangelo     Form to be Faxed [phone]    Patient made aware of 10 business day policy

## 2023-05-18 ENCOUNTER — PATIENT OUTREACH (OUTPATIENT)
Dept: INTERNAL MEDICINE CLINIC | Facility: CLINIC | Age: 68
End: 2023-05-18

## 2023-05-18 ENCOUNTER — PATIENT OUTREACH (OUTPATIENT)
Dept: FAMILY MEDICINE CLINIC | Facility: CLINIC | Age: 68
End: 2023-05-18

## 2023-05-18 DIAGNOSIS — Z78.9 NEEDS ASSISTANCE WITH COMMUNITY RESOURCES: Primary | ICD-10-CM

## 2023-05-18 NOTE — PROGRESS NOTES
MISSY has received a call from Cely silva of the [address] Waiver Program program [phone] who is trying to reach pt o set up his first IEB assessment  She has not been able to reach pt or family    MISSY assisted with a 3 way call to pt's friend / caregiver Jerrlyn Ahumada @ [phone] to inform her of same  She shares she is currently driving but will call them back when she can later today  MISSY reviewed what they will need for this meeting and that Aging Office will also be calling to set up their visit  Missy requested she let SW know how there visits go  MISSY will also ask our [facility] to f/u and assist with [place] Waiver application as indicated  Cm Team to f/u and assist as indicted

## 2023-05-19 ENCOUNTER — PATIENT OUTREACH (OUTPATIENT)
Dept: FAMILY MEDICINE CLINIC | Facility: CLINIC | Age: 68
End: 2023-05-19

## 2023-05-19 NOTE — PROGRESS NOTES
spoke to patient and he was unable to verify his personal information for IEB  CMOC spoke to friend Rodger De Leon and she will be going to patient home in the next 10 minutes  [address] [place] [address] will call back to patient in a bit so we can try to scheduled an intake with IEB  Quintin Olivo stated his oldest son lives in [place] and is not able to assist with the financials at this time they county will need  The son that lives here is doing an apprentice program and is available after 5:30pm       Pt sister has been coming to patient home every day but the oldest son does not want her involved in his financials per Quintin Olivo states pt is able to provide consent but will not be able to understand much of what is going on

## 2023-05-23 ENCOUNTER — PATIENT OUTREACH (OUTPATIENT)
Dept: FAMILY MEDICINE CLINIC | Facility: CLINIC | Age: 68
End: 2023-05-23

## 2023-05-23 NOTE — PROGRESS NOTES
[facility] will meet with pt at his home today to help him scheduled an apt for LEILA Rm  Later Entry:   [facility] met with pt at his home  Pt needed assistance with calling IEB to scheduled an intake assessment  Assessment has been scheduled for Friday May 26, between 11:am -1:pm  Apollo mcmahon be the person going to his home  Aletha Berman will also be present during the assessment  [facility] and Angy Salas are authorized representatives for [employer]  Pt receives $2016 in social security income       [facility] will continue to f/u with pt and team

## 2023-05-26 ENCOUNTER — PATIENT OUTREACH (OUTPATIENT)
Dept: INTERNAL MEDICINE CLINIC | Facility: CLINIC | Age: 68
End: 2023-05-26

## 2023-05-26 NOTE — PROGRESS NOTES
Outpatient Care Management Note:    Received notification from [facility] with clerical staff that patient's friend Dashawn Rodriguez was calling that no one showed today for patient  Per chart review patient was to have IEB intake assessment and scheduled today between 11am-1pm  I called the IEB at [phone] and spoke with Maciel Katz who was unable to get a hold of person doing the assessment after several attempts and that they will have to reschedule the assessment  I requested she reach out to Dashawn Rodriguez directly to make them aware of this info  She did make Supriya aware but she could not reschedule at this time as she was driving and would call back to reschedule

## 2023-05-26 NOTE — PROGRESS NOTES
MISSY received message late that Juma Domenicayudi of the [place] Waiver Program [phone] was at pt;s door @ 11:35 Am and no one was answering th door  She was there to do his [place] WAIVER INTAKE  Missy has attempted to reach her to call pt;s friend Jayne Ferguson [phone] but Denice's VM was full  MISSY has reached out to [employer] as well  She was upset as they did not call her as per her INSTRUCTIONS  She expected them to call and she was going to meet them at pt's home  Missy attempted a 3 way call with her but again her VM was full  She shares she will be on vacation from 6/8/-/6/15 so she hopes it can be done before then  [facility] will call the IEB back to reset up the time  CM Team to f/u as indicated

## 2023-05-31 ENCOUNTER — OFFICE VISIT (OUTPATIENT)
Age: 68
End: 2023-05-31

## 2023-05-31 VITALS
HEIGHT: 67 IN | HEART RATE: 76 BPM | BODY MASS INDEX: 31.77 KG/M2 | OXYGEN SATURATION: 98 % | SYSTOLIC BLOOD PRESSURE: 144 MMHG | WEIGHT: 202.4 LBS | DIASTOLIC BLOOD PRESSURE: 74 MMHG | TEMPERATURE: 97.9 F

## 2023-05-31 DIAGNOSIS — H54.40 BLINDNESS OF LEFT EYE, UNSPECIFIED RIGHT EYE VISUAL IMPAIRMENT CATEGORY: ICD-10-CM

## 2023-05-31 DIAGNOSIS — I25.810 CORONARY ARTERY DISEASE INVOLVING CORONARY BYPASS GRAFT OF NATIVE HEART WITHOUT ANGINA PECTORIS: Chronic | ICD-10-CM

## 2023-05-31 DIAGNOSIS — E11.65 TYPE 2 DIABETES MELLITUS WITH HYPERGLYCEMIA, WITHOUT LONG-TERM CURRENT USE OF INSULIN (HCC): Chronic | ICD-10-CM

## 2023-05-31 DIAGNOSIS — D50.8 IRON DEFICIENCY ANEMIA SECONDARY TO INADEQUATE DIETARY IRON INTAKE: Chronic | ICD-10-CM

## 2023-05-31 DIAGNOSIS — M15.9 PRIMARY OSTEOARTHRITIS INVOLVING MULTIPLE JOINTS: Chronic | ICD-10-CM

## 2023-05-31 DIAGNOSIS — H25.011 CORTICAL AGE-RELATED CATARACT OF RIGHT EYE: ICD-10-CM

## 2023-05-31 DIAGNOSIS — R47.01 APHASIA: ICD-10-CM

## 2023-05-31 DIAGNOSIS — F03.A0 MILD DEMENTIA, UNSPECIFIED DEMENTIA TYPE, UNSPECIFIED WHETHER BEHAVIORAL, PSYCHOTIC, OR MOOD DISTURBANCE OR ANXIETY (HCC): Primary | ICD-10-CM

## 2023-05-31 PROBLEM — M17.11 PRIMARY OSTEOARTHRITIS OF RIGHT KNEE: Status: RESOLVED | Noted: 2019-01-15 | Resolved: 2023-05-31

## 2023-05-31 PROBLEM — H54.50 BLINDNESS OF LEFT EYE WITH LOW VISION IN CONTRALATERAL EYE: Status: ACTIVE | Noted: 2020-02-28

## 2023-05-31 PROBLEM — H54.10 BLINDNESS OF LEFT EYE WITH LOW VISION IN CONTRALATERAL EYE: Status: ACTIVE | Noted: 2020-02-28

## 2023-05-31 PROBLEM — H54.10 BLINDNESS OF LEFT EYE WITH LOW VISION IN CONTRALATERAL EYE: Chronic | Status: ACTIVE | Noted: 2020-02-28

## 2023-05-31 PROBLEM — R03.0 ELEVATED BLOOD PRESSURE READING: Status: RESOLVED | Noted: 2023-05-01 | Resolved: 2023-05-31

## 2023-05-31 PROBLEM — M19.041 LOCALIZED OSTEOARTHRITIS OF HANDS, BILATERAL: Status: RESOLVED | Noted: 2018-11-28 | Resolved: 2023-05-31

## 2023-05-31 PROBLEM — M19.042 LOCALIZED OSTEOARTHRITIS OF HANDS, BILATERAL: Status: RESOLVED | Noted: 2018-11-28 | Resolved: 2023-05-31

## 2023-05-31 PROBLEM — H54.50 BLINDNESS OF LEFT EYE WITH LOW VISION IN CONTRALATERAL EYE: Chronic | Status: ACTIVE | Noted: 2020-02-28

## 2023-05-31 RX ORDER — FERROUS SULFATE TAB EC 324 MG (65 MG FE EQUIVALENT) 324 (65 FE) MG
324 TABLET DELAYED RESPONSE ORAL DAILY
COMMUNITY
Start: 2023-05-31

## 2023-05-31 NOTE — PATIENT INSTRUCTIONS
Please call for a follow up with Ophthalmology team:    Team Member Role and Specialty Contact Info Address   Kae Rodriguez MD (Ophthalmology) Phone: [phone] Fax: [phone]   [address] [place] [id number]

## 2023-05-31 NOTE — ASSESSMENT & PLAN NOTE
Patient with very unusual presentation of very poor cognitive skills, likely with significant aphasia which would be most likely secondary to previous CVA  Alternative diagnosis could be primary progressive aphasia or atypical Alzheimer's, however these appear much less likely considering relatively spared functional status compared to very poor cognitive testing  Will refer for MRI Brain NeuroQuant w&w/o contrast and refer to speech therapy for aphasia  Decision-making capacity: Likely with capacity for medical and financial decisions, although difficult to determine considering likely severe aphasia  Would recommend neuropsychology Rosemary egan for capacity evaluation if necessary  Staging: Mild Stage Dementia (unspecified cause)    Medications Review: Reviewed current medications  Appear appropriate for current medical diagnoses

## 2023-05-31 NOTE — PROGRESS NOTES
Maureen [facility]  [address] [facility], [address] [facility], [address]  [phone]    Social Work [address] [facility] presents today for a geriatric assessment, accompanied by ***  Social/Family History   Marital status: Has been  twice, has son with friend    Does patient have children? Yes How Many? 2 children  (If yes, where do the children live?) [place] in [place], [place] in [place]  Family members assisting patient at home: sister-Gracie   Are any relationships causing problems right now: No   Who is available to provide care in case of illness or crisis (please specify relation to patient / level of care able to provide)? Piper Devi would be POA       Employment and Education   Education: Highest Level of Education: Associate's Degree    Currently Employed: No    Retired: Yes                        Date of group home? before covid   Type of employment: [employer]   : No    Benefits (if applicable):        Lifestyle/Community Services   Clubs and Organizations: use to be very involved in [employer] daily  Major life events in past two years (ex: group home, death in family, major illness etc ): lost a sister  Have you ever used a home care agency, meal delivery service, or respite care?: had visiting nurse to assist with medications, services stopped due to patient not being homebound  Services that assessment team feels would be beneficial to patient/family: ***      Financial   Total Monthly income: [address] 98 of income: SSDI  Meet current needs? Yes    Funds available for home care? No   Funds available for nursing home? No   Do you rent or own your home? Rent       End-Of-Life Checklist   Have wishes or desires for end-of-life care been discussed?  yes   Advanced directives: Family has POA documents written up, and working with         For all patients, we encourage seeing a  to establish a Financial Power of , carmen Harper Power of [place], and an Advanced Directive (living will)  Particularly for patients experiencing memory concerns, we strongly recommend that this is completed as soon as possible  Safety Assessment   Is the patient still driving? No    Is the patient taking medications as prescribed? friend believes he is taking them correctly    Is there a system in place for medication management? He is filling a pillbox  Are firearms or weapons in the home? No  Does the patient live alone? Yes  1  What is the layout of the home? 2 story home, apartment building  2  Do you feel comfortable leaving the person home alone? Yes, but patient does not lock the door, friend is trying to get him a key   3  Have you noticed any burned pans or other signs of issues with the stove or other appliances? unsure   4  Do you have any concerns about the person’s cooking or eating habits? unsure if he is eating good choices, LSW provided information on Meals on Wheels  Maria T Balloon is dropping off meals  5  Are there working smoke detectors and fire extinguishers in the home? unsure, will check   6  Have you thought about when it will no longer be safe for the patient to live alone?  Remain in the home with care

## 2023-05-31 NOTE — PROGRESS NOTES
Pamella Brito [facility]  [address] [facility], [address] [place] [address], [address] [place] [address]  [phone]    Social Work [address] [facility] presents today for a geriatric assessment, accompanied by friend-Supriya  Marital status: Has been  twice, has son with friend-Supriya   Does patient have children? Yes How Many? 2 children  (If yes, where do the children live?) [place] close by, [place] in [facility]  Family members assisting patient at home: sisterAliza stops in to help  Are any relationships causing problems right now: No   Who is available to provide care in case of illness or crisis (please specify relation to patient / level of care able to provide)? Estrada Thompson would be POA       Employment and Education   Education: Highest Level of Education: Associate's Degree    Currently Employed: No    Retired: Yes                        Date of intermediate? before covid   Type of employment: [employer]   : No       Lifestyle/Community Sellsy and Organizations: use to be very involved in [employer] daily  Major life events in past two years (ex: intermediate, death in family, major illness etc ): lost a sister  Have you ever used a home care agency, meal delivery service, or respite care?: had visiting nurse to assist with medications, services stopped due to patient not being homebound  Services that assessment team feels would be beneficial to patient/family: Patient working with [address]  that is assisting patient with obtaining waiver services  LSW provided general information about Waiver Services that includes: Steps to Apply with [place] IEB contact information and the [address] [place] [address] and Support Pending Documentation checklist for all documents required for eligibility for waiver services  LSW also provided information on OPTIONS as friend was unsure if he will qualify   LSW suggested talking with the  they are working with to assist with gathering documents needed to qualify for waiver  LSW also provided information on meals on wheels, as well as Apple watch (patient wearing apple watch) settings for fall detection, medical alert information and stated that it can be used for GPS tracking for if patient were to get lost/wander when going out on long walks daily  Financial   Total Monthly income: $8728     Source of income: SSDI  Meet current needs? Yes    Funds available for home care? No   Funds available for nursing home? No   Do you rent or own your home? Rent       End-Of-Life Checklist   Have wishes or desires for end-of-life care been discussed? yes   Advanced directives: Family has POA documents written up, and working with   LSW provided information on Legal and Financial Planning with Dementia booklet from [facility]  For all patients, we encourage seeing a  to establish a Financial Power of , a [address], and an Advanced Directive (living will)  Particularly for patients experiencing memory concerns, we strongly recommend that this is completed as soon as possible  Safety Assessment   Is the patient still driving? No    Is the patient taking medications as prescribed? friend believes he is taking them correctly    Is there a system in place for medication management? He is filling a pillbox  Are firearms or weapons in the home? No  Does the patient live alone? Yes  1  What is the layout of the home? 2 story home, apartment building  2  Do you feel comfortable leaving the person home alone? Yes, but patient does not lock the door, friend is trying to get him a new key   3  Have you noticed any burned pans or other signs of issues with the stove or other appliances? unsure   4  Do you have any concerns about the person’s cooking or eating habits? unsure if he is eating good choices, LSW provided brochure for Meals on Wheels   Valery Bhat is dropping off meals but that he forgets at times to eat them  5  Are there working smoke detectors and fire extinguishers in the home? unsure, will check   6  Have you thought about when it will no longer be safe for the patient to live alone? Remain in the home with care, would not want to have him in a nursing home  Alexi Cognitive Assessment (MoCA) Version 8 1 BLIND  Education: Associates    Points Earned POSSIBLE Points   Attention   Digit Span 1 2   Vigilance (letters) 0 1   Serial 7 subtraction 0 3   Language   Sentence Repetition 0 2   Verbal fluency 0 1   Abstraction   Abstraction (word pairings) 0 2   Delayed recall   Delayed recall 0 5   Memory index score: -/15   Orientation   Orientation 1 6   TOTAL SCORE: 2/22   Additional notes: Attempted MoCA 8 1, patient stated he has no vision in left eye and was unable to see  LSW switched to Blind version

## 2023-06-01 ENCOUNTER — PATIENT OUTREACH (OUTPATIENT)
Dept: FAMILY MEDICINE CLINIC | Facility: CLINIC | Age: 68
End: 2023-06-01

## 2023-06-02 ENCOUNTER — PATIENT OUTREACH (OUTPATIENT)
Dept: INTERNAL MEDICINE CLINIC | Facility: CLINIC | Age: 68
End: 2023-06-02

## 2023-06-02 NOTE — PROGRESS NOTES
SW has reached out to pt as f/u as he has missed his Nurse visit yesterday  SW wants to f/u with his [place] waiver application as well  SW had to leave a message for pt to return SW call  Felicia also reached out to pt's friend Hernandez Connell and she shares that pt's IEB meeting has been rescheduled for 6/16/23 between 9 am and 11 AM window  She hopes to be available for this but does share she can not be there waiting for 3 hours  Hopefully they will be on time as it looks like the first visit of the day  She shares they also have her # to call this time  SW did share pt missed his schedued Nurse visit at our office yesterday which was for a blood pressure check  She was not aware of the appointment but did share pt's blood pressure was better at his recent [place] [id number] appointment  Felicia has also noted pt's  Senior Care Office appointment with  Dr Leonor Scott   It is noted pt mild dementia  Pt 's hx  of CVA and has aphasia has also been noted  If capacity evaliuation becomes necessary they reccomendd Edilberto EUGENE Team to remain availalbe and assist as indicated

## 2023-06-07 ENCOUNTER — HOSPITAL ENCOUNTER (OUTPATIENT)
Dept: RADIOLOGY | Facility: HOSPITAL | Age: 68
Discharge: HOME | End: 2023-06-07
Attending: INTERNAL MEDICINE
Payer: MEDICARE

## 2023-06-07 DIAGNOSIS — F03.A0 MILD DEMENTIA, UNSPECIFIED DEMENTIA TYPE, UNSPECIFIED WHETHER BEHAVIORAL, PSYCHOTIC, OR MOOD DISTURBANCE OR ANXIETY (HCC): ICD-10-CM

## 2023-06-07 PROCEDURE — A9585 GADOBUTROL INJECTION: HCPCS | Performed by: INTERNAL MEDICINE

## 2023-06-07 PROCEDURE — G1004 CDSM NDSC: HCPCS

## 2023-06-07 PROCEDURE — 70553 MRI BRAIN STEM W/O & W/DYE: CPT

## 2023-06-07 RX ADMIN — GADOBUTROL 9 ML: 604.72 INJECTION INTRAVENOUS at 17:29

## 2023-06-16 ENCOUNTER — PATIENT OUTREACH (OUTPATIENT)
Dept: INTERNAL MEDICINE CLINIC | Facility: CLINIC | Age: 68
End: 2023-06-16

## 2023-06-16 NOTE — PROGRESS NOTES
SW received call from Ms Mumtaz Manley from the [facility]  [phone] who was again at pt's home to do his Evaluation but no one was there to let her in  She attempted to reach pt's Friend SILVIANO Fernandez Done  [phone]  but there was no answer  SW assisted with a call to [address] [place] [address] as well to ask if she was going to meet with pt and let her in but it went to   Ms Mumtaz Manley then shared that [facility] [address] was calling her back  Hopefullt they willhave the evaluation this date  CMOC to f/u re same

## 2023-06-22 ENCOUNTER — PATIENT OUTREACH (OUTPATIENT)
Dept: INTERNAL MEDICINE CLINIC | Facility: CLINIC | Age: 68
End: 2023-06-22

## 2023-06-22 NOTE — PROGRESS NOTES
SW attempted to return call to pt's friend Liset Frankel [phone] but had tole ave a CVM  SW asked for her to call back sn leave a message or f/u with [facility] re any Waiver questions  Sw will f/u and assist as indicated

## 2023-06-23 ENCOUNTER — PATIENT OUTREACH (OUTPATIENT)
Dept: FAMILY MEDICINE CLINIC | Facility: CLINIC | Age: 68
End: 2023-06-23

## 2023-06-23 NOTE — PROGRESS NOTES
[facility] spoke to IEB Representative and she stated the IEB intake has been done and case has been referred to [facility]  Area of Aging left message for patient 2 days ago  [facility] left message for Aster Plasencia person who will be conducting intake  I left my phone number and friend Emergency [address] [place] [address] #   CMOC will f/u with Aster Plasencia from [facility] office on Monday 06/26/2023

## 2023-06-26 ENCOUNTER — PATIENT OUTREACH (OUTPATIENT)
Dept: FAMILY MEDICINE CLINIC | Facility: CLINIC | Age: 68
End: 2023-06-26

## 2023-06-26 NOTE — PROGRESS NOTES
Waiver:  [facility] received call from Victor Manuel Perez at [employer] today  He has scheduled the intake evaluation for July 3, 2023 at 3:30pm  Cory Nickerson will be going to patient home  CMOC spoke to friend Gene Stahl [phone] and she is ok with July 3 at 3:30  [facility] left message for Victor Manuel Perez at [phone] for him to make sure Tatiana is there at 3:30pm or calls Gene Stahl when she is on her way to his home       [facility] will continue to f/u with pt and team

## 2023-06-27 ENCOUNTER — PATIENT OUTREACH (OUTPATIENT)
Dept: FAMILY MEDICINE CLINIC | Facility: CLINIC | Age: 68
End: 2023-06-27

## 2023-06-27 NOTE — PROGRESS NOTES
CMOC received call from Negro Lewis at the Aging office today  She states she can scheduled the in-take for July 3 at 3:30pm      Tatiana from [address] [place] [address] has phone number for friend Lalitatomas Kelley and will be contacting her to confirm the apt and time  [address] [place] [address] spoke to [address] of patient and she states the apt is scheduled for July 3, 2023 at 3:30pm  With the [facility] office for [employer]

## 2023-07-10 NOTE — PROGRESS NOTES
[facility]  [address] [facility], [address] [facility], [facility] [address]  [phone]    Care Conference    NAME: June De La Cruz  AGE: 78 y.o. SEX: male  YOB: 1955  DATE OF ASSESSMENT: 05/31/23  DATE OF CONFERENCE: 07/12/23    Family Present: jaime Goodwin Board and Doc Shock  Staff Present: Radha Nam MD    Patient / Family Goals of Care: Review cognitive decline and associated symptoms, discuss treatment options and care planning. 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 MRI Brain NeuroQuant w&w/o contrast was completed which suggests neurodegenerative etiology. Refer to speech therapy for aphasia.  o Rogers Cognitive Assessment: 2/22 (blind version)    · Decision-making capacity: Likely with capacity for medical and financial decisions, although difficult to determine considering likely severe aphasia. Would recommend neuropsychology Vasu egan for capacity evaluation if necessary.   · Staging: Mild Stage Dementia secondary to Primary Progressive Aphasia    • Remain active physically, mentally and socially  • Pharmaceutical and non-pharmaceutical interventions discussed  • Engage in cognitively challenging exercises such as crosswords and puzzles  • Maintain chronic conditions under control  • Repeat cognitive assessment in 6 months    Diagnostic Studies  • Review of bloodwork  • Review of MRI NeuroQuant: IMPRESSION: 1. Mild chronic microangiopathy. 2.NeuroQuant analysis was performed: Low hippocampal volume and enlargement of the adjacent inferior lateral ventricles suggestive of local ex-vacuo dilatation. Findings support medial temporal lobe focused neurodegenerative etiology in right clinical setting. Physical Finding Impacting Function   Fall Risk: low fall risk   Activities of Daily Living: Independent   Instrumental Activities of Daily Living: assist    • Encourage appropriate footwear at all times  • Review fall risk prevention tips and adjust within the home environment as needed    Medications Reviewed   · Reviewed current medications. Appear appropriate for current medical diagnoses. • Check with PCP before using over the counter medications  • Avoid over the counter medications that can affect cognition (e.g., Benadryl, Tylenol PM)   • Avoid NSAIDs due to risk of GI bleed and renal impairment    Other Findings   Overall health  • BMI: 31.70 kg/m2  • Maintain well-balanced diet  • Continue following with primary care physician regularly  Iron deficiency anemia secondary to inadequate dietary iron intake  · Would benefit from close GI f/u for EGD/colonoscopy. Coronary artery disease involving coronary bypass graft of native heart without angina pectoris  · Appears stable. Continue ASA, rosuvastatin. Unclear why not on beta-blocker therapy  Blindness of left eye, unspecified right eye visual impairment category  · Refer to ophthalmology  Type 2 diabetes mellitus with hyperglycemia, without long-term current use of insulin  · Refer to ophthalmology. Continue metformin.   Primary osteoathritis involving multiple joints  • Continue regular walking  Aphasia  • Referral to speech therapy  Cortical age-related cataract of right eye  • Continue with regular eye care. Due to follow up with [facility] eye    Recommended Health Maintenance   Immunizations, if not contraindicated:   • Influenza vaccine yearly  • Pneumo vaccine every 5 years (65 years and over)  • Shingles vaccine  • COVID-19 vaccine    Social / Safety Concerns  • Consider an Adult Day Program for positive socialization, physical exercise, cognitive stimulation and family respite  • Stay in touch with family and friends  • Plan self-care activities for your mental well-being each week  • Recommend review of fall risk prevention tips  • Recommend use of fall precautions including fall alert device  • For wandering prevention: consider use of fall alert with GPS, [employer] bracelet, Project LifeSaver bracelet, video surveillance, or alarm systems  • Consider assistance for medication administration such as blister packaging or use of an automated pill dispenser  • Recommend contacting your local [address] [place] [address] on Aging for possible eligible programs such as OPTIONS, Caregiver Support Program, or [place] updated advance directives and provide a copy to your primary care provider  • Consider caregiver support groups and educational resources through the Alzheimer's Association; access Alzheimer's Association 24/7 Helpline at 8-617.317.7359  ? [address] does offer a monthly caregiver support group. If interested, please speak with a . • Utilize reorientation and redirection as needed (dependent on situation)  • Educational information provided    Patient and family verbalized understanding of above care plan. For care coordination purposes, this care plan will be shared with your primary care provider. With any questions, please contact our office at [phone].

## 2023-07-12 ENCOUNTER — OFFICE VISIT (OUTPATIENT)
Age: 68
End: 2023-07-12
Payer: MEDICARE

## 2023-07-12 VITALS
OXYGEN SATURATION: 99 % | HEIGHT: 68 IN | HEART RATE: 80 BPM | TEMPERATURE: 98.6 F | SYSTOLIC BLOOD PRESSURE: 140 MMHG | BODY MASS INDEX: 30.01 KG/M2 | DIASTOLIC BLOOD PRESSURE: 70 MMHG | WEIGHT: 198 LBS

## 2023-07-12 DIAGNOSIS — I25.810 CORONARY ARTERY DISEASE INVOLVING CORONARY BYPASS GRAFT OF NATIVE HEART WITHOUT ANGINA PECTORIS: Chronic | ICD-10-CM

## 2023-07-12 DIAGNOSIS — D50.8 IRON DEFICIENCY ANEMIA SECONDARY TO INADEQUATE DIETARY IRON INTAKE: Chronic | ICD-10-CM

## 2023-07-12 DIAGNOSIS — F02.80 PRIMARY PROGRESSIVE APHASIA (HCC): Primary | Chronic | ICD-10-CM

## 2023-07-12 DIAGNOSIS — H54.40 BLINDNESS OF LEFT EYE, UNSPECIFIED RIGHT EYE VISUAL IMPAIRMENT CATEGORY: Chronic | ICD-10-CM

## 2023-07-12 DIAGNOSIS — E11.65 TYPE 2 DIABETES MELLITUS WITH HYPERGLYCEMIA, WITHOUT LONG-TERM CURRENT USE OF INSULIN (HCC): Chronic | ICD-10-CM

## 2023-07-12 DIAGNOSIS — G31.01 PRIMARY PROGRESSIVE APHASIA (HCC): Primary | Chronic | ICD-10-CM

## 2023-07-12 DIAGNOSIS — M15.9 PRIMARY OSTEOARTHRITIS INVOLVING MULTIPLE JOINTS: Chronic | ICD-10-CM

## 2023-07-12 PROCEDURE — 99483 ASSMT & CARE PLN PT COG IMP: CPT | Performed by: INTERNAL MEDICINE

## 2023-07-12 NOTE — PROGRESS NOTES
Klarissa Henry [address], [address] [place] [address], [address]  [phone]    Care Conference: Resources Provided    LSW participated in today's conference. LSW provided Aphasia ID card with patient's name on it to carry in patient's wallet. Family is also working with [address] [place] [address] on obtaining waiver services. AAA assessment has been completed. LSW mailed the following resources, along with a copy of care plan to: [address] [place] [address]    General Information  - 10 Ways to Love Your Brain  - Memory loss ladder  - Primary Progressive Aphasia Packet (information provided by www. aphasia.org)    Caregiver Support  - Flyer for [address] (meeting 2x per month by [employer])  - Alzheimer's Association Rx Pad (guide to website and 24/7 helpline, 5-215.379.4984)    Safety  - CDC fall prevention / home safety checklist  - Lifeline fall alert systems packet  - Apple Watch GPS tracking/Fall [address] Aging in [address] contains information on home care agencies, adult day centers, higher levels of care, and more  - [address] [facility] List  - Adult Day list    Legal  - Legal and Financial Planning for People Living with Dementia

## 2023-07-12 NOTE — PATIENT INSTRUCTIONS
[address] [facility]  [address] [facility], [address] [place] [address] [address]  [phone]    Care Conference    NAME: Jamel Madison  AGE: 78 y.o. SEX: male  YOB: 1955  DATE OF ASSESSMENT: 05/31/23  DATE OF CONFERENCE: 07/12/23    Family Present: jaime Good and Ozzy Degroot  Staff Present: Ariadna Gee MD    Patient / Family Goals of Care: Review cognitive decline and associated symptoms, discuss treatment options and care planning. 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 MRI Brain NeuroQuant w&w/o contrast was completed which suggests neurodegenerative etiology. Refer to speech therapy for aphasia. Ragan Cognitive Assessment: 2/22 (blind version)    Decision-making capacity: Likely with capacity for medical and financial decisions, although difficult to determine considering likely severe aphasia. Would recommend neuropsychology Valerie egan for capacity evaluation if necessary.   Staging: Mild Stage Dementia secondary to Primary Progressive Aphasia    Remain active physically, mentally and socially  Pharmaceutical and non-pharmaceutical interventions discussed  Engage in cognitively challenging exercises such as crosswords and puzzles  Maintain chronic conditions under control  Repeat cognitive assessment in 6 months    Diagnostic Studies  Review of bloodwork  Review of MRI NeuroQuant: IMPRESSION: 1. Mild chronic microangiopathy. 2.NeuroQuant analysis was performed: Low hippocampal volume and enlargement of the adjacent inferior lateral ventricles suggestive of local ex-vacuo dilatation. Findings support medial temporal lobe focused neurodegenerative etiology in right clinical setting. Physical Finding Impacting Function   Fall Risk: low fall risk   Activities of Daily Living: Independent   Instrumental Activities of Daily Living: assist    Encourage appropriate footwear at all times  Review fall risk prevention tips and adjust within the home environment as needed    Medications Reviewed   Reviewed current medications. Appear appropriate for current medical diagnoses. Check with PCP before using over the counter medications  Avoid over the counter medications that can affect cognition (e.g., Benadryl, Tylenol PM)   Avoid NSAIDs due to risk of GI bleed and renal impairment    Other Findings   Overall health  BMI: 31.70 kg/m2  Maintain well-balanced diet  Continue following with primary care physician regularly  Iron deficiency anemia secondary to inadequate dietary iron intake  Would benefit from close GI f/u for EGD/colonoscopy. Coronary artery disease involving coronary bypass graft of native heart without angina pectoris  Appears stable. Continue ASA, rosuvastatin. Unclear why not on beta-blocker therapy  Blindness of left eye, unspecified right eye visual impairment category  Refer to ophthalmology  Type 2 diabetes mellitus with hyperglycemia, without long-term current use of insulin  Refer to ophthalmology. Continue metformin.   Primary osteoathritis involving multiple joints  Continue regular walking  Aphasia  Referral to speech therapy  Cortical age-related cataract of right eye  Continue with regular eye care. Due to follow up with [facility] eye    Recommended Health Maintenance   Immunizations, if not contraindicated: Influenza vaccine yearly  Pneumo vaccine every 5 years (65 years and over)  Shingles vaccine  COVID-19 vaccine    Social / Safety Concerns  Consider an Adult Day Program for positive socialization, physical exercise, cognitive stimulation and family respite  Stay in touch with family and friends  Plan self-care activities for your mental well-being each week  Recommend review of fall risk prevention tips  Recommend use of fall precautions including fall alert device  For wandering prevention: consider use of fall alert with GPS, Reveeeturn bracelet, Project LifeSaver bracelet, video surveillance, or alarm systems  Consider assistance for medication administration such as blister packaging or use of an automated pill dispenser  Recommend contacting your local [address] [place] [address] on Aging for possible eligible programs such as OPTIONS, Caregiver Support Program, or [address] [place] [address],[place] [address] updated advance directives and provide a copy to your primary care provider  Consider caregiver support groups and educational resources through the Alzheimer's Association; access Alzheimer's Association 24/7 Helpline at [address] [facility] does offer a monthly caregiver support group. If interested, please speak with a . Utilize reorientation and redirection as needed (dependent on situation)  Educational information provided    Patient and family verbalized understanding of above care plan. For care coordination purposes, this care plan will be shared with your primary care provider. With any questions, please contact our office at [phone].

## 2023-07-14 ENCOUNTER — PATIENT OUTREACH (OUTPATIENT)
Dept: FAMILY MEDICINE CLINIC | Facility: CLINIC | Age: 68
End: 2023-07-14

## 2023-07-14 NOTE — PROGRESS NOTES
[facility] call Aging Office and they stated the intake has been conducted. [facility] spoke to [address] and the case is currently at the county office. The [facility] office is requesting the following documents for Waiver Approval:   1) 5 year tax returns  2)Current or most recent tax return  3) Verification of All Resources, [place], [employer], Life Insurance, Vehicles, Powerboats  4) Bank Statements for the following   Statements for month of March and September for year 2018, 2019, 2020  Bank Statements for Months of June 2021 to Dec 2021  [address] Statements for Months of January 2022 to Dec 2022  [address] Statements for months for January 2023 to June 2023    [facility] spoke to Lashonda and she states she will be getting these documents with the help of patient son. Pt son is the POA and he will try to get these documents. CMOC will f/u with son and friend Azucena Neely next week on Monday to see what the status of the documents. Per Azucena Neely she states patient has seen the geriatrics doctor yesterday and patient was referred to Adult Day Program.  The son and Azucena Neely were not given any information  For the Adult Day Program. Pt friend would like a list of the Adult Day Programs. CMOC will send in-basket to [address] [place] [address] to assist with this resource.

## 2023-07-19 ENCOUNTER — CONSULT (OUTPATIENT)
Dept: GASTROENTEROLOGY | Facility: CLINIC | Age: 68
End: 2023-07-19
Payer: MEDICARE

## 2023-07-19 VITALS
WEIGHT: 191 LBS | SYSTOLIC BLOOD PRESSURE: 136 MMHG | BODY MASS INDEX: 28.95 KG/M2 | TEMPERATURE: 97.6 F | HEIGHT: 68 IN | DIASTOLIC BLOOD PRESSURE: 76 MMHG

## 2023-07-19 DIAGNOSIS — Z86.010 HISTORY OF ADENOMATOUS POLYP OF COLON: Primary | ICD-10-CM

## 2023-07-19 DIAGNOSIS — D50.8 IRON DEFICIENCY ANEMIA SECONDARY TO INADEQUATE DIETARY IRON INTAKE: ICD-10-CM

## 2023-07-19 PROCEDURE — 99203 OFFICE O/P NEW LOW 30 MIN: CPT | Performed by: INTERNAL MEDICINE

## 2023-07-19 RX ORDER — POLYETHYLENE GLYCOL 3350, SODIUM SULFATE ANHYDROUS, SODIUM BICARBONATE, SODIUM CHLORIDE, POTASSIUM CHLORIDE 236; 22.74; 6.74; 5.86; 2.97 G/4L; G/4L; G/4L; G/4L; G/4L
4000 POWDER, FOR SOLUTION ORAL ONCE
Qty: 4000 ML | Refills: 0 | Status: SHIPPED | OUTPATIENT
Start: 2023-07-19 | End: 2023-07-19

## 2023-07-19 NOTE — PROGRESS NOTES
[facility] - Outpatient Consultation  Bianka Chapin 78 y.o. male MRN: [id number]  Encounter: [id number]        ASSESSMENT AND PLAN     1. Iron deficiency anemia secondary to inadequate dietary iron intake  Improving with iron supplements, no prior EGDs, last colonoscopy in 2019 with 2 adenomas, repeat due in 2024 however given his new onset iron deficiency anemia without any evidence of overt bleeding we will perform bidirectional endoscopies, plan for gastric plus duodenal biopsies.  - Ambulatory Referral to Gastroenterology  - EGD; Future  - Colonoscopy; Future  - polyethylene glycol (Golytely) 4000 mL solution; Take 4,000 mL by mouth once for 1 dose Take 4000 mL by mouth once for 1 dose. Use as directed  Dispense: 4000 mL; Refill: 0    2. History of adenomatous polyp of colon  2 adenomas in 2019. Follow-up as needed or after procedure. Orders Placed This Encounter   Procedures   • EGD   • Colonoscopy         HISTORY OF PRESENT ILLNESS     HPI   15-year-old male with history including but not limited to CAD s/p CABG, remote PCI currently on aspirin, diabetes on metformin who presents upon primary care provider referral for iron deficiency anemia. Patient during his routine blood work as an outpatient noted to have anemia with hemoglobin around ~8; iron panel at that time consistent with iron deficiency, was started on iron pills and on repeat blood work levels have been improving with improvement in hemoglobin around 9. Other cell lines within normal limits. Not on any antiplatelets or anticoagulation. On my encounter he denies any evidence of rectal bleeding, melena, nausea, vomiting, epigastric pain, unintentional weight changes. He has never had an EGD. Last colonoscopy in 2019 with removal of 2 adenomatous polyps. REVIEW OF SYSTEMS     CONSTITUTIONAL: Denies any fever, chills, rigors, and weight loss. HEENT: No earache or tinnitus.  Denies hearing loss or visual disturbances. CARDIOVASCULAR: No chest pain or palpitations. RESPIRATORY: Denies any cough, hemoptysis, shortness of breath or dyspnea on exertion. GASTROINTESTINAL: As noted in the History of Present Illness. GENITOURINARY: No problems with urination. Denies any hematuria or dysuria. NEUROLOGIC: No dizziness or vertigo, denies headaches. MUSCULOSKELETAL: Denies any muscle or joint pain. SKIN: Denies skin rashes or itching. ENDOCRINE: Denies excessive thirst. Denies intolerance to heat or cold. PSYCHOSOCIAL: Denies depression or anxiety. Denies any recent memory loss. Historical information     Past Medical History:   Diagnosis Date   • Arteriosclerosis of coronary artery 1/13/2014   • Arthritis    • Benign hypertension 1/18/2018   • Carpal tunnel syndrome    • Coronary artery disease    • Costochondritis     RESOLVED: [id number]   • Diabetes mellitus (HCC)    • Hematuria    • HTN (hypertension)    • Hx of CABG 2/7/2014   • Hyperlipidemia    • Kidney stone    • Myocardial infarction [facility])    • Weak carotid pulse      Past Surgical History:   Procedure Laterality Date   • CATARACT EXTRACTION Left    • COLONOSCOPY     • CORONARY ANGIOPLASTY WITH STENT PLACEMENT  06/29/2005    RCA    • CORONARY ANGIOPLASTY WITH STENT PLACEMENT  07/05/2005    Cx   • CORONARY ARTERY BYPASS GRAFT      LAST ASSESSED: [id number]   • LITHOTRIPSY      RENAL   • MOUTH SURGERY     • NV COLONOSCOPY FLX DX W/COLLJ SPEC WHEN PFRMD N/A 2/22/2019    Procedure: COLONOSCOPY;  Surgeon: Tarik Roe MD;  Location: [facility];   Service: Gastroenterology   • NV CYSTO/URETERO W/LITHOTRIPSY &INDWELL STENT INSRT Left 3/24/2017    Procedure: CYSTOSCOPY, URETEROSCOPY, HOLMIUM LASER LITHOTRIPSY, BASKET STONE EXTRACTION, RETROGRADE PYELOGRAM, STENT INSERTION;  Surgeon: Fernando Lara MD;  Location: [facility] OR;  Service: Urology   • NV CYSTO/URETERO W/LITHOTRIPSY &INDWELL STENT INSRT Left 7/28/2017    Procedure: CYSTOSCOPY, URETEROSCOPY WITH HOLMIUM LASER LITHOTRIPSY, AND STENT EXCHANGE;  Surgeon: Afia Sin MD;  [facility];  Service: Urology   • WI LITHOTRIPSY XTRCORP SHOCK WAVE Left 9/15/2017    Procedure: Heath Posey SHOCKWAVE (ESWL); Surgeon: Afia Sin MD;  [facility];  Service: Urology   • ROTATOR CUFF REPAIR Right    • SHOULDER ARTHROSCOPY Left    • TOOTH EXTRACTION       Social History   Social History     Substance and Sexual Activity   Alcohol Use Not Currently    Comment: quit 23 years ago; SOCIAL DRINKER AS PER ALL SCRIPTS      Social History     Substance and Sexual Activity   Drug Use Never     Social History     Tobacco Use   Smoking Status Former   • Packs/day: 1.00   • Years: 44.00   • Total pack years: 44.00   • Types: Cigarettes   Smokeless Tobacco Never   Tobacco Comments    up to 1ppd since age 13y/o; FORMER SMOKER AS PER ALL SCRIPTS      Family History   Problem Relation Age of Onset   • Diabetes Sister         TYPE 2   • Diabetes Mother    • Heart disease Mother    • Diabetes Brother    • Diabetes Sister         TYPE 2   • Heart disease Sister    • Heart disease Sister    • Coronary artery disease Maternal Uncle    • Diabetes Maternal Uncle        Allergies       Current Outpatient Medications:   •  aspirin 81 MG tablet  •  docusate sodium (COLACE) 100 mg capsule  •  ferrous sulfate 324 (65 Fe) mg  •  lisinopril (ZESTRIL) 2.5 mg tablet  •  metFORMIN (GLUCOPHAGE-XR) 750 mg 24 hr tablet  •  omega-3-acid ethyl esters (LOVAZA) 1 g capsule  •  polyethylene glycol (Golytely) 4000 mL solution  •  rosuvastatin (CRESTOR) 40 MG tablet    Allergies   Allergen Reactions   • Morphine GI Intolerance     vomiting           Objective assessment       Blood pressure 136/76, temperature 97.6 °F (36.4 °C), temperature source Tympanic, height 5' 8" (1.727 m), weight 86.6 kg (191 lb). Body mass index is 29.04 kg/m².         PHYSICAL EXAM:         General Appearance:   Alert, cooperative, no distress   HEENT: Normocephalic     Neck:  symmetrical   Lungs:   Breathing unlabored, able to speak in full sentences    Heart[de-identified]   Regular rate    Abdomen:    Nontender   Genitalia:   Deferred    Rectal:   Deferred    Extremities:  No cyanosis, or edema    Pulses:  Strong   Skin:  No jaundice, rashes, or lesions    Lymph nodes:  No palpable cervical lymphadenopathy        Lab Results:      No visits with results within 1 Day(s) from this visit.    Latest known visit with results is:   Appointment on 05/01/2023   Component Date Value   • WBC 05/01/2023 6.19    • RBC 05/01/2023 4.36    • Hemoglobin 05/01/2023 9.3 (L)    • Hematocrit 05/01/2023 32.4 (L)    • MCV 05/01/2023 74 (L)    • MCH 05/01/2023 21.3 (L)    • MCHC 05/01/2023 28.7 (L)    • RDW 05/01/2023 18.8 (H)    • MPV 05/01/2023 9.7    • Platelets 43/58/4910 337    • nRBC 05/01/2023 0    • Neutrophils Relative 05/01/2023 68    • Immat GRANS % 05/01/2023 0    • Lymphocytes Relative 05/01/2023 23    • Monocytes Relative 05/01/2023 6    • Eosinophils Relative 05/01/2023 1    • Basophils Relative 05/01/2023 2 (H)    • Neutrophils Absolute 05/01/2023 4.25    • Immature Grans Absolute 05/01/2023 0.02    • Lymphocytes Absolute 05/01/2023 1.44    • Monocytes Absolute 05/01/2023 0.36    • Eosinophils Absolute 05/01/2023 0.03    • Basophils Absolute 05/01/2023 0.09    • Sodium 05/01/2023 136    • Potassium 05/01/2023 4.5    • Chloride 05/01/2023 105    • CO2 05/01/2023 26    • ANION GAP 05/01/2023 5    • BUN 05/01/2023 15    • Creatinine 05/01/2023 1.06    • Glucose, Fasting 05/01/2023 237 (H)    • Calcium 05/01/2023 9.6    • AST 05/01/2023 23    • ALT 05/01/2023 30    • Alkaline Phosphatase 05/01/2023 58    • Total Protein 05/01/2023 8.1    • Albumin 05/01/2023 4.2    • Total Bilirubin 05/01/2023 0.60    • eGFR 05/01/2023 72    • TSH 3RD GENERATON 05/01/2023 1.090    • Sed Rate 05/01/2023 31 (H)    • Syphilis Total Antibody 05/01/2023 Non-reactive    • Vitamin B-12 05/01/2023 551    • Iron Saturation 05/01/2023 3 (L)    • TIBC 05/01/2023 623 (H)    • Iron 05/01/2023 19 (L)    • Ferritin 05/01/2023 9          Radiology Results:      No results found.       German Burgos MD  [facility] Fellow

## 2023-07-19 NOTE — PATIENT INSTRUCTIONS
Scheduled date of colonoscopy/egd  (as of today): 9/7/23  Physician performing colonoscopy: Dr. Bridgette Blanco  Location of colonoscopy: [facility]   Bowel prep reviewed with patient: golytely/dulcolax   Instructions reviewed with patient by: Nate Morrison   Clearances:   n/a

## 2023-07-24 ENCOUNTER — PATIENT OUTREACH (OUTPATIENT)
Dept: INTERNAL MEDICINE CLINIC | Facility: CLINIC | Age: 68
End: 2023-07-24

## 2023-07-24 ENCOUNTER — RA CDI HCC (OUTPATIENT)
Dept: OTHER | Facility: HOSPITAL | Age: 68
End: 2023-07-24

## 2023-07-24 NOTE — PROGRESS NOTES
[address] [facility] coding opportunities       Chart reviewed, no opportunity found: CHART REVIEWED, NO OPPORTUNITY FOUND        Patients Insurance     Medicare Insurance: Medicare

## 2023-07-24 NOTE — PROGRESS NOTES
MISSY has returned all to pt's friend Idalia Sinha who has been helping with pt [place] Waiver Evaluation. She was interested in info re Adult Day Programs. Sw did share with her info re the [phone] ( there may have an opening) and the [address] [place] [address] [phone] or [address] [facility] [address] Coordinator @ [phone]. If [place] Waiver approved the program can help pt pay for same. She shares that she is concerned as she realizes pt is not taking his medication properly. She thinks he maybe taking them all at one time. She sets up  the medications in a pill box. .    Missy suggested she speak with PCP re same to see if is okay to take them altogetehr. Sw did suggest if PA Waiver approved aides cannot give medications but they can remind  pt to take them at the right time. Another option might be blister packing from a local pharmacy. She is asking for additional help with other needed referral ie Sharon and his eye care. MISSY also suggested he speak with his PCP  this week at his upcoming meeting. In addition the Referral Specialist maybe able to help him get scheduled. In addition , Idalia Sinha asked if the aides when [address] [place] [address] approved can bring pt to some appointments? MISSY advised they are NOT required to trasnport pts but can accompany pts on their appointments  on [employer]. She  share they would like pt to be on the [employer] as well,. SW to ask [facility] to help with same.

## 2023-07-25 ENCOUNTER — PATIENT OUTREACH (OUTPATIENT)
Dept: FAMILY MEDICINE CLINIC | Facility: CLINIC | Age: 68
End: 2023-07-25

## 2023-07-25 NOTE — PROGRESS NOTES
Taking all of his medications at the same time is okay. I have already referred him to ophthalmology previously. He just needs to schedule. Who is requesting a referral for speech? What is the indication?

## 2023-07-25 NOTE — PROGRESS NOTES
CMOC spoke to [address] friend of patient and she states the son in [place] is taking care of gathering the documents for the waiver services. Son in [place] is Ella Hamlin [phone] he is son's POA. [facility] left message for lsia Bledsoe to call me back. [facility] states the bank will have the bank statements in 10 days. This Friday it will be 10 days. [facility] will continue to f/u with pt and team.     Case discussed w/ [address] today. CMOC spoke to son Ella Hamlin who is the [facility] for patient. He will send us a copy of the POA. [facility] will provide the e-mail address for [facility] office for him to send the document he has and the POA for waiver approval.     Pt lisa Case Form has the phone number for [address] [place] [address]. He will reach out to her w/ any questions he may have.

## 2023-07-28 ENCOUNTER — PATIENT OUTREACH (OUTPATIENT)
Dept: FAMILY MEDICINE CLINIC | Facility: CLINIC | Age: 68
End: 2023-07-28

## 2023-07-28 NOTE — PROGRESS NOTES
OC have received Power of  forms from son via e-mail. [address] [facility] also received Financial Power of  via e-mail. A copy has been send to the [facility] office by the son. A copy of these forms have been scanned in the file.

## 2023-08-01 ENCOUNTER — OFFICE VISIT (OUTPATIENT)
Dept: INTERNAL MEDICINE CLINIC | Facility: CLINIC | Age: 68
End: 2023-08-01

## 2023-08-01 ENCOUNTER — PATIENT OUTREACH (OUTPATIENT)
Dept: INTERNAL MEDICINE CLINIC | Facility: CLINIC | Age: 68
End: 2023-08-01

## 2023-08-01 VITALS
TEMPERATURE: 97.6 F | WEIGHT: 195 LBS | BODY MASS INDEX: 29.55 KG/M2 | HEIGHT: 68 IN | SYSTOLIC BLOOD PRESSURE: 132 MMHG | HEART RATE: 80 BPM | DIASTOLIC BLOOD PRESSURE: 85 MMHG

## 2023-08-01 DIAGNOSIS — F02.80 PRIMARY PROGRESSIVE APHASIA (HCC): Chronic | ICD-10-CM

## 2023-08-01 DIAGNOSIS — D50.8 IRON DEFICIENCY ANEMIA SECONDARY TO INADEQUATE DIETARY IRON INTAKE: Chronic | ICD-10-CM

## 2023-08-01 DIAGNOSIS — Z13.6 ENCOUNTER FOR ABDOMINAL AORTIC ANEURYSM (AAA) SCREENING: ICD-10-CM

## 2023-08-01 DIAGNOSIS — Z12.5 PROSTATE CANCER SCREENING: ICD-10-CM

## 2023-08-01 DIAGNOSIS — R94.8 ABNORMAL RESULTS OF FUNCTION STUDIES OF OTHER ORGANS AND SYSTEMS: ICD-10-CM

## 2023-08-01 DIAGNOSIS — Z00.00 MEDICARE ANNUAL WELLNESS VISIT, INITIAL: Primary | ICD-10-CM

## 2023-08-01 DIAGNOSIS — E11.65 TYPE 2 DIABETES MELLITUS WITH HYPERGLYCEMIA, WITHOUT LONG-TERM CURRENT USE OF INSULIN (HCC): Chronic | ICD-10-CM

## 2023-08-01 DIAGNOSIS — E78.2 MIXED HYPERLIPIDEMIA: ICD-10-CM

## 2023-08-01 DIAGNOSIS — Z87.891 PERSONAL HISTORY OF NICOTINE DEPENDENCE: ICD-10-CM

## 2023-08-01 DIAGNOSIS — R80.9 MICROALBUMINURIA: ICD-10-CM

## 2023-08-01 DIAGNOSIS — G31.01 PRIMARY PROGRESSIVE APHASIA (HCC): Chronic | ICD-10-CM

## 2023-08-01 PROBLEM — R47.01 APHASIA: Status: RESOLVED | Noted: 2023-05-31 | Resolved: 2023-08-01

## 2023-08-01 LAB — SL AMB POCT HEMOGLOBIN AIC: 8.4 (ref ?–6.5)

## 2023-08-01 PROCEDURE — G0439 PPPS, SUBSEQ VISIT: HCPCS | Performed by: PHYSICIAN ASSISTANT

## 2023-08-01 PROCEDURE — 83036 HEMOGLOBIN GLYCOSYLATED A1C: CPT | Performed by: PHYSICIAN ASSISTANT

## 2023-08-01 PROCEDURE — 99214 OFFICE O/P EST MOD 30 MIN: CPT | Performed by: PHYSICIAN ASSISTANT

## 2023-08-01 RX ORDER — METFORMIN HYDROCHLORIDE 500 MG/1
1000 TABLET, EXTENDED RELEASE ORAL 2 TIMES DAILY WITH MEALS
Qty: 180 TABLET | Refills: 3 | Status: SHIPPED | OUTPATIENT
Start: 2023-08-01

## 2023-08-01 NOTE — ASSESSMENT & PLAN NOTE
Patient is now followed by senior care. MRI Brain NeuroQuant w&w/o contrast was completed which suggests neurodegenerative etiology. He did refer to speech therapy and recommended neuropsych consult as well. Patient does not want to do either. His caregiver states she will discuss speech therapy with him, but also declines neuropsych. Referral given and scheduling information given to patient and care giver.

## 2023-08-01 NOTE — PATIENT INSTRUCTIONS
Medicare Preventive Visit Patient Instructions  Thank you for completing your Welcome to Medicare Visit or Medicare Annual Wellness Visit today. Your next wellness visit will be due in one year (8/1/2024). The screening/preventive services that you may require over the next 5-10 years are detailed below. Some tests may not apply to you based off risk factors and/or age. Screening tests ordered at today's visit but not completed yet may show as past due. Also, please note that scanned in results may not display below. Preventive Screenings:  Service Recommendations Previous Testing/Comments   Colorectal Cancer Screening  · Colonoscopy    · Fecal Occult Blood Test (FOBT)/Fecal Immunochemical Test (FIT)  · Fecal DNA/Cologuard Test  · Flexible Sigmoidoscopy Age: 43-73 years old   Colonoscopy: every 10 years (May be performed more frequently if at higher risk)  OR  FOBT/FIT: every 1 year  OR  Cologuard: every 3 years  OR  Sigmoidoscopy: every 5 years  Screening may be recommended earlier than age 39 if at higher risk for colorectal cancer. Also, an individualized decision between you and your healthcare provider will decide whether screening between the ages of 77-80 would be appropriate. Colonoscopy: 02/22/2019  FOBT/FIT: Not on file  Cologuard: Not on file  Sigmoidoscopy: Not on file          Prostate Cancer Screening Individualized decision between patient and health care provider in men between ages of 53-66   Medicare will cover every 12 months beginning on the day after your 50th birthday PSA: 1.0 ng/mL           Hepatitis C Screening Once for adults born between 1945 and 1965  More frequently in patients at high risk for Hepatitis C Hep C Antibody: 09/22/2022        Diabetes Screening 1-2 times per year if you're at risk for diabetes or have pre-diabetes Fasting glucose: 237 mg/dL (5/1/2023)  A1C: 8.6 (5/1/2023)      Cholesterol Screening Once every 5 years if you don't have a lipid disorder.  May order more often based on risk factors. Lipid panel: 09/22/2022         Other Preventive Screenings Covered by Medicare:  1. Abdominal Aortic Aneurysm (AAA) Screening: covered once if your at risk. You're considered to be at risk if you have a family history of AAA or a male between the age of 70-76 who smoking at least 100 cigarettes in your lifetime. 2. Lung Cancer Screening: covers low dose CT scan once per year if you meet all of the following conditions: (1) Age 48-67; (2) No signs or symptoms of lung cancer; (3) Current smoker or have quit smoking within the last 15 years; (4) You have a tobacco smoking history of at least 20 pack years (packs per day x number of years you smoked); (5) You get a written order from a healthcare provider. 3. Glaucoma Screening: covered annually if you're considered high risk: (1) You have diabetes OR (2) Family history of glaucoma OR (3)  aged 48 and older OR (3)  American aged 72 and older  3. Osteoporosis Screening: covered every 2 years if you meet one of the following conditions: (1) Have a vertebral abnormality; (2) On glucocorticoid therapy for more than 3 months; (3) Have primary hyperparathyroidism; (4) On osteoporosis medications and need to assess response to drug therapy. 5. HIV Screening: covered annually if you're between the age of 14-79. Also covered annually if you are younger than 13 and older than 72 with risk factors for HIV infection. For pregnant patients, it is covered up to 3 times per pregnancy.     Immunizations:  Immunization Recommendations   Influenza Vaccine Annual influenza vaccination during flu season is recommended for all persons aged >= 6 months who do not have contraindications   Pneumococcal Vaccine   * Pneumococcal conjugate vaccine = PCV13 (Prevnar 13), PCV15 (Vaxneuvance), PCV20 (Prevnar 20)  * Pneumococcal polysaccharide vaccine = PPSV23 (Pneumovax) Adults 20-63 years old: 1-3 doses may be recommended based on certain risk factors  Adults 72 years old: 1-2 doses may be recommended based off what pneumonia vaccine you previously received   Hepatitis B Vaccine 3 dose series if at intermediate or high risk (ex: diabetes, end stage renal disease, liver disease)   Tetanus (Td) Vaccine - COST NOT COVERED BY MEDICARE PART B Following completion of primary series, a booster dose should be given every 10 years to maintain immunity against tetanus. Td may also be given as tetanus wound prophylaxis. Tdap Vaccine - COST NOT COVERED BY MEDICARE PART B Recommended at least once for all adults. For pregnant patients, recommended with each pregnancy. Shingles Vaccine (Shingrix) - COST NOT COVERED BY MEDICARE PART B  2 shot series recommended in those aged 48 and above     Health Maintenance Due:      Topic Date Due   • Colorectal Cancer Screening  02/22/2029   • Hepatitis C Screening  Completed     Immunizations Due:      Topic Date Due   • COVID-19 Vaccine (3 - Pfizer series) 07/03/2021   • Influenza Vaccine (1) 09/01/2023     Advance Directives   What are advance directives? Advance directives are legal documents that state your wishes and plans for medical care. These plans are made ahead of time in case you lose your ability to make decisions for yourself. Advance directives can apply to any medical decision, such as the treatments you want, and if you want to donate organs. What are the types of advance directives? There are many types of advance directives, and each state has rules about how to use them. You may choose a combination of any of the following:  · Living will: This is a written record of the treatment you want. You can also choose which treatments you do not want, which to limit, and which to stop at a certain time. This includes surgery, medicine, IV fluid, and tube feedings. · Durable power of  for healthcare [place] [facility]):   This is a written record that states who you want to make healthcare choices for you when you are unable to make them for yourself. This person, called a proxy, is usually a family member or a friend. You may choose more than 1 proxy. · Do not resuscitate (DNR) order:  A DNR order is used in case your heart stops beating or you stop breathing. It is a request not to have certain forms of treatment, such as CPR. A DNR order may be included in other types of advance directives. · Medical directive: This covers the care that you want if you are in a coma, near death, or unable to make decisions for yourself. You can list the treatments you want for each condition. Treatment may include pain medicine, surgery, blood transfusions, dialysis, IV or tube feedings, and a ventilator (breathing machine). · Values history: This document has questions about your views, beliefs, and how you feel and think about life. This information can help others choose the care that you would choose. Why are advance directives important? An advance directive helps you control your care. Although spoken wishes may be used, it is better to have your wishes written down. Spoken wishes can be misunderstood, or not followed. Treatments may be given even if you do not want them. An advance directive may make it easier for your family to make difficult choices about your care. Weight Management   Why it is important to manage your weight:  Being overweight increases your risk of health conditions such as heart disease, high blood pressure, type 2 diabetes, and certain types of cancer. It can also increase your risk for osteoarthritis, sleep apnea, and other respiratory problems. Aim for a slow, steady weight loss. Even a small amount of weight loss can lower your risk of health problems. How to lose weight safely:  A safe and healthy way to lose weight is to eat fewer calories and get regular exercise. You can lose up about 1 pound a week by decreasing the number of calories you eat by 500 calories each day.    Healthy meal plan for weight management:  A healthy meal plan includes a variety of foods, contains fewer calories, and helps you stay healthy. A healthy meal plan includes the following:  · Eat whole-grain foods more often. A healthy meal plan should contain fiber. Fiber is the part of grains, fruits, and vegetables that is not broken down by your body. Whole-grain foods are healthy and provide extra fiber in your diet. Some examples of whole-grain foods are whole-wheat breads and pastas, oatmeal, brown rice, and bulgur. · Eat a variety of vegetables every day. Include dark, leafy greens such as spinach, kale, travis greens, and mustard greens. Eat yellow and orange vegetables such as carrots, sweet potatoes, and winter squash. · Eat a variety of fruits every day. Choose fresh or canned fruit (canned in its own juice or light syrup) instead of juice. Fruit juice has very little or no fiber. · Eat low-fat dairy foods. Drink fat-free (skim) milk or 1% milk. Eat fat-free yogurt and low-fat cottage cheese. Try low-fat cheeses such as mozzarella and other reduced-fat cheeses. · Choose meat and other protein foods that are low in fat. Choose beans or other legumes such as split peas or lentils. Choose fish, skinless poultry (chicken or turkey), or lean cuts of red meat (beef or pork). Before you cook meat or poultry, cut off any visible fat. · Use less fat and oil. Try baking foods instead of frying them. Add less fat, such as margarine, sour cream, regular salad dressing and mayonnaise to foods. Eat fewer high-fat foods. Some examples of high-fat foods include french fries, doughnuts, ice cream, and cakes. · Eat fewer sweets. Limit foods and drinks that are high in sugar. This includes candy, cookies, regular soda, and sweetened drinks. Exercise:  Exercise at least 30 minutes per day on most days of the week. Some examples of exercise include walking, biking, dancing, and swimming.  You can also fit in more physical activity by taking the stairs instead of the elevator or parking farther away from stores. Ask your healthcare provider about the best exercise plan for you. © Copyright [employer] 2018 Information is for End User's use only and may not be sold, redistributed or otherwise used for commercial purposes.  All illustrations and images included in CareNotes® are the copyrighted property of [employer] or [address] [place] [address]

## 2023-08-01 NOTE — ASSESSMENT & PLAN NOTE
Lab Results   Component Value Date    CHOLESTEROL 149 09/22/2022    CHOLESTEROL 174 01/13/2022    CHOLESTEROL 153 06/28/2021     Lab Results   Component Value Date    HDL 68 09/22/2022    HDL 75 01/13/2022    HDL 58 06/28/2021     Lab Results   Component Value Date    TRIG 78 09/22/2022    TRIG 64 01/13/2022    TRIG 121 06/28/2021     No results found for: "[address]"   Lab Results   Component Value Date    LDLCALC 65 09/22/2022     Will recheck lipid panel today. Continue Crestor 40 mg daily. Low fat diet.

## 2023-08-01 NOTE — PROGRESS NOTES
Assessment and Plan:     Problem List Items Addressed This Visit        Endocrine    Type 2 diabetes mellitus with hyperglycemia, without long-term current use of insulin (HCC) (Chronic)       Lab Results   Component Value Date    HGBA1C 8.4 (A) 08/01/2023     Slight improvement, but his caregiver states he does not always takes his medications. She is not there every day. She reports he misses days, but feels it is less than half the days each week. Will increase metformin to 1000 mg BID from 750 mg BID. Emory Degroot, is trying to help get waiver approved so there will be an aide with him daily. This will likely improve adherence. Reviewed nutrition and exercise counseling. Referral to ophthalmology. He is over due. Up to date on foot exam. Recheck a1c in 3 months. Relevant Medications    metFORMIN (GLUCOPHAGE-XR) 500 mg 24 hr tablet    Other Relevant Orders    Ambulatory Referral to Ophthalmology    POCT hemoglobin A1c (Completed)    Basic metabolic panel       Nervous and Auditory    Primary progressive aphasia ([address]) (Chronic)     Patient is now followed by senior care. MRI Brain NeuroQuant w&w/o contrast was completed which suggests neurodegenerative etiology. He did refer to speech therapy and recommended neuropsych consult as well. Patient does not want to do either. His caregiver states she will discuss speech therapy with him, but also declines neuropsych. Referral given and scheduling information given to patient and care giver. Other    Iron deficiency anemia secondary to inadequate dietary iron intake (Chronic)     Will recheck CBC today. Continue ferrous sulfate daily. Colonoscopy/endoscopy scheduled 9/7/23.          Relevant Orders    CBC and differential    Iron Panel (Includes Ferritin, Iron Sat%, Iron, and TIBC)    Albumin / creatinine urine ratio    Mixed hyperlipidemia     Lab Results   Component Value Date    CHOLESTEROL 149 09/22/2022    CHOLESTEROL 174 01/13/2022 CHOLESTEROL 153 06/28/2021     Lab Results   Component Value Date    HDL 68 09/22/2022    HDL 75 01/13/2022    HDL 58 06/28/2021     Lab Results   Component Value Date    TRIG 78 09/22/2022    TRIG 64 01/13/2022    TRIG 121 06/28/2021     No results found for: "[address]"   Lab Results   Component Value Date    LDLCALC 65 09/22/2022     Will recheck lipid panel today. Continue Crestor 40 mg daily. Low fat diet. Relevant Orders    Lipid panel    Microalbuminuria     Continue lisinopril 2.5 mg daily. Personal history of nicotine dependence     Recommended CT lung cancer screening. Patient was agreeable. See shared decision making. Relevant Orders    CT lung screening program    Medicare annual wellness visit, initial - Primary     Discussed general health recommendations and screening guidelines. Agreeable to PSA for prostate cancer screening. Agreeable to AAA screening and CT lung cancer screening. Colonoscopy scheduled. Declines Shingrix immunization today. Up to date on all other immunizations. Recommend routine dental and eye exams. Next AWV one year. Other Visit Diagnoses     Prostate cancer screening        Relevant Orders    PSA, total and free    Encounter for abdominal aortic aneurysm (AAA) screening        Relevant Orders    US abdominal aorta screening aaa    Abnormal results of function studies of other organs and systems        Relevant Orders    PSA, total and free    BMI 29.0-29.9,adult            BMI Counseling: Body mass index is 29.65 kg/m². The BMI is above normal. Nutrition recommendations include decreasing portion sizes, encouraging healthy choices of fruits and vegetables, decreasing fast food intake, consuming healthier snacks, limiting drinks that contain sugar, moderation in carbohydrate intake, increasing intake of lean protein, reducing intake of saturated and trans fat and reducing intake of cholesterol.  Exercise recommendations include moderate physical activity 150 minutes/week, exercising 3-5 times per week and strength training exercises. No pharmacotherapy was ordered. Rationale for BMI follow-up plan is due to patient being overweight or obese. Tobacco Cessation Counseling: Tobacco cessation counseling was provided. The patient is sincerely urged to quit consumption of tobacco. He is not ready to quit tobacco. Medication options and side effects of medication discussed. Patient refused medication. Lung Cancer Screening Shared Decision Making: I discussed with him that he is a candidate for lung cancer CT screening. The following Shared Decision-Making points were covered:  1. Benefits of screening were discussed, including the rates of reduction in death from lung cancer and other causes. Harms of screening were reviewed, including false positive tests, radiation exposure levels, risks of invasive procedures, risks of complications of screening, and risk of overdiagnosis. 2. I counseled on the importance of adherence to annual lung cancer LDCT screening, impact of co-morbidities, and ability or willingness to undergo diagnosis and treatment. 3. I counseled on the importance of maintaining abstinence as a former smoker or was counseled on the importance of smoking cessation if a current smoker    Review of Eligibility Criteria: He meets all of the criteria for Lung Cancer Screening.   - He is 78 y.o.   - He has 20 pack year tobacco history and is a current smoker or has quit within the past 15 years  - He presents no signs or symptoms of lung cancer    After discussion, the patient decided to elect lung cancer screening. Preventive health issues were discussed with patient, and age appropriate screening tests were ordered as noted in patient's After Visit Summary. Personalized health advice and appropriate referrals for health education or preventive services given if needed, as noted in patient's After Visit Summary.      History of Present Illness:     Patient presents for a Medicare Wellness Visit    Patient is a 78year old male with a PMH of T2DM, CAD, and HLD presenting for annual wellness visit. He is here with his friend, who helps care for him. They were never  or together, but they had a child together and have been friends since. She has been helping him because of his recent cognitive decline. He states he has been compliant with his medications. Denies any medication side effects. He does not check his sugar. Denies any hypoglycemic or hyperglycemic events. Denies polyuria, polyphagia, and polydipsia. Denies changes in appetite or weight. Denies visual changes. He is blind in his left eye. Denies numbness, tingling, or weakness. Patient Care Team:  Prasanna Jj PA-C as PCP - General (Physician Assistant)  Wallie Schilder, Melvena Friday, MD Ralston Ramus, MD Inda Olszewski, MD as Endoscopist  Gerardo Edwards as  Care Manager (Care Coordination)  Yana Reid as [facility] Worker  Galilea Freed MD (Ophthalmology)     Review of Systems:     Review of Systems   Constitutional: Negative for activity change, appetite change, chills, diaphoresis, fatigue, fever and unexpected weight change. HENT: Negative for congestion, hearing loss, sore throat, tinnitus and trouble swallowing. Eyes: Negative for photophobia and visual disturbance. Respiratory: Negative for cough, chest tightness, shortness of breath and wheezing. Cardiovascular: Negative for chest pain, palpitations and leg swelling. Gastrointestinal: Negative for abdominal pain, blood in stool, constipation, diarrhea, nausea and vomiting. Endocrine: Negative for cold intolerance, heat intolerance, polydipsia, polyphagia and polyuria. Genitourinary: Negative for decreased urine volume, dysuria, frequency, hematuria and urgency. Musculoskeletal: Negative for arthralgias and myalgias. Skin: Negative for rash. Neurological: Negative for dizziness, tremors, weakness, light-headedness, numbness and headaches. Hematological: Negative for adenopathy. Psychiatric/Behavioral: Positive for confusion. Negative for agitation, behavioral problems, dysphoric mood, hallucinations, self-injury, sleep disturbance and suicidal ideas. The patient is not nervous/anxious.          Problem List:     Patient Active Problem List   Diagnosis   • Coronary artery disease involving coronary bypass graft of native heart without angina pectoris   • Mixed hyperlipidemia   • Microalbuminuria   • Onychomycosis   • Erectile dysfunction   • Primary osteoarthritis involving multiple joints   • Carpal tunnel syndrome, bilateral   • Blindness of left eye   • Type 2 diabetes mellitus with hyperglycemia, without long-term current use of insulin (HCC)   • Primary progressive aphasia ([address])   • Iron deficiency anemia secondary to inadequate dietary iron intake   • Cortical age-related cataract of right eye   • Personal history of nicotine dependence   • Medicare annual wellness visit, initial      Past Medical and Surgical History:     Past Medical History:   Diagnosis Date   • Arteriosclerosis of coronary artery 1/13/2014   • Arthritis    • Benign hypertension 1/18/2018   • Carpal tunnel syndrome    • Coronary artery disease    • Costochondritis     RESOLVED: [id number]   • Diabetes mellitus (HCC)    • Hematuria    • HTN (hypertension)    • Hx of CABG 2/7/2014   • Hyperlipidemia    • Kidney stone    • Myocardial infarction [facility])    • Weak carotid pulse      Past Surgical History:   Procedure Laterality Date   • CATARACT EXTRACTION Left    • COLONOSCOPY     • CORONARY ANGIOPLASTY WITH STENT PLACEMENT  06/29/2005    RCA    • CORONARY ANGIOPLASTY WITH STENT PLACEMENT  07/05/2005    Cx   • CORONARY ARTERY BYPASS GRAFT      LAST ASSESSED: [id number]   • LITHOTRIPSY      RENAL   • MOUTH SURGERY     • IL COLONOSCOPY FLX DX W/COLLJ SPEC WHEN PFRMD N/A 2/22/2019 Procedure: COLONOSCOPY;  Surgeon: Levon Valladares MD;  Location: [facility]; Service: Gastroenterology   • MT CYSTO/URETERO W/LITHOTRIPSY &INDWELL STENT INSRT Left 3/24/2017    Procedure: CYSTOSCOPY, URETEROSCOPY, HOLMIUM LASER LITHOTRIPSY, BASKET STONE EXTRACTION, RETROGRADE PYELOGRAM, STENT INSERTION;  Surgeon: Liam Cid MD;  Location:  MAIN OR;  Service: Urology   • MT CYSTO/URETERO W/LITHOTRIPSY &INDWELL STENT INSRT Left 7/28/2017    Procedure: CYSTOSCOPY, URETEROSCOPY WITH HOLMIUM LASER LITHOTRIPSY, AND STENT EXCHANGE;  Surgeon: Louisa Becerra MD;  [facility];  Service: Urology   • MT LITHOTRIPSY XTRCORP SHOCK WAVE Left 9/15/2017    Procedure: Betina Pages SHOCKWAVE (ESWL);   Surgeon: Louisa Becerra MD;  [facility];  Service: Urology   • ROTATOR CUFF REPAIR Right    • SHOULDER ARTHROSCOPY Left    • TOOTH EXTRACTION        Family History:     Family History   Problem Relation Age of Onset   • Diabetes Sister         TYPE 2   • Diabetes Mother    • Heart disease Mother    • Diabetes Brother    • Diabetes Sister         TYPE 2   • Heart disease Sister    • Heart disease Sister    • Coronary artery disease Maternal Uncle    • Diabetes Maternal Uncle       Social History:     Social History     Socioeconomic History   • Marital status:      Spouse name: None   • Number of children: None   • Years of education: None   • Highest education level: None   Occupational History   • Occupation: laboror     Comment:    Tobacco Use   • Smoking status: Every Day     Packs/day: 0.25     Years: 44.00     Total pack years: 11.00     Types: Cigarettes   • Smokeless tobacco: Never   • Tobacco comments:     up to 1ppd since age 13y/o; FORMER SMOKER AS PER ALL SCRIPTS    Vaping Use   • Vaping Use: Never used   Substance and Sexual Activity   • Alcohol use: Not Currently     Comment: quit 23 years ago; SOCIAL DRINKER AS PER ALL SCRIPTS    • Drug use: Never   • Sexual activity: Not Currently   Other Topics Concern   • None   Social History Narrative    The patient was born in [facility]. He graduated from high school and worked for [email or web address]. He reports that when they shut down, he went to [employer] [facility] [place]. 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 Most recently he has worked for [employer] as a      Social Determinants of [address] Strain: [address]  (8/1/2023)    Overall Financial Resource Strain (CARDIA)    • Difficulty of Paying Living Expenses: Not hard at all   Food Insecurity: No Food Insecurity (8/1/2023)    Hunger Vital Sign    • Worried About Running Out of Food in the Last Year: Never true    • Ran Out of Food in the Last Year: Never true   Transportation Needs: No Transportation Needs (8/1/2023)    PRAPARE - Transportation    • Lack of Transportation (Medical): No    • Lack of Transportation (Non-Medical): No   Physical Activity: Inactive (9/16/2020)    Exercise Vital Sign    • Days of Exercise per Week: 0 days    • Minutes of Exercise per Session: 0 min   Stress: No Stress Concern Present (9/16/2020)    [facility]    • Feeling of Stress : Not at all   Social Connections: Socially Isolated (9/16/2020)    Social Connection and Isolation Panel [NHANES]    • Frequency of Communication with Friends and Family: More than three times a week    • Frequency of Social Gatherings with Friends and Family:  Three times a week    • Attends Confucianism Services: Never    • Active Member of Clubs or Organizations: No    • Attends Club or Organization Meetings: Never    • Marital Status:    Intimate Partner Violence: Not At Risk (9/16/2020)    Humiliation, Afraid, Rape, and Kick questionnaire    • Fear of Current or Ex-Partner: No    • Emotionally Abused: No    • Physically Abused: No    • Sexually Abused: No   Housing Stability: Not on file      Medications and Allergies:     Current Outpatient Medications   Medication Sig Dispense Refill   • aspirin 81 MG tablet Take 81 mg by mouth daily at bedtime      • ferrous sulfate 324 (65 Fe) mg Take 1 tablet (324 mg total) by mouth daily     • lisinopril (ZESTRIL) 2.5 mg tablet TAKE ONE TABLET BY MOUTH EVERY DAY 90 tablet 3   • metFORMIN (GLUCOPHAGE-XR) 500 mg 24 hr tablet Take 2 tablets (1,000 mg total) by mouth 2 (two) times a day with meals 180 tablet 3   • omega-3-acid ethyl esters (LOVAZA) 1 g capsule Take 100 mg by mouth 2 (two) times a day     • rosuvastatin (CRESTOR) 40 MG tablet TAKE ONE TABLET BY MOUTH EVERY DAY 90 tablet 3     No current facility-administered medications for this visit. Allergies   Allergen Reactions   • Morphine GI Intolerance     vomiting      Immunizations:     Immunization History   Administered Date(s) Administered   • COVID-19 PFIZER VACCINE 0.3 ML IM 04/14/2021, 05/08/2021   • INFLUENZA 03/20/2020, 10/25/2021   • Influenza Injectable, MDCK, Preservative Free, Quadrivalent, 0.5 mL 03/20/2020   • Influenza Quadrivalent Preservative Free 3 years and older IM 10/13/2015, 11/29/2016   • Influenza Quadrivalent, 6-35 Months IM 10/17/2017   • Influenza, Seasonal Vaccine, Quadrivalent, Adjuvanted, . 5e 10/25/2021   • Influenza, recombinant, quadrivalent,injectable, preservative free 10/17/2018   • Influenza, seasonal, injectable 01/05/2011, 01/16/2014   • Pneumococcal Conjugate Vaccine 20-valent (Pcv20), Polysace 09/20/2022   • Pneumococcal Polysaccharide PPV23 07/27/2018   • Tdap 04/09/2016   • Tuberculin Skin Test-PPD Intradermal 02/28/2020      Health Maintenance:         Topic Date Due   • Colorectal Cancer Screening  02/22/2029   • Hepatitis C Screening  Completed         Topic Date Due   • COVID-19 Vaccine (3 - Pfizer series) 07/03/2021   • Influenza Vaccine (1) 09/01/2023      Medicare Screening Tests and Risk Assessments:     Dana Santos is here for his Initial Wellness visit. Health Risk Assessment:   Patient rates overall health as very good. Patient feels that their physical health rating is same. Patient is very satisfied with their life. Eyesight was rated as slightly worse. Hearing was rated as same. Patient feels that their emotional and mental health rating is same. Patients states they are never, rarely angry. Patient states they are never, rarely unusually tired/fatigued. Pain experienced in the last 7 days has been none. Patient states that he has experienced no weight loss or gain in last 6 months. Fall Risk Screening: In the past year, patient has experienced: no history of falling in past year      Home Safety:  Patient does not have trouble with stairs inside or outside of their home. Patient has working smoke alarms and has no working carbon monoxide detector. Home safety hazards include: none. Nutrition:   Current diet is Regular. Medications:   Patient is not currently taking any over-the-counter supplements. Patient is not able to manage medications. Heidi Vargas (patient jimena mother) assist patient with getting meds situated    Activities of Daily Living (ADLs)/Instrumental Activities of Daily Living (IADLs):   Walk and transfer into and out of bed and chair?: Yes  Dress and groom yourself?: Yes    Bathe or shower yourself?: Yes    Feed yourself?  Yes  Do your laundry/housekeeping?: Yes  Manage your money, pay your bills and track your expenses?: No  Make your own meals?: No    Do your own shopping?: Yes    ADL comments: Patient sons assist with shopping, patient also receives meals on wheels    Previous Hospitalizations:   Any hospitalizations or ED visits within the last 12 months?: No      Advance Care Planning:   Living will: No    Durable POA for healthcare: No    Advanced directive: No    Advanced directive counseling given: Yes      Cognitive Screening:   Provider or family/friend/caregiver concerned regarding cognition?: Yes    PREVENTIVE SCREENINGS      Cardiovascular Screening:    General: Screening Not Indicated and History Lipid Disorder      Diabetes Screening:     General: Screening Not Indicated and History Diabetes      Colorectal Cancer Screening:     General: Screening Current      Prostate Cancer Screening:    General: Risks and Benefits Discussed    Due for: PSA      Osteoporosis Screening:    General: Screening Not Indicated      Abdominal Aortic Aneurysm (AAA) Screening:    Risk factors include: age between 70-77 yo and tobacco use        General: Risks and Benefits Discussed    Due for: Screening AAA Ultrasound      Lung Cancer Screening:     General: Risks and Benefits Discussed    Due for: Low Dose CT (LDCT)      Hepatitis C Screening:    General: Screening Current    Screening, Brief Intervention, and Referral to Treatment (SBIRT)    Screening  Typical number of drinks in a day: 0  Typical number of drinks in a week: 0  Interpretation: Low risk drinking behavior. Single Item Drug Screening:  How often have you used an illegal drug (including marijuana) or a prescription medication for non-medical reasons in the past year? never    Single Item Drug Screen Score: 0  Interpretation: Negative screen for possible drug use disorder    Brief Intervention  Alcohol & drug use screenings were reviewed. No concerns regarding substance use disorder identified. Other Counseling Topics:   Car/seat belt/driving safety, skin self-exam, sunscreen and regular weightbearing exercise and calcium and vitamin D intake. No results found. Physical Exam:     /85 (BP Location: Right arm, Patient Position: Sitting, Cuff Size: Large)   Pulse 80   Temp 97.6 °F (36.4 °C) (Temporal)   Ht 5' 8" (1.727 m)   Wt 88.5 kg (195 lb)   BMI 29.65 kg/m²     Physical Exam  Vitals and nursing note reviewed. Constitutional:       General: He is awake. He is not in acute distress. Appearance: Normal appearance.  He is well-developed, well-groomed and overweight. He is not ill-appearing. HENT:      Head: Normocephalic and atraumatic. Right Ear: Tympanic membrane, ear canal and external ear normal.      Left Ear: Tympanic membrane, ear canal and external ear normal.      Nose: Nose normal.      Mouth/Throat:      Mouth: Mucous membranes are moist.      Pharynx: Oropharynx is clear. No oropharyngeal exudate or posterior oropharyngeal erythema. Eyes:      General: No scleral icterus. Extraocular Movements: Extraocular movements intact. Conjunctiva/sclera: Conjunctivae normal.      Pupils: Pupils are equal, round, and reactive to light. Cardiovascular:      Rate and Rhythm: Normal rate and regular rhythm. Pulses: Normal pulses. Heart sounds: Normal heart sounds. No murmur heard. Pulmonary:      Effort: Pulmonary effort is normal. No respiratory distress. Breath sounds: Normal breath sounds and air entry. No decreased air movement. No decreased breath sounds, wheezing, rhonchi or rales. Abdominal:      General: Abdomen is flat. Bowel sounds are normal.      Palpations: Abdomen is soft. Musculoskeletal:         General: Normal range of motion. Cervical back: Neck supple. Right lower leg: No edema. Left lower leg: No edema. Lymphadenopathy:      Cervical: No cervical adenopathy. Skin:     General: Skin is warm. Coloration: Skin is not jaundiced. Findings: No rash. Neurological:      General: No focal deficit present. Mental Status: He is alert. Motor: Motor function is intact. Coordination: Coordination is intact. Gait: Gait is intact. Psychiatric:         Attention and Perception: Attention normal.         Mood and Affect: Mood and affect normal.         Speech: Speech normal.         Behavior: Behavior normal. Behavior is cooperative.           Lilli Marmolejo PA-C

## 2023-08-01 NOTE — ASSESSMENT & PLAN NOTE
Lab Results   Component Value Date    HGBA1C 8.4 (A) 08/01/2023     Slight improvement, but his caregiver states he does not always takes his medications. She is not there every day. She reports he misses days, but feels it is less than half the days each week. Will increase metformin to 1000 mg BID from 750 mg BID. Manjit Malave, is trying to help get waiver approved so there will be an aide with him daily. This will likely improve adherence. Reviewed nutrition and exercise counseling. Referral to ophthalmology. He is over due. Up to date on foot exam. Recheck a1c in 3 months.

## 2023-08-01 NOTE — PROGRESS NOTES
MISSY has met with pt and his friend Mariah Clay this date at his PCP visit. Mariah Clay shares the pt's Meals on Wheels have started. Pt also toured the [employer] but did not like same. MISSY provided contact info again for the [facility]  and encouraged pt to tour same to see if he likes it. If [place] Waiver Approved the Program can find it. MISSY has called [address] [place] [address] for a Waiver update. Shw request that SW have pt sign a MARCELLA for DPW so she can f/u with them directly she will also f/u with  pt's son to see if all required douments sent in for the Financial approval for the Waiver. MARCELLA Siginitue received and secure e-mail sent to [address] [place] [address] who will f/u re same.

## 2023-08-01 NOTE — ASSESSMENT & PLAN NOTE
Discussed general health recommendations and screening guidelines. Agreeable to PSA for prostate cancer screening. Agreeable to AAA screening and CT lung cancer screening. Colonoscopy scheduled. Declines Shingrix immunization today. Up to date on all other immunizations. Recommend routine dental and eye exams. Next AWV one year.

## 2023-08-15 ENCOUNTER — TELEPHONE (OUTPATIENT)
Dept: NEUROLOGY | Facility: CLINIC | Age: 68
End: 2023-08-15

## 2023-08-15 NOTE — TELEPHONE ENCOUNTER
This is [address]an, director supports. I'm returning a voice mail regarding Mr. Rachel Sheehan, birthdays 1955. So I received the voicemail regarding some recommendations on blood work that he had done. If you want to give me a phone call back, that would be great. You can leave that information on my voicemail if it is a private voicemail. So I can implement it from there. I'm also sending a message to [facility] just because um, usually i'm on the phone a lot and I tend to miss some calls and have to work off the [employer] and phone tag. So you can reply there as well. If you need to email me my email address is [email or web address] directed supports and I will, I'm person . com and I will add that in my message. Thank you. Tomy.    [phone]

## 2023-08-17 ENCOUNTER — TELEPHONE (OUTPATIENT)
Age: 68
End: 2023-08-17

## 2023-08-17 NOTE — TELEPHONE ENCOUNTER
Patients GI provider: Dr. Ariana Pepper  Number to return call: [phone]    Reason for call: Pt's friend calling regarding procedure on 9/7/2023. Castro Huff would like to know the time of the procedure since she will be [place] .     Scheduled procedure/appointment date if applicable: Apt/procedure 9/7/2023

## 2023-08-18 ENCOUNTER — TELEPHONE (OUTPATIENT)
Dept: INTERNAL MEDICINE CLINIC | Facility: CLINIC | Age: 68
End: 2023-08-18

## 2023-08-18 ENCOUNTER — PATIENT OUTREACH (OUTPATIENT)
Dept: FAMILY MEDICINE CLINIC | Facility: CLINIC | Age: 68
End: 2023-08-18

## 2023-08-18 NOTE — PROGRESS NOTES
Waiver:    [facility]) spoke to lisa Torres [facility]) today [phone]. Per [employer] the Waiver Case has been closed because the [employer] did not received the required documents. Case was rejected on 07/20/2023. The [place] has a 90 day reconsideration time frame. We have until 10/20/23 to get documents in for [facility] to review. The following documents are needed. 1) Bank Statements for all months from June 2021 to June 2023. 2) Bank Statements for months of March and Sept for 2018, 2019 and 2020  3)Any transactions of over $500 needs an explanation  4)Any resources that pt may have closed an account or a life insurance policy that is closed  Need an explanation  5) Tax returns for last 5 years. Rolo Ang can write a statement to the [facility] office stating he has submitted all tax returns but year 2018 tax return. Case # [id number]    Rolo Ang states he has not received the bank statements from [facility] on [employer]. [address] [place] [address] will be calling the [place] to speak to  to see how they can assist us.   _____________________________________________  [employer]:   Section 8 voucher application completed today. Your Preliminary Application for housing has been successfully submitted. Please retain this receipt for your records. [place] [employer]  [id number]  [id number]   Angy Stack  Submitted  [id number]  August 18, 2023 11:46AM  Status information will only be provided online at [email or web address]. Please do not call the Housing Authority for status information. If your legal or mailing address changes, you must notify the [facility] in writing to maintain your waiting list status. Important: If you lose or forget your User ID and/or Password and are unable to answer the security questions to retrieve your information, you will lose access to the [address] [place] [address] system.  Losing access to the system will not affect your status on the Waiting List.    Please check [email or web address] using this account to receive your Application status. At that time, Active Applicants may use Assistance Connect, which will allow enhanced communication with the Agency. Show Full Application  _____________________________________________  [facility] along with son Chanelle Reagan called [employer] bank at [phone]. [facility] [phone] representative is reviewing the account of the requested bank statements. She stated the 1st request was not process and they will be taking another request and sending it to the proper department. Arbencandelariayudi Heron (son) will get an e-mail with the [address] Statements in the next few days. [facility] will f/u on Tuesday 08/22 to make sure the statements were send to Chasidy Shelby. Later entry:   [facility] spoke to [facility] and she states patient is now getting Meals on Wheels. They are delivering the food to her and she brings it to him.      [facility] will continue to f/u with pt and team.

## 2023-08-18 NOTE — TELEPHONE ENCOUNTER
Spoke with care giver Jose A Ortega, explained the hospital or the procedure facility will finalize the timing of the procedure 24-48 hours in advance.

## 2023-08-18 NOTE — TELEPHONE ENCOUNTER
Patient's care giver Abbey Thu left a message requesting a call back from you. She didn't specify about what. Please give her a call at [phone].

## 2023-08-23 ENCOUNTER — PATIENT OUTREACH (OUTPATIENT)
Dept: FAMILY MEDICINE CLINIC | Facility: CLINIC | Age: 68
End: 2023-08-23

## 2023-08-23 NOTE — PROGRESS NOTES
Waiver:   [address] spoke to son Surendra Witt [facility]) today. He states he has received the bank statements and has forwarded them to me. [facility] did not received them. He will resend them again today. He states its a big file. I will f/u with pt son Surendra Witt tomorrow again.

## 2023-08-24 ENCOUNTER — PATIENT OUTREACH (OUTPATIENT)
Dept: FAMILY MEDICINE CLINIC | Facility: CLINIC | Age: 68
End: 2023-08-24

## 2023-08-24 NOTE — PROGRESS NOTES
[facility] left message for lisa Orta [phone] to send me a copy of the [address] statement or send them to the county office. Grazyna Rao has received the Bank statements from the county office. Patient waiver is on an extension at this time. The [facility] office needs these documents as soon as possible. Later Entry:   Incoming call from son Grazyna Mcmillan today. He will be sending me the documents that are needed.

## 2023-09-05 ENCOUNTER — PATIENT OUTREACH (OUTPATIENT)
Dept: FAMILY MEDICINE CLINIC | Facility: CLINIC | Age: 68
End: 2023-09-05

## 2023-09-05 NOTE — PROGRESS NOTES
[facility]) mailed out [place] for [employer]. To [facility] office.      [address] will continue to f/u with pt and team.

## 2023-09-06 ENCOUNTER — HOSPITAL ENCOUNTER (OUTPATIENT)
Dept: RADIOLOGY | Facility: HOSPITAL | Age: 68
Discharge: HOME | End: 2023-09-06
Payer: MEDICARE

## 2023-09-06 ENCOUNTER — PATIENT OUTREACH (OUTPATIENT)
Dept: INTERNAL MEDICINE CLINIC | Facility: CLINIC | Age: 68
End: 2023-09-06

## 2023-09-06 DIAGNOSIS — Z13.6 ENCOUNTER FOR ABDOMINAL AORTIC ANEURYSM (AAA) SCREENING: ICD-10-CM

## 2023-09-06 DIAGNOSIS — I71.40 ABDOMINAL AORTIC ANEURYSM (AAA) 3.0 CM TO 5.5 CM IN DIAMETER IN MALE (HCC): Primary | ICD-10-CM

## 2023-09-06 PROCEDURE — 76706 US ABDL AORTA SCREEN AAA: CPT

## 2023-09-06 NOTE — PROGRESS NOTES
SW received update from PCP that she has spoken to pt's friend oJrge Olivares re other referrals pt will need and also request for  f/u with her re [place] Waiver Program.  MISSY did speak with Supriya and MISSY did share that additional financial records were sent to [address] [place] [address] this week. MISSY suggested perhaps later next week there maybe an update possible. Financial approval  is the Final step to see if a pt is approved for the [place] Waiver Program.  Jorge Olivares did also asked for # for where  to call for the Vascular Surgery consult. PCP has shared with Sw it is [phone]. Jorge Olivares could not write it down so SW called her right back and left it on her VM. In addition, Jorge Olivares has asked if the referral for  Ophthalmology / Dr. Doshi can be faxed to their Office as she called 2 weeks ago and the Office did not have it. Missy will ask the Clerical team to fax it to Dr Paul Patton. Support offered. Missy will ask [facility] to f/u with pt/ son/ and Supriya re   PA Waiver update when decision  known.

## 2023-09-08 ENCOUNTER — HOSPITAL ENCOUNTER (OUTPATIENT)
Dept: RADIOLOGY | Facility: HOSPITAL | Age: 68
Discharge: HOME | End: 2023-09-08
Payer: MEDICARE

## 2023-09-08 ENCOUNTER — TELEPHONE (OUTPATIENT)
Dept: FAMILY MEDICINE CLINIC | Facility: CLINIC | Age: 68
End: 2023-09-08

## 2023-09-08 DIAGNOSIS — Z87.891 PERSONAL HISTORY OF NICOTINE DEPENDENCE: ICD-10-CM

## 2023-09-08 PROCEDURE — 71271 CT THORAX LUNG CANCER SCR C-: CPT

## 2023-09-08 NOTE — TELEPHONE ENCOUNTER
FYI: OT session was missed this week, patient wasn't home.      Gisell @ [facility]: [phone]

## 2023-09-11 ENCOUNTER — EVALUATION (OUTPATIENT)
Dept: SPEECH THERAPY | Facility: CLINIC | Age: 68
End: 2023-09-11
Payer: MEDICARE

## 2023-09-11 DIAGNOSIS — G31.01 PRIMARY PROGRESSIVE APHASIA (HCC): Primary | Chronic | ICD-10-CM

## 2023-09-11 DIAGNOSIS — F02.80 PRIMARY PROGRESSIVE APHASIA (HCC): Primary | Chronic | ICD-10-CM

## 2023-09-11 DIAGNOSIS — R47.01 APHASIA: ICD-10-CM

## 2023-09-11 DIAGNOSIS — F03.A0 MILD DEMENTIA, UNSPECIFIED DEMENTIA TYPE, UNSPECIFIED WHETHER BEHAVIORAL, PSYCHOTIC, OR MOOD DISTURBANCE OR ANXIETY (HCC): ICD-10-CM

## 2023-09-11 PROCEDURE — 96105 ASSESSMENT OF APHASIA: CPT

## 2023-09-11 PROCEDURE — 92507 TX SP LANG VOICE COMM INDIV: CPT

## 2023-09-11 NOTE — PROGRESS NOTES
Speech-Language Pathology Initial Evaluation    Today's date: 2023   Patient’s name: Aquiles Rock  : 1955  MRN: [id number]  Safety measures: dementia, visual impairment   Referring provider: Bárbara Ferrara MD    Encounter Diagnosis     ICD-10-CM    1. Primary progressive aphasia ([address])  G31.01     F02.80       2. Mild dementia, unspecified dementia type, unspecified whether behavioral, psychotic, or mood disturbance or anxiety ([address])  F03. A0 Ambulatory Referral to Speech Therapy      3. Aphasia  R47.01 Ambulatory Referral to Speech Therapy        Visit tracking:  -Referring provider: Epic  -Billing guidelines: CMS  -Visit #1/10  -Insurance: Medicare  -RE due 2023    Subjective comments: Pt arrived with caregiver. Pt was agreeable to participation in assessment. How did the patient hear about us? Physician    Patient's goal(s): "I don't know"     Reason for referral: Change in cognitive status and Decreased language skills  Prior functional status: Communication appropriate and efficient in most situations. Minimal difficulty with self-monitoring, self-correction needed  Clinically complex situations: N/A    History: Patient is a 80 y.o. male who was referred to outpatient skilled Speech Therapy services for a language evaluation. Pt with PMH of mild dementia secondary to primary progressive aphasia, previous CVA, type 2 diabetes mellitus and hyperlipidemia, has been experiencing cognitive decline. Pt blind in R eye, unspecified visual impairment in L eye. MRI brain w&w/o contrast was completed which suggests neurodegenerative etiology. Refered to speech therapy for aphasia assessment.          Hearing: Decreased  Vision: Blind in R, decreased in L     Home environment/lifestyle: alone apartment   Highest level of education: Associate's degree  Vocational status: worked in [employer] [address] (20+ years ago), no longer works    Mental status: Disoriented- slightly - unsure why he was here- stated he "had to get some things done"   Behavior status: Cooperative  Patient reported symptoms of: confusion   Communication modalities: Verbal  Rehabilitation prognosis: Good rehab potential to reach the established goals    Assessments    The Western Aphasia Battery-Revised (WAB-R) is designed to evaluate a patient's language function following CVA, dementia, or other acquired neurological disorder. It measures a patient’s linguistic skills, such as speech content, fluency, auditory comprehension, repetition, naming, reading, and writing. The WAB-R also measures a patient’s nonlinguistic skills, including drawing, calculation, block design, and apraxia. The purpose of this standardized assessment is to (1) determine the presence, severity, and type of aphasia; (2) measure the patient's level of performance to provide a baseline for detecting change over time; (3) provide a comprehensive assessment of the patient's language assets and deficits in order to guide treatment and management; and (4) infer the location and etiology of the lesion causing aphasia. The following results were obtained during the administration of the assessment:     PART 1: Score:   -Spontaneous Speech: 10/20   -Auditory Verbal Comprehension: 6.1/10   -Repetition: 4.2/10   -Naming & Word Findin.9/10     *Pt scores correlated most consistently with Wernicke's Aphasia. Due to time constraints, only portions of the standardized assessment were administered on this date of service. Part 2 not administered d/t time constraints        Score:   *Aphasia Quotient (AQ): 46.4/100       Goals    Short Term Goals    Patient will follow 2-step verbal directions with 80% accuracy to facilitate increased auditory comprehension skills, to be achieved in 4-6 weeks. Patient will answer verbal Yes/No questions with 80% accuracy to facilitate increased auditory comprehension skills, to be achieved in 4-6 weeks.      Patient will name an appropriate word opposite (e.g., hot and /cold/) with 80% accuracy to build expressive vocabulary for conversation, to be achieved in 4-6 weeks. Patient will name an appropriate word synonym (e.g., street or /road/) with 80% accuracy to build expressive vocabulary for conversation, to be achieved in 4-6 weeks. Patient will name an appropriate category for given words (e.g., apple, banana, pear = fruits / sun, bus, lemon = yellow things) with 80% accuracy to facilitate improved semantic organization, to be achieved in 4-6 weeks. Patient will provide an adequate response to confrontation naming task (i.e., what is…) with 80% accuracy to facilitate increased expressive language skills, to be achieved in 4-6 weeks. Patient will complete picture description tasks using language organization strategies with 80% accuracy to facilitate improved utilization of circumlocution strategy, to be achieved in 4-6 weeks. Long Term Goals    Patient will demonstrate improved expressive and receptive language skills during structured and unstructured tasks by discharge. Patient will improve ability to facilitate communication to meet needs including use of compensatory strategies to promote meaningful interactions for improved quality of life and maximize level of independence, by discharge. Patient will use functional communication skills for social interactions (e.g., greetings, social etiquette, and short questions/simple sentences) with both familiar and unfamiliar partners with 90% success, by discharge. Impressions/Recommendations    Impressions:   -Patient presents with mod-severe receptive and expressive language impairments secondary to mild dementia as a result of PPA. Pt with reduced generative naming (1 animal named in 60 seconds), object naming and responsive speech. This demonstrates reduced expressive language skills, including word finding and confrontation naming deficits.  Pt required at minimum tactile cueing to name objects, requiring semantic/phonemic cueing the majority of the time to name objects placed in front of him. Pt's picture description was also severely reduced with minimal content information provided. This demonstrates both word finding as well as comprehension deficits. Notably, pt stated wrong apartment number unknowingly and was corrected by his caregiver. Complex repetition was reduced, but simple (1-2 word phrases) were accurate. As phrases became more complex, pt repeated each individual word as student clinician was reading the phrases. Pt was able to complete simple receptive language tasks (close your eyes, raise your hand), but more complex/mulitstep receptive language tasks were challenging. Pt was not able to respond to some complex receptive questions even with max cueing. ST services recommended to facilitate safety and functional communications skills. OT evaluation was suggested by clinician considering cognitive deficits observed (memory, attention, etc). Caregiver was concerned about transporting pt here frequently. Twice weekly sessions for ST was suggested, but again the frequency was a concern in terms of transportation. Pt and caregiver agreed to one ST session and to reconsider frequency after first session.      Recommendations:  -Patient would benefit from outpatient skilled Speech Therapy services: Speech-language therapy    -Frequency: 1-2x weekly  -Duration: 6-8 weeks    -Intervention certification from: 0/28/1791  -Intervention certification to:  65/91/0881    -Intervention comments:   50 minutes test administration  10 minutes patient/caregiver education  45 minutes scoring and interpretation

## 2023-09-12 ENCOUNTER — PATIENT OUTREACH (OUTPATIENT)
Dept: FAMILY MEDICINE CLINIC | Facility: CLINIC | Age: 68
End: 2023-09-12

## 2023-09-12 ENCOUNTER — OFFICE VISIT (OUTPATIENT)
Dept: SPEECH THERAPY | Facility: CLINIC | Age: 68
End: 2023-09-12
Payer: MEDICARE

## 2023-09-12 DIAGNOSIS — F02.80 PRIMARY PROGRESSIVE APHASIA (HCC): Primary | ICD-10-CM

## 2023-09-12 DIAGNOSIS — G31.01 PRIMARY PROGRESSIVE APHASIA (HCC): Primary | ICD-10-CM

## 2023-09-12 DIAGNOSIS — R47.01 APHASIA: ICD-10-CM

## 2023-09-12 DIAGNOSIS — F03.A0 MILD DEMENTIA, UNSPECIFIED DEMENTIA TYPE, UNSPECIFIED WHETHER BEHAVIORAL, PSYCHOTIC, OR MOOD DISTURBANCE OR ANXIETY (HCC): ICD-10-CM

## 2023-09-12 PROCEDURE — 92507 TX SP LANG VOICE COMM INDIV: CPT

## 2023-09-12 NOTE — PROGRESS NOTES
Daily Speech Treatment Note    Today's date: 2023  Patient’s name: Bharath Ocasio  : 1955  MRN: [id number]  Safety measures: dementia, visual impairment   Referring provider: Shantanu Adams MD    Encounter Diagnosis     ICD-10-CM    1. Primary progressive aphasia ([address])  G31.01     F02.80       2. Aphasia  R47.01       3. Mild dementia, unspecified dementia type, unspecified whether behavioral, psychotic, or mood disturbance or anxiety ([address])  F03. A0         Visit tracking:  -Referring provider: Epic  -Billing guidelines: CMS  -Visit #2/10  -Insurance: Medicare  -RE due 2023    Subjective/Behavioral:  -In response to "how are you", pt replied "the same as always"    Objective/Assessment:  -Reviewed testing results and goals in plan care with patient. Patient is in agreement at this time. Short-term goals:  Patient will follow 2-step verbal directions with 80% accuracy to facilitate increased auditory comprehension skills, to be achieved in 4-6 weeks. Patient will answer verbal Yes/No questions with 80% accuracy to facilitate increased auditory comprehension skills, to be achieved in 4-6 weeks. Patient will name an appropriate word opposite (e.g., hot and /cold/) with 80% accuracy to build expressive vocabulary for conversation, to be achieved in 4-6 weeks. Patient will name an appropriate word synonym (e.g., street or /road/) with 80% accuracy to build expressive vocabulary for conversation, to be achieved in 4-6 weeks. Patient will name an appropriate category for given words (e.g., apple, banana, pear = fruits / sun, bus, lemon = yellow things) with 80% accuracy to facilitate improved semantic organization, to be achieved in 4-6 weeks. Patient will provide an adequate response to confrontation naming task (i.e., what is…) with 80% accuracy to facilitate increased expressive language skills, to be achieved in 4-6 weeks.     To target confrontation naming: Pt was presented image cards ([place]) and asked "what is this?". Pt was able to independently name 13/40 (33%) of image cards, increasing to 34/40 (85%) given max semantic and phonemic cues. The images the pt was able to name were very basic, common items (I.e. bus, truck, baby). Pt struggled with even commonly used items (i.e. pants, shirt, fork), stating "I don't worry about [the name of] those". Pt was also very distracted during this task, requiring frequent redirection. Patient will complete picture description tasks using language organization strategies with 80% accuracy to facilitate improved utilization of circumlocution strategy, to be achieved in 4-6 weeks. Plan:  -Discussed session and patient's progress with caregiver/family member after today's session.

## 2023-09-12 NOTE — PROGRESS NOTES
[facility]. CMOC provided supportive listening. Keke Orantes states patient is getting worse each day. She states she is no longer able to take care of him each day. Keke Orantes states she took him to speech Therapy and they want him to come to the appt 2 x per week. Pt was frustrated during the appointment. Paolakathi Rolanda feels if Speech Therapy will not help him then its a waste of his time. Keke Orantes states pt has not taken a shower in a week. She had to bathe him this weekend. Pt does not have Meals on Wheels delivered to him on the weekends. He has no food in his house and she take him out to eat. Pt may be in need of food stamps. [facility] will check. Keke Orantes states he patient has heart issues & the doctors wants his blood pressure to be monitored 2x per day. Keke Orantes states he is able to go 1 x per day to his home. Keke Orantes states his sister is not coming to help him any longer due to her own medical issues. Keke Orantes states that they are willing to private pay for someone to watch him for a few hours. Paolakathi Lozanoaline states patient has $3,000 in his bank account and the sons are willing to help for towards the cost.     Keke Orantes states patient colonoscopy apt had to be cancelled. Keke Orantes states pt refused the  program. Pt does not want to go. They tried 1 x and he left the program.     [facility] will relate the information to [address] [place] [address].   [facility] will continue to f/u with pt and team.

## 2023-09-13 ENCOUNTER — PATIENT OUTREACH (OUTPATIENT)
Dept: INTERNAL MEDICINE CLINIC | Facility: CLINIC | Age: 68
End: 2023-09-13

## 2023-09-13 NOTE — PROGRESS NOTES
MISSY has reached out to pt's friend Peterson Lucas who shares that she is becoming exhausted and more frustrated  trying to help pt with also of his activities. He is not taking his medications as ordered even with reminders, not bathing on his own, was not getting Meals on Wheel on the weekend   ( Pt not able o use a microwave him self)  Peterson Lucas share his sister has not been helping out for about a month or more. She is trying to get him to recommended medical / OT appointments as well as checking his blood pressure 2 x /day as asked. She shares she has not carie able to do this as often as requested. He has not been paying his bills on time but family is assisting with same. Peterson Lucas has asked if pt/family can hire help until the Waiver is approved? However he has only limited resources and the  Money he has  is meant to help pay his rent. Missy has suggested we call to the [facility] to see if pt can get some aide service through  the [address] until the [place] Waiver is finanacially approved and actual services start. MISSY assisted with a 3 way call to [facility] and was transferred to [facility] line where we left a VM requesting the help and additional Meals on Wheel Days provided,. In addition, MISSY has emailed [facility] List of Agencies for e her to research possible private services. She share they have a niece and a former aide who cared for a different family member in mind. Supriya to research above. She also shares that she and her sons have discussed that [place] [facility] may be necessary. Missy reviewed the [place] Application Process from home  or from the hospital if hospital care  Needed. Support provided. Froy Eisenberg / Peterson Lucas awaiting Aging return call re additional in home help. Shireen BHATTI [facility] to f/u on pt [place] Waiver application as well. ADDENDUM:  Call received from Reid from [facility].   He share he will have pt's [employer] Case Worker reach out to pt/ Supriya to further assess and see what additional services they can provided.

## 2023-09-14 ENCOUNTER — PATIENT OUTREACH (OUTPATIENT)
Dept: FAMILY MEDICINE CLINIC | Facility: CLINIC | Age: 68
End: 2023-09-14

## 2023-09-14 NOTE — PROGRESS NOTES
CMOC spoke to Representative @  [facility] today. They stated the case for Waiver is closed as of 07/20/2021. A new  Application is needed. CMOC completed the application today. [employer] spoke to son via Text and he stated he could not call the [facility] office today. We can try tomorrow. [employer] call [facility] office today. Per Representative she will be sending a message to  Neo Garcia [phone]. Ticket # [phone]    [employer] left message for  Neo Garcia [phone] to call me back.

## 2023-09-19 ENCOUNTER — PATIENT OUTREACH (OUTPATIENT)
Dept: FAMILY MEDICINE CLINIC | Facility: CLINIC | Age: 68
End: 2023-09-19

## 2023-09-19 NOTE — PROGRESS NOTES
[facility] received incoming call from Joey at [place] office. She states a new  has been assigned. Sean Sport [phone] is the new  for patient for the Waiver approval.     [facility] left message for Sean Carballo today. Awaiting her call. Later Entry:   Incoming call from [employer] she states she has a few cases to review before this case. If she is missing any documents she will let me know. Informed Supriya (Friend the case is being reviewed for Waiver)  Yg Ambrosio is in [place] on Vacation. We can reach her in 2 week.

## 2023-09-21 ENCOUNTER — PATIENT OUTREACH (OUTPATIENT)
Dept: FAMILY MEDICINE CLINIC | Facility: CLINIC | Age: 68
End: 2023-09-21

## 2023-09-21 NOTE — PROGRESS NOTES
received incoming call from son Mea Cordero. He wanted to know the status of the Waiver case. Mae Cordero states patient is doing bad. Pt is not bathing, Pt is not eating. Pt does not want to go to doctors Appointments. Waiver Approved  On 09/19/2023    [address] calling [facility] and [address] [place] [address] to find out if the case has been assigned a . Phone number is [phone]. [address] call [employer] and they are unable to find member. They need me to provide an ID for patient. [address] call [phone] and patient has not been assigned a . The insurance became effective 09/20/2021. Representative stated I should call back next week and hopefully he will have a  assigned to his case. Reference for call is [id number].      I will continue to f/u with pt and team. Universal Safety Interventions

## 2023-09-27 ENCOUNTER — OFFICE VISIT (OUTPATIENT)
Dept: SPEECH THERAPY | Facility: CLINIC | Age: 68
End: 2023-09-27
Payer: MEDICARE

## 2023-09-27 DIAGNOSIS — G31.01 PRIMARY PROGRESSIVE APHASIA (HCC): Primary | ICD-10-CM

## 2023-09-27 DIAGNOSIS — F03.A0 MILD DEMENTIA, UNSPECIFIED DEMENTIA TYPE, UNSPECIFIED WHETHER BEHAVIORAL, PSYCHOTIC, OR MOOD DISTURBANCE OR ANXIETY (HCC): ICD-10-CM

## 2023-09-27 DIAGNOSIS — R47.01 APHASIA: ICD-10-CM

## 2023-09-27 DIAGNOSIS — F02.80 PRIMARY PROGRESSIVE APHASIA (HCC): Primary | ICD-10-CM

## 2023-09-27 PROCEDURE — 92507 TX SP LANG VOICE COMM INDIV: CPT

## 2023-09-27 NOTE — PROGRESS NOTES
Daily Speech Treatment Note    Today's date: 2023  Patient’s name: Charlene Rainey  : 1955  MRN: [id number]  Safety measures: dementia, visual impairment   Referring provider: Army Yamel MD    Encounter Diagnosis     ICD-10-CM    1. Primary progressive aphasia ([address])  G31.01     F02.80       2. Aphasia  R47.01       3. Mild dementia, unspecified dementia type, unspecified whether behavioral, psychotic, or mood disturbance or anxiety ([address])  F03. A0         Visit tracking:  -Referring provider: Epic  -Billing guidelines: CMS  -Visit #3/10  -Insurance: Medicare  -RE due 2023    Subjective/Behavioral:  -In response to "how are you", pt replied "the same as always"    Pt's caregiver also mentioned that patient was approved for [place] waiver since last visit    Objective/Assessment:  -Reviewed testing results and goals in plan care with patient. Patient is in agreement at this time. Short-term goals:  Patient will follow 2-step verbal directions with 80% accuracy to facilitate increased auditory comprehension skills, to be achieved in 4-6 weeks. Patient will answer verbal Yes/No questions with 80% accuracy to facilitate increased auditory comprehension skills, to be achieved in 4-6 weeks. Patient will name an appropriate word opposite (e.g., hot and /cold/) with 80% accuracy to build expressive vocabulary for conversation, to be achieved in 4-6 weeks. To target antonyms: Pt completed antomyn worksheet with 3 options provided as synonyms to a given word. Pt was verbally presented target word and answer options (given visual impairment). Pt completed this task in 2/11 (18%) opp, increasing to 10/11 (90%) given mod to max verbal and visual semantic cues. Patient will name an appropriate word synonym (e.g., street or /road/) with 80% accuracy to build expressive vocabulary for conversation, to be achieved in 4-6 weeks.      To target synonyms: Pt completed synonym worksheet with 3 options provided as synonyms to a given word. Pt was verbally presented target word and answer options (given visual impairment). Pt completed this task in 5/11 (45%) opp, increasing to 10/11 (90%) given mod to max verbal and visual semantic cues. Patient will name an appropriate category for given words (e.g., apple, banana, pear = fruits / sun, bus, lemon = yellow things) with 80% accuracy to facilitate improved semantic organization, to be achieved in 4-6 weeks. Patient will provide an adequate response to confrontation naming task (i.e., what is…) with 80% accuracy to facilitate increased expressive language skills, to be achieved in 4-6 weeks. To target confrontation naming: Pt was presented image cards ([employer]) and asked to point to the thing that you drive, eat, wear, etc. Pt was able to independently point to images given these cues in 40/40 (100%) opp. Pt was able to name 10/40 (25%) of images independently. Pt required max semantic and phonemic cues to name the remaining images. Pt was distracted during this task, but notably less than previous sessions. Patient will complete picture description tasks using language organization strategies with 80% accuracy to facilitate improved utilization of circumlocution strategy, to be achieved in 4-6 weeks. Plan:  -Discussed session and patient's progress with caregiver/family member after today's session.

## 2023-09-30 PROBLEM — Z00.00 MEDICARE ANNUAL WELLNESS VISIT, INITIAL: Status: RESOLVED | Noted: 2023-08-01 | Resolved: 2023-09-30

## 2023-10-06 ENCOUNTER — PATIENT OUTREACH (OUTPATIENT)
Dept: FAMILY MEDICINE CLINIC | Facility: CLINIC | Age: 68
End: 2023-10-06

## 2023-10-06 NOTE — PROGRESS NOTES
[address] [place] [address] calling [facility] and [facility] [address] [place] [address] left message for  at [phone]. [address] [place] [address] spoke to [place] and she states the  will be meeting with patient today. She is waiting for him.  per insurance is [employer] [phone].

## 2023-10-09 ENCOUNTER — PATIENT OUTREACH (OUTPATIENT)
Dept: FAMILY MEDICINE CLINIC | Facility: CLINIC | Age: 68
End: 2023-10-09

## 2023-10-09 NOTE — PROGRESS NOTES
[facility]) spoke to Gerardo Gamble (friend). Gerardo Gamble stated [facility] came on Friday to conduct the intake interview to see how many hours of waiver patient will get assigned. Gerardo Gamble will know in about 7 days the hours approved. Gerardo Gamble states Lydia Delgado is getting worse. She is not sure if pt eats on the weekends as he spends most of his day alone. Gerardo Gamble brought patient to her house on Sunday and made sure he ate. [employer] [place]:   Gerardo Gamble wants to know if he is getting [employer] [place] or if his food stamps need to be re-certified. [place] office is closed today. I will call tomorrow to get the information.

## 2023-10-10 NOTE — PROGRESS NOTES
Daily Speech Treatment Note    Today's date: 10/10/2023***  Patient’s name: Sara Kraft  : 1955  MRN: [id number]  Safety measures: dementia, visual impairment   Referring provider: Milena Jay MD    Encounter Diagnosis     ICD-10-CM    1. Primary progressive aphasia ([address])  G31.01     F02.80       2. Aphasia  R47.01       3. Mild dementia, unspecified dementia type, unspecified whether behavioral, psychotic, or mood disturbance or anxiety ([address])  F03. A0         Visit tracking:  -Referring provider: Epic  -Billing guidelines: CMS  -Visit #3***/10  -Insurance: Medicare  -RE due 2023    Subjective/Behavioral:  Patient ***    Objective/Assessment:  -See goals targeted during today's treatment session. Short-term goals:  Patient will follow 2-step verbal directions with 80% accuracy to facilitate increased auditory comprehension skills, to be achieved in 4-6 weeks. Patient will answer verbal Yes/No questions with 80% accuracy to facilitate increased auditory comprehension skills, to be achieved in 4-6 weeks. Patient will name an appropriate word opposite (e.g., hot and /cold/) with 80% accuracy to build expressive vocabulary for conversation, to be achieved in 4-6 weeks. Patient will name an appropriate word synonym (e.g., street or /road/) with 80% accuracy to build expressive vocabulary for conversation, to be achieved in 4-6 weeks. Patient will name an appropriate category for given words (e.g., apple, banana, pear = fruits / sun, bus, lemon = yellow things) with 80% accuracy to facilitate improved semantic organization, to be achieved in 4-6 weeks. Patient will provide an adequate response to confrontation naming task (i.e., what is…) with 80% accuracy to facilitate increased expressive language skills, to be achieved in 4-6 weeks.       Patient will complete picture description tasks using language organization strategies with 80% accuracy to facilitate improved utilization of circumlocution strategy, to be achieved in 4-6 weeks. Plan:  -Continue with current plan of care.

## 2023-10-12 ENCOUNTER — APPOINTMENT (OUTPATIENT)
Dept: SPEECH THERAPY | Facility: CLINIC | Age: 68
End: 2023-10-12
Payer: MEDICARE

## 2023-10-16 ENCOUNTER — PATIENT OUTREACH (OUTPATIENT)
Dept: FAMILY MEDICINE CLINIC | Facility: CLINIC | Age: 68
End: 2023-10-16

## 2023-10-16 NOTE — PROGRESS NOTES
called [address] today. [facility] and are asking for additional 2 days. Services can take 2 weeks to get started. CMOC spoke to [facility] [phone]  supervisor. Hay Benitez is the coordinator  [phone] Ext [phone].

## 2023-10-19 ENCOUNTER — OFFICE VISIT (OUTPATIENT)
Dept: SPEECH THERAPY | Facility: CLINIC | Age: 68
End: 2023-10-19
Payer: MEDICARE

## 2023-10-19 DIAGNOSIS — G31.01 PRIMARY PROGRESSIVE APHASIA (HCC): Primary | ICD-10-CM

## 2023-10-19 DIAGNOSIS — R47.01 APHASIA: ICD-10-CM

## 2023-10-19 DIAGNOSIS — F03.A0 MILD DEMENTIA, UNSPECIFIED DEMENTIA TYPE, UNSPECIFIED WHETHER BEHAVIORAL, PSYCHOTIC, OR MOOD DISTURBANCE OR ANXIETY (HCC): ICD-10-CM

## 2023-10-19 DIAGNOSIS — F02.80 PRIMARY PROGRESSIVE APHASIA (HCC): Primary | ICD-10-CM

## 2023-10-19 PROCEDURE — 92507 TX SP LANG VOICE COMM INDIV: CPT

## 2023-10-19 NOTE — PROGRESS NOTES
Daily Speech Treatment Note    Today's date: 10/19/2023  Patient’s name: Jan Curiel  : 1955  MRN: [id number]  Safety measures: dementia, visual impairment   Referring provider: Jael Garrett MD    Encounter Diagnosis     ICD-10-CM    1. Primary progressive aphasia ([address])  G31.01     F02.80       2. Aphasia  R47.01       3. Mild dementia, unspecified dementia type, unspecified whether behavioral, psychotic, or mood disturbance or anxiety ([address])  F03. A0         Visit tracking:  -Referring provider: Epic  -Billing guidelines: CMS  -Visit #4/10   -Insurance: Medicare  -RE due 2023    Subjective/Behavioral:  -Patient arrived with his friend, Mariana Menendez, today. Objective/Assessment:  -Reviewed testing results and goals in plan care with patient. Patient is in agreement at this time. Short-term goals:  1. Patient will follow 2-step verbal directions with 80% accuracy to facilitate increased auditory comprehension skills, to be achieved in 4-6 weeks. To target functional direction following: Therapist called Patient's phone and he was instructed to move through the steps to properly answer (e.g. hit green button, hold phone up to ear, say hello). Patient practiced this 3 times with mod to min verbal cues. Patient became more independent each time he answered the phone. To target functional direction following: Therapist instructed Patient to then call Mariana Menendez utilizing the correct steps (e.g. find phone icon, select Supriya from contacts, press call button). Patient practiced this 3 times as well with mod to min verbal cues. Patient became more independent each time he called. At the end of session, Mariana Menendez called Patient from outside and Patient was able to independently  phone call and even stated, "We are almost done. I will be there soon."    2.  Patient will answer verbal Yes/No questions with 80% accuracy to facilitate increased auditory comprehension skills, to be achieved in 4-6 weeks. 3. Patient will name an appropriate word opposite (e.g., hot and /cold/) with 80% accuracy to build expressive vocabulary for conversation, to be achieved in 4-6 weeks. 4. Patient will name an appropriate word synonym (e.g., street or /road/) with 80% accuracy to build expressive vocabulary for conversation, to be achieved in 4-6 weeks. 5. Patient will name an appropriate category for given words (e.g., apple, banana, pear = fruits / sun, bus, lemon = yellow things) with 80% accuracy to facilitate improved semantic organization, to be achieved in 4-6 weeks. To target word finding: Patient was presented with a target card (e.g. hammer) and an array of four other cards (e.g. baby, nails, oven, dog) and was instructed to state which two pictures matched (e.g. hammer/nails). Patient accurately matched picture cards in 13/15 (87%) opportunities independently, increasing to 15/15 (100%) opportunities given min verbal cues. Patient was then instructed to state the name of each picture card in the pair. He accurately named 16/30 (53%) opportunities independently, increasing to 18/30 (60%) opportunities given semantic cues, and increasing to 30/30 (100%) opportunities given phonemic cues. Patient was noted to have significant "rambled" speech throughout this task (e.g. "See I don't know about these things because I don't really have them over there. It is something that I know I have seen, but I don't pay attention to this."). Clinician explained that although Patient lives alone and does not always have to label everyday objects, it is still important for him to utilize this language so when he is speaking with others there is less communication breakdown. It should also be noted that when Patient was given mod to max verbal cues of "stop and think," he was better able to come up with answers independently.   It appears as though Patient often gets "stuck" in his own jargon thinking loop and does not allow himself to process. 6. Patient will provide an adequate response to confrontation naming task (i.e., what is…) with 80% accuracy to facilitate increased expressive language skills, to be achieved in 4-6 weeks. 7. Patient will complete picture description tasks using language organization strategies with 80% accuracy to facilitate improved utilization of circumlocution strategy, to be achieved in 4-6 weeks. Plan:  -Discussed session and patient's progress with caregiver/family member after today's session.

## 2023-10-23 ENCOUNTER — PATIENT OUTREACH (OUTPATIENT)
Dept: FAMILY MEDICINE CLINIC | Facility: CLINIC | Age: 68
End: 2023-10-23

## 2023-10-23 NOTE — PROGRESS NOTES
CMOC spoke to [address] [place] [address]  [phone] Ext [phone] today. She states is sending a message to the supervisor and I should get a phone call by the end of the day. Later entry:   [facility] received call from [address] [place] [address] patient has been approved for for 31 hours of home care. Family must contact the agency they want to begin the employment with. Family has the option to Appeal the case to get more service hours. CMOC will now closed the case as the services have been approved and will get started shortly. Gerardo Gamble states patient is getting worse and he is forgetting how to answer the phone. Pt is working with a pp and they are working on the phone skills.

## 2023-10-24 ENCOUNTER — PATIENT OUTREACH (OUTPATIENT)
Dept: INTERNAL MEDICINE CLINIC | Facility: CLINIC | Age: 68
End: 2023-10-24

## 2023-10-24 NOTE — PROGRESS NOTES
MISSY received update from [facility] that pt has been approved for [place]aiver Program for 31 hours of Home Care through [address]  and    Connorcaracarmen Yuantamir is the   [phone] Ext [phone]. BRYAN PIMENTEL  also had received a call from pt's [address] [place] [address] S/O [phone] re delivery of a Medical Alert System. Pt is presently alone and did not accept delivery of same MISSY has advised that they set up a new delivery date when pt's family is also present. MISSY has also called friend  Arianna Cobb [phone] to ask since she is away who or if anyone one is checking on pt? MISSY had to leave a message for her to return SW call. MISSY then called again ans was able to speak with Gerardo Gamble who shared she had seen pt on Sunday of this week, and her son saw him on Monday. She is going away today  and her son will be away on Thursday and through the rest of the week and  she returns on Sunday. She shares he dgt has a key and will be checking on him in her absence. She does feel he is going good at the moment but she is concerned as he does not answer the phone now. She does want to see how the Waiver program works and with regular assistance will hopefully help. She thinks he may want to re-consider the Adult Day Program and possible pt may ultimately need Nursing Home placement. Missy reviewed that if  their are any concerns Aging should be called and if NH Placement is needed that we can assist with same  through the OP or through the Hospital if needed.

## 2023-10-26 ENCOUNTER — APPOINTMENT (OUTPATIENT)
Dept: SPEECH THERAPY | Facility: CLINIC | Age: 68
End: 2023-10-26
Payer: MEDICARE

## 2023-11-08 DIAGNOSIS — D50.8 IRON DEFICIENCY ANEMIA SECONDARY TO INADEQUATE DIETARY IRON INTAKE: Chronic | ICD-10-CM

## 2023-11-08 RX ORDER — FERROUS SULFATE 324(65)MG
324 TABLET, DELAYED RELEASE (ENTERIC COATED) ORAL DAILY
OUTPATIENT
Start: 2023-11-08

## 2023-11-09 ENCOUNTER — TELEPHONE (OUTPATIENT)
Dept: FAMILY MEDICINE CLINIC | Facility: CLINIC | Age: 68
End: 2023-11-09

## 2023-11-09 LAB
LEFT EYE DIABETIC RETINOPATHY: NORMAL
RIGHT EYE DIABETIC RETINOPATHY: NORMAL

## 2023-11-14 ENCOUNTER — PATIENT OUTREACH (OUTPATIENT)
Dept: INTERNAL MEDICINE CLINIC | Facility: CLINIC | Age: 68
End: 2023-11-14

## 2023-11-14 ENCOUNTER — OFFICE VISIT (OUTPATIENT)
Dept: INTERNAL MEDICINE CLINIC | Facility: CLINIC | Age: 68
End: 2023-11-14

## 2023-11-14 ENCOUNTER — APPOINTMENT (OUTPATIENT)
Dept: LAB | Facility: CLINIC | Age: 68
End: 2023-11-14
Payer: MEDICARE

## 2023-11-14 VITALS
TEMPERATURE: 98.1 F | HEART RATE: 89 BPM | BODY MASS INDEX: 28.34 KG/M2 | HEIGHT: 68 IN | WEIGHT: 187 LBS | SYSTOLIC BLOOD PRESSURE: 134 MMHG | DIASTOLIC BLOOD PRESSURE: 82 MMHG

## 2023-11-14 DIAGNOSIS — D50.8 IRON DEFICIENCY ANEMIA SECONDARY TO INADEQUATE DIETARY IRON INTAKE: Chronic | ICD-10-CM

## 2023-11-14 DIAGNOSIS — E11.65 TYPE 2 DIABETES MELLITUS WITH HYPERGLYCEMIA, WITHOUT LONG-TERM CURRENT USE OF INSULIN (HCC): Primary | ICD-10-CM

## 2023-11-14 DIAGNOSIS — Z87.891 PERSONAL HISTORY OF NICOTINE DEPENDENCE: ICD-10-CM

## 2023-11-14 DIAGNOSIS — E78.2 MIXED HYPERLIPIDEMIA: ICD-10-CM

## 2023-11-14 DIAGNOSIS — Z12.5 PROSTATE CANCER SCREENING: ICD-10-CM

## 2023-11-14 DIAGNOSIS — D50.8 IRON DEFICIENCY ANEMIA SECONDARY TO INADEQUATE DIETARY IRON INTAKE: ICD-10-CM

## 2023-11-14 DIAGNOSIS — Z23 ENCOUNTER FOR IMMUNIZATION: ICD-10-CM

## 2023-11-14 DIAGNOSIS — I25.810 CORONARY ARTERY DISEASE INVOLVING CORONARY BYPASS GRAFT OF NATIVE HEART WITHOUT ANGINA PECTORIS: Chronic | ICD-10-CM

## 2023-11-14 DIAGNOSIS — R94.8 ABNORMAL RESULTS OF FUNCTION STUDIES OF OTHER ORGANS AND SYSTEMS: ICD-10-CM

## 2023-11-14 DIAGNOSIS — E11.65 TYPE 2 DIABETES MELLITUS WITH HYPERGLYCEMIA, WITHOUT LONG-TERM CURRENT USE OF INSULIN (HCC): Chronic | ICD-10-CM

## 2023-11-14 DIAGNOSIS — G31.01 PRIMARY PROGRESSIVE APHASIA (HCC): Chronic | ICD-10-CM

## 2023-11-14 DIAGNOSIS — F02.80 PRIMARY PROGRESSIVE APHASIA (HCC): Chronic | ICD-10-CM

## 2023-11-14 DIAGNOSIS — I71.40 ABDOMINAL AORTIC ANEURYSM (AAA) 3.0 CM TO 5.5 CM IN DIAMETER IN MALE (HCC): ICD-10-CM

## 2023-11-14 LAB
ANION GAP SERPL CALCULATED.3IONS-SCNC: 9 MMOL/L
BASOPHILS # BLD AUTO: 0.08 THOUSANDS/ÂΜL (ref 0–0.1)
BASOPHILS NFR BLD AUTO: 1 % (ref 0–1)
BUN SERPL-MCNC: 15 MG/DL (ref 5–25)
CALCIUM SERPL-MCNC: 9.8 MG/DL (ref 8.4–10.2)
CHLORIDE SERPL-SCNC: 105 MMOL/L (ref 96–108)
CHOLEST SERPL-MCNC: 158 MG/DL
CO2 SERPL-SCNC: 27 MMOL/L (ref 21–32)
CREAT SERPL-MCNC: 0.91 MG/DL (ref 0.6–1.3)
EOSINOPHIL # BLD AUTO: 0.13 THOUSAND/ÂΜL (ref 0–0.61)
EOSINOPHIL NFR BLD AUTO: 2 % (ref 0–6)
ERYTHROCYTE [DISTWIDTH] IN BLOOD BY AUTOMATED COUNT: 20.4 % (ref 11.6–15.1)
FERRITIN SERPL-MCNC: 8 NG/ML (ref 24–336)
GFR SERPL CREATININE-BSD FRML MDRD: 86 ML/MIN/1.73SQ M
GLUCOSE SERPL-MCNC: 149 MG/DL (ref 65–140)
HCT VFR BLD AUTO: 35.5 % (ref 36.5–49.3)
HDLC SERPL-MCNC: 68 MG/DL
HGB BLD-MCNC: 10.9 G/DL (ref 12–17)
IMM GRANULOCYTES # BLD AUTO: 0.02 THOUSAND/UL (ref 0–0.2)
IMM GRANULOCYTES NFR BLD AUTO: 0 % (ref 0–2)
IRON SATN MFR SERPL: 5 % (ref 15–50)
IRON SERPL-MCNC: 39 UG/DL (ref 50–212)
LDLC SERPL CALC-MCNC: 58 MG/DL (ref 0–100)
LYMPHOCYTES # BLD AUTO: 1.49 THOUSANDS/ÂΜL (ref 0.6–4.47)
LYMPHOCYTES NFR BLD AUTO: 22 % (ref 14–44)
MCH RBC QN AUTO: 24.4 PG (ref 26.8–34.3)
MCHC RBC AUTO-ENTMCNC: 30.7 G/DL (ref 31.4–37.4)
MCV RBC AUTO: 80 FL (ref 82–98)
MONOCYTES # BLD AUTO: 0.4 THOUSAND/ÂΜL (ref 0.17–1.22)
MONOCYTES NFR BLD AUTO: 6 % (ref 4–12)
NEUTROPHILS # BLD AUTO: 4.57 THOUSANDS/ÂΜL (ref 1.85–7.62)
NEUTS SEG NFR BLD AUTO: 69 % (ref 43–75)
NONHDLC SERPL-MCNC: 90 MG/DL
NRBC BLD AUTO-RTO: 0 /100 WBCS
PLATELET # BLD AUTO: 355 THOUSANDS/UL (ref 149–390)
PMV BLD AUTO: 10.7 FL (ref 8.9–12.7)
POTASSIUM SERPL-SCNC: 4.2 MMOL/L (ref 3.5–5.3)
RBC # BLD AUTO: 4.46 MILLION/UL (ref 3.88–5.62)
SL AMB POCT HEMOGLOBIN AIC: 7.6 (ref ?–6.5)
SODIUM SERPL-SCNC: 141 MMOL/L (ref 135–147)
TIBC SERPL-MCNC: 796 UG/DL (ref 250–450)
TRIGL SERPL-MCNC: 159 MG/DL
UIBC SERPL-MCNC: 757 UG/DL (ref 155–355)
WBC # BLD AUTO: 6.69 THOUSAND/UL (ref 4.31–10.16)

## 2023-11-14 PROCEDURE — 83550 IRON BINDING TEST: CPT

## 2023-11-14 PROCEDURE — 83540 ASSAY OF IRON: CPT

## 2023-11-14 PROCEDURE — 99214 OFFICE O/P EST MOD 30 MIN: CPT | Performed by: PHYSICIAN ASSISTANT

## 2023-11-14 PROCEDURE — 83036 HEMOGLOBIN GLYCOSYLATED A1C: CPT | Performed by: PHYSICIAN ASSISTANT

## 2023-11-14 PROCEDURE — 84153 ASSAY OF PSA TOTAL: CPT

## 2023-11-14 PROCEDURE — 90662 IIV NO PRSV INCREASED AG IM: CPT | Performed by: PHYSICIAN ASSISTANT

## 2023-11-14 PROCEDURE — 84154 ASSAY OF PSA FREE: CPT

## 2023-11-14 PROCEDURE — 80048 BASIC METABOLIC PNL TOTAL CA: CPT

## 2023-11-14 PROCEDURE — 82728 ASSAY OF FERRITIN: CPT

## 2023-11-14 PROCEDURE — 36415 COLL VENOUS BLD VENIPUNCTURE: CPT

## 2023-11-14 PROCEDURE — 80061 LIPID PANEL: CPT

## 2023-11-14 PROCEDURE — G0008 ADMIN INFLUENZA VIRUS VAC: HCPCS | Performed by: PHYSICIAN ASSISTANT

## 2023-11-14 PROCEDURE — 85025 COMPLETE CBC W/AUTO DIFF WBC: CPT

## 2023-11-14 RX ORDER — FERROUS SULFATE 324(65)MG
324 TABLET, DELAYED RELEASE (ENTERIC COATED) ORAL EVERY OTHER DAY
Qty: 45 TABLET | Refills: 2 | Status: SHIPPED | OUTPATIENT
Start: 2023-11-14

## 2023-11-14 NOTE — ASSESSMENT & PLAN NOTE
Patient is now followed by prison and is now attending speech therapy. MRI Brain NeuroQuant w&w/o contrast was completed which suggests neurodegenerative etiology.

## 2023-11-14 NOTE — ASSESSMENT & PLAN NOTE
Lab Results   Component Value Date    HGBA1C 7.6 (A) 11/14/2023     Improved from 8.4%. Issue with non-adherence due to cognitive impairment. If we can get more hours approved for the waiver, will likely increase adherence rate. Continue metformin 1000 mg twice daily. Reviewed nutrition and exercise recommendations. Up to date on foot and eye exam. Due for urine micro. Already ordered in chart. Patient reminded to get done today. Will recheck in 3 months, sooner if needed.

## 2023-11-14 NOTE — ASSESSMENT & PLAN NOTE
AAA screening 9/6/23: Distal abdominal aortic aneurysm measuring 3.4 cm. Consult with vascular scheduled 11/17.

## 2023-11-14 NOTE — LETTER
November 14, 2023     Patient: Rachel Sheehan  YOB: 1955  Date of Visit: 11/14/2023      To Whom it May Concern:    Rachel Sheehan is under my professional care. Cara Hair was seen in my office on 11/14/2023. Cara Hair suffers from primary progressive aphasia. Cara Hair requires more supervision and than 31 hours a week can provide. Please increase his hours to the maximal amount. If you have any questions or concerns, please don't hesitate to call.          Sincerely,          Henry Mirza PA-C        CC: No Recipients

## 2023-11-14 NOTE — ASSESSMENT & PLAN NOTE
Continue ferrous sulfate every other day. Due for colonoscopy to rule out occult GI bleed. Awaiting scheduling as they have to schedule him for an over night stay for proper prep.

## 2023-11-14 NOTE — PROGRESS NOTES
MISSY has met with pt and his Johnnie Soto [phone] after his PCP visit this date. Pt now has 31 hours of care thru the [place] Program,  His Aide is Jose A Alexander is with him Monday - Friday for 5 hours/ day and 6 hours on Sunday. Pt has received his Medical Alert System and is receiving Meals on Wheels. PT is happy with his aide and shares she is a good cook. MISSY has provided his aide info on the [facility] his Kate Yuen had asked about same. Per conversation with PCP pt is asking for more hours as well. MISSY has also reached out to [address] S/O [phone] to f/u with her to see if there are any additional questions but Missy had to leave a message.

## 2023-11-14 NOTE — ASSESSMENT & PLAN NOTE
Lab Results   Component Value Date    CHOLESTEROL 149 09/22/2022    CHOLESTEROL 174 01/13/2022    CHOLESTEROL 153 06/28/2021     Lab Results   Component Value Date    HDL 68 09/22/2022    HDL 75 01/13/2022    HDL 58 06/28/2021     Lab Results   Component Value Date    TRIG 78 09/22/2022    TRIG 64 01/13/2022    TRIG 121 06/28/2021     No results found for: [facility]"   Lab Results   Component Value Date    [facility] 65 09/22/2022      ASCVD risk 30.5%. Continue rosuvastatin 40 mg daily. Low fat diet.

## 2023-11-14 NOTE — PROGRESS NOTES
Name: Megan Johnson      : 1955      MRN: [id number]  Encounter Provider: Rowena Rider PA-C  Encounter Date: 2023   Encounter department: [address] [place] [address]    Assessment & Plan     1. Type 2 diabetes mellitus with hyperglycemia, without long-term current use of insulin (HCC)  Assessment & Plan:    Lab Results   Component Value Date    HGBA1C 7.6 (A) 2023     Improved from 8.4%. Issue with non-adherence due to cognitive impairment. If we can get more hours approved for the waiver, will likely increase adherence rate. Continue metformin 1000 mg twice daily. Reviewed nutrition and exercise recommendations. Up to date on foot and eye exam. Due for urine micro. Already ordered in chart. Patient reminded to get done today. Will recheck in 3 months, sooner if needed. Orders:  -     POCT hemoglobin A1c    2. Mixed hyperlipidemia  Assessment & Plan:  Lab Results   Component Value Date    CHOLESTEROL 149 2022    CHOLESTEROL 174 2022    CHOLESTEROL 153 2021     Lab Results   Component Value Date    HDL 68 2022    HDL 75 2022    HDL 58 2021     Lab Results   Component Value Date    TRIG 78 2022    TRIG 64 2022    TRIG 121 2021     No results found for: "[facility]"   Lab Results   Component Value Date    [facility] 65 2022      ASCVD risk 30.5%. Continue rosuvastatin 40 mg daily. Low fat diet. 3. Primary progressive aphasia [facility])  Assessment & Plan:  Patient is now followed by half-way and is now attending speech therapy. MRI Brain NeuroQuant w&w/o contrast was completed which suggests neurodegenerative etiology. 4. Iron deficiency anemia secondary to inadequate dietary iron intake  Assessment & Plan:  Continue ferrous sulfate every other day. Due for colonoscopy to rule out occult GI bleed. Awaiting scheduling as they have to schedule him for an over night stay for proper prep.      Orders:  - ferrous sulfate 324 (65 Fe) mg; Take 1 tablet (324 mg total) by mouth every other day    5. Abdominal aortic aneurysm (AAA) 3.0 cm to 5.5 cm in diameter in male [facility])  Assessment & Plan:  AAA screening 9/6/23: Distal abdominal aortic aneurysm measuring 3.4 cm. Consult with vascular scheduled 11/17. 6. Coronary artery disease involving coronary bypass graft of native heart without angina pectoris  Assessment & Plan:  Appears stable. Continue aspirin 81 mg daily and rosuvastatin 40 mg daily. 7. Personal history of nicotine dependence  Assessment & Plan:  Up to date on CT lung cancer screening. Encouraged smoking cessation. 8. Encounter for immunization  Assessment & Plan:  Received influenza immunization today. Patient tolerated well. Orders:  -     influenza vaccine, high-dose, PF 0.7 mL (FLUZONE HIGH-DOSE)      BMI Counseling: Body mass index is 28.43 kg/m². The BMI is above normal. Nutrition recommendations include decreasing portion sizes, encouraging healthy choices of fruits and vegetables, decreasing fast food intake, consuming healthier snacks, limiting drinks that contain sugar, moderation in carbohydrate intake, increasing intake of lean protein, reducing intake of saturated and trans fat and reducing intake of cholesterol. Exercise recommendations include moderate physical activity 150 minutes/week, exercising 3-5 times per week and strength training exercises. No pharmacotherapy was ordered. Rationale for BMI follow-up plan is due to patient being overweight or obese. Tobacco Cessation Counseling: Tobacco cessation counseling was provided. The patient is sincerely urged to quit consumption of tobacco. He is not ready to quit tobacco. Medication options and side effects of medication discussed. Patient refused medication. Subjective      Patient is a 80year old male with a PMH of T2DM, CAD, primary progressive asphasia, and HLD presenting for diabetes follow up.  He is here with his caregiver, she is with him usually 5 hours a day. The patient does still forget to take his medications when no one is with him. This has improved though. His friend and her  also check in on him when they are able to. Denies any medication side effects. He does not check his sugar. Denies any hypoglycemic or hyperglycemic events. Denies polyuria, polyphagia, and polydipsia. Denies changes in appetite or weight. Denies visual changes. He is blind in his left eye. Denies numbness, tingling, or weakness. Review of Systems   Constitutional:  Negative for activity change, appetite change, chills, diaphoresis, fatigue, fever and unexpected weight change. HENT:  Negative for congestion, hearing loss, sore throat, tinnitus and trouble swallowing. Eyes:  Negative for photophobia and visual disturbance. Respiratory:  Negative for cough, chest tightness, shortness of breath and wheezing. Cardiovascular:  Negative for chest pain, palpitations and leg swelling. Gastrointestinal:  Negative for abdominal pain, blood in stool, constipation, diarrhea, nausea and vomiting. Endocrine: Negative for cold intolerance, heat intolerance, polydipsia, polyphagia and polyuria. Genitourinary:  Negative for decreased urine volume, dysuria, frequency, hematuria and urgency. Musculoskeletal:  Negative for arthralgias and myalgias. Skin:  Negative for rash. Neurological:  Negative for dizziness, tremors, weakness, light-headedness, numbness and headaches. Hematological:  Negative for adenopathy. Psychiatric/Behavioral:  Positive for confusion. Negative for agitation, behavioral problems, dysphoric mood, hallucinations, self-injury, sleep disturbance and suicidal ideas. The patient is not nervous/anxious.         Current Outpatient Medications on File Prior to Visit   Medication Sig    aspirin 81 MG tablet Take 81 mg by mouth daily at bedtime     lisinopril (ZESTRIL) 2.5 mg tablet TAKE ONE TABLET BY MOUTH EVERY DAY    metFORMIN (GLUCOPHAGE-XR) 500 mg 24 hr tablet Take 2 tablets (1,000 mg total) by mouth 2 (two) times a day with meals    omega-3-acid ethyl esters (LOVAZA) 1 g capsule Take 100 mg by mouth 2 (two) times a day    rosuvastatin (CRESTOR) 40 MG tablet TAKE ONE TABLET BY MOUTH EVERY DAY    [DISCONTINUED] ferrous sulfate 324 (65 Fe) mg Take 1 tablet (324 mg total) by mouth daily       Objective     /82 (BP Location: Right arm, Patient Position: Sitting, Cuff Size: Large)   Pulse 89   Temp 98.1 °F (36.7 °C) (Temporal)   Ht 5' 8" (1.727 m)   Wt 84.8 kg (187 lb)   BMI 28.43 kg/m²     Physical Exam  Vitals and nursing note reviewed. Constitutional:       General: He is not in acute distress. Appearance: Normal appearance. He is well-developed, well-groomed and overweight. He is not ill-appearing. HENT:      Head: Normocephalic and atraumatic. Eyes:      General: No scleral icterus. Conjunctiva/sclera: Conjunctivae normal.   Cardiovascular:      Rate and Rhythm: Normal rate and regular rhythm. Pulses: Normal pulses. Radial pulses are 2+ on the right side and 2+ on the left side. Heart sounds: Normal heart sounds. No murmur heard. Pulmonary:      Effort: Pulmonary effort is normal. No respiratory distress. Breath sounds: Normal breath sounds and air entry. No decreased air movement. No decreased breath sounds, wheezing, rhonchi or rales. Abdominal:      General: Abdomen is flat. Bowel sounds are normal. There is no distension. Palpations: Abdomen is soft. There is no mass. Tenderness: There is no abdominal tenderness. There is no right CVA tenderness, left CVA tenderness, guarding or rebound. Hernia: No hernia is present. Musculoskeletal:         General: Normal range of motion. Cervical back: Normal range of motion and neck supple. Right lower leg: No edema. Left lower leg: No edema.    Lymphadenopathy: Cervical: No cervical adenopathy. Skin:     General: Skin is warm. Capillary Refill: Capillary refill takes less than 2 seconds. Coloration: Skin is not jaundiced. Findings: No rash. Neurological:      General: No focal deficit present. Mental Status: He is alert and oriented to person, place, and time. Mental status is at baseline. Sensory: Sensation is intact. Motor: Motor function is intact. Coordination: Coordination is intact. Gait: Gait is intact. Psychiatric:         Attention and Perception: Attention normal.         Mood and Affect: Mood and affect normal.         Speech: Speech normal.         Behavior: Behavior normal. Behavior is cooperative.        Roscoe Leventhal, PA-C

## 2023-11-15 LAB
PSA FREE MFR SERPL: 23 %
PSA FREE SERPL-MCNC: 0.23 NG/ML
PSA SERPL-MCNC: 1 NG/ML (ref 0–4)

## 2023-11-17 ENCOUNTER — TELEPHONE (OUTPATIENT)
Dept: INTERNAL MEDICINE CLINIC | Facility: CLINIC | Age: 68
End: 2023-11-17

## 2023-11-17 NOTE — TELEPHONE ENCOUNTER
Good morning,  I spoke with Sonia Gill, one of Juan's caregivers. She would like to schedule Sly Palomo for his colonoscopy/EGD, but she stated it requires an admission as he is unable to prep on his own. Can you or someone at the office please reach out to her to schedule?

## 2023-11-20 NOTE — TELEPHONE ENCOUNTER
Scheduled date of EGD/colonoscopy (as of today):02.15.24  Physician performing EGD/colonoscopy:DR HUNTER  Location of EGD/colonoscopy:[facility]  Desired bowel prep reviewed with patient:D/M  Instructions reviewed with patient by:MAILED TO CARE TAKER  Clearances:  N/A  PT NEEDS ADMISSION FOR PREP

## 2023-11-21 DIAGNOSIS — D50.8 IRON DEFICIENCY ANEMIA SECONDARY TO INADEQUATE DIETARY IRON INTAKE: Primary | ICD-10-CM

## 2023-11-24 ENCOUNTER — TELEPHONE (OUTPATIENT)
Dept: GASTROENTEROLOGY | Facility: CLINIC | Age: 68
End: 2023-11-24

## 2023-11-24 DIAGNOSIS — D50.8 IRON DEFICIENCY ANEMIA SECONDARY TO INADEQUATE DIETARY IRON INTAKE: Primary | ICD-10-CM

## 2023-11-24 DIAGNOSIS — Z86.010 HISTORY OF ADENOMATOUS POLYP OF COLON: ICD-10-CM

## 2023-11-24 NOTE — TELEPHONE ENCOUNTER
I called the number on file, answered by his friend Ramos Cary; who is primary care giver for the patient right now. Discussed that he was not able to finish the prep for his prior colonoscopy due to his history of dementia and would require assistance with the same. Discussed indications of colonoscopy which is Iron Deficiency anemia. She expressed understanding and would like to pursue further evaluation. We will add ADT 21 order for direct admit for bowel prep for egd and colonoscopy which is currently on schedule for 2/15/24 with Dr Laquita Kay.

## 2023-11-24 NOTE — TELEPHONE ENCOUNTER
Hi , I spoke to patient's primary caregiver and they would like to complete evaluation for iron deficiency anemia. Added ADT21 order.

## 2023-12-01 ENCOUNTER — APPOINTMENT (OUTPATIENT)
Dept: SPEECH THERAPY | Facility: CLINIC | Age: 68
End: 2023-12-01
Payer: MEDICARE

## 2023-12-01 ENCOUNTER — TELEPHONE (OUTPATIENT)
Dept: INTERNAL MEDICINE CLINIC | Facility: CLINIC | Age: 68
End: 2023-12-01

## 2023-12-01 DIAGNOSIS — E11.65 TYPE 2 DIABETES MELLITUS WITH HYPERGLYCEMIA, WITHOUT LONG-TERM CURRENT USE OF INSULIN (HCC): Primary | ICD-10-CM

## 2023-12-08 ENCOUNTER — EVALUATION (OUTPATIENT)
Dept: SPEECH THERAPY | Facility: CLINIC | Age: 68
End: 2023-12-08
Payer: MEDICARE

## 2023-12-08 DIAGNOSIS — F03.A0 MILD DEMENTIA, UNSPECIFIED DEMENTIA TYPE, UNSPECIFIED WHETHER BEHAVIORAL, PSYCHOTIC, OR MOOD DISTURBANCE OR ANXIETY (HCC): ICD-10-CM

## 2023-12-08 DIAGNOSIS — F02.80 PRIMARY PROGRESSIVE APHASIA (HCC): Primary | ICD-10-CM

## 2023-12-08 DIAGNOSIS — G31.01 PRIMARY PROGRESSIVE APHASIA (HCC): Primary | ICD-10-CM

## 2023-12-08 DIAGNOSIS — R47.01 APHASIA: ICD-10-CM

## 2023-12-08 PROCEDURE — 96105 ASSESSMENT OF APHASIA: CPT

## 2023-12-08 NOTE — PROGRESS NOTES
Speech-Language Pathology Re-Evaluation    Today's date: 2023   Patient’s name: Jazmín Tran  : 1955  MRN: [id number]  Safety measures: Dementia, Visual Impairment   Referring provider: Anastacio Tamez MD    Encounter Diagnosis     ICD-10-CM    1. Primary progressive aphasia ([address])  G31.01     F02.80       2. Aphasia  R47.01       3. Mild dementia, unspecified dementia type, unspecified whether behavioral, psychotic, or mood disturbance or anxiety ([address])  F03. A0           Re-eval Due POC Expires Auth Expiration Date ST Visit Limit   2024 N/A BOMN                           Visit/Unit Tracking  AUTH Status:  Date 2023   BOMN Used 9    Remaining  1        Subjective comments: Patient stated that he was having more trouble seeing today. Patient's vision at baseline is already impaired. It is undetermined how much his vision impacted his test scores today. Patient's goal(s): "I don't know."    Assessments    The Western Aphasia Battery-Revised (WAB-R) is designed to evaluate a patient's language function following CVA, dementia, or other acquired neurological disorder. It measures a patient’s linguistic skills, such as speech content, fluency, auditory comprehension, repetition, naming, reading, and writing. The WAB-R also measures a patient’s nonlinguistic skills, including drawing, calculation, block design, and apraxia. The purpose of this standardized assessment is to (1) determine the presence, severity, and type of aphasia; (2) measure the patient's level of performance to provide a baseline for detecting change over time; (3) provide a comprehensive assessment of the patient's language assets and deficits in order to guide treatment and management; and (4) infer the location and etiology of the lesion causing aphasia.  The following results were obtained during the administration of the assessment:     PART 1: Score: IE: Status:   -Spontaneous Speech:  10 DECLINE -Auditory Verbal Comprehension: 6.9/10 6.1 IMPROVEMENT   -Repetition: 4.4/10 4.2 IMPROVEMENT   -Naming & Word Findin.8/10 2.9 DECLINE     *Pt scores correlated most consistently with Primary Progressive Aphasia. Due to time constraints, only portions of the standardized assessment were administered on this date of service. Score: IE: Status:   *Aphasia Quotient (AQ): 42.2/100 46.4 DECLINE     “IE” indicates the scores from the initial evaluation (2023). "A bunch a lot of people. This one. Right here, there's. This is an old one, I can tell you that much. And this one. They they they just just walking. Playing basketball maybe. This girl. **Kissing noises** Come on baby. A dog. This one I don't think so. That's different."     Goals    Short Term Goals  1. Patient will follow 2-step verbal directions with 80% accuracy to facilitate increased auditory comprehension skills, to be achieved in 4-6 weeks. --NOT MET    2. Patient will answer verbal Yes/No questions with 80% accuracy to facilitate increased auditory comprehension skills, to be achieved in 4-6 weeks. --NOT MET    3. Patient will name an appropriate word opposite (e.g., hot and /cold/) with 80% accuracy to build expressive vocabulary for conversation, to be achieved in 4-6 weeks. --NOT MET    4. Patient will name an appropriate word synonym (e.g., street or /road/) with 80% accuracy to build expressive vocabulary for conversation, to be achieved in 4-6 weeks. --NOT MET    5. Patient will name an appropriate category for given words (e.g., apple, banana, pear = fruits / sun, bus, lemon = yellow things) with 80% accuracy to facilitate improved semantic organization, to be achieved in 4-6 weeks. --NOT MET    6. Patient will provide an adequate response to confrontation naming task (i.e., what is…) with 80% accuracy to facilitate increased expressive language skills, to be achieved in 4-6 weeks. --NOT MET    7.  Patient will complete picture description tasks using language organization strategies with 80% accuracy to facilitate improved utilization of circumlocution strategy, to be achieved in 4-6 weeks. --NOT MET    NEW GOALS:  8. Patient will complete word generation tasks (e.g., categorical naming, sentence/phrase completion, word deduction, definitions, etc.) at 75% accuracy with min semantic and phonemic cues from clinician as needed to target the maintenance of his expressive vocabulary, as well as promote socialization and safety, to be met in 8-10 weeks. 9. Patient will complete following directions tasks (both simple and complex) at 75% accuracy with min verbal cues from Clinician as needed to target the maintenance of his receptive language, as well as promote safety, to be met in 8-10 weeks. Long Term Goals  1. Patient will demonstrate improved expressive and receptive language skills during structured and unstructured tasks by discharge. --NOT MET    2. Patient will improve ability to facilitate communication to meet needs including use of compensatory strategies to promote meaningful interactions for improved quality of life and maximize level of independence, by discharge. --NOT MET    NEW GOALS:  3. Patient will maintain his/her highest level of independence with her current cognitive-linguistic functioning to meet her needs with the implementation of compensatory strategies to promote positive communication interactions and socialization, participation in meaningful activities, safety, and quality of life (to be achieved by discharge). 4. Patient will use functional communication skills for social interactions (e.g., greetings, social etiquette, and short questions/simple sentences) with both familiar and unfamiliar partners with 90% success, by discharge.       Impressions/Recommendations    Impressions:   -Patient continues to present with mod-severe receptive and expressive language impairments secondary to mild dementia as a result of PPA. Patient was noted to have strengths in answering simple demographic information (e.g. name, status) as well as answering simple yes/no questions. Patient was also able to identify more than half of the objects/pictures he was shown. He only started to have increased difficulty when he was asked to point to more complex items (e.g. right ear, left elbow). It should be noted that Patient did appear to be distracted by his candy wrappers on the desk. Clinician instructed Patient to focus on the task at hand and disposed of the candy wrappers. Patient was noted to have weaknesses with following complex directions including two or more steps, confrontational naming, generative naming, and responsive naming. Patient was noted to often state, "I don't know because I don't use this stuff anymore."  Clinician explained that although he may not use some objects every single day, it is still important to know what they are called in order to have conversation with others. Patient also appeared to be confused with some tasks (e.g. giving a response to the target phrase during repetition subtest). At this time, skilled outpatient Speech Therapy services are recommended to facilitate safety and functional communications skills. Minimal progress has been made at this time due to inconsistency of care. This Clinician believes that a maintenance program would be more appropriate at this time. Due to complexity and nature of PPA, Patient has made fluctuating progress over the course of rehabilitation. Due to this, we will transition this Patient into a skilled maintenance program to reduce functional decline and maintain level of function. New goals have been created below to reflect focus of maintenance therapy.   Once it is deemed that his functional level has stabilized, Patient will be discharged to a home exercise program.      Recommendations:  -Patient would benefit from outpatient skilled Speech Therapy services: Speech-language therapy and Maintenance therapy program    -Frequency: 1x weekly  -Duration:  8-10 Weeks    -Intervention certification from: 28/1/5562  -Intervention certification to: 68/07/3855    -Intervention comments:   45 minutes test administration; 45 minutes scoring and documentation of POC

## 2023-12-13 ENCOUNTER — OFFICE VISIT (OUTPATIENT)
Dept: SPEECH THERAPY | Facility: CLINIC | Age: 68
End: 2023-12-13
Payer: MEDICARE

## 2023-12-13 DIAGNOSIS — F03.A0 MILD DEMENTIA, UNSPECIFIED DEMENTIA TYPE, UNSPECIFIED WHETHER BEHAVIORAL, PSYCHOTIC, OR MOOD DISTURBANCE OR ANXIETY (HCC): ICD-10-CM

## 2023-12-13 DIAGNOSIS — R47.01 APHASIA: ICD-10-CM

## 2023-12-13 DIAGNOSIS — F02.80 PRIMARY PROGRESSIVE APHASIA (HCC): Primary | ICD-10-CM

## 2023-12-13 DIAGNOSIS — G31.01 PRIMARY PROGRESSIVE APHASIA (HCC): Primary | ICD-10-CM

## 2023-12-13 PROCEDURE — 92507 TX SP LANG VOICE COMM INDIV: CPT

## 2023-12-13 NOTE — PROGRESS NOTES
Daily Speech Treatment Note    Today's date: 2023  Patient’s name: Charlene Rainey  : 1955  MRN: [id number]  Safety measures: Dementia, Visual Impairment   Referring provider: Army Yamel MD    Encounter Diagnosis     ICD-10-CM    1. Primary progressive aphasia ([address])  G31.01     F02.80       2. Aphasia  R47.01       3. Mild dementia, unspecified dementia type, unspecified whether behavioral, psychotic, or mood disturbance or anxiety ([address])  F03. A0         Visit tracking:  Re-eval Due POC Expires Auth Expiration Date ST Visit Limit   2024 N/A BOMN                           Visit/Unit Tracking  AUTH Status:  Date 2023   BOMN Used 9 8    Remaining  1 2       Subjective/Behavioral:  -Patient continues to fixate on his visual deficits even when he is completing a task where no vision is involved. Objective/Assessment:  -Reviewed testing results and goals in plan care with patient. Patient is in agreement at this time. Short Term Goals  1. Patient will follow 2-step verbal directions with 80% accuracy to facilitate increased auditory comprehension skills, to be achieved in 4-6 weeks. --NOT MET    To target following directions: Patient was presented with basic shapes, letters, and numbers and was verbally presented with a direction (e.g. shade in the [place]). He completed this task in 3/10 (30%) opportunities independently, increasing to 8/10 (80%) opportunities given mod to max verbal cues, and increasing to 10/10 (100%) opportunities given direct model. 2. Patient will answer verbal Yes/No questions with 80% accuracy to facilitate increased auditory comprehension skills, to be achieved in 4-6 weeks. --NOT MET    3. Patient will name an appropriate word opposite (e.g., hot and /cold/) with 80% accuracy to build expressive vocabulary for conversation, to be achieved in 4-6 weeks. --NOT MET    4.  Patient will name an appropriate word synonym (e.g., street or /road/) with 80% accuracy to build expressive vocabulary for conversation, to be achieved in 4-6 weeks. --NOT MET    5. Patient will name an appropriate category for given words (e.g., apple, banana, pear = fruits / sun, bus, lemon = yellow things) with 80% accuracy to facilitate improved semantic organization, to be achieved in 4-6 weeks. --NOT MET    6. Patient will provide an adequate response to confrontation naming task (i.e., what is…) with 80% accuracy to facilitate increased expressive language skills, to be achieved in 4-6 weeks. --NOT MET    To target expressive language: Patient was presented with a brief description (e.g. jewelry you wear on your wrist to tell time) and was instructed to state what item was being described (e.g. watch). He completed this task in 3/10 (30%) opportunities independently, increasing to 5/10 (50%) opportunities given semantic cues, and increasing to 10/10 (100%) opportunities given phonemic cues. 7. Patient will complete picture description tasks using language organization strategies with 80% accuracy to facilitate improved utilization of circumlocution strategy, to be achieved in 4-6 weeks. --NOT MET    8. Patient will complete word generation tasks (e.g., categorical naming, sentence/phrase completion, word deduction, definitions, etc.) at 75% accuracy with min semantic and phonemic cues from clinician as needed to target the maintenance of his expressive vocabulary, as well as promote socialization and safety, to be met in 8-10 weeks. 9. Patient will complete following directions tasks (both simple and complex) at 75% accuracy with min verbal cues from Clinician as needed to target the maintenance of his receptive language, as well as promote safety, to be met in 8-10 weeks. Plan:  -Patient was provided with home exercises/activities to target goals in plan of care at the end of today's session.  -Continue with current plan of care.

## 2023-12-20 ENCOUNTER — OFFICE VISIT (OUTPATIENT)
Dept: SPEECH THERAPY | Facility: CLINIC | Age: 68
End: 2023-12-20
Payer: MEDICARE

## 2023-12-20 DIAGNOSIS — F03.A0 MILD DEMENTIA, UNSPECIFIED DEMENTIA TYPE, UNSPECIFIED WHETHER BEHAVIORAL, PSYCHOTIC, OR MOOD DISTURBANCE OR ANXIETY (HCC): ICD-10-CM

## 2023-12-20 DIAGNOSIS — G31.01 PRIMARY PROGRESSIVE APHASIA (HCC): Primary | ICD-10-CM

## 2023-12-20 DIAGNOSIS — F02.80 PRIMARY PROGRESSIVE APHASIA (HCC): Primary | ICD-10-CM

## 2023-12-20 DIAGNOSIS — R47.01 APHASIA: ICD-10-CM

## 2023-12-20 PROCEDURE — 92507 TX SP LANG VOICE COMM INDIV: CPT

## 2023-12-20 NOTE — PROGRESS NOTES
Daily Speech Treatment Note    Today's date: 2023  Patient’s name: Juan Henley  : 1955  MRN: [id number]  Safety measures: Dementia, Visual Impairment   Referring provider: Jarrell Giraldo MD    Encounter Diagnosis     ICD-10-CM    1. Primary progressive aphasia (HCC)  G31.01     F02.80       2. Aphasia  R47.01       3. Mild dementia, unspecified dementia type, unspecified whether behavioral, psychotic, or mood disturbance or anxiety (HCC)  F03.A0         Visit tracking:  Re-eval Due POC Expires Auth Expiration Date ST Visit Limit   2024 N/A BOMN                           Visit/Unit Tracking  AUTH Status:  Date 2023   BOMN Used 1 2 3    Remaining  9 8 7       Subjective/Behavioral:  -Patient appeared to have better focus during today's session.     Objective/Assessment:  -Reviewed testing results and goals in plan care with patient. Patient is in agreement at this time.      Short Term Goals  1. Patient will follow 2-step verbal directions with 80% accuracy to facilitate increased auditory comprehension skills, to be achieved in 4-6 weeks. --NOT MET    To target following directions: Patient was verbally presented with a two step direction (e.g. raise your hand and say your name) and was instructed to follow.  He completed this task in 16/30 (53%) opportunities independently, increasing to 26/30 (87%) opportunities given repetitions, and increasing to 30/30 (100%) opportunities given a direct model.     2. Patient will answer verbal Yes/No questions with 80% accuracy to facilitate increased auditory comprehension skills, to be achieved in 4-6 weeks. --NOT MET    3. Patient will name an appropriate word opposite (e.g., hot and /cold/) with 80% accuracy to build expressive vocabulary for conversation, to be achieved in 4-6 weeks. --NOT MET    4. Patient will name an appropriate word synonym (e.g., street or /road/) with 80% accuracy to build expressive  vocabulary for conversation, to be achieved in 4-6 weeks. --NOT MET    To target expressive language: Patient was verbally presented with a sentence (e.g. The lady was very kind) and was instructed to state a synonym for the target word (e.g. kind/nice).  Patient completed this task in 9/15 (60%) opportunities independently, increasing to 15/15 (100%) opportunities when given three choices.     5. Patient will name an appropriate category for given words (e.g., apple, banana, pear = fruits / sun, bus, lemon = yellow things) with 80% accuracy to facilitate improved semantic organization, to be achieved in 4-6 weeks. --NOT MET    6. Patient will provide an adequate response to confrontation naming task (i.e., what is…) with 80% accuracy to facilitate increased expressive language skills, to be achieved in 4-6 weeks. --NOT MET    To target expressive language: Patient was verbally presented with a common phrase (e.g. turn on the ____) and was instructed to fill in the blank (e.g. light).  Patient completed this task in 10/15 (67%) opportunities independently, increasing to 15/15 (100%) opportunities given three choices.     7. Patient will complete picture description tasks using language organization strategies with 80% accuracy to facilitate improved utilization of circumlocution strategy, to be achieved in 4-6 weeks. --NOT MET    8. Patient will complete word generation tasks (e.g., categorical naming, sentence/phrase completion, word deduction, definitions, etc.) at 75% accuracy with min semantic and phonemic cues from clinician as needed to target the maintenance of his expressive vocabulary, as well as promote socialization and safety, to be met in 8-10 weeks.    9. Patient will complete following directions tasks (both simple and complex) at 75% accuracy with min verbal cues from Clinician as needed to target the maintenance of his receptive language, as well as promote safety, to be met in 8-10 weeks.        Plan:  -Patient was provided with home exercises/activities to target goals in plan of care at the end of today's session.  -Continue with current plan of care.

## 2023-12-27 ENCOUNTER — OFFICE VISIT (OUTPATIENT)
Dept: SPEECH THERAPY | Facility: CLINIC | Age: 68
End: 2023-12-27
Payer: MEDICARE

## 2023-12-27 DIAGNOSIS — G31.01 PRIMARY PROGRESSIVE APHASIA (HCC): Primary | ICD-10-CM

## 2023-12-27 DIAGNOSIS — F03.A0 MILD DEMENTIA, UNSPECIFIED DEMENTIA TYPE, UNSPECIFIED WHETHER BEHAVIORAL, PSYCHOTIC, OR MOOD DISTURBANCE OR ANXIETY (HCC): ICD-10-CM

## 2023-12-27 DIAGNOSIS — R47.01 APHASIA: ICD-10-CM

## 2023-12-27 DIAGNOSIS — F02.80 PRIMARY PROGRESSIVE APHASIA (HCC): Primary | ICD-10-CM

## 2023-12-27 PROCEDURE — 92507 TX SP LANG VOICE COMM INDIV: CPT

## 2023-12-27 NOTE — PROGRESS NOTES
Daily Speech Treatment Note    Today's date: 2023  Patient’s name: Juan Henley  : 1955  MRN: [id number]  Safety measures: Dementia, Visual Impairment   Referring provider: Jarrell Giraldo MD    Encounter Diagnosis     ICD-10-CM    1. Primary progressive aphasia (HCC)  G31.01     F02.80       2. Aphasia  R47.01       3. Mild dementia, unspecified dementia type, unspecified whether behavioral, psychotic, or mood disturbance or anxiety (HCC)  F03.A0         Visit tracking:  Re-eval Due POC Expires Auth Expiration Date ST Visit Limit   2024 N/A BOMN                           Visit/Unit Tracking  AUTH Status:  Date 2023   BOMN Used 1 2 3    Remaining  9 8 7       Subjective/Behavioral:  -Patient appeared to have better focus during today's session.     Objective/Assessment:  -Reviewed testing results and goals in plan care with patient. Patient is in agreement at this time.      Short Term Goals  8. Patient will complete word generation tasks (e.g., categorical naming, sentence/phrase completion, word deduction, definitions, etc.) at 75% accuracy with min semantic and phonemic cues from clinician as needed to target the maintenance of his expressive vocabulary, as well as promote socialization and safety, to be met in 8-10 weeks.    To target expressive language: Patient was verbally presented with three clues (e.g. movies, building, popcorn) and was instructed to state the target word being described (e.g. theater).  Patient completed this task in 1/10 (10%) opportunities independently, increasing to 2/10 (20%) opportunities given additional semantic cues, and increasing to 9/10 (90%) opportunities given phonemic cues.    To target expressive language: Patient was verbally presented with general information questions regarding food (e.g. what yellow fruit do you peel?) and was instructed to answer (e.g. banana).  Patient completed this task in 2/10 (20%)  opportunities independently, increasing to 4/10 (40%) opportunities given semantic cues, and increasing to 10/10 (100%) opportunities given phonemic cues.    9. Patient will complete following directions tasks (both simple and complex) at 75% accuracy with min verbal cues from Clinician as needed to target the maintenance of his receptive language, as well as promote safety, to be met in 8-10 weeks.     To target following directions: Patient was verbally presented with a two step direction (e.g. raise your hand and say your name) and was instructed to follow.  He completed this task in 9/20 (45%) opportunities independently, increasing to 13/20 (65%) opportunities given repetitions, and increasing to 20/20 (100%) opportunities given a direct model.       Plan:  -Patient was provided with home exercises/activities to target goals in plan of care at the end of today's session.  -Continue with current plan of care.

## 2024-01-03 ENCOUNTER — APPOINTMENT (OUTPATIENT)
Dept: SPEECH THERAPY | Facility: CLINIC | Age: 69
End: 2024-01-03
Payer: MEDICARE

## 2024-01-10 ENCOUNTER — APPOINTMENT (OUTPATIENT)
Dept: SPEECH THERAPY | Facility: CLINIC | Age: 69
End: 2024-01-10
Payer: MEDICARE

## 2024-01-17 ENCOUNTER — OFFICE VISIT (OUTPATIENT)
Dept: SPEECH THERAPY | Facility: CLINIC | Age: 69
End: 2024-01-17
Payer: MEDICARE

## 2024-01-17 DIAGNOSIS — G31.01 PRIMARY PROGRESSIVE APHASIA (HCC): Primary | ICD-10-CM

## 2024-01-17 DIAGNOSIS — R47.01 APHASIA: ICD-10-CM

## 2024-01-17 DIAGNOSIS — F03.A0 MILD DEMENTIA, UNSPECIFIED DEMENTIA TYPE, UNSPECIFIED WHETHER BEHAVIORAL, PSYCHOTIC, OR MOOD DISTURBANCE OR ANXIETY (HCC): ICD-10-CM

## 2024-01-17 DIAGNOSIS — F02.80 PRIMARY PROGRESSIVE APHASIA (HCC): Primary | ICD-10-CM

## 2024-01-17 PROCEDURE — 92507 TX SP LANG VOICE COMM INDIV: CPT

## 2024-01-17 NOTE — PROGRESS NOTES
Daily Speech Treatment Note    Today's date: 2024  Patient’s name: Juan Henley  : 1955  MRN: [id number]  Safety measures: Dementia, Visual Impairment   Referring provider: Jarrell Giraldo MD    Encounter Diagnosis     ICD-10-CM    1. Primary progressive aphasia (HCC)  G31.01     F02.80       2. Aphasia  R47.01       3. Mild dementia, unspecified dementia type, unspecified whether behavioral, psychotic, or mood disturbance or anxiety (HCC)  F03.A0         Visit tracking:  Re-eval Due POC Expires Auth Expiration Date ST Visit Limit   2024 N/A BOMN                           Visit/Unit Tracking  AUTH Status:  Date 2023   BOMN Used 1 2 3 4    Remaining  9 8 7 6       Subjective/Behavioral:  -Patient with no new complaints.     -Patient was provided with information regarding his senior care visit today (including doctor's name and address).      Objective/Assessment:  -Reviewed testing results and goals in plan care with patient. Patient is in agreement at this time.      Short Term Goals  8. Patient will complete word generation tasks (e.g., categorical naming, sentence/phrase completion, word deduction, definitions, etc.) at 75% accuracy with min semantic and phonemic cues from clinician as needed to target the maintenance of his expressive vocabulary, as well as promote socialization and safety, to be met in 8-10 weeks.    To target receptive language: Patient was presented with a target card (e.g. milk bottle) and an array of 4 picture cards (e.g. socks, letter, baby, dog) and was instructed to select the picture that best matched the target.  It should be noted that Clinician did state what each card showed as Patient could not see the cards easily.  Patient completed this task over 15 trials in 9/15 (60%) opportunities independently, increasing to 15/15 (100%) opportunities given mod verbal cues.     To target generative naming: Patient  was presented with a target category (e.g. food) and was instructed to name two items for each category.  He completed this task over 10 trials in 7/20 (35%) opportunities independently, increasing to 11/20 (55%) opportunities given semantic cues, and increasing to 20/20 (100%) opportunities given phonemic cues.     9. Patient will complete following directions tasks (both simple and complex) at 75% accuracy with min verbal cues from Clinician as needed to target the maintenance of his receptive language, as well as promote safety, to be met in 8-10 weeks.       Plan:  -Patient was provided with home exercises/activities to target goals in plan of care at the end of today's session.  -Continue with current plan of care.

## 2024-01-24 ENCOUNTER — OFFICE VISIT (OUTPATIENT)
Dept: SPEECH THERAPY | Facility: CLINIC | Age: 69
End: 2024-01-24
Payer: MEDICARE

## 2024-01-24 DIAGNOSIS — R47.01 APHASIA: ICD-10-CM

## 2024-01-24 DIAGNOSIS — G31.01 PRIMARY PROGRESSIVE APHASIA (HCC): Primary | ICD-10-CM

## 2024-01-24 DIAGNOSIS — F02.80 PRIMARY PROGRESSIVE APHASIA (HCC): Primary | ICD-10-CM

## 2024-01-24 DIAGNOSIS — F03.A0 MILD DEMENTIA, UNSPECIFIED DEMENTIA TYPE, UNSPECIFIED WHETHER BEHAVIORAL, PSYCHOTIC, OR MOOD DISTURBANCE OR ANXIETY (HCC): ICD-10-CM

## 2024-01-24 PROCEDURE — 92507 TX SP LANG VOICE COMM INDIV: CPT

## 2024-01-24 NOTE — PROGRESS NOTES
Daily Speech Treatment Note    Today's date: 2024  Patient’s name: Juan Henley  : 1955  MRN: [id number]  Safety measures: Dementia, Visual Impairment   Referring provider: Jarrell Giraldo MD    Encounter Diagnosis     ICD-10-CM    1. Primary progressive aphasia (HCC)  G31.01     F02.80       2. Aphasia  R47.01       3. Mild dementia, unspecified dementia type, unspecified whether behavioral, psychotic, or mood disturbance or anxiety (HCC)  F03.A0         Visit tracking:  Re-eval Due POC Expires Auth Expiration Date ST Visit Limit   2024 N/A BOMN                           Visit/Unit Tracking  AUTH Status:  Date 2023   BOMN Used 1 2 3 4 5    Remaining  9 8 7 6 5       Subjective/Behavioral:  -Patient stated that he is feeling okay today.      -Patient did not attend Senior Care visit last week.     Objective/Assessment:  -Reviewed testing results and goals in plan care with patient. Patient is in agreement at this time.      Short Term Goals  8. Patient will complete word generation tasks (e.g., categorical naming, sentence/phrase completion, word deduction, definitions, etc.) at 75% accuracy with min semantic and phonemic cues from clinician as needed to target the maintenance of his expressive vocabulary, as well as promote socialization and safety, to be met in 8-10 weeks.    To target expressive language: Patient participated in SFA where he was shown objects and was instructed to state information about the objects using function, feature, class language.  Patient completed this task over 6 objects and stated functions, features, and class of items in 5/23 (22%) opportunities independently, increasing to 8/23 (35%) opportunities given min semantic cues, and increasing to 23/23 (100%) opportunities given multiple choices.     Patient was then asked to name each item after describing it with task above.  He named items in 6/6  (100%) opportunities given phonemic cues.    9. Patient will complete following directions tasks (both simple and complex) at 75% accuracy with min verbal cues from Clinician as needed to target the maintenance of his receptive language, as well as promote safety, to be met in 8-10 weeks.     To target following directions: Patient was verbally presented with a one step direction (e.g. raise your hand, say your name) and was instructed to follow.  He completed this task in 24/30 (80%) opportunities independently, increasing to 29/30 (97%) opportunities given repetitions, and increasing to 30/30 (100%) opportunities given a direct model with HUH assistance.       Plan:  -Patient was provided with home exercises/activities to target goals in plan of care at the end of today's session.  -Continue with current plan of care.

## 2024-01-30 ENCOUNTER — TELEPHONE (OUTPATIENT)
Dept: GASTROENTEROLOGY | Facility: CLINIC | Age: 69
End: 2024-01-30

## 2024-01-30 NOTE — TELEPHONE ENCOUNTER
Called pt to confirm procedure - per chart notes and scheduled admission, pt will be admitted for prep.  Confirmed with family.

## 2024-01-31 ENCOUNTER — APPOINTMENT (OUTPATIENT)
Dept: SPEECH THERAPY | Facility: CLINIC | Age: 69
End: 2024-01-31
Payer: MEDICARE

## 2024-02-13 ENCOUNTER — PATIENT OUTREACH (OUTPATIENT)
Dept: INTERNAL MEDICINE CLINIC | Facility: CLINIC | Age: 69
End: 2024-02-13

## 2024-02-13 NOTE — PROGRESS NOTES
YEHUDA completed chart review. As per chart review, patient with a PMH of T2DM, CAD, primary progressive aphasia, and HLD. As per chart review, CMOC and YEHUDA Lim assisted patient with Waiver Services needs, Medical Alert System and Meals on Wheels.     As indicated in chart, on 11/14/23, YEHUDA Lim met with patient and his Aide, Celena Olvera ([phone]). Pt approved for 31 hours of care thru the [place]aiver Program, patient's aide with patient Monday - Friday for 5 hours/ day and 6 hours on Sunday. Pt had received his Medical Alert System and Meals on Wheels. [facility] provided Aide info on the [facility] and provided same to patient's friend, Supriya.  This YEHUDA spoke with to patient's Aide, Celena Olvera, [phone]. Celena reports patient is doing much better since she started. Aide reports she takes patient to some of his appointments. Aide requested [facility] to call Supriya, patient's friend, for further information on patient. MISSY acknowledged request and called Supriya. Supriya reports she has called [facility] to have HHA hours increased as she identifies need for increased hours but was told hours are sufficient. [facility] encouraged Supriya to call again and inform of patient's needs to have an assessment scheduled. Supriya agreeable. Supriya reports this Aide “does a good job” and they “are happy with her services”. Supriya also provides help with patient and brings food on the weekends when Aide is not there. Supriya reports patient's children are not as involved as they work full time. Supriya notes she was not aware of patient's scheduled endoscopy for tomorrow, 2/15/24. [facility] provided Supriya contact information for Gastroenterology, [phone].     [facility] encouraged Supriya to call with future needs. MISSYCM to close case as all patient needs met.

## 2024-02-14 ENCOUNTER — APPOINTMENT (OUTPATIENT)
Dept: SPEECH THERAPY | Facility: CLINIC | Age: 69
End: 2024-02-14
Payer: MEDICARE

## 2024-02-14 ENCOUNTER — HOSPITAL ENCOUNTER (OUTPATIENT)
Facility: HOSPITAL | Age: 69
Setting detail: OBSERVATION
LOS: 1 days | Discharge: HOME | End: 2024-02-15
Attending: INTERNAL MEDICINE | Admitting: INTERNAL MEDICINE
Payer: MEDICARE

## 2024-02-14 DIAGNOSIS — D50.8 IRON DEFICIENCY ANEMIA SECONDARY TO INADEQUATE DIETARY IRON INTAKE: Primary | ICD-10-CM

## 2024-02-14 LAB
ANION GAP SERPL CALCULATED.3IONS-SCNC: 7 MMOL/L
BUN SERPL-MCNC: 8 MG/DL (ref 5–25)
CALCIUM SERPL-MCNC: 8.8 MG/DL (ref 8.4–10.2)
CHLORIDE SERPL-SCNC: 104 MMOL/L (ref 96–108)
CO2 SERPL-SCNC: 28 MMOL/L (ref 21–32)
CREAT SERPL-MCNC: 0.81 MG/DL (ref 0.6–1.3)
ERYTHROCYTE [DISTWIDTH] IN BLOOD BY AUTOMATED COUNT: 18.7 % (ref 11.6–15.1)
GFR SERPL CREATININE-BSD FRML MDRD: 91 ML/MIN/1.73SQ M
GLUCOSE SERPL-MCNC: 172 MG/DL (ref 65–140)
GLUCOSE SERPL-MCNC: 174 MG/DL (ref 65–140)
GLUCOSE SERPL-MCNC: 175 MG/DL (ref 65–140)
HCT VFR BLD AUTO: 34.5 % (ref 36.5–49.3)
HGB BLD-MCNC: 10.6 G/DL (ref 12–17)
MCH RBC QN AUTO: 26.7 PG (ref 26.8–34.3)
MCHC RBC AUTO-ENTMCNC: 30.7 G/DL (ref 31.4–37.4)
MCV RBC AUTO: 87 FL (ref 82–98)
PLATELET # BLD AUTO: 210 THOUSANDS/UL (ref 149–390)
PMV BLD AUTO: 10 FL (ref 8.9–12.7)
POTASSIUM SERPL-SCNC: 3.9 MMOL/L (ref 3.5–5.3)
RBC # BLD AUTO: 3.97 MILLION/UL (ref 3.88–5.62)
SODIUM SERPL-SCNC: 139 MMOL/L (ref 135–147)
WBC # BLD AUTO: 6.08 THOUSAND/UL (ref 4.31–10.16)

## 2024-02-14 PROCEDURE — 80048 BASIC METABOLIC PNL TOTAL CA: CPT

## 2024-02-14 PROCEDURE — G0379 DIRECT REFER HOSPITAL OBSERV: HCPCS

## 2024-02-14 PROCEDURE — 99222 1ST HOSP IP/OBS MODERATE 55: CPT | Performed by: INTERNAL MEDICINE

## 2024-02-14 PROCEDURE — 85027 COMPLETE CBC AUTOMATED: CPT

## 2024-02-14 PROCEDURE — 82948 REAGENT STRIP/BLOOD GLUCOSE: CPT

## 2024-02-14 RX ORDER — FERROUS SULFATE 325(65) MG
325 TABLET ORAL EVERY OTHER DAY
Status: DISCONTINUED | OUTPATIENT
Start: 2024-02-16 | End: 2024-02-15 | Stop reason: HOSPADM

## 2024-02-14 RX ORDER — INSULIN LISPRO 100 [IU]/ML
1-6 INJECTION, SOLUTION INTRAVENOUS; SUBCUTANEOUS EVERY 6 HOURS SCHEDULED
Status: DISCONTINUED | OUTPATIENT
Start: 2024-02-14 | End: 2024-02-15 | Stop reason: HOSPADM

## 2024-02-14 RX ORDER — ENOXAPARIN SODIUM 100 MG/ML
40 INJECTION SUBCUTANEOUS DAILY
Status: DISCONTINUED | OUTPATIENT
Start: 2024-02-14 | End: 2024-02-14

## 2024-02-14 RX ORDER — ASPIRIN 81 MG/1
81 TABLET, CHEWABLE ORAL DAILY
Status: DISCONTINUED | OUTPATIENT
Start: 2024-02-15 | End: 2024-02-15 | Stop reason: HOSPADM

## 2024-02-14 RX ORDER — CHLORAL HYDRATE 500 MG
1000 CAPSULE ORAL 2 TIMES DAILY
Status: DISCONTINUED | OUTPATIENT
Start: 2024-02-15 | End: 2024-02-15 | Stop reason: HOSPADM

## 2024-02-14 RX ORDER — ATORVASTATIN CALCIUM 80 MG/1
80 TABLET, FILM COATED ORAL DAILY
Status: DISCONTINUED | OUTPATIENT
Start: 2024-02-15 | End: 2024-02-15 | Stop reason: HOSPADM

## 2024-02-14 RX ORDER — LISINOPRIL 2.5 MG/1
2.5 TABLET ORAL DAILY
Status: DISCONTINUED | OUTPATIENT
Start: 2024-02-15 | End: 2024-02-15 | Stop reason: HOSPADM

## 2024-02-14 RX ADMIN — INSULIN LISPRO 1 UNITS: 100 INJECTION, SOLUTION INTRAVENOUS; SUBCUTANEOUS at 17:13

## 2024-02-14 RX ADMIN — POLYETHYLENE GLYCOL 3350, SODIUM SULFATE ANHYDROUS, SODIUM BICARBONATE, SODIUM CHLORIDE, POTASSIUM CHLORIDE 4000 ML: 236; 22.74; 6.74; 5.86; 2.97 POWDER, FOR SOLUTION ORAL at 17:13

## 2024-02-14 RX ADMIN — INSULIN LISPRO 1 UNITS: 100 INJECTION, SOLUTION INTRAVENOUS; SUBCUTANEOUS at 13:46

## 2024-02-14 NOTE — CONSULTS
Consultation -  [facility]  Patient: Juan Henley 68 y.o. male   MRN: [id number]  PCP: Nataliya Gamboa PA-C  Unit/Bed#: -01 Encounter: [id number]  Length of Stay: 1 days        Inpatient consult to gastroenterology     Date/Time  2/14/2024 12:05 PM     Performed by  Clemente Tapia MD   Authorized by  Nettie Wong DO           Assessment/Plan:  Juan Henley is a 68 y.o. male with a PMH of CAD s/p CABG, remote PCI on aspirin, DM2, MONICA, dementia who presents for inpatient bowel prep for EGD and colon.    # MONICA   - EGD today to r/o occult GIB vs malabsorptive process. Will perform Bx for Hpy and celiac.    # Hx of polyps   - last colon 2019 w/ 2 adenomas, due for repeat   - was on clears, prepped well w/ golytely, will proceed w/ colon today along w/ upper endoscopy for above   - CLD today, golytely tonight, NPO amn    History of Present Illness:  Juan Henley is a 68 y.o. male with a PMH of CAD s/p CABG, remote PCI on aspirin, DM2, MONICA, dementia who presents for inpatient bowel prep for EGD and colon.     Seen by his PCP and was fth MONICA on routine bloodwork. Started on iron supplementation but Hb has stagnated to ~9. He is conversant and denies any evidence of any recent N/V/D/C, hematemesis, heartburn, dysphagia, odynophagia, abdominal pain, hematochezia, melena, stool incontinence, jaundice, pruritis, anorexia, weight loss, weakness, or fatigue.    Past Medical History:   Diagnosis Date    Arteriosclerosis of coronary artery 1/13/2014    Arthritis     Benign hypertension 1/18/2018    Carpal tunnel syndrome     Coronary artery disease     Costochondritis     RESOLVED: [id number]    Diabetes mellitus (HCC)     Hematuria     HTN (hypertension)     Hx of CABG 2/7/2014    Hyperlipidemia     Kidney stone     Myocardial infarction (HCC)     Weak carotid pulse      Past Surgical History:   Procedure Laterality Date    CATARACT EXTRACTION Left     COLONOSCOPY      CORONARY ANGIOPLASTY  WITH STENT PLACEMENT  06/29/2005    RCA     CORONARY ANGIOPLASTY WITH STENT PLACEMENT  07/05/2005    Cx    CORONARY ARTERY BYPASS GRAFT      LAST ASSESSED: [id number]    LITHOTRIPSY      RENAL    MOUTH SURGERY      MA COLONOSCOPY FLX DX W/COLLJ SPEC WHEN PFRMD N/A 2/22/2019    Procedure: COLONOSCOPY;  Surgeon: Thee Lam MD;  Location: [facility];  Service: Gastroenterology    MA CYSTO/URETERO W/LITHOTRIPSY &INDWELL STENT INSRT Left 3/24/2017    Procedure: CYSTOSCOPY, URETEROSCOPY, HOLMIUM LASER LITHOTRIPSY, BASKET STONE EXTRACTION, RETROGRADE PYELOGRAM, STENT INSERTION;  Surgeon: Shashank Thacker MD;  Location: [facility];  Service: Urology    MA CYSTO/URETERO W/LITHOTRIPSY &INDWELL STENT INSRT Left 7/28/2017    Procedure: CYSTOSCOPY, URETEROSCOPY WITH HOLMIUM LASER LITHOTRIPSY, AND STENT EXCHANGE;  Surgeon: Seb Rodriguez MD;  Location: [facility];  Service: Urology    MA LITHOTRIPSY XTRCORP SHOCK WAVE Left 9/15/2017    Procedure: LITHROTRIPSY EXTRACORPORAL SHOCKWAVE (ESWL);  Surgeon: Seb Rodriguez MD;  Location: [facility];  Service: Urology    ROTATOR CUFF REPAIR Right     SHOULDER ARTHROSCOPY Left     TOOTH EXTRACTION       Prior to Admission medications    Medication Sig Start Date End Date Taking? Authorizing Provider   aspirin 81 MG tablet Take 81 mg by mouth daily at bedtime    Yes Historical Provider, MD   ferrous sulfate 324 (65 Fe) mg Take 1 tablet (324 mg total) by mouth every other day 11/14/23  Yes Nataliya Gamboa PA-C   lisinopril (ZESTRIL) 2.5 mg tablet TAKE ONE TABLET BY MOUTH EVERY DAY 3/1/23  Yes Nataliya Gamboa PA-C   metFORMIN (GLUCOPHAGE-XR) 500 mg 24 hr tablet Take 2 tablets (1,000 mg total) by mouth 2 (two) times a day with meals 8/1/23  Yes Nataliya Gamboa PA-C   omega-3-acid ethyl esters (LOVAZA) 1 g capsule Take 100 mg by mouth 2 (two) times a day   Yes Historical Provider, MD   rosuvastatin (CRESTOR) 40 MG tablet TAKE ONE TABLET BY MOUTH EVERY DAY 3/28/23  Yes Nataliya Gamboa PA-C      Allergies   Allergen Reactions    Morphine GI Intolerance     vomiting     Social History     Tobacco Use    Smoking status: Every Day     Current packs/day: 0.25     Average packs/day: 0.3 packs/day for 44.0 years (11.0 ttl pk-yrs)     Types: Cigarettes    Smokeless tobacco: Never    Tobacco comments:     up to 1ppd since age 17y/o; FORMER SMOKER AS PER ALL SCRIPTS    Vaping Use    Vaping status: Never Used   Substance Use Topics    Alcohol use: Not Currently     Comment: quit 23 years ago; SOCIAL DRINKER AS PER ALL SCRIPTS     Drug use: Never     Family History   Problem Relation Age of Onset    Diabetes Sister         TYPE 2    Diabetes Mother     Heart disease Mother     Diabetes Brother     Diabetes Sister         TYPE 2    Heart disease Sister     Heart disease Sister     Coronary artery disease Maternal Uncle     Diabetes Maternal Uncle        Objective:  There were no vitals taken for this visit.    Intake/Output Summary (Last 24 hours) at 2/14/2024 1239  Last data filed at 2/14/2024 1222  Gross per 24 hour   Intake 0 ml   Output --   Net 0 ml     There is no height or weight on file to calculate BMI.  Physical Exam  Constitutional:       General: He is not in acute distress.     Appearance: Normal appearance.   HENT:      Head: Normocephalic and atraumatic.      Nose: Nose normal.      Mouth/Throat:      Mouth: Mucous membranes are moist.   Eyes:      General: No scleral icterus.     Extraocular Movements: Extraocular movements intact.      Conjunctiva/sclera: Conjunctivae normal.      Pupils: Pupils are equal, round, and reactive to light.   Cardiovascular:      Rate and Rhythm: Normal rate and regular rhythm.   Pulmonary:      Effort: Pulmonary effort is normal.   Abdominal:      General: Abdomen is flat. There is no distension.      Palpations: There is no mass.      Tenderness: There is no abdominal tenderness.   Musculoskeletal:         General: No swelling. Normal range of motion.   Skin:      General: Skin is warm and dry.      Capillary Refill: Capillary refill takes less than 2 seconds.   Neurological:      General: No focal deficit present.      Mental Status: He is alert.   Psychiatric:         Mood and Affect: Mood normal.         Labs:  I have reviewed all available labs and imaging results in Epic.                    Additional Labs:                 Imaging:  No orders to display       Medications:   Current Facility-Administered Medications   Medication Dose Route Frequency Provider Last Rate    enoxaparin  40 mg Subcutaneous Daily Nettie Wong DO         Problem List:  Patient Active Problem List   Diagnosis    Coronary artery disease involving coronary bypass graft of native heart without angina pectoris    Mixed hyperlipidemia    Microalbuminuria    Onychomycosis    Erectile dysfunction    Primary osteoarthritis involving multiple joints    Carpal tunnel syndrome, bilateral    Blindness of left eye    Type 2 diabetes mellitus with hyperglycemia, without long-term current use of insulin (HCC)    Primary progressive aphasia (HCC)    Iron deficiency anemia secondary to inadequate dietary iron intake    Cortical age-related cataract of right eye    Personal history of nicotine dependence    Abdominal aortic aneurysm (AAA) 3.0 cm to 5.5 cm in diameter in male (HCC)    Encounter for immunization       Case discussed with attending physician Dr. Martinez. All recs are preliminary pending final attestation.    Clemente Tapia, PGY-4

## 2024-02-14 NOTE — ASSESSMENT & PLAN NOTE
Patient has history of prior PCI (last in 2005) and CABG x3 in 11/2012. Currently maintained on asa and statin.     Plan:  -Crestor 40mg qd  -Discussed with GI team regarding Aspirin use and upcoming procedure, ok to continue aspirin 81mg

## 2024-02-14 NOTE — ASSESSMENT & PLAN NOTE
Lab Results   Component Value Date    HGBA1C 7.6 (A) 11/14/2023     Last A1c 7.6 in 11/2023. Maintained on metformin 1000mg BID.     Plan:  -SSI while inpatient  -Hold metformin while inpatient

## 2024-02-14 NOTE — ASSESSMENT & PLAN NOTE
Patient has history of MONICA, with most recent Hgb of 10.9 and most recent iron panel 11/2023 remarkable for: Iron sat 5, TIBC 796, iron 39. Per chart review it appears patient was first noted to have MONICA back in 9/2022 with Hgb of 8.1 which has improved since starting ferrous sulfate 325mg every other day. Patient here this admission as part of bowel prep assistance d/t cognitive impairment for upcoming EGD/colo 2/15/24 with GI. Last colonoscopy was in 2019 which demonstrated 2 adenomas. No prior EGDs.     Plan:  EGD/Colonoscopy without source of MONICA, should follow-up with GI for capsule endoscopy as an outpatient  -Continue ferrous sulfate every other day  -GI following this admission

## 2024-02-14 NOTE — ASSESSMENT & PLAN NOTE
"Patient has history of cognitive impairment, currently has a caregiver who assists with most ADLs and medications. Per recent PCP visit note, the patient is now followed by [facility] and is now attending speech therapy. MRI Brain was completed which suggests neurodegenerative etiology. Last note from [facility] 7/2023 stating \"Decision-making capacity: Likely with capacity for medical and financial decisions, although difficult to determine considering likely severe aphasia.  Would recommend neuropsychology (Han morris) for capacity evaluation if necessary\". Per patient's son Ulises, the patient has not yet had formal Neuospych evaluation outpatient.     Discussed with patient's son Ulises who reports having documentation that he and other son Anrue are medical power of  for the patient.   "

## 2024-02-14 NOTE — H&P
INTERNAL MEDICINE RESIDENCY ADMISSION H&P     Name: Juan Henley   Age & Sex: 68 y.o. male   MRN: [id number]  Unit/Bed#: -01   Encounter: [id number]  Primary Care Provider: Nataliya Gamboa PA-C    Code Status: Level 1 - Full Code  Admission Status: OBSERVATION  Disposition: Patient requires Med/Surg    Admit to team: SOD Team A    ASSESSMENT/PLAN     Principal Problem:    Iron deficiency anemia secondary to inadequate dietary iron intake  Active Problems:    Type 2 diabetes mellitus with hyperglycemia, without long-term current use of insulin (HCC)    Primary progressive aphasia (HCC)    Coronary artery disease involving coronary bypass graft of native heart without angina pectoris      * Iron deficiency anemia secondary to inadequate dietary iron intake  Assessment & Plan  Patient has history of MONICA, with most recent Hgb of 10.9 and most recent iron panel 11/2023 remarkable for: Iron sat 5, TIBC 796, iron 39. Per chart review it appears patient was first noted to have MONICA back in 9/2022 with Hgb of 8.1 which has improved since starting ferrous sulfate 325mg every other day. Patient here this admission as part of bowel prep assistance d/t cognitive impairment for upcoming EGD/colo 2/15/24 with GI. Last colonoscopy was in 2019 which demonstrated 2 adenomas. No prior EGDs.     Plan:  -Clear liquid diet today, plan to start bowel prep this evening  -NPOpMN for EGD/[place] tomorrow  -Continue ferrous sulfate every other day  -GI following this admission    Type 2 diabetes mellitus with hyperglycemia, without long-term current use of insulin (HCC)  Assessment & Plan  Lab Results   Component Value Date    HGBA1C 7.6 (A) 11/14/2023     Last A1c 7.6 in 11/2023. Maintained on metformin 1000mg BID.     Plan:  -SSI while inpatient  -Hold metformin while inpatient    Primary progressive aphasia (HCC)  Assessment & Plan  Patient has history of cognitive impairment, currently has a caregiver who assists with most  "ADLs and medications. Per recent PCP visit note, the patient is now followed by [facility] and is now attending speech therapy. MRI Brain was completed which suggests neurodegenerative etiology. Last note from nursing home 7/2023 stating \"Decision-making capacity: Likely with capacity for medical and financial decisions, although difficult to determine considering likely severe aphasia.  Would recommend neuropsychology (Han morris) for capacity evaluation if necessary\". Per patient's son Ulises, the patient has not yet had formal Neuospych evaluation outpatient.     Discussed with patient's son Ulises who reports having documentation that he and other son Madhavi are medical power of  for the patient.     Coronary artery disease involving coronary bypass graft of native heart without angina pectoris  Assessment & Plan  Patient has history of prior PCI (last in 2005) and CABG x3 in 11/2012. Currently maintained on asa and statin.     Plan:  -Crestor 40mg qd  -Discussed with GI team regarding Aspirin use and upcoming procedure, ok to continue aspirin 81mg        VTE Pharmacologic Prophylaxis: Held in setting of procedure anticipated in 24 hours  VTE Mechanical Prophylaxis: sequential compression device    CHIEF COMPLAINT   No chief complaint on file.     HISTORY OF PRESENT ILLNESS     Patient is a 69yo male with PMH MONICA on iron supplementation, CAD s/p CABG, primary progressive aphasia, and cognitive decline. He presents as a pre-admit for upcoming EGD/colonoscopy on 2/15 with GI as part of further workup regarding iron deficiency anemia. He has a history of MONICA that has been treated with ferrous sulfate administration with improvement in Hgb. Last colonoscopy was in 2019 which demonstrated 2 adenomas. No prior EGDs. Patient was unable to tolerate the bowel prep outpatient due to cognitive decline that requires him to have assistance with ADLs and medications, and thus is being admitted for bowel prep " assistance in preparation of procedure 2/15. On arrival to admission, patient is hemodynamically stable with vital signs showing 97.5F, HR 70, /85, and 99% RA. He denies occult blood loss in stool, urine, cough, or vomit. He denies dysphagia, abdominal pain, nausea, changes in bowel habits, or pain with defecation.     He will be admitted to [facility] service under Dr. Bhardwaj's team. He is a Level 1 - Full Code.     REVIEW OF SYSTEMS     Review of Systems   Constitutional:  Negative for chills and fever.   HENT:  Negative for ear pain and sore throat.    Eyes:  Negative for pain and visual disturbance.   Respiratory:  Negative for cough and shortness of breath.    Cardiovascular:  Negative for chest pain and palpitations.   Gastrointestinal:  Negative for abdominal pain and vomiting.   Genitourinary:  Negative for dysuria and hematuria.   Musculoskeletal:  Negative for arthralgias and back pain.   Skin:  Negative for color change and rash.   Neurological:  Negative for seizures and syncope.   All other systems reviewed and are negative.    OBJECTIVE     Vitals:    24 1231 24 1253 24 1255   BP:  130/85 130/85   BP Location:   Left arm   Pulse:  70 76   Temp:  97.5 °F (36.4 °C) 97.5 °F (36.4 °C)   TempSrc:   Oral   SpO2:  99% 99%   Weight: 75.1 kg (165 lb 9.1 oz)        Temperature:   Temp (24hrs), Av.5 °F (36.4 °C), Min:97.5 °F (36.4 °C), Max:97.5 °F (36.4 °C)    Temperature: 97.5 °F (36.4 °C)  Intake & Output:  I/O          0701   0700  0701   0700  0701  02/15 0700    P.O.   0    Total Intake(mL/kg)   0 (0)    Net   0                 Weights:        Body mass index is 25.17 kg/m².  Weight (last 2 days)       Date/Time Weight    24 1231 75.1 (165.57)          Physical Exam  Vitals and nursing note reviewed.   Constitutional:       General: He is not in acute distress.     Appearance: He is well-developed.   HENT:      Head: Normocephalic and atraumatic.   Eyes:       Conjunctiva/sclera: Conjunctivae normal.   Cardiovascular:      Rate and Rhythm: Normal rate and regular rhythm.      Heart sounds: No murmur heard.  Pulmonary:      Effort: Pulmonary effort is normal. No respiratory distress.      Breath sounds: Normal breath sounds.   Abdominal:      Palpations: Abdomen is soft.      Tenderness: There is no abdominal tenderness.   Musculoskeletal:      Cervical back: Neck supple.      Right lower leg: No edema.      Left lower leg: No edema.   Skin:     General: Skin is warm and dry.      Capillary Refill: Capillary refill takes less than 2 seconds.   Neurological:      Mental Status: He is alert.      Comments: Oriented x2   Psychiatric:         Mood and Affect: Mood normal.       PAST MEDICAL HISTORY     Past Medical History:   Diagnosis Date    Arteriosclerosis of coronary artery 1/13/2014    Arthritis     Benign hypertension 1/18/2018    Carpal tunnel syndrome     Coronary artery disease     Costochondritis     RESOLVED: [id number]    Diabetes mellitus (HCC)     Hematuria     HTN (hypertension)     Hx of CABG 2/7/2014    Hyperlipidemia     Kidney stone     Myocardial infarction (HCC)     Weak carotid pulse      PAST SURGICAL HISTORY     Past Surgical History:   Procedure Laterality Date    CATARACT EXTRACTION Left     COLONOSCOPY      CORONARY ANGIOPLASTY WITH STENT PLACEMENT  06/29/2005    RCA     CORONARY ANGIOPLASTY WITH STENT PLACEMENT  07/05/2005    Cx    CORONARY ARTERY BYPASS GRAFT      LAST ASSESSED: [id number]    LITHOTRIPSY      RENAL    MOUTH SURGERY      NC COLONOSCOPY FLX DX W/COLLJ SPEC WHEN PFRMD N/A 2/22/2019    Procedure: COLONOSCOPY;  Surgeon: Thee Lam MD;  Location: [facility];  Service: Gastroenterology    NC CYSTO/URETERO W/LITHOTRIPSY &INDWELL STENT INSRT Left 3/24/2017    Procedure: CYSTOSCOPY, URETEROSCOPY, HOLMIUM LASER LITHOTRIPSY, BASKET STONE EXTRACTION, RETROGRADE PYELOGRAM, STENT INSERTION;  Surgeon: Shashank Thacker MD;  Location[facility]  "OR;  Service: Urology    AR CYSTO/URETERO W/LITHOTRIPSY &INDWELL STENT INSRT Left 7/28/2017    Procedure: CYSTOSCOPY, URETEROSCOPY WITH HOLMIUM LASER LITHOTRIPSY, AND STENT EXCHANGE;  Surgeon: Seb Rodriguez MD;  Location: [facility];  Service: Urology    AR LITHOTRIPSY XTRCORP SHOCK WAVE Left 9/15/2017    Procedure: LITHROTRIPSY EXTRACORPORAL SHOCKWAVE (ESWL);  Surgeon: Seb Rodriguez MD;  Location: [facility];  Service: Urology    ROTATOR CUFF REPAIR Right     SHOULDER ARTHROSCOPY Left     TOOTH EXTRACTION       SOCIAL & FAMILY HISTORY     Social History     Substance and Sexual Activity   Alcohol Use Not Currently    Comment: quit 23 years ago; SOCIAL DRINKER AS PER ALL SCRIPTS        Social History     Substance and Sexual Activity   Drug Use Never     Social History     Tobacco Use   Smoking Status Every Day    Current packs/day: 0.25    Average packs/day: 0.3 packs/day for 44.0 years (11.0 ttl pk-yrs)    Types: Cigarettes   Smokeless Tobacco Never   Tobacco Comments    up to 1ppd since age 19y/o; FORMER SMOKER AS PER ALL SCRIPTS      Family History   Problem Relation Age of Onset    Diabetes Sister         TYPE 2    Diabetes Mother     Heart disease Mother     Diabetes Brother     Diabetes Sister         TYPE 2    Heart disease Sister     Heart disease Sister     Coronary artery disease Maternal Uncle     Diabetes Maternal Uncle      LABORATORY DATA     Labs: I have personally reviewed pertinent reports.             Invalid input(s): \"LABALBU\"                       Micro:  Lab Results   Component Value Date    URINECX <10,000 cfu/ml 10/13/2017    URINECX No Growth <1000 cfu/mL 01/19/2017    URINECX No Growth <1000 cfu/mL 08/23/2016     IMAGING & DIAGNOSTIC TESTS     Imaging: I have personally reviewed pertinent reports.    No results found.  EKG, Pathology, and Other Studies: I have personally reviewed pertinent reports.     ALLERGIES     Allergies   Allergen Reactions    Morphine GI Intolerance     vomiting "     MEDICATIONS PRIOR TO ARRIVAL     Prior to Admission medications    Medication Sig Start Date End Date Taking? Authorizing Provider   aspirin 81 MG tablet Take 81 mg by mouth daily at bedtime    Yes Historical Provider, MD   ferrous sulfate 324 (65 Fe) mg Take 1 tablet (324 mg total) by mouth every other day 11/14/23  Yes Nataliya Gamboa PA-C   lisinopril (ZESTRIL) 2.5 mg tablet TAKE ONE TABLET BY MOUTH EVERY DAY 3/1/23  Yes Nataliya Gamboa PA-C   metFORMIN (GLUCOPHAGE-XR) 500 mg 24 hr tablet Take 2 tablets (1,000 mg total) by mouth 2 (two) times a day with meals 8/1/23  Yes Nataliya Gamboa PA-C   omega-3-acid ethyl esters (LOVAZA) 1 g capsule Take 100 mg by mouth 2 (two) times a day   Yes Historical Provider, MD   rosuvastatin (CRESTOR) 40 MG tablet TAKE ONE TABLET BY MOUTH EVERY DAY 3/28/23  Yes Nataliya Gamboa PA-C     MEDICATIONS ADMINISTERED IN LAST 24 HOURS     Medication Administration - last 24 hours from 02/13/2024 1348 to 02/14/2024 1348         Date/Time Order Dose Route Action Action by     02/14/2024 1340 EST enoxaparin (LOVENOX) subcutaneous injection 40 mg -- Subcutaneous Canceled Entry Blanca Gonzalez RN     02/14/2024 1346 EST insulin lispro (HumaLOG) 100 units/mL subcutaneous injection 1-6 Units 1 Units Subcutaneous Given Blanca Gonzalez RN          CURRENT MEDICATIONS     Current Facility-Administered Medications   Medication Dose Route Frequency Provider Last Rate    [START ON 2/15/2024] aspirin  81 mg Oral Daily Nettie Pattoli, DO      [START ON 2/15/2024] atorvastatin  80 mg Oral Daily Nettie Pattoli, DO      [START ON 2/16/2024] ferrous sulfate  325 mg Oral Every Other Day Nettie Pattoli, DO      [START ON 2/15/2024] fish oil  1,000 mg Oral BID Nettie Pattoli, DO      insulin lispro  1-6 Units Subcutaneous Q6H PIO Nettie Pattoli, DO      [START ON 2/15/2024] lisinopril  2.5 mg Oral Daily Nettie Pattoli, DO               Admission Time  I spent 30 minutes admitting the patient.  This  "involved direct patient contact where I performed a full history and physical, reviewing previous records, and reviewing laboratory and other diagnostic studies.    Portions of the record may have been created with voice recognition software.  Occasional wrong word or \"sound a like\" substitutions may have occurred due to the inherent limitations of voice recognition software.  Read the chart carefully and recognize, using context, where substitutions have occurred.    ==  Nettie Wong DO  [facility]  Internal Medicine Residency PGY-1   "

## 2024-02-15 ENCOUNTER — ANESTHESIA EVENT (OUTPATIENT)
Dept: GASTROENTEROLOGY | Facility: HOSPITAL | Age: 69
End: 2024-02-15
Payer: MEDICARE

## 2024-02-15 ENCOUNTER — HOSPITAL ENCOUNTER (OUTPATIENT)
Dept: GASTROENTEROLOGY | Facility: HOSPITAL | Age: 69
Setting detail: OUTPATIENT SURGERY
End: 2024-02-15
Payer: MEDICARE

## 2024-02-15 ENCOUNTER — TELEPHONE (OUTPATIENT)
Dept: GASTROENTEROLOGY | Facility: CLINIC | Age: 69
End: 2024-02-15

## 2024-02-15 ENCOUNTER — PREP FOR PROCEDURE (OUTPATIENT)
Dept: GASTROENTEROLOGY | Facility: CLINIC | Age: 69
End: 2024-02-15

## 2024-02-15 ENCOUNTER — ANESTHESIA (OUTPATIENT)
Dept: GASTROENTEROLOGY | Facility: HOSPITAL | Age: 69
End: 2024-02-15
Payer: MEDICARE

## 2024-02-15 VITALS
OXYGEN SATURATION: 100 % | WEIGHT: 165.79 LBS | SYSTOLIC BLOOD PRESSURE: 146 MMHG | HEART RATE: 54 BPM | DIASTOLIC BLOOD PRESSURE: 79 MMHG | BODY MASS INDEX: 25.21 KG/M2 | TEMPERATURE: 97.2 F | RESPIRATION RATE: 16 BRPM

## 2024-02-15 VITALS
HEART RATE: 59 BPM | RESPIRATION RATE: 16 BRPM | DIASTOLIC BLOOD PRESSURE: 57 MMHG | OXYGEN SATURATION: 100 % | SYSTOLIC BLOOD PRESSURE: 114 MMHG | TEMPERATURE: 97.3 F

## 2024-02-15 DIAGNOSIS — D50.9 IRON DEFICIENCY ANEMIA, UNSPECIFIED IRON DEFICIENCY ANEMIA TYPE: ICD-10-CM

## 2024-02-15 DIAGNOSIS — D50.8 IRON DEFICIENCY ANEMIA SECONDARY TO INADEQUATE DIETARY IRON INTAKE: Primary | Chronic | ICD-10-CM

## 2024-02-15 DIAGNOSIS — D50.8 IRON DEFICIENCY ANEMIA SECONDARY TO INADEQUATE DIETARY IRON INTAKE: ICD-10-CM

## 2024-02-15 LAB
ANION GAP SERPL CALCULATED.3IONS-SCNC: 8 MMOL/L
BUN SERPL-MCNC: 7 MG/DL (ref 5–25)
CALCIUM SERPL-MCNC: 8.6 MG/DL (ref 8.4–10.2)
CHLORIDE SERPL-SCNC: 104 MMOL/L (ref 96–108)
CO2 SERPL-SCNC: 29 MMOL/L (ref 21–32)
CREAT SERPL-MCNC: 0.89 MG/DL (ref 0.6–1.3)
ERYTHROCYTE [DISTWIDTH] IN BLOOD BY AUTOMATED COUNT: 18.7 % (ref 11.6–15.1)
GFR SERPL CREATININE-BSD FRML MDRD: 88 ML/MIN/1.73SQ M
GLUCOSE P FAST SERPL-MCNC: 125 MG/DL (ref 65–99)
GLUCOSE SERPL-MCNC: 109 MG/DL (ref 65–140)
GLUCOSE SERPL-MCNC: 125 MG/DL (ref 65–140)
GLUCOSE SERPL-MCNC: 135 MG/DL (ref 65–140)
GLUCOSE SERPL-MCNC: 137 MG/DL (ref 65–140)
HCT VFR BLD AUTO: 35.1 % (ref 36.5–49.3)
HGB BLD-MCNC: 10.6 G/DL (ref 12–17)
MCH RBC QN AUTO: 26.6 PG (ref 26.8–34.3)
MCHC RBC AUTO-ENTMCNC: 30.2 G/DL (ref 31.4–37.4)
MCV RBC AUTO: 88 FL (ref 82–98)
PLATELET # BLD AUTO: 207 THOUSANDS/UL (ref 149–390)
PMV BLD AUTO: 10.6 FL (ref 8.9–12.7)
POTASSIUM SERPL-SCNC: 4 MMOL/L (ref 3.5–5.3)
RBC # BLD AUTO: 3.99 MILLION/UL (ref 3.88–5.62)
SODIUM SERPL-SCNC: 141 MMOL/L (ref 135–147)
WBC # BLD AUTO: 6.48 THOUSAND/UL (ref 4.31–10.16)

## 2024-02-15 PROCEDURE — 99235 HOSP IP/OBS SAME DATE MOD 70: CPT | Performed by: INTERNAL MEDICINE

## 2024-02-15 PROCEDURE — 82948 REAGENT STRIP/BLOOD GLUCOSE: CPT

## 2024-02-15 PROCEDURE — 88305 TISSUE EXAM BY PATHOLOGIST: CPT | Performed by: STUDENT IN AN ORGANIZED HEALTH CARE EDUCATION/TRAINING PROGRAM

## 2024-02-15 PROCEDURE — 45385 COLONOSCOPY W/LESION REMOVAL: CPT | Performed by: INTERNAL MEDICINE

## 2024-02-15 PROCEDURE — 80048 BASIC METABOLIC PNL TOTAL CA: CPT

## 2024-02-15 PROCEDURE — 43239 EGD BIOPSY SINGLE/MULTIPLE: CPT | Performed by: INTERNAL MEDICINE

## 2024-02-15 PROCEDURE — 85027 COMPLETE CBC AUTOMATED: CPT

## 2024-02-15 RX ORDER — LIDOCAINE HYDROCHLORIDE 10 MG/ML
INJECTION, SOLUTION EPIDURAL; INFILTRATION; INTRACAUDAL; PERINEURAL AS NEEDED
Status: DISCONTINUED | OUTPATIENT
Start: 2024-02-15 | End: 2024-02-15

## 2024-02-15 RX ORDER — PROPOFOL 10 MG/ML
INJECTION, EMULSION INTRAVENOUS CONTINUOUS PRN
Status: DISCONTINUED | OUTPATIENT
Start: 2024-02-15 | End: 2024-02-15

## 2024-02-15 RX ORDER — SODIUM CHLORIDE 9 MG/ML
INJECTION, SOLUTION INTRAVENOUS CONTINUOUS PRN
Status: DISCONTINUED | OUTPATIENT
Start: 2024-02-15 | End: 2024-02-15

## 2024-02-15 RX ORDER — PROPOFOL 10 MG/ML
INJECTION, EMULSION INTRAVENOUS AS NEEDED
Status: DISCONTINUED | OUTPATIENT
Start: 2024-02-15 | End: 2024-02-15

## 2024-02-15 RX ADMIN — PROPOFOL 50 MG: 10 INJECTION, EMULSION INTRAVENOUS at 11:04

## 2024-02-15 RX ADMIN — PROPOFOL 100 MG: 10 INJECTION, EMULSION INTRAVENOUS at 11:00

## 2024-02-15 RX ADMIN — PROPOFOL 30 MG: 10 INJECTION, EMULSION INTRAVENOUS at 11:35

## 2024-02-15 RX ADMIN — ATORVASTATIN CALCIUM 80 MG: 80 TABLET, FILM COATED ORAL at 08:20

## 2024-02-15 RX ADMIN — PROPOFOL 30 MG: 10 INJECTION, EMULSION INTRAVENOUS at 11:29

## 2024-02-15 RX ADMIN — OMEGA-3 FATTY ACIDS CAP 1000 MG 1000 MG: 1000 CAP at 08:20

## 2024-02-15 RX ADMIN — LISINOPRIL 2.5 MG: 2.5 TABLET ORAL at 08:20

## 2024-02-15 RX ADMIN — PROPOFOL 120 MCG/KG/MIN: 10 INJECTION, EMULSION INTRAVENOUS at 11:16

## 2024-02-15 RX ADMIN — LIDOCAINE HYDROCHLORIDE 50 MG: 10 INJECTION, SOLUTION EPIDURAL; INFILTRATION; INTRACAUDAL at 11:00

## 2024-02-15 RX ADMIN — SODIUM CHLORIDE: 0.9 INJECTION, SOLUTION INTRAVENOUS at 11:16

## 2024-02-15 RX ADMIN — PROPOFOL 50 MG: 10 INJECTION, EMULSION INTRAVENOUS at 11:25

## 2024-02-15 RX ADMIN — SODIUM CHLORIDE: 0.9 INJECTION, SOLUTION INTRAVENOUS at 10:50

## 2024-02-15 RX ADMIN — PROPOFOL 50 MG: 10 INJECTION, EMULSION INTRAVENOUS at 11:07

## 2024-02-15 RX ADMIN — ASPIRIN 81 MG CHEWABLE TABLET 81 MG: 81 TABLET CHEWABLE at 08:20

## 2024-02-15 NOTE — DISCHARGE SUMMARY
"      INTERNAL MEDICINE RESIDENCY DISCHARGE SUMMARY     Juan Henley   68 y.o. male  MRN: [id number]  Room/Bed: /-01     [facility]   Encounter: [id number]    Principal Problem:    Iron deficiency anemia secondary to inadequate dietary iron intake  Active Problems:    Coronary artery disease involving coronary bypass graft of native heart without angina pectoris    Type 2 diabetes mellitus with hyperglycemia, without long-term current use of insulin (HCC)    Primary progressive aphasia (HCC)      Primary progressive aphasia (HCC)  Assessment & Plan  Patient has history of cognitive impairment, currently has a caregiver who assists with most ADLs and medications. Per recent PCP visit note, the patient is now followed by care home and is now attending speech therapy. MRI Brain was completed which suggests neurodegenerative etiology. [facility] 7[facility] stating \"Decision-making capacity: Likely with capacity for medical and financial decisions, although difficult to determine considering likely severe aphasia.  Would recommend neuropsychology (Han morris) for capacity evaluation if necessary\". Per patient's son Ulises, the patient has not yet had formal Neuospych evaluation outpatient.     Discussed with patient's son Ulises who reports having documentation that he and other son Madhavi are medical power of  for the patient.     Type 2 diabetes mellitus with hyperglycemia, without long-term current use of insulin (HCC)  Assessment & Plan  Lab Results   Component Value Date    HGBA1C 7.6 (A) 11/14/2023     Last A1c 7.6 in 11/2023. Maintained on metformin 1000mg BID.     Plan:  -SSI while inpatient  -Hold metformin while inpatient    Coronary artery disease involving coronary bypass graft of native heart without angina pectoris  Assessment & Plan  Patient has history of prior PCI (last in 2005) and CABG x3 in 11/2012. " "Currently maintained on asa and statin.     Plan:  -Crestor 40mg qd  -Discussed with GI team regarding Aspirin use and upcoming procedure, ok to continue aspirin 81mg    * Iron deficiency anemia secondary to inadequate dietary iron intake  Assessment & Plan  Patient has history of MONICA, with most recent Hgb of 10.9 and most recent iron panel 11/2023 remarkable for: Iron sat 5, TIBC 796, iron 39. Per chart review it appears patient was first noted to have MONICA back in 9/2022 with Hgb of 8.1 which has improved since starting ferrous sulfate 325mg every other day. Patient here this admission as part of bowel prep assistance d/t cognitive impairment for upcoming EGD/colo 2/15/24 with GI. Last colonoscopy was in 2019 which demonstrated 2 adenomas. No prior EGDs.     Plan:  EGD/Colonoscopy without source of MONICA, should follow-up with GI for capsule endoscopy as an outpatient  -Continue ferrous sulfate every other day  -GI following this admission        DETAILS OF HOSPITAL COURSE     Per H&P by Dr. Wong \"Patient is a 69yo male with PMH MONICA on iron supplementation, CAD s/p CABG, primary progressive aphasia, and cognitive decline. He presents as a pre-admit for upcoming EGD/colonoscopy on 2/15 with GI as part of further workup regarding iron deficiency anemia. He has a history of MONICA that has been treated with ferrous sulfate administration with improvement in Hgb. Last colonoscopy was in 2019 which demonstrated 2 adenomas. No prior EGDs. Patient was unable to tolerate the bowel prep outpatient due to cognitive decline that requires him to have assistance with ADLs and medications, and thus is being admitted for bowel prep assistance in preparation of procedure 2/15. On arrival to admission, patient is hemodynamically stable with vital signs showing 97.5F, HR 70, /85, and 99% RA. He denies occult blood loss in stool, urine, cough, or vomit. He denies dysphagia, abdominal pain, nausea, changes in bowel habits, or pain " "with defecation.\"    Patient underwent EGD and colonoscopy on 2/15/24 which showed: no source of iron deficiency anemia. He should follow-up with gastroenterology for a capsule endoscopy as an out-patient. Ambulatory referral placed.     Exam at time of discharge: No acute distress, regular rate and rhythm, auscultation bilaterally, abdomen soft and nontender to palpation, full range of motion of extremities, oriented to self but not time or place, circumferential and repetitive speech consistent with history of dementia.  Patient stable to discharge home.    DISCHARGE INFORMATION     PCP at Discharge:  MANNIE Feng    Admitting Provider: MD Dr. Jc Bailey  Admission Date: 2/14/2024    Discharge Provider: MD Dr. Jc Bailey  Discharge Date: 2/15/24    Discharge Disposition: Home/Self Care  Discharge Condition: good  Discharge with Lines: no    Discharge Diet: regular diet  Activity Restrictions: none  Test Results Pending at Discharge: none    Discharge Diagnoses:  Principal Problem:    Iron deficiency anemia secondary to inadequate dietary iron intake  Active Problems:    Coronary artery disease involving coronary bypass graft of native heart without angina pectoris    Type 2 diabetes mellitus with hyperglycemia, without long-term current use of insulin (HCC)    Primary progressive aphasia (HCC)  Resolved Problems:    * No resolved hospital problems. *      Consulting Providers:      Diagnostic & Therapeutic Procedures Performed:  No results found.    Code Status: Level 1 - Full Code  Advance Directive & Living Will: <no information>  Power of :    POLST:      Medications:  Current Discharge Medication List        Current Discharge Medication List        Current Discharge Medication List        CONTINUE these medications which have NOT CHANGED    Details   aspirin 81 MG tablet Take 81 mg by mouth daily at bedtime       ferrous sulfate 324 (65 Fe) mg Take 1 tablet (324 " "mg total) by mouth every other day  Qty: 45 tablet, Refills: 2    Associated Diagnoses: Iron deficiency anemia secondary to inadequate dietary iron intake      lisinopril (ZESTRIL) 2.5 mg tablet TAKE ONE TABLET BY MOUTH EVERY DAY  Qty: 90 tablet, Refills: 3    Associated Diagnoses: Microalbuminuria      metFORMIN (GLUCOPHAGE-XR) 500 mg 24 hr tablet Take 2 tablets (1,000 mg total) by mouth 2 (two) times a day with meals  Qty: 180 tablet, Refills: 3    Associated Diagnoses: Type 2 diabetes mellitus with hyperglycemia, without long-term current use of insulin ([facility])      omega-3-acid ethyl esters (LOVAZA) 1 g capsule Take 100 mg by mouth 2 (two) times a day      rosuvastatin (CRESTOR) 40 MG tablet TAKE ONE TABLET BY MOUTH EVERY DAY  Qty: 90 tablet, Refills: 3    Associated Diagnoses: Coronary artery disease involving native coronary artery of native heart without angina pectoris; Hyperlipidemia, unspecified hyperlipidemia type             Allergies:  Allergies   Allergen Reactions    Morphine GI Intolerance     vomiting       FOLLOW-UP     PCP Outpatient Follow-up:  Routine follow-up    Consulting Providers Follow-up:  yes  please follow-up with Gastroenterologist      Active Issues Requiring Follow-up:   yes  Iron deficiency anemia    Discharge Statement:   I spent 20 minutes minutes discharging the patient. This time was spent on the day of discharge. I had direct contact with the patient on the day of discharge. Additional documentation is required if more than 30 minutes were spent on discharge.    Portions of the record may have been created with voice recognition software.  Occasional wrong word or \"sound a like\" substitutions may have occurred due to the inherent limitations of voice recognition software.  Read the chart carefully and recognize, using context, where substitutions have occurred.    ==  Rosey Rodriguez MD  [facility]  Internal Medicine Resident PGY-1   "

## 2024-02-15 NOTE — PLAN OF CARE
Problem: PAIN - ADULT  Goal: Verbalizes/displays adequate comfort level or baseline comfort level  Description: Interventions:  - Encourage patient to monitor pain and request assistance  - Assess pain using appropriate pain scale  - Administer analgesics based on type and severity of pain and evaluate response  - Implement non-pharmacological measures as appropriate and evaluate response  - Consider cultural and social influences on pain and pain management  - Notify physician/advanced practitioner if interventions unsuccessful or patient reports new pain  Outcome: Progressing     Problem: INFECTION - ADULT  Goal: Absence or prevention of progression during hospitalization  Description: INTERVENTIONS:  - Assess and monitor for signs and symptoms of infection  - Monitor lab/diagnostic results  - Monitor all insertion sites, i.e. indwelling lines, tubes, and drains  - Monitor endotracheal if appropriate and nasal secretions for changes in amount and color  - [place] appropriate cooling/warming therapies per order  - Administer medications as ordered  - Instruct and encourage patient and family to use good hand hygiene technique  - Identify and instruct in appropriate isolation precautions for identified infection/condition  Outcome: Progressing  Goal: Absence of fever/infection during neutropenic period  Description: INTERVENTIONS:  - Monitor WBC    Outcome: Progressing     Problem: SAFETY ADULT  Goal: Patient will remain free of falls  Description: INTERVENTIONS:  - Educate patient/family on patient safety including physical limitations  - Instruct patient to call for assistance with activity   - Consult OT/PT to assist with strengthening/mobility   - Keep Call bell within reach  - Keep bed low and locked with side rails adjusted as appropriate  - Keep care items and personal belongings within reach  - Initiate and maintain comfort rounds  - Make Fall Risk Sign visible to staff  - Initiate/Maintain bed alarm  -  Apply yellow socks and bracelet for high fall risk patients  - Consider moving patient to room near nurses station  Outcome: Progressing  Goal: Maintain or return to baseline ADL function  Description: INTERVENTIONS:  -  Assess patient's ability to carry out ADLs; assess patient's baseline for ADL function and identify physical deficits which impact ability to perform ADLs (bathing, care of mouth/teeth, toileting, grooming, dressing, etc.)  - Assess/evaluate cause of self-care deficits   - Assess range of motion  - Assess patient's mobility; develop plan if impaired  - Assess patient's need for assistive devices and provide as appropriate  - Encourage maximum independence but intervene and supervise when necessary  - Involve family in performance of ADLs  - Assess for home care needs following discharge   - Consider OT consult to assist with ADL evaluation and planning for discharge  - Provide patient education as appropriate  Outcome: Progressing  Goal: Maintains/Returns to pre admission functional level  Description: INTERVENTIONS:  - Perform AM-PAC 6 Click Basic Mobility/ Daily Activity assessment daily.  - Set and communicate daily mobility goal to care team and patient/family/caregiver.   - Collaborate with rehabilitation services on mobility goals if consulted  - Stand patient 3 times a day  - Ambulate patient 3 times a day  - Out of bed to chair 3 times a day   - Out of bed for meals 3 times a day  - Out of bed for toileting  - Record patient progress and toleration of activity level   Outcome: Progressing     Problem: DISCHARGE PLANNING  Goal: Discharge to home or other facility with appropriate resources  Description: INTERVENTIONS:  - Identify barriers to discharge w/patient and caregiver  - Arrange for needed discharge resources and transportation as appropriate  - Identify discharge learning needs (meds, wound care, etc.)  - Arrange for interpretive services to assist at discharge as needed  - Refer to  Case Management Department for coordinating discharge planning if the patient needs post-hospital services based on physician/advanced practitioner order or complex needs related to functional status, cognitive ability, or social support system  Outcome: Progressing     Problem: Knowledge Deficit  Goal: Patient/family/caregiver demonstrates understanding of disease process, treatment plan, medications, and discharge instructions  Description: Complete learning assessment and assess knowledge base.  Interventions:  - Provide teaching at level of understanding  - Provide teaching via preferred learning methods  Outcome: Progressing

## 2024-02-15 NOTE — TELEPHONE ENCOUNTER
----- Message from Ruy Naylor sent at 2/15/2024 12:25 PM EST -----  Regarding: FW: capsule    ----- Message -----  From: Nicolette Rothman DO  Sent: 2/15/2024  11:51 AM EST  To: #  Subject: capsule                                          Good morning,    Can you please call patient's son to arrange for outpatient capsule endoscopy to evaluate for underlying cause of iron deficiency anemia?    Thank you,  Richy

## 2024-02-15 NOTE — ANESTHESIA PREPROCEDURE EVALUATION
Procedure:  EGD  COLONOSCOPY    Relevant Problems   CARDIO   (+) Abdominal aortic aneurysm (AAA) 3.0 cm to 5.5 cm in diameter in male (HCC)   (+) Coronary artery disease involving coronary bypass graft of native heart without angina pectoris   (+) Mixed hyperlipidemia      ENDO   (+) Type 2 diabetes mellitus with hyperglycemia, without long-term current use of insulin (HCC)      HEMATOLOGY   (+) Iron deficiency anemia secondary to inadequate dietary iron intake      MUSCULOSKELETAL   (+) Primary osteoarthritis involving multiple joints      NEURO/PSYCH   (+) Blindness of left eye   (+) Primary progressive aphasia (HCC)        Physical Exam    Airway    Mallampati score: II  TM Distance: >3 FB  Neck ROM: full     Dental   Comment: Multiple chipped, multiple missing teeth     Cardiovascular  Rhythm: regular, Rate: normal, No weak pulses    Pulmonary   No stridor    Other Findings        Anesthesia Plan  ASA Score- 3     Anesthesia Type- IV sedation with anesthesia with ASA Monitors.         Additional Monitors:     Airway Plan:            Plan Factors-    Chart reviewed. EKG reviewed.  Existing labs reviewed. Patient summary reviewed.                  Induction- intravenous.    Postoperative Plan-     Informed Consent- Anesthetic plan and risks discussed with patient.  I personally reviewed this patient with the CRNA. Discussed and agreed on the Anesthesia Plan with the CRNA..

## 2024-02-15 NOTE — CASE MANAGEMENT
Case Management Assessment & Discharge Planning Note    Patient name Juan Henley  Location /-01 MRN [id number]  : 1955 Date 2/15/2024       Current Admission Date: 2024  Current Admission Diagnosis:Iron deficiency anemia secondary to inadequate dietary iron intake   Patient Active Problem List    Diagnosis Date Noted    Encounter for immunization 2023    Abdominal aortic aneurysm (AAA) 3.0 cm to 5.5 cm in diameter in male (HCC) 2023    Personal history of nicotine dependence 2023    Cortical age-related cataract of right eye 2023    Iron deficiency anemia secondary to inadequate dietary iron intake 2023    Primary progressive aphasia (HCC) 10/25/2021    Type 2 diabetes mellitus with hyperglycemia, without long-term current use of insulin (HCC) 2021    Blindness of left eye 2020    Carpal tunnel syndrome, bilateral     Primary osteoarthritis involving multiple joints 01/15/2019    Erectile dysfunction 2018    Coronary artery disease involving coronary bypass graft of native heart without angina pectoris 2015    Microalbuminuria 2015    Onychomycosis 2015    Mixed hyperlipidemia 2014      LOS (days): 1  Geometric Mean LOS (GMLOS) (days):   Days to GMLOS:     OBJECTIVE:              Current admission status: Observation       Preferred Pharmacy:   [id number] - [place], [place] - [address]  [address]  [place] [address]  Phone: [phone] Fax: [phone]    Primary Care Provider: Nataliya Gamboa PA-C    Primary Insurance: MEDICARE  Secondary Insurance: [place] [employer] [facility]    ASSESSMENT:  Active Health Care Proxies    There are no active Health Care Proxies on file.                 Readmission Root Cause  30 Day Readmission: No    Patient Information  Admitted from:: Home  Mental Status: Alert  During Assessment patient was accompanied by: Not  accompanied during assessment  Assessment information provided by:: Son, Friend  Primary Caregiver: Other (Comment)  Caregiver's Name:: [facility]  Caregiver's Relationship to Patient:: Other (Specify) (Waiver services caregiver.)  Caregiver's Telephone Number:: Celena [phone]  Support Systems: Private Caregivers, Son, Friend  County of Residence: [place]  What city do you live in?: [place]  Home entry access options. Select all that apply.: Stairs  Number of steps to enter home.:  (2 flights to apartment.)  Do the steps have railings?: Yes  Type of Current Residence: Apartment  Floor Level: 2  Upon entering residence, is there a bedroom on the main floor (no further steps)?: Yes  Upon entering residence, is there a bathroom on the main floor (no further steps)?: Yes  Living Arrangements: Lives Alone (Has 31 hours of waiver services 6 days per week.)    Activities of Daily Living Prior to Admission  Functional Status: Assistance  Completes ADLs independently?: Yes  Ambulates independently?: Yes  Does patient use assisted devices?: No  Does patient currently own DME?: No  Does patient have a history of Outpatient Therapy (PT/OT)?: No  Does the patient have a history of Short-Term Rehab?: No  Does patient have a history of HHC?: Yes  Does patient currently have HHC?: Yes    Current Home Health Care  Type of Current Home Care Services: Home health aide, Meals on Wheels  Current Home Health Agency:: Other (please enter name in comment) ([facility]ly [facility])  Current Home Health Follow-Up Provider:: PCP    Patient Information Continued  Income Source: SSI/SSD  Does patient have prescription coverage?: Yes  Does patient receive dialysis treatments?: No  Does patient have a history of substance abuse?: No  Does patient have a history of Mental Health Diagnosis?: No         Means of Transportation  Means of Transport to Appts:: Family transport      Social Determinants of Health (SDOH)      Flowsheet Row  Most Recent Value   Housing Stability    In the last 12 months, was there a time when you were not able to pay the mortgage or rent on time? N   In the last 12 months, how many places have you lived? 1   In the last 12 months, was there a time when you did not have a steady place to sleep or slept in a shelter (including now)? N   Transportation Needs    In the past 12 months, has lack of transportation kept you from medical appointments or from getting medications? no   In the past 12 months, has lack of transportation kept you from meetings, work, or from getting things needed for daily living? No   Food Insecurity    Within the past 12 months, you worried that your food would run out before you got the money to buy more. Never true   Within the past 12 months, the food you bought just didn't last and you didn't have money to get more. Never true   Utilities    In the past 12 months has the electric, gas, oil, or water company threatened to shut off services in your home? No            DISCHARGE DETAILS:    Discharge planning discussed with:: Patient at bedside. Pt's son/POA (Ulises) and pt's friend (Supriya).  [place] of Choice: Yes  Comments - [place] of Choice: No rehab needs at this time.  CM contacted family/caregiver?: Yes  Were Treatment Team discharge recommendations reviewed with patient/caregiver?: Yes  Did patient/caregiver verbalize understanding of patient care needs?: Yes  Were patient/caregiver advised of the risks associated with not following Treatment Team discharge recommendations?: Yes    Contacts  Patient Contacts: Ulises (Son/POA) and Supriya (Friend)  Relationship to Patient:: Family  Contact Method: Phone  Phone Number: Ulises: [phone],  Supriya:[phone]  Reason/Outcome: Continuity of Care, Emergency Contact, Discharge Planning    Requested Home Health Care         Is the patient interested in HHC at discharge?: No (Patient has waiver services through [facility].)         Other  Referral/Resources/Interventions Provided:  Referral Comments: No referrals made at this time.      Family notified:: CM spoke with pt's son/ medical POA (Ulises) Ulises reports he resides in [place] and would like CM to contact pt's friend Supriya for specific answers on what private caregiver company pt uses. Cm contacted Supriya who reports she plans to pick pt up today and transport him home. Pt resides in an apartment with 2 flights of steps to get to his 2nd floor apartment. Pt has private caregiver services through waiver services and uses [facility]. Pt has 5 hours per day, 6 days per week of private caregiving. Per pt's son, pt is able to complete ADL's on his own, he just forgets to do them. Pt's caregiver's name is Ceelna ([phone]). CM department to continue to follow for discharge planning needs throughout admission.

## 2024-02-15 NOTE — ANESTHESIA PROCEDURE NOTES
Anesthesia Notable Event    Date/Time: 2/15/2024 4:13 PM    Performed by: Devonte Jimenez MD  Authorized by: Devonte Jimenez MD

## 2024-02-16 ENCOUNTER — TRANSITIONAL CARE MANAGEMENT (OUTPATIENT)
Dept: INTERNAL MEDICINE CLINIC | Facility: CLINIC | Age: 69
End: 2024-02-16

## 2024-02-19 PROCEDURE — 88305 TISSUE EXAM BY PATHOLOGIST: CPT | Performed by: STUDENT IN AN ORGANIZED HEALTH CARE EDUCATION/TRAINING PROGRAM

## 2024-02-19 NOTE — TELEPHONE ENCOUNTER
Number to return call: ( [phone]    Reason for call: Pt friend calling back to schedule procedure. Please advise    Scheduled procedure/appointment date if applicable: Apt/procedure

## 2024-02-20 ENCOUNTER — TELEPHONE (OUTPATIENT)
Dept: FAMILY MEDICINE CLINIC | Facility: CLINIC | Age: 69
End: 2024-02-20

## 2024-02-20 DIAGNOSIS — A04.8 HELICOBACTER PYLORI INFECTION: Primary | ICD-10-CM

## 2024-02-20 DIAGNOSIS — A04.8 H. PYLORI INFECTION: Primary | ICD-10-CM

## 2024-02-20 NOTE — TELEPHONE ENCOUNTER
Margarita Rothman,    I spoke with patient's caregiver Supriya.She states patient lives alone and has a nurses aid that cares for him from 10 am until 3 pm.It would be more convenient for the patient to receive his medications twice a day with the triple therapy instead of four times a day with the Quad therapy.The aid would give him the morning dose and Supriya will go to his home to give the evening dose.I qued up the triple therapy for you to sign if you are agreeable.Thank You!

## 2024-02-21 ENCOUNTER — TELEPHONE (OUTPATIENT)
Age: 69
End: 2024-02-21

## 2024-02-21 ENCOUNTER — APPOINTMENT (OUTPATIENT)
Dept: SPEECH THERAPY | Facility: CLINIC | Age: 69
End: 2024-02-21
Payer: MEDICARE

## 2024-02-21 RX ORDER — PANTOPRAZOLE SODIUM 40 MG/1
TABLET, DELAYED RELEASE ORAL
Qty: 28 TABLET | Refills: 0 | Status: SHIPPED | OUTPATIENT
Start: 2024-02-21

## 2024-02-21 RX ORDER — AMOXICILLIN 500 MG/1
CAPSULE ORAL
Qty: 56 CAPSULE | Refills: 0 | Status: SHIPPED | OUTPATIENT
Start: 2024-02-21 | End: 2024-03-05

## 2024-02-21 RX ORDER — CLARITHROMYCIN 500 MG/1
TABLET, COATED ORAL
Qty: 28 TABLET | Refills: 0 | Status: SHIPPED | OUTPATIENT
Start: 2024-02-21 | End: 2024-03-05

## 2024-02-21 NOTE — TELEPHONE ENCOUNTER
Simon Rothman,     This patient's friends (listed on communication consent) called in and asked if you recommend the patient to get capsule study done. She explained the patient has issues comprehending information and doesn't feel like they can do a thorough prep without being admitted again.    Please share your recommendations, thank you!

## 2024-02-21 NOTE — TELEPHONE ENCOUNTER
Pt care taker was calling regarding the capsule endoscopy and saying they have questions for the appt tomorrow. I did not see an appt in epic. Please call to advise if there is a capsule scheduled for tomorrow and answer any questions

## 2024-02-21 NOTE — TELEPHONE ENCOUNTER
Patients GI provider:  Dr. Martinez    Number to return call: [phone]     Reason for call: Pt calling confirming capsule endoscopy    Scheduled procedure/appointment date if applicable: Apt/procedure

## 2024-02-22 ENCOUNTER — OFFICE VISIT (OUTPATIENT)
Dept: INTERNAL MEDICINE CLINIC | Facility: CLINIC | Age: 69
End: 2024-02-22

## 2024-02-22 ENCOUNTER — TELEPHONE (OUTPATIENT)
Dept: PSYCHIATRY | Facility: CLINIC | Age: 69
End: 2024-02-22

## 2024-02-22 ENCOUNTER — PATIENT OUTREACH (OUTPATIENT)
Dept: INTERNAL MEDICINE CLINIC | Facility: CLINIC | Age: 69
End: 2024-02-22

## 2024-02-22 VITALS
BODY MASS INDEX: 28.27 KG/M2 | OXYGEN SATURATION: 98 % | DIASTOLIC BLOOD PRESSURE: 84 MMHG | SYSTOLIC BLOOD PRESSURE: 124 MMHG | WEIGHT: 185.9 LBS | HEART RATE: 68 BPM | TEMPERATURE: 97.5 F

## 2024-02-22 DIAGNOSIS — E66.3 OVERWEIGHT WITH BODY MASS INDEX (BMI) OF 28 TO 28.9 IN ADULT: ICD-10-CM

## 2024-02-22 DIAGNOSIS — Z23 ENCOUNTER FOR IMMUNIZATION: ICD-10-CM

## 2024-02-22 DIAGNOSIS — Z78.9 NEEDS ASSISTANCE WITH COMMUNITY RESOURCES: Primary | ICD-10-CM

## 2024-02-22 DIAGNOSIS — G31.01 PRIMARY PROGRESSIVE APHASIA (HCC): Chronic | ICD-10-CM

## 2024-02-22 DIAGNOSIS — I25.810 CORONARY ARTERY DISEASE INVOLVING CORONARY BYPASS GRAFT OF NATIVE HEART WITHOUT ANGINA PECTORIS: Chronic | ICD-10-CM

## 2024-02-22 DIAGNOSIS — F02.80 PRIMARY PROGRESSIVE APHASIA (HCC): Chronic | ICD-10-CM

## 2024-02-22 DIAGNOSIS — D50.8 IRON DEFICIENCY ANEMIA SECONDARY TO INADEQUATE DIETARY IRON INTAKE: Chronic | ICD-10-CM

## 2024-02-22 DIAGNOSIS — E11.65 TYPE 2 DIABETES MELLITUS WITH HYPERGLYCEMIA, WITHOUT LONG-TERM CURRENT USE OF INSULIN (HCC): Primary | ICD-10-CM

## 2024-02-22 DIAGNOSIS — Z87.891 PERSONAL HISTORY OF NICOTINE DEPENDENCE: ICD-10-CM

## 2024-02-22 DIAGNOSIS — E78.2 MIXED HYPERLIPIDEMIA: ICD-10-CM

## 2024-02-22 LAB — SL AMB POCT HEMOGLOBIN AIC: 7.1 (ref ?–6.5)

## 2024-02-22 PROCEDURE — 90471 IMMUNIZATION ADMIN: CPT | Performed by: PHYSICIAN ASSISTANT

## 2024-02-22 PROCEDURE — 83036 HEMOGLOBIN GLYCOSYLATED A1C: CPT | Performed by: PHYSICIAN ASSISTANT

## 2024-02-22 PROCEDURE — 90750 HZV VACC RECOMBINANT IM: CPT | Performed by: PHYSICIAN ASSISTANT

## 2024-02-22 PROCEDURE — 99496 TRANSJ CARE MGMT HIGH F2F 7D: CPT | Performed by: PHYSICIAN ASSISTANT

## 2024-02-22 RX ORDER — FERROUS SULFATE 324(65)MG
324 TABLET, DELAYED RELEASE (ENTERIC COATED) ORAL EVERY OTHER DAY
Qty: 45 TABLET | Refills: 2 | Status: SHIPPED | OUTPATIENT
Start: 2024-02-22

## 2024-02-22 NOTE — ASSESSMENT & PLAN NOTE
His caregiver Supriya feels he is getting worse and his level of awareness is worsening. He has been attending speech therapy which is not helping per her. It has been difficult on the family. Son is now considering placing him in a nursing home. I did recommend a follow up with senior [facility]. She was agreeable and he was scheduled 3/12. I also recommended a consult with neuropsychiatry which she will consider. Referral placed. I also completed a letter of medical necessity for more waiver hours.

## 2024-02-22 NOTE — ASSESSMENT & PLAN NOTE
Lab Results   Component Value Date    CHOLESTEROL 158 11/14/2023    CHOLESTEROL 149 09/22/2022    CHOLESTEROL 174 01/13/2022     Lab Results   Component Value Date    HDL 68 11/14/2023    HDL 68 09/22/2022    HDL 75 01/13/2022     Lab Results   Component Value Date    TRIG 159 (H) 11/14/2023    TRIG 78 09/22/2022    TRIG 64 01/13/2022     Lab Results   Component Value Date    NONHDLC 90 11/14/2023      Lab Results   Component Value Date    LDLCALC 58 11/14/2023      Continue taking rosuvastatin 40 mg daily. Low fat diet encouraged.

## 2024-02-22 NOTE — ASSESSMENT & PLAN NOTE
Lab Results   Component Value Date    WBC 6.48 02/15/2024    HGB 10.6 (L) 02/15/2024    HCT 35.1 (L) 02/15/2024    MCV 88 02/15/2024     02/15/2024      No source of GI bleed during colonoscopy and endoscopy. GI is also trying to plan a capsule endoscopy. Continue ferrous sulfate 325 mg every other day. Possible cause could be lack of oral iron intake.   We also reviewed that his endoscopy biopsies came back positive for H. Pylori and triple therapy was prescribed by GI. She plan on picking up these medications today.

## 2024-02-22 NOTE — LETTER
February 22, 2024     Patient: Juan Henley  YOB: 1955  Date of Visit: 2/22/2024      To Whom it May Concern:    Juan Henley is under my professional care. Juan was seen in my office on 2/22/2024. Juan suffers from dementia and primary progressive aphasia. Due to this, patient does not always follow treatment recommendations and plan and take his medications as instructed. He requires around the clock care and supervision. It is medically necessary. Please increase his hours to the maximal amount.     If you have any questions or concerns, please don't hesitate to call.         Sincerely,          Nataliya Gamboa PA-C        CC: No Recipients

## 2024-02-22 NOTE — ASSESSMENT & PLAN NOTE
Lab Results   Component Value Date    HGBA1C 7.1 (A) 02/22/2024     A1c has improved from 7.6%. Goal less than 7.0%. Continue metformin 1000 mg BID. Reviewed nutrition recommendations. If he does have more aide supervision, his nutritional intake would likely improve. Up to date on foot and eye exam and urine micro. Will recheck A1c in 3 months.

## 2024-02-22 NOTE — TELEPHONE ENCOUNTER
A nurse from [facility] called and is seeking a return call to schedule an appt for the patient for Neuropsychology. Please reach out to her when you can at # [phone]

## 2024-02-22 NOTE — PROGRESS NOTES
Assessment & Plan     1. Type 2 diabetes mellitus with hyperglycemia, without long-term current use of insulin (HCC)  Assessment & Plan:    Lab Results   Component Value Date    HGBA1C 7.1 (A) 02/22/2024     A1c has improved from 7.6%. Goal less than 7.0%. Continue metformin 1000 mg BID. Reviewed nutrition recommendations. If he does have more aide supervision, his nutritional intake would likely improve. Up to date on foot and eye exam and urine micro. Will recheck A1c in 3 months.     Orders:  -     POCT hemoglobin A1c    2. Iron deficiency anemia secondary to inadequate dietary iron intake  Assessment & Plan:  Lab Results   Component Value Date    WBC 6.48 02/15/2024    HGB 10.6 (L) 02/15/2024    HCT 35.1 (L) 02/15/2024    MCV 88 02/15/2024     02/15/2024      No source of GI bleed during colonoscopy and endoscopy. GI is also trying to plan a capsule endoscopy. Continue ferrous sulfate 325 mg every other day. Possible cause could be lack of oral iron intake.   We also reviewed that his endoscopy biopsies came back positive for H. Pylori and triple therapy was prescribed by GI. She plan on picking up these medications today.     Orders:  -     ferrous sulfate 324 (65 Fe) mg; Take 1 tablet (324 mg total) by mouth every other day    3. Primary progressive aphasia (HCC)  Assessment & Plan:  His caregiver Supriya feels he is getting worse and his level of awareness is worsening. He has been attending speech therapy which is not helping per her. It has been difficult on the family. Son is now considering placing him in a nursing home. I did recommend a follow up with senior care. She was agreeable and he was scheduled 3/12. I also recommended a consult with neuropsychiatry which she will consider. Referral placed. I also completed a letter of medical necessity for more waiver hours.     Orders:  -     Ambulatory Referral to Neuropsychology; Future    4. Mixed hyperlipidemia  Assessment & Plan:  Lab Results    Component Value Date    CHOLESTEROL 158 11/14/2023    CHOLESTEROL 149 09/22/2022    CHOLESTEROL 174 01/13/2022     Lab Results   Component Value Date    HDL 68 11/14/2023    HDL 68 09/22/2022    HDL 75 01/13/2022     Lab Results   Component Value Date    TRIG 159 (H) 11/14/2023    TRIG 78 09/22/2022    TRIG 64 01/13/2022     Lab Results   Component Value Date    NONHDLC 90 11/14/2023      Lab Results   Component Value Date    LDLCALC 58 11/14/2023      Continue taking rosuvastatin 40 mg daily. Low fat diet encouraged.       5. Coronary artery disease involving coronary bypass graft of native heart without angina pectoris  Assessment & Plan:  Denies any symptoms at this time. Continue ASA 81 mg daily and rosuvastatin 40 mg daily.       6. Personal history of nicotine dependence  Assessment & Plan:  Up to date on CT lung cancer screening. Encouraged smoking cessation.         7. Encounter for immunization  Assessment & Plan:  First dose of Shingrix given. Tolerated well. Second dose in 2-6 months.     Orders:  -     Zoster Vaccine Recombinant IM    8. Overweight with body mass index (BMI) of 28 to 28.9 in adult      BMI Counseling: Body mass index is 28.27 kg/m². The BMI is above normal. Nutrition recommendations include decreasing portion sizes, encouraging healthy choices of fruits and vegetables, decreasing fast food intake, moderation in carbohydrate intake, increasing intake of lean protein, reducing intake of saturated and trans fat and reducing intake of cholesterol. Exercise recommendations include moderate physical activity 150 minutes/week, exercising 3-5 times per week and strength training exercises. No pharmacotherapy was ordered. Rationale for BMI follow-up plan is due to patient being overweight or obese.     Tobacco Cessation Counseling: Tobacco cessation counseling was provided. The patient is sincerely urged to quit consumption of tobacco. He is not ready to quit tobacco. Medication options and  side effects of medication discussed. Patient refused medication. Does not smoke everyday, but smokes most days. Unsure how much.       Subjective     Transitional Care Management Review:   Juan Henley is a 68 y.o. male here for TCM follow up.     During the TCM phone call patient stated:  TCM Call       Date and time call was made  2/16/2024  3:12 PM    Hospital care reviewed  Records reviewed    Patient was hospitialized at  [facility]    Date of Admission  02/14/24    Date of discharge  02/15/24    Diagnosis  Iron deficiency anemia secondary to inadequate dietary iron intake D70.8    Disposition  Home    Were the patients medications reviewed and updated  Yes          TCM Call       Scheduled for follow up?  Yes    Did you obtain your prescribed medications  Yes    Do you need help managing your prescriptions or medications  No    Is transportation to your appointment needed  No    I have advised the patient to call PCP with any new or worsening symptoms  Iron Nur MA    Interperter language line needed  No    Counseling  Patient    Counseling topics  instructions for management; Importance of RX compliance          Patient is a 68 year old male with a PMH of T2DM, CAD, primary progressive asphasia, and HLD presenting for transition of care. He was admitted on 2/14 for a planned admission. As he suffers from a cognitive impairment, he is unable to complete a bowel prep at home as he does not have around the clock care and can not follow the plan on his own. He is here with two of his care givers. They state he has been compliant with his medications. Supriya is concerned that he is eating too much. He forgets he eats and then eats again. He also tends to eat more processed foods and junk food. Having an aide does help, but he only has 31 hours a week. His family tries to visit him in the evenings to help with medications and anything else he may need. He has no complaints today.   Supriya mentions  that his son is considering placing him in a nursing home.       Review of Systems   Constitutional:  Negative for activity change, appetite change, chills, diaphoresis, fatigue, fever and unexpected weight change.   HENT:  Negative for congestion, hearing loss, sore throat, tinnitus and trouble swallowing.    Eyes:  Negative for photophobia and visual disturbance.   Respiratory:  Negative for cough, chest tightness, shortness of breath and wheezing.    Cardiovascular:  Negative for chest pain, palpitations and leg swelling.   Gastrointestinal:  Negative for abdominal pain, blood in stool, constipation, diarrhea, nausea and vomiting.   Endocrine: Negative for cold intolerance, heat intolerance, polydipsia, polyphagia and polyuria.   Genitourinary:  Negative for decreased urine volume, dysuria, frequency, hematuria and urgency.   Musculoskeletal:  Negative for arthralgias and myalgias.   Skin:  Negative for rash.   Neurological:  Negative for dizziness, tremors, weakness, light-headedness, numbness and headaches.   Hematological:  Negative for adenopathy.   Psychiatric/Behavioral:  Positive for confusion. Negative for agitation, behavioral problems, dysphoric mood, hallucinations, self-injury, sleep disturbance and suicidal ideas. The patient is not nervous/anxious.        Objective     /84 (BP Location: Left arm, Patient Position: Sitting, Cuff Size: Large)   Pulse 68   Temp 97.5 °F (36.4 °C) (Temporal)   Wt 84.3 kg (185 lb 14.4 oz)   SpO2 98%   BMI 28.27 kg/m²      Physical Exam  Vitals and nursing note reviewed.   Constitutional:       General: He is awake. He is not in acute distress.     Appearance: Normal appearance. He is well-developed, well-groomed and overweight. He is not ill-appearing.   HENT:      Head: Normocephalic and atraumatic.      Right Ear: Tympanic membrane, ear canal and external ear normal.      Left Ear: Tympanic membrane, ear canal and external ear normal.      Nose: Nose normal.       Mouth/Throat:      Mouth: Mucous membranes are moist.      Pharynx: Oropharynx is clear. No oropharyngeal exudate or posterior oropharyngeal erythema.   Eyes:      General: No scleral icterus.     Extraocular Movements: Extraocular movements intact.      Conjunctiva/sclera: Conjunctivae normal.      Pupils: Pupils are equal, round, and reactive to light.   Cardiovascular:      Rate and Rhythm: Normal rate and regular rhythm.      Pulses: Normal pulses.      Heart sounds: Normal heart sounds. No murmur heard.  Pulmonary:      Effort: Pulmonary effort is normal. No respiratory distress.      Breath sounds: Normal breath sounds and air entry. No decreased air movement. No decreased breath sounds, wheezing, rhonchi or rales.   Abdominal:      General: Abdomen is flat. Bowel sounds are normal.      Palpations: Abdomen is soft.   Musculoskeletal:         General: Normal range of motion.      Cervical back: Neck supple.      Right lower leg: No edema.      Left lower leg: No edema.   Lymphadenopathy:      Cervical: No cervical adenopathy.   Skin:     General: Skin is warm.      Coloration: Skin is not jaundiced.      Findings: No rash.   Neurological:      General: No focal deficit present.      Mental Status: He is alert.      Motor: Motor function is intact.      Coordination: Coordination is intact.      Gait: Gait is intact.   Psychiatric:         Attention and Perception: Attention normal.         Mood and Affect: Mood and affect normal.         Speech: Speech normal.         Behavior: Behavior normal. Behavior is cooperative.       Medications have been reviewed by provider in current encounter    Nataliya Gamboa PA-C

## 2024-02-23 NOTE — PROGRESS NOTES
YEHUDA met with patient at request of provider during PCP OV to assist patient with needs related to SNAP benefits and increasing Home Health Aide hours. YEHUDA met with patient, patient's HHA, Celena Olvera and patient's friend, Supriya Hare. YEHUDA reintroduced self and role to all present. SWCM provided contact information.    Supriya reports patient has had cognitively decline and can no longer be alone on the weekends. Patient forgets to take medications, is not able to prepare meals, roams the street in the winter with shorts and slippers, forgets to pay bills and is extremely forgetful. Patient is currently approved for 31 hours/ week of HHA services via "[employer] and Supriya is requesting for assistance with increasing hours for in home services as patient requires extra care; patient alone on weekends. Supriya also is requesting assistance with SNAP reinstatement as patient no longer receives benefits but they do not know why benefits stopped. Supriya believes patient may have received correspondence related to SNAP benefits but may have disposed of it with no follow up. MISSYCM to follow up with Supriya with update on how to proceed to address needs.    YEHUDA met CMOCShireen, to discuss patient needs. CMOC contacted [facility] and [facility] to speak to patient's case assigned . Phone number is [phone]. [facility] was connected to patient's , Nancy Zaman ([phone] ext. [phone]). YEHUDA called Supriya during CMOC's conversation with patient's . CMOC requested appointment to assess patient to address needs. An appointment was coordinated between Supriya and patient's   for assessment at patient's home on Thursday, 2/29/24 at 11a.m. (See CMOC note)    MISSYCM to send referral to CMOC to assist patient with SNAP benefits needs. As per CMOC, patient may need to start new application. MISSYCM informed Supriya of same. YEHUDA informed Supriya CMOC would make  outreach as her schedule permits. Supriya expressed understanding. MISSY provided Supriya CMOC's contact information.

## 2024-02-26 ENCOUNTER — PATIENT OUTREACH (OUTPATIENT)
Dept: INTERNAL MEDICINE CLINIC | Facility: CLINIC | Age: 69
End: 2024-02-26

## 2024-02-26 ENCOUNTER — OFFICE VISIT (OUTPATIENT)
Dept: SPEECH THERAPY | Facility: CLINIC | Age: 69
End: 2024-02-26
Payer: MEDICARE

## 2024-02-26 DIAGNOSIS — F03.A0 MILD DEMENTIA WITHOUT BEHAVIORAL DISTURBANCE, PSYCHOTIC DISTURBANCE, MOOD DISTURBANCE, OR ANXIETY, UNSPECIFIED DEMENTIA TYPE (HCC): ICD-10-CM

## 2024-02-26 DIAGNOSIS — F02.80 PRIMARY PROGRESSIVE APHASIA (HCC): Primary | ICD-10-CM

## 2024-02-26 DIAGNOSIS — G31.01 PRIMARY PROGRESSIVE APHASIA (HCC): Primary | ICD-10-CM

## 2024-02-26 PROCEDURE — 92507 TX SP LANG VOICE COMM INDIV: CPT | Performed by: SPEECH-LANGUAGE PATHOLOGIST

## 2024-02-26 NOTE — PROGRESS NOTES
Daily Speech Treatment Note    Today's date: 2024  Patient’s name: Juan Henley  : 1955  MRN: [id number]  Safety measures: Dementia, Visual Impairment   Referring provider: Jarrell Giraldo MD    Encounter Diagnosis     ICD-10-CM    1. Primary progressive aphasia (HCC)  G31.01     F02.80       2. Mild dementia without behavioral disturbance, psychotic disturbance, mood disturbance, or anxiety, unspecified dementia type (HCC)  F03.A0           Visit tracking:  Re-eval Due POC Expires Auth Expiration Date ST Visit Limit   2024 N/A BOMN                           Visit/Unit Tracking  AUTH Status:  Date 2023   BOMN Used 1 2 3 4 5 6 7    Remaining  9 8 7 6 5 4 3       Subjective/Behavioral:  Patient reports that he feels good.    Objective/Assessment:  -Patient's regular clinician, Monica Prince, SLP spoke to the patient and his caregiver at the beginning of this session to discuss his inconsistency in attendance, including not participating in therapy for the past month and the need for discharge. They were understanding of the clinic policies and the fact that lack of consistency in therapy was not beneficial to the patient therapeutically. Today will be his last treatment session.    Short Term Goals  8. Patient will complete word generation tasks (e.g., categorical naming, sentence/phrase completion, word deduction, definitions, etc.) at 75% accuracy with min semantic and phonemic cues from clinician as needed to target the maintenance of his expressive vocabulary, as well as promote socialization and safety, to be met in 8-10 weeks.  Patient was shown a picture and asked to name the object. Patient completed task with 65% accuracy, with min-mod cueing.    9. Patient will complete following directions tasks (both simple and complex) at 75% accuracy with min verbal cues from Clinician as needed to target the  maintenance of his receptive language, as well as promote safety, to be met in 8-10 weeks.   Patient answered basic auditory yes/no questions with 86% accuracy (13/15), requiring min cueing.    Patient completed 2 step directions, provided by clinician with 66% accuracy, requiring min-mod cueing.      Plan:  -Discharge.

## 2024-02-26 NOTE — PROGRESS NOTES
Outgoing call   02/26/2024    CMOC review chart and notes from [facility].     Per notes pt suffers from cognitive impairment.     CMOC received referral from [facility] to assist patient with Snap Benefits and having increase hours with home health aide through the Waiver Program.     CMOC spoke to Supriya today. Supriya will work on getting me a copy of the Lease and current bank statement to provide to the county office. When she gets this information she will call me to pick it up from her home.     CMOC spoke to Son Ulises Henley who is the POA and he will get a copy of the Award Letter for 2024. Ulises will call social security office & have them mail the letter to Supriya's house.     Supriya stated that she went to patient home on Sunday and he had no food in the refrigerator. Pt eats all food in sight. They hide stuff but pt ends up finding the food. Opens the cans and eat them cold. Pt does not know how to use a microwave.     Next Outreach is scheduled for 03/04/2024.

## 2024-02-28 ENCOUNTER — APPOINTMENT (OUTPATIENT)
Dept: SPEECH THERAPY | Facility: CLINIC | Age: 69
End: 2024-02-28
Payer: MEDICARE

## 2024-03-01 ENCOUNTER — PATIENT OUTREACH (OUTPATIENT)
Dept: INTERNAL MEDICINE CLINIC | Facility: CLINIC | Age: 69
End: 2024-03-01

## 2024-03-02 NOTE — PROGRESS NOTES
[facility] called Supriya, patient's friend, to follow up and assist with patient's needs. Supriya informed [facility] scheduled assessment on 2/29/24 with  Nancy Zaman to have hours increased for home health aide through the Waiver Program was canceled as patient assigned a new  (Nato-Supriya did not remember last name). Assessment rescheduled for 3/12/24. [facility] to inform CMOC, Shireen, of same.    Supriya reports she continue to work on obtaining lease and bank statements needed to provide to the [facility] office. [facility] encouraged uSpriya to contact CMOC, Shireen, when documents obtained. Supriya agreeable. Supriya reports patient's son, Ulises (FATOU), is working on 2024 award letter for [facility] and has made contact with  office to obtain. [facility] encouraged Supriya to advise CMOC when letter obtained. Supriya agreeable. [facility] to make CMOC aware of same.    Supriya reports being frustrated that patient assessment is taking longer as patient stays alone on weekends and she is worried for his safety; they also continue to have to hide food from patient as he eats everything. [facility] provided emotional support via empathetic listening.     SWCM encouraged Supriay to call with needs/ questions. CM will remain available to assist as needed.

## 2024-03-05 ENCOUNTER — PATIENT OUTREACH (OUTPATIENT)
Dept: INTERNAL MEDICINE CLINIC | Facility: CLINIC | Age: 69
End: 2024-03-05

## 2024-03-05 NOTE — PROGRESS NOTES
Outgoing call   03/05/2024    [facility] left message for Ulises Henley son in [place] to call me back.  Re: Award Letter 2024     CMOC spoke to Supriya and she stated she has a copy of a Bank Statement. She hopes its the month of feb 2024. She will let me know. She is getting the lease mailed out to her and hopes to have it by next week.     [facility] will mail out a P4 form for Supriya to have patient signed for me to get information on [facility] office.     Next Outreach is scheduled for 03/12/2024.

## 2024-03-14 ENCOUNTER — PATIENT OUTREACH (OUTPATIENT)
Dept: INTERNAL MEDICINE CLINIC | Facility: CLINIC | Age: 69
End: 2024-03-14

## 2024-03-14 NOTE — PROGRESS NOTES
Outgoing call   03/14/2024    CMOC spoke to Supriya today. Supriya states she has all documents for me. [facility] will  documents on Monday 03/18/2024 at 10:am.     Supriya states the pt has lost his phone and his apple watch.     Supriya states they are waiting for   Paperwork has been submitted for [facility]. The determination can take [facility] 2 weeks or longer.       is Nato Grimaldo tel # [phone]     Next outreach is schedule for 03/18/2024.

## 2024-03-18 ENCOUNTER — PATIENT OUTREACH (OUTPATIENT)
Dept: INTERNAL MEDICINE CLINIC | Facility: CLINIC | Age: 69
End: 2024-03-18

## 2024-03-18 DIAGNOSIS — R80.9 MICROALBUMINURIA: ICD-10-CM

## 2024-03-18 NOTE — PROGRESS NOTES
[facility] and CMOC, Shireen Mike, discussion to provide update regarding patient needs and status of obtaining needed documents to assist with [place] Waiver process. As per CMOC, meeting with family to obtain needed documents, CMOC planned home visit on this day for same. As per CMOC, Supriya, patient's friend reported patient has lost his telephone and Apple watch. Patient's  is Nato [employer] tel # [phone]. [facility] and CMOC to continue to provide updates on patient/ family needs and assist patient as  needed.     Addendum:    [facility] received call from [facility] to provide update on communication with Supriya, RE: patient needs. [facility] reports during meeting with Supriya, she discussed GI not returning call to schedule patient procedure. [facility] provided information as Supriya previously discussed issue with YEHUDA. CM to follow up with Supriya and discuss concerns further/ assist as needed.

## 2024-03-18 NOTE — PROGRESS NOTES
Outgoing call   03/18/2024    CMOC will be picking up the documents today at [facility].     Snap:   Snap Application completed today. Application # [id number].   [facility] scanned all supporting documents to Replaced by [facility] office today.     Wireless Ass[employer] Government phone:   CMOC completed an application and a phone was approved and be will mail out to patient.     Next outreach scheduled for Friday 03/22/2024.     Later Entry:   CMOC received call from [facility] office today. Pt is already getting food stamps. Pt get $23.00 per month. A new card will be mail to patient home today. Card should be received in next 7 to 10 days.  Pt is due for renewal of Snap benefits in 08/2024.

## 2024-03-19 ENCOUNTER — PATIENT OUTREACH (OUTPATIENT)
Dept: INTERNAL MEDICINE CLINIC | Facility: CLINIC | Age: 69
End: 2024-03-19

## 2024-03-19 RX ORDER — LISINOPRIL 2.5 MG/1
TABLET ORAL
Qty: 90 TABLET | Refills: 3 | Status: SHIPPED | OUTPATIENT
Start: 2024-03-19

## 2024-03-19 NOTE — PROGRESS NOTES
SW and CMOCShireen met to discuss patient needs. CMOC informed SWCM that during last visit with patient's friend, Supriya, concerns were raised regarding patient's GI needs. SWCM consulted with patient's Provider, Nataliya, to discuss issue/ concerns further and glean further understanding of patient's GI needs to address concerns raised by Supriya. Provider informed SWCM of past patient's past GI procedures, outcome of procedures, and next steps required for patient to address.     SWCM called patient's friend, Supriya, to follow up and address concerns. SWCM unable to reach Supriya. SWCM left voice message requesting return call. Contact information provided. SWCM will continue to follow up and remain available to assist as needed.

## 2024-03-20 ENCOUNTER — PATIENT OUTREACH (OUTPATIENT)
Dept: INTERNAL MEDICINE CLINIC | Facility: CLINIC | Age: 69
End: 2024-03-20

## 2024-03-20 NOTE — PROGRESS NOTES
MISSYCM received return call from [facility] but SWCM not available. SWCM called returned call to follow up and address concerns. SWCM unable to reach [facility]. SWCM left voice message requesting return call. Contact information provided. SWCM will continue to follow up and remain available to assist as needed.

## 2024-03-22 ENCOUNTER — PATIENT OUTREACH (OUTPATIENT)
Dept: INTERNAL MEDICINE CLINIC | Facility: CLINIC | Age: 69
End: 2024-03-22

## 2024-03-22 NOTE — PROGRESS NOTES
Outgoing call   03/22/2024    CMOC left voicemail message for Supriya to return my call at [phone].     CMOC will f/u with Waiver  on Friday 03/29/2024 to see if more hours have been approved.  CMOC spoke to  on 03/14 and Nato stated it can take 2 weeks to get service approved or rejected for additional hours.      CMOC would like to know if pt has received Snap benefit card in the mail.     CMOC would like to know if pt has received government phone.     Next outreach is schedule for 03/29/2024.

## 2024-03-28 ENCOUNTER — PATIENT OUTREACH (OUTPATIENT)
Dept: INTERNAL MEDICINE CLINIC | Facility: CLINIC | Age: 69
End: 2024-03-28

## 2024-03-28 NOTE — PROGRESS NOTES
[facility] called patient friend, Supriya ([phone]), to follow up and assist with needs. [facility] spoke to Supriya. Supriya reports GI has not followed up with her regarding second endoscopy that needed to take place after course of antibiotics for H. Pylori TX. Supriya reports RN from GI was scheduled to call regarding having patient be inpatient for prep; RN to discuss with Provider and call Supriya with outcome. Supriya explained to RN patient lives alone and would not be able to follow prep specifications.     As per CMOC, Shireen, she will speak Waiver  on Friday 03/29/2024 to see if more hours have been approved.  CMOC spoke to  on 03/14 and Nato stated it can take 2 weeks to get service approved or rejected for additional hours.  Supriya reports increased hours were denied. Supriya reports pt has not received Snap benefit card in the mail. As per Supriya, Pt has received [employer] phone, but it is not working properly, it's going into voicemail. [facility] informed CMOC of same. CMOC to call [place], to possibly assist related to Waiver Services.    [facility] discussion with [facility] Carina related to GI needs. [facility] to request guidance from RIMA Gamboa to assist patient with needs.    [facility] to call Supriya with update. [facility] will remian available to assist with needs.    Addendum:    [facility] received guidance from provider to encourage Supriya to attempt another call to GI to assist with patient needs. SW attempted to call Supriya to convey same. SWCM unable to reach. Left voice message requesting return call. CM will continue to follow up.

## 2024-04-02 ENCOUNTER — PATIENT OUTREACH (OUTPATIENT)
Dept: INTERNAL MEDICINE CLINIC | Facility: CLINIC | Age: 69
End: 2024-04-02

## 2024-04-02 NOTE — PROGRESS NOTES
SWCM and CMOC, Shireen, met during huddle to discuss patient and provide update on needs. As per CMOC, CMOC to f/u with Supriya to see if she wants to appeal for Increase home hours for Waiver Services. CMOC to follow up this week with patient's friend, Supriya. CMOC to continue to assist patient with needs and provide SWCM updates. SWCM to remain available to assist as needed.    SWCM called Supriya, patient's friend, to follow up and assist with needs (x2). SWCM unable to reach Supriya and unable to leave message as her phone rang and then seemed to be out of order with a strange sound. SWCM will attempt to reach Supriya to assist with needs.

## 2024-04-08 ENCOUNTER — TELEPHONE (OUTPATIENT)
Dept: INTERNAL MEDICINE CLINIC | Facility: CLINIC | Age: 69
End: 2024-04-08

## 2024-04-08 ENCOUNTER — PATIENT OUTREACH (OUTPATIENT)
Dept: INTERNAL MEDICINE CLINIC | Facility: CLINIC | Age: 69
End: 2024-04-08

## 2024-04-08 DIAGNOSIS — D50.8 IRON DEFICIENCY ANEMIA SECONDARY TO INADEQUATE DIETARY IRON INTAKE: Chronic | ICD-10-CM

## 2024-04-08 DIAGNOSIS — E11.65 TYPE 2 DIABETES MELLITUS WITH HYPERGLYCEMIA, WITHOUT LONG-TERM CURRENT USE OF INSULIN (HCC): Chronic | ICD-10-CM

## 2024-04-08 DIAGNOSIS — E78.5 HYPERLIPIDEMIA, UNSPECIFIED HYPERLIPIDEMIA TYPE: ICD-10-CM

## 2024-04-08 DIAGNOSIS — I25.10 CORONARY ARTERY DISEASE INVOLVING NATIVE CORONARY ARTERY OF NATIVE HEART WITHOUT ANGINA PECTORIS: ICD-10-CM

## 2024-04-08 NOTE — TELEPHONE ENCOUNTER
Attempted to reach patient's caregiver Supriya at  regarding medications that need refill. Left contact number for follow up.

## 2024-04-08 NOTE — PROGRESS NOTES
Outgoing call   04/08/2024    [facility] left message for Supriya to call me back at [phone].  [facility] wants to know if Supriya wants to appeal the hours denied for home service.     Later Entry:   [facility] received call from Supriya. Supriya states she does not want to appeal because its too much of a hassle.      Supriya and home caregiver found the Snap benefit card in the garbage. She was able to activate it.     [facility] send in-basket to  medical staff for medication refills - Pt uses [facility] pharmacy on [address].     [facility] needs to return documents to Supriya and will be going to her home on Thursday to provide the documents to her.     [facility] will print out the upcoming schedule appointments. Supriya does not wish to appeal for hours denied. Supriya is allowing me to reach out to the  to find out if the appeal can be done and what documents we need.     [facility] left message for  Nato Koch at [phone] for him to return my call.     Next outreach is schedule for Thursday 04/11/2024.

## 2024-04-09 ENCOUNTER — PATIENT OUTREACH (OUTPATIENT)
Dept: INTERNAL MEDICINE CLINIC | Facility: CLINIC | Age: 69
End: 2024-04-09

## 2024-04-09 RX ORDER — ROSUVASTATIN CALCIUM 40 MG/1
40 TABLET, COATED ORAL DAILY
Qty: 90 TABLET | Refills: 3 | Status: SHIPPED | OUTPATIENT
Start: 2024-04-09

## 2024-04-09 RX ORDER — FERROUS SULFATE 324(65)MG
324 TABLET, DELAYED RELEASE (ENTERIC COATED) ORAL EVERY OTHER DAY
Qty: 45 TABLET | Refills: 2 | Status: SHIPPED | OUTPATIENT
Start: 2024-04-09

## 2024-04-09 RX ORDER — METFORMIN HYDROCHLORIDE 500 MG/1
1000 TABLET, EXTENDED RELEASE ORAL 2 TIMES DAILY WITH MEALS
Qty: 180 TABLET | Refills: 3 | Status: SHIPPED | OUTPATIENT
Start: 2024-04-09

## 2024-04-09 NOTE — TELEPHONE ENCOUNTER
Received call from patient's caregiver Supriya at . Patient's caregiver requesting refill of ferrous sulfate, metformin, and rosuvastatin.     Scripts sent to pharmacy for refill.

## 2024-04-10 NOTE — PROGRESS NOTES
"[facility] called patient's friend, Supriya, to follow up and assist with needs. [facility] spoke to Supriya. Supriya reports finding SNAP benefits card in the garbage and she went food shopping but she expressed frustration as \"amount patient receives does not cover even 10% of cost of food patient needs\". [facility] provided empathetic listening to Supriya.     [facility] and Supriya discussed Waiver Services appeal and Supriya states she is tired and does not want to go through the appeal process to request extra hours for patient home care. Supriya informed [facility] CMOC, Shireen, with her consent, will be appealing on patient's behalf. Supriya expressed appreciation for OC's support.    SW and Supriya discussed patient GI needs. Supriya states she will not follow up with GI. She will \"let them contact me\". [facility] encouraged Supriya to call to continue to provide patient with care he needs.     Supriya inquired on medication refill request. [facility] confirmed medications refill were sent to pharmacy as per chart. Supriya expressed relief as patient was running out of meds.     Supriya reports she and CMOC plan to meet on Thursday to obtain documents from CMOC.    [facility] encouraged Supriya to call with questions/ needs. Supriya agreeable. [facility] will remain available to assist as needed.  "

## 2024-04-11 ENCOUNTER — PATIENT OUTREACH (OUTPATIENT)
Dept: INTERNAL MEDICINE CLINIC | Facility: CLINIC | Age: 69
End: 2024-04-11

## 2024-04-11 NOTE — PROGRESS NOTES
Outgoing call   04/11/2024    CMOC will meet with Supriya this morning at her home. CMOC returned the documents she had provided for the Snap approval.     Supriya gave me 2 bills for me to investigate on behalf of patient. CMOC will be calling the billing department to investigate.     CMOC spoke to  Nato Koch at [phone] and he states Supriya needs to call [facility] to file a grievance to get more hours. After this step they will review the information and a hearing will be schedule over the phone.     CMOC along with Supriya call GI office today [phone] we spoke to Caro and she will be sending the message to the doctor and will get back to Supriya next week to schedule endoscopy and having patient admitted for this procedure.     CMOC call [facility] for you and Supriya is unable to file a grievance because she is not the authorized person on the account. They need a release of information for Supriya to file the grievance.  Ref # for call [id number].     CMOC left message for  Nato today to send me a PHI via e-mail to get signature from Juan  and have Supriya be an authorized representative.     Pt has received some bills from [facility]. The secondary insurance began on 09/20/2023 [employer]. The bills  for 08/2023 provided by Supriya are not covered by the insurance.     [facility] received an authorization and use of disclose health information. Pt needs to sign it and it needs to be returned back to [facility].     Next outreach is schedule for 04/18/2024.

## 2024-04-18 ENCOUNTER — PATIENT OUTREACH (OUTPATIENT)
Dept: FAMILY MEDICINE CLINIC | Facility: CLINIC | Age: 69
End: 2024-04-18

## 2024-04-18 NOTE — PROGRESS NOTES
Chart review indicates that CMOC and SW are involved to assist with increased waiver hours and SNAP benefit which pt lost. Pt did receive a re-eval for waiver hours on 3/12 and was later denied the additional hours requested. CMOC and Supriya called [facility] for you on 4/11 to file a grievance, but bc she is not the authorized person on the acct they need a release of information for Supriya to file the grievance. CMOC reached out to  to e-mail a PHI so that pt can authorize Supriya. The disclosure of health information was received and needs to be signed by patient. Pt was also approved SNAP and does have his card. CMOC did assist pt with [employer], but it was not working properly.   Patient also needed follow up with GI, CMOC did assist Supriya in calling GI on 4/11 and GI will be reaching out to Supriya to schedule endoscopy and have pt admitted for procedure once approved. Notes do not indicate a response from GI yet. Pt has outstanding bills from [facility] and 2nd insurance not active until after the services were performed. Notes due indicate that CMOC called [facility] and request they bill 2nd insurance.   CMOC scheduled to outreach Supriya today to discuss above.     SW outreached Supriya today to discuss above, she reports concerns that pt will not sign the medical release that will allow her to do the appeal for waiver. She was inquiring as to whether Shireen would be following up with pt son in [facility] who holds POA and medical proxy. Son in [place] also holds POA. [facility] will consult w CMOC Shireen. Supriya expressed concern for pt being home alone on the weekends as he will not necessarily eat, she has requested [facility] bring weekends meals but they will not, if Supriya does not bring him food on Sunday no one else visits. Also concerned at [facility] comes too early in the day sometimes during the week and pt does not come to door and she is charged for meal anyway. Supriya will call [facility] and request drop off later in the day to ensure  alison is there. Supriya stated that son in [facility] is considering nursing home placement. [facility] and son in [facility] plans to come up to [place] for the after consult with [facility] and he will make a decision at that time. Supriya informed that we can try to assist with this process if needed if placement is desired. YEHUDA also spoke to Supriya about paying out of pocket for extra aides on the weekends in the interim as pt does have some accessible cash. She will reach out to current home care agency to discuss options and prices.    Supriya did confirm that she has not heard back from GI yet. Hopefully this week.     Pt [employer] phone is working. However, pt rarely picks up his phone calls which concerns family.    Message to CMOC to discuss. YEHUDA to follow.     Later Entry:    YEHUDA spoke to CMOC who does plan on reaching out to son in [facility] to complete the release.

## 2024-04-19 ENCOUNTER — PATIENT OUTREACH (OUTPATIENT)
Dept: INTERNAL MEDICINE CLINIC | Facility: CLINIC | Age: 69
End: 2024-04-19

## 2024-04-19 NOTE — PROGRESS NOTES
CMOC call GI office today [phone] to get an update on the call we made on 04/11/2024. OC spoke to Dina today. She states the message from 04/11/2024 was forwarded to the providers. Dina is sending another message high priority to the providers today.  Pt needs to have an endoscopy and have patient admitted for this procedure.     [facility] spoke to patient son Ulises today. He will be completing the Authorization and Form to disclose information to [facility] to file a grievance.  Vickey Montgomery will send me the form when its completed.     Next outreach is schedule for 04/26/2024    Addendum:  [facility] received a phone call from patients son in regards to the Authorization form.     [facility] spoke to vickey Montgomery and he will be sending me this form today.     Next outreach is scheduled for 04/26/2024

## 2024-04-22 ENCOUNTER — PATIENT OUTREACH (OUTPATIENT)
Dept: INTERNAL MEDICINE CLINIC | Facility: CLINIC | Age: 69
End: 2024-04-22

## 2024-04-22 DIAGNOSIS — I25.10 CORONARY ARTERY DISEASE INVOLVING NATIVE CORONARY ARTERY OF NATIVE HEART WITHOUT ANGINA PECTORIS: ICD-10-CM

## 2024-04-22 DIAGNOSIS — E78.5 HYPERLIPIDEMIA, UNSPECIFIED HYPERLIPIDEMIA TYPE: ICD-10-CM

## 2024-04-22 NOTE — PROGRESS NOTES
Outgoing call   04/22/2024    CMOC received the Authorization HIPAA form from son Ulises Seguraquez. Form has been faxed to [facility] at [phone]. Fax confirmation has been received.     Form has been secured e-mail to [email or web address]     form has been mail to [facility]  Attn: Compliance Dept   [address] - [address] [place] [id number]    CMOC will check with [facility] by the end of the week to see if they have received it.     Next outreach is schedule for 04/22/2024

## 2024-04-24 RX ORDER — ROSUVASTATIN CALCIUM 40 MG/1
40 TABLET, COATED ORAL DAILY
Qty: 90 TABLET | Refills: 3 | Status: SHIPPED | OUTPATIENT
Start: 2024-04-24

## 2024-04-29 ENCOUNTER — TELEPHONE (OUTPATIENT)
Age: 69
End: 2024-04-29

## 2024-04-29 ENCOUNTER — PATIENT OUTREACH (OUTPATIENT)
Dept: INTERNAL MEDICINE CLINIC | Facility: CLINIC | Age: 69
End: 2024-04-29

## 2024-04-29 NOTE — TELEPHONE ENCOUNTER
Patients GI provider:  Dr. LINDA Lynn    Number to return call: ([phone] (Supriya, patients caretaker) Shae [phone] [facility]    Reason for call: [facility] contacted office stating they have reached out to our office multiple times regarding scheduling Capsule Endoscopy (pt must be admitted day prior) due to prep.  They are requesting the patient be scheduled ASAP.   Assisted Viky from [facility] in calling central scheduling.  Patient is scheduled for capsule endoscopy for 6/4/2024 @ 7:30.    Please contact both SUPRIYA at [phone] and [facility] Shae at [phone] regarding admission day prior for prep. Please place ADT order if needs to be placed. Please verify star has all procedure instructions.      Further instructions:  No iron tablets 3 days prior to procedure.  Follow instructions from ordering physician to take diabetic medication  No MRIs can be scheduled within two weeks of capsule.

## 2024-04-29 NOTE — PROGRESS NOTES
Outgoing call   04/29/2024    CMOC and Supriya Hare call [employer] and [employer] at [phone] today.     They have received the Authorization HIPAA Form. Supriya was able to file a grievance today to get additional hours of home care. Supriya is requesting an additional 25 hours. Pt currently has 31 hours of home care. Pt spends the weekend without home care.     Grievance file today and Supriya stated the concerns she has for patient and diagnosis were provided.     Grievance can take 30 calendar days. An earlier decision can be made if the doctor signs a letter stating an early decision is recommended because of the safety of the patient and the harm that it has to his health due to his conditions.     CMOC discussed case with MISSY Lim to ask for advise. Please see her notes from today.     In-basket send to Nataliya Gamboa  PCP for earlier decision on the grievance.     In-basket send to Geriatric Medicine PCP Jarrell Giraldo MD.     CMOC call [facility] at [phone] and son Ulises Henley ([facility]) [phone] needs to call to give permission that Supriya Hare can be given information on the patient appt with neuropsychology. Pt was give a referral to neuropsychology on 02/22/2024.      CMOC spoke to Phoebe at  in regards to second Endoscopy. Supriya Hare [phone] states she has not heard any information from the GI clinic. Please see MISSY Rodríguez notes from 03/28/2024.  Pt needs pt be inpatient for prep for 2nd Endoscopy.     With the help of Phoebe from  - Central Scheduling has scheduled the Endoscopy to be scheduled in Tuesday June 4, 2024 7:30 am [facility] at [address] [place] [address].  Phoebe will send a message to the providers to an update in regards to admission for prep for 2nd Endoscopy and they will give Supriya Hare.     Supriya states [employer] call Supriya and discontinued meals because they from the PCP.     Supriya spoke to the aide who is care giver now and she  is willing to work for an additional  5 hours on . Supriya needs to speak to Ulises Henley (POA).     CMOC unable to leave a message for Son Ulises Ball (POA). Not able to leave a voicemail.     CMOC left message for Dari at [employer] to call me back.      Next outreach is schedule for 2024    Addendum:   CMOC received call from Dari at [employer]. She states pt needs to re-apply for the year. Services  on 2024. Dari send an e-mail to [facility] today around 10:am to reinstate the authorization for [employer].     CMOC spoke to  Nato Koch at [phone] help with [employer].

## 2024-04-30 ENCOUNTER — PATIENT OUTREACH (OUTPATIENT)
Dept: INTERNAL MEDICINE CLINIC | Facility: CLINIC | Age: 69
End: 2024-04-30

## 2024-04-30 ENCOUNTER — TELEPHONE (OUTPATIENT)
Dept: PSYCHIATRY | Facility: CLINIC | Age: 69
End: 2024-04-30

## 2024-04-30 DIAGNOSIS — D64.9 ANEMIA, UNSPECIFIED TYPE: Primary | ICD-10-CM

## 2024-04-30 NOTE — TELEPHONE ENCOUNTER
Prep is not required for the capsule. It is helpful, but many providers do not prescribe prep. He can take miralax the week leading up to capsule (daily dose) when the patient's aid is available and then follow clear liquid diet on day prior.    Thanks,  Richy

## 2024-04-30 NOTE — TELEPHONE ENCOUNTER
Patients son Ulises called office requesting to put patient on wait list. Patient has POA that was emailed over to speak to Supriya (local friend) and Ulises due to patient having dementia. Son requesting to call Supriya to put patient on wait list due to her dealing with appointments and being local. Writer juwan Epps and she denied services at this time for patient. Writer informed her that if any other questions/concerns to give our office a call.

## 2024-04-30 NOTE — TELEPHONE ENCOUNTER
I did discuss with attending. We do not admit patient's for preparation for capsule endoscopy as no prep is required, so there is no reason for admission. If his hemoglobin is stable, we can hold off on capsule. Could also reschedule for an evening someone can stay with him to remind him to adhere to clear liquid diet.    I am happy to call [facility] and advise of same, but would not recommend admission only for clear liquid diet.    Thanks,  Richy

## 2024-04-30 NOTE — TELEPHONE ENCOUNTER
Shae from [facility] called back stating that the pt has dimentia and it would be very difficult for him to do the prep on his own. He does not take care of himself and she feels that he will not be able to do the prep. She stated that when he had his colon he needed to be admitted for the prep because he could not do it himself and there is no one to help him. Please reach out to Shae at [phone] and let her know if this will be doable.

## 2024-04-30 NOTE — TELEPHONE ENCOUNTER
Spoke to Supriya again and relayed message from dr. Epps asked if blood work can be ordered to see if hemoglobin is stable and if it is, they will cancel procedure.

## 2024-04-30 NOTE — PROGRESS NOTES
Simon Prince,    There is no bowel prep for capsule endoscopy. Patient only needs to follow clear liquid diet day prior to administration of capsule. Unfortunately, this would not be an indication for admission. I would recommend rescheduling capsule for a day that patient can have someone stay with him overnight if he is unable to follow clear liquid diet on his own. Please let me know if I can assist any further.    Thanks,  Richy

## 2024-04-30 NOTE — TELEPHONE ENCOUNTER
Patient does not need to be admitted for bowel prep for capsule if unable to do half bowel prep the evening prior. Would recommend daily miralax for week leading up to procedure, and clear liquid diet day prior to procedure. Please let me know if there are any other issues

## 2024-04-30 NOTE — PROGRESS NOTES
MISSY received a message from Carey ( ?) @ [facility] GI Office [phone] indicating  indicated pt did not need to be admitted for his Capsule endoscopy.    MISSY returned call and spoke to Cielo @ [phone] and reviewed that pt lives alone and [place] the [place] he has dementia/ Aphasia and we are trying to have his [place] Waiver Aide Care increased as he can not care well for himself.    His friend and caregiver Supriya [phone] has SHARED PT DOES  NEED to be admitted for the Prep as it was required for is last procedure. Prior to that pt NOT able to do the Prep himself.  Cielo share she will review same with Physician and let MISSY know.    MISSY also asked that she call pt friend / aide SUPRIYA as well to help schedule same.  MISSY has reviewed same with Shireen PIMENTEL who will f/u with Supriya anderson same as well as calling [facility] to have pt placed on their waiting list for a neuro  psych evaluation.

## 2024-05-01 NOTE — TELEPHONE ENCOUNTER
Lm ramos Epps () that lab orders have been placed and if they end up wanting to cx procedure to call the office.

## 2024-05-02 ENCOUNTER — PATIENT OUTREACH (OUTPATIENT)
Dept: INTERNAL MEDICINE CLINIC | Facility: CLINIC | Age: 69
End: 2024-05-02

## 2024-05-02 NOTE — PROGRESS NOTES
Incoming call   05/02/2024    CMOC received incoming call from Supriya Hare today. Supriya states his patient son Ulises will be coming to the area on 05/22/2024 to visit patient. Pt is getting meals through an agency the aid provided. Pt is getting 2 meals per day M-F and the meals are delivered to patient. Supriya Hare does not know the name of the company.     CMOC discussed case with RN Fara Calle. She review the notes from GI. Pt needs to have labs done. GI will call pt with the results. Depending on the labs they will decided if the endoscopy is needed or will be cancelled. Please see Fara Calle RN notes.     Information related to Supriya and Supriya states she will bring him to the lab.     Next outreach is schedule for 05/09/20424

## 2024-05-02 NOTE — PROGRESS NOTES
Outpatient Care Management Note:    Re:  case review with CMOC    Reviewed case with MARGARITO Crespo  per her request. Patient in need of capsule endoscopy and patient's friend Supriya reached out to her further about it. CMTIKA and SW on case to assist SNAP benefits and increasing [place] hours. Patient has history of type 2 diabetes without insulin use (last A1C was 7.1% on 2/22/24), iron deficiency anemia. Hyperlipemia, CAD, osteoarthritis, and aphasia.     Per review of telephone encounter on 4/29/24 with GI patient to complete bloodwork to check hemoglobin to see if it is stable and if it is can consider canceling capsule endoscopy. GI left message for Supriya on 5/1 with this info. MARGARITO Iyer will review with Supriya that iron panel and CBC with diff was ordered and to complete. I gave mobile lab number if needed to MARGARITO [phone]. Patient and friend, Supriya to further follow up with GI office regarding bloodwork results and if there is a need for further follow up once patient completes bloodwork. Reviewed that patient will NOT be admitted to hospital if capsule endoscopy is needed and admission is not medically necessary per GI office.     This is a one time outreach and no further RN CM outreach needed at this time. Patient does not meet criteria for care management at this time. Please re-consult as needed.

## 2024-05-03 ENCOUNTER — TELEPHONE (OUTPATIENT)
Age: 69
End: 2024-05-03

## 2024-05-03 ENCOUNTER — PATIENT OUTREACH (OUTPATIENT)
Dept: INTERNAL MEDICINE CLINIC | Facility: CLINIC | Age: 69
End: 2024-05-03

## 2024-05-03 NOTE — PROGRESS NOTES
Outgoing call   05/03/2024    [facility] call [facility] at [phone] and spoke to representative who provided the fax # to be able to get the grievance faster.     Letter from doctor has been faxed to [facility] to get a sooner grievance to get increase hours.    Fax confirmation has been received.     Next outreach is schedule for 05/10/2024.

## 2024-05-03 NOTE — TELEPHONE ENCOUNTER
Letter requesting 24/7 care/supervision prepared, needing signature    ----- Message from Jarrell Giraldo MD sent at 5/1/2024  4:56 PM EDT -----  Regarding: FW: Grievance for Home care hours  Can we write up a letter for this, saying needing 24/7 supervision?    :)    ----- Message -----  From: Shireen Mike  Sent: 4/29/2024  11:42 AM EDT  To: MISSY Castro; Jarrell Giraldo MD  Subject: Grievance for Home care hours                    Good Morning,     We are filing a grievance for additional home care hours with the insurance [place] [employer] and [employer].     Pt is approved for Waiver and gets 31 hours of home care. Family is asking for an additional 25 hours for his care. A total of 56 hours.     We have made a request to the insurance for additional hours and this request was recently denied.     Today we file a Grievance with the insurance for additional hours.   A grievance can take 30 calendar days.     I wanted to see if you are able to write a letter to the insurance that states the reasons why its beneficial for patient to have the additional 25 hours. You can be specific on his diagnosis. The letter can say patient needs additional hours because his safety is at risk due to his health conditions.     If you need additional information you can reach out to me at [phone] or you can reach out to MISSY Lim at [phone] at his PCP office.      We are aware patient has an appointment with you at the end of the month.     Thank you so much.     Shireen Mike

## 2024-05-03 NOTE — LETTER
Patient: Juan Henley  : 1955    To whom it may concern,    Mr. 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 Due to his diagnosis he requires an increase in waiver services to  care and supervision to assist with activities of daily living and for safety. If you require additional information, please contact me at the number listed above.    Sincerely,        Jarrell Giraldo MD

## 2024-05-07 ENCOUNTER — TELEPHONE (OUTPATIENT)
Age: 69
End: 2024-05-07

## 2024-05-07 ENCOUNTER — PATIENT OUTREACH (OUTPATIENT)
Dept: INTERNAL MEDICINE CLINIC | Facility: CLINIC | Age: 69
End: 2024-05-07

## 2024-05-07 NOTE — PROGRESS NOTES
Chart review indicates that a grievance was filed on 4/29 requesting additional 25 hour increase from 31 hours pt currently receiving. Notes indicate aide is now coming 5 hours on Sunday. Notes indicate physician did write letter of medical necessity so that grievance will not take full 30 days to be determined. Letter faxed to [facility] on 5/3. Currently awaiting determination.    Notes indicate CMOC to follow up with Supriya regarding having pt placed on [facility] with  [facility] exam.    Pt has upcoming appt with [facility] on 5/22 as appt on 5/1 was canceled.     JM Calle consulted on case and patient needs to complete lab work. Pending lab work pt may or may not have to have capsule endoscopy. Plan is for Supriya to take pt to lab. Supriya was also provided information for  mobile lab. Per chart labs not completed yet.     [facility] spoke to Supriya who is working this week, but plan is for pt aide to take him for labs this week at [facility]. Hours provided to her, she denied need for information for  mobile lab.    She confirms that med necessity letter for grievance has been faxed to [facility] and they are waiting determination. They are planning to pay oop for extra aides on the weekend in the interim. Supriya believes she has found someone and denies need for list of non skilled aides at this time.    Supriya stating that medications were picked up at pharmacy 2 weeks ago, but after home lisinopril was not in bag. Supriya will call pharmacy today and request to be able to  again and explain the situation. Instructed to return call to [facility] if another order for RX is needed from provider.    Supriya also stating that she always pays 35-40$ when she picks up medications. Unsure why as he has medicare and Medicaid. She is unsure if pharmacy had both insurances on file or if he has Medicare part D. [facility] e-mailed her insurance names and ID and she will call pharmacy  today for clarification and to ensure they have both insurances on file. E-mail to [email or web address]. If they do and medicaid is not covering certain rxs she will follow up with MISSY and we will assess further options for medication coverage.     Supriya also asking why pt has a 35$ bill at [facility]. YEHUDA reviewed [employer] and Supriya offered number for [facility] to follow up. She denied needing number as she does not have time and will just pay bill.    No further needs at this time. YEHUDA will follow.

## 2024-05-07 NOTE — TELEPHONE ENCOUNTER
Left message with patient's son, Ebony Henley ([phone]) regarding waiver letter that is complete.    Left message to let us know wether or not we should mail it or if he will be picking it up.

## 2024-05-10 ENCOUNTER — PATIENT OUTREACH (OUTPATIENT)
Dept: INTERNAL MEDICINE CLINIC | Facility: CLINIC | Age: 69
End: 2024-05-10

## 2024-05-10 NOTE — PROGRESS NOTES
Outgoing call   05/10/2024    MARGARITO  along with Supriya call [email or web address] and [facility] at [phone] in regards to the grievance filed on 04/29/2024.     Per representative she will contact the grievance team to see if they can expedite the grievance.  Grievance acknowledgment letter was mail out on the 04/30/2024 by [email or web address] and [employer]. The letter needs to be signed by lisa Martinez or Supriya Hare and mail back to [email or web address] and [facility].  Per Representative once the grievance letter is received by [email or web address] they will schedule a grievance date and time.      CMOC left the information with Supriya to look for the letter from [email or web address].      Representative will be sending another Grievance acknowledgement letter today in case they previous one send is lost.     Next outreach is scheduled for 05/17/2024

## 2024-05-16 ENCOUNTER — APPOINTMENT (OUTPATIENT)
Dept: LAB | Facility: CLINIC | Age: 69
End: 2024-05-16
Payer: MEDICARE

## 2024-05-16 DIAGNOSIS — D64.9 ANEMIA, UNSPECIFIED TYPE: ICD-10-CM

## 2024-05-16 DIAGNOSIS — D50.8 IRON DEFICIENCY ANEMIA SECONDARY TO INADEQUATE DIETARY IRON INTAKE: ICD-10-CM

## 2024-05-16 DIAGNOSIS — A04.8 H. PYLORI INFECTION: ICD-10-CM

## 2024-05-16 LAB
BASOPHILS # BLD AUTO: 0.05 THOUSANDS/ÂΜL (ref 0–0.1)
BASOPHILS NFR BLD AUTO: 1 % (ref 0–1)
EOSINOPHIL # BLD AUTO: 0.14 THOUSAND/ÂΜL (ref 0–0.61)
EOSINOPHIL NFR BLD AUTO: 2 % (ref 0–6)
ERYTHROCYTE [DISTWIDTH] IN BLOOD BY AUTOMATED COUNT: 14.1 % (ref 11.6–15.1)
FERRITIN SERPL-MCNC: 70 NG/ML (ref 24–336)
HCT VFR BLD AUTO: 45 % (ref 36.5–49.3)
HGB BLD-MCNC: 15.1 G/DL (ref 12–17)
IMM GRANULOCYTES # BLD AUTO: 0.01 THOUSAND/UL (ref 0–0.2)
IMM GRANULOCYTES NFR BLD AUTO: 0 % (ref 0–2)
IRON SATN MFR SERPL: 5 % (ref 15–50)
IRON SERPL-MCNC: 20 UG/DL (ref 50–212)
LYMPHOCYTES # BLD AUTO: 0.84 THOUSANDS/ÂΜL (ref 0.6–4.47)
LYMPHOCYTES NFR BLD AUTO: 14 % (ref 14–44)
MCH RBC QN AUTO: 29.8 PG (ref 26.8–34.3)
MCHC RBC AUTO-ENTMCNC: 33.6 G/DL (ref 31.4–37.4)
MCV RBC AUTO: 89 FL (ref 82–98)
MONOCYTES # BLD AUTO: 0.45 THOUSAND/ÂΜL (ref 0.17–1.22)
MONOCYTES NFR BLD AUTO: 8 % (ref 4–12)
NEUTROPHILS # BLD AUTO: 4.55 THOUSANDS/ÂΜL (ref 1.85–7.62)
NEUTS SEG NFR BLD AUTO: 75 % (ref 43–75)
NRBC BLD AUTO-RTO: 0 /100 WBCS
PLATELET # BLD AUTO: 241 THOUSANDS/UL (ref 149–390)
PMV BLD AUTO: 10.6 FL (ref 8.9–12.7)
RBC # BLD AUTO: 5.07 MILLION/UL (ref 3.88–5.62)
TIBC SERPL-MCNC: 432 UG/DL (ref 250–450)
UIBC SERPL-MCNC: 412 UG/DL (ref 155–355)
WBC # BLD AUTO: 6.04 THOUSAND/UL (ref 4.31–10.16)

## 2024-05-16 PROCEDURE — 82728 ASSAY OF FERRITIN: CPT

## 2024-05-16 PROCEDURE — 83550 IRON BINDING TEST: CPT

## 2024-05-16 PROCEDURE — 85025 COMPLETE CBC W/AUTO DIFF WBC: CPT

## 2024-05-16 PROCEDURE — 83540 ASSAY OF IRON: CPT

## 2024-05-16 PROCEDURE — 36415 COLL VENOUS BLD VENIPUNCTURE: CPT

## 2024-05-20 ENCOUNTER — PATIENT OUTREACH (OUTPATIENT)
Dept: INTERNAL MEDICINE CLINIC | Facility: CLINIC | Age: 69
End: 2024-05-20

## 2024-05-20 NOTE — PROGRESS NOTES
Outgoing call   05/20/2024    [facility] received messages from Supriya family member.     She states pt is ready to be send to a nursing home as the son has been finding patient in a bed with poop. Family found poop on the floor. Son states the poop looked red. Supriya states pt is hallucinating more and she needs assistance.    Supriya is upset with caregiver because she has an emergency and she had to change her hours to 7:am to 12 noon.      Supriya spoke to MISSY Lim. Please see MISSY notes.  CMOC discussed case with MISSY Lim today.     [facility] will  documents from Supriya tomorrow around 9:30 am.     Son will be in town from [place] next Monday.      Next outreach is schedule for 05/27/2024.

## 2024-05-20 NOTE — PROGRESS NOTES
MISSY has returned urgent call to pt's friend/caregiver Supriya [phone].    She shares that she feels pt may need a nursing home ASAP.  She shares that pt has soiled himself and the whole apt.    Her son Mic fond him when he was bring him food.    She also shares that the Apt management is complaining because the toilet was over stuffed and is causing damage in the apt downstairs.  Supriya is not sure if the stool was bloody.  She is going with her dgt tonight to check on pt and clean up as able.  She also thinks pt maybe hallucinating and has been more confused as to time.     Pt's [place] Waiver hours have been cut and she shares they just Found the APPEAL paperwork which was mailed to pt.    Supriya shares that pt's current aide who pt likes has had to adjust her hours this  week and is the from 7 am to 12 Noon instead of 9 AM to 2 PM which better covers his waking hours.  This aide actually does bring pt to his appointment and brings him food Supriya shares that Meals on Wheels has ended.    Pt is also schedule for a virtual Senior Care Appointment for 24 @ 9 am.     Supriya shares her other son is coming up from [place] this Monday. They are talking about prepaying pt's  expenses to have the taken care of.   Supriya concerned about this affecting his MA . MISSY advise making sure they keep documentation re same.    MISSY has asked IF family have determined what the want to do.  She shares the they are his 2 sons are his POAs and has been for Nursing home Placement.  They have been trying to have the [place] Waiver work out.      MISSY has reviewed the option of bring pt to the Hospital if they feel there is a change in his condition which needs to be evaluated.  If they feel it is UNSAFE for pt to return home they can then also ask for Nursing Home Placement.    If they feel they can continue to mange pt at home and work on increasing aide hours and possibly getting ATTENDs ( Adult diapers)  to help and seeing if [facility] can assist further.    If indicated we can also look to Nursing Home Placement form the Community but it does take a lot more time to find a facility and pt would need to be safe during the process.    Supriya to go to see pt tonight and will see how he is doing and will decide what their next steps will be.  Case reviewed with Shireen ESCAMILLA who will see pt tomorrow and get the APPEAL paperwork and review above options.    SW top also ask Clinical Team to assist with obtaining a prescription for Attends and help with the ordering process.   Support offred.     CM Team to f/u and assist as indicated.    Sw to ask Clinical Team to assist with obtaining order for Attends. Supriya thinks he maybe size medium.

## 2024-05-21 ENCOUNTER — PATIENT OUTREACH (OUTPATIENT)
Age: 69
End: 2024-05-21

## 2024-05-21 ENCOUNTER — PATIENT OUTREACH (OUTPATIENT)
Dept: INTERNAL MEDICINE CLINIC | Facility: CLINIC | Age: 69
End: 2024-05-21

## 2024-05-21 NOTE — PROGRESS NOTES
In-person visit with Supriya  05/21/2024    CMOC met with Supriya Hare today to  the Grievance Acknowledgment letter from [employer].  Grievance letter has been faxed to [facility] [employer] [facility] today at FAX # [phone].     CMOC will be calling tomorrow to make sure [employer] received the letter acknowledgment for the Grievance.     Supriya is requesting CMOC be present at the Virtual appt tomorrow at 9:am. She thinks she will be there but if she is not she thinks the aid will not be able to communicate with the doctor due to the language barrier.     CMOC will ask SW to see if they want me to be present for the appt.     CMOC provided supportive listening to Supriya.     Supriya spoke to aid today. The aid states she will be changing her hours to the afternoon from 3-8 pm and will be requesting the agency to change her to another placement home.    Juan Henley phone # [phone]   Nakia Ortega # [phone]     CMOC will be delivering 4 depends incontinence diapers today.     Next outreach is schedule for 05/28/2024.

## 2024-05-22 ENCOUNTER — TELEPHONE (OUTPATIENT)
Dept: INTERNAL MEDICINE CLINIC | Facility: CLINIC | Age: 69
End: 2024-05-22

## 2024-05-22 ENCOUNTER — PATIENT OUTREACH (OUTPATIENT)
Dept: INTERNAL MEDICINE CLINIC | Facility: CLINIC | Age: 69
End: 2024-05-22

## 2024-05-22 ENCOUNTER — TELEMEDICINE (OUTPATIENT)
Age: 69
End: 2024-05-22
Payer: MEDICARE

## 2024-05-22 DIAGNOSIS — B96.81 HELICOBACTER PYLORI GASTRITIS: Chronic | ICD-10-CM

## 2024-05-22 DIAGNOSIS — I25.810 CORONARY ARTERY DISEASE INVOLVING CORONARY BYPASS GRAFT OF NATIVE HEART WITHOUT ANGINA PECTORIS: Chronic | ICD-10-CM

## 2024-05-22 DIAGNOSIS — G30.1 MODERATE LATE ONSET ALZHEIMER'S DEMENTIA WITH PSYCHOTIC DISTURBANCE (HCC): Primary | ICD-10-CM

## 2024-05-22 DIAGNOSIS — R32 URINARY AND FECAL INCONTINENCE: ICD-10-CM

## 2024-05-22 DIAGNOSIS — F02.B2 MODERATE LATE ONSET ALZHEIMER'S DEMENTIA WITH PSYCHOTIC DISTURBANCE (HCC): Primary | ICD-10-CM

## 2024-05-22 DIAGNOSIS — E11.65 TYPE 2 DIABETES MELLITUS WITH HYPERGLYCEMIA, WITHOUT LONG-TERM CURRENT USE OF INSULIN (HCC): Chronic | ICD-10-CM

## 2024-05-22 DIAGNOSIS — R15.9 URINARY AND FECAL INCONTINENCE: ICD-10-CM

## 2024-05-22 DIAGNOSIS — D50.0 IRON DEFICIENCY ANEMIA DUE TO CHRONIC BLOOD LOSS: ICD-10-CM

## 2024-05-22 DIAGNOSIS — K29.70 HELICOBACTER PYLORI GASTRITIS: Chronic | ICD-10-CM

## 2024-05-22 PROBLEM — Z23 ENCOUNTER FOR IMMUNIZATION: Status: RESOLVED | Noted: 2023-11-14 | Resolved: 2024-05-22

## 2024-05-22 PROCEDURE — 99483 ASSMT & CARE PLN PT COG IMP: CPT | Performed by: INTERNAL MEDICINE

## 2024-05-22 RX ORDER — ROSUVASTATIN CALCIUM 10 MG/1
10 TABLET, COATED ORAL DAILY
Qty: 90 TABLET | Refills: 3 | Status: SHIPPED | OUTPATIENT
Start: 2024-05-22

## 2024-05-22 NOTE — PROGRESS NOTES
SW received a call from Shireen PIMENTEL who is present at pt's home for his Virtual Senior Care appointment with Dr. Giraldo this AM.  Shireen PIMENTEL is present to assist with translation as pt's aide is not bi-lingual.  SW has asked pt how he feels he is doing living in his apt?    Pt feels he is doing well.  MISSY has asked CMOC re the condition of the home and she responds it is clean. Pt's current Aide Shannon [phone] reports pt was having diarrhea this past Monday and that is when the apt was dirty and had fecal matter all over.  Pt's aide reports this has not happened before.  It is reported that she is adjusting her hours to be 3 pm to 8 pm and the agency is looking for a new aide moving forward.   Pt does not have an aide on Sunday.  An APPEAL is pending with PA Waiver for more hours.  Shireen PIMENTEL shared as far as she knows no decision made re Nursing Home placement until pt's son from [place] arrives on Monday.      Dr Giraldo has begun the appointment and Shireen PIMENTEL dropped SW from the call so that pt's friend and main care taker Supriya Causey could be added to the call.     SW received an update from Shireen PIMENTEL after the Appointment and consult of Dr Giraldo and note of Geriatric SWejimi Mehta has been noted and is appreciated.  Pt's dementia has progressed and pt is in need of 24 / 7 supervision.    Pt's [place] Waiver Program is not covering same.    MISSY notes APS contacted as well.    OP/CM Team will follow and assist as indicated.

## 2024-05-22 NOTE — PROGRESS NOTES
ASSESSMENT AND PLAN:  1. 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  He now is in a moderately severe stage with difficulty managing his own toileting and incontinence issues.  He is well now having active hallucinations and severely disordered thought patterns.  Despite this he has had very limited help in the house with only 5 hours of aide services.  He requires 24/7 assistance and should not have access to his own medications as he already has over an underdosed his own medications and does not understand or have the ability to keep track of pills in a calendar.    Decision-making capacity: Does not have capacity for medical or financial decisions    Staging: Moderately Severe Alzheimer's Dementia (FAST stage 6c)    Medications Review: This point may stop iron supplement his anemia is resolved as well as iron deficiency resolved.  He should restart on baby aspirin daily.  May reduce rosuvastatin dose as lipid panel is doing very well.    Caregiver Review: With severe caregiver stress of friend Supriya.  POA/son not able to provide necessary caregiving.  Patient requires 24/7 assistance.  2. Helicobacter pylori gastritis  Assessment & Plan:  S/p medication therapy.  Needs H. pylori stool check to verify resolution of infection.  3. Iron deficiency anemia due to chronic blood loss  Assessment & Plan:  Secondary to H. pylori infection which was treated.  Likely resolved.  Needs repeat H. pylori stool test to verify resolution of infection.  Anemia now resolved.  May stop iron therapy  4. Type 2 diabetes mellitus with hyperglycemia, without long-term current use of insulin ([facility])  Assessment & Plan:  A1c has been improving.  May continue with metformin therapy but likely can reduce dose to 500 mg twice a day soon.  5. Coronary artery disease involving coronary bypass graft of native heart without  "angina pectoris  Assessment & Plan:  Recommend restarting baby aspirin along with statin.  LDL has been doing quite well.  Considering increasing frailty and age may reduce rosuvastatin to 10 mg daily  Orders:  -     rosuvastatin (CRESTOR) 10 MG tablet; Take 1 tablet (10 mg total) by mouth daily  6. Urinary and fecal incontinence  Assessment & Plan:  Due to progressive dementia.  Would benefit from incontinence diapers/pull-ups and bed liners.        HPI:    We had the pleasure of evaluating Juan Henley who is a 68 y.o. male in Geriatric consultation today.  Mr. Henley is seen virtually today with home health aid present and [facility] staff Viky present in the home.    HISTORY AS PER AID AND FRIEND SILVIANO:    COGNITION UPDATE:  Friend reports decline, increase in hallucinations, thinking someone is in the house.  Also reports increase in incontinence.  Now confuses the TV remote with the telephone.  Now needs help getting clothes on, putting underwear on top of this shorts.  Now with difficulty toileting, with stool all over the bathroom and all over the bedroom.  With increased disorganized thinking.  Asked to call his step daughter \"mom\".  At other times, doesn't remember that his own mother is .  No long leaves the house without someone due to safety concerns.  Also there are medication safety concerns.  He has access to his medications and there have been times where he has overdosed on his medications and also times where he probably does not take them as there is no family in the home to assist him.  They are coming to the home to assist when they can but not every day.    I also later called son/POA Ulises Henley.  He corroborates that patient has been declining greatly in the last 4-5 months with increased disorganized thought patterns and agrees that placement in facility would probably be best at this time.    FUNCTIONAL STATUS:  BADLs  Does patient require assistance with:  Bathing: " Yes, needs cuing  Dressing: Yes  Transferring: No  Continence: Yes  Toileting: Yes  Feeding: No    IADLs  Dose patient require assistance with:  Telephone: Yes  Shopping: Yes  Food Preparation: Yes  Housekeeping: Yes  Laundry: Yes  Transportation: Yes  Medications: Yes  Finances: Yes    NEUROPSYCH SYMPTOMS:  Does patient get angry or hostile?  Resist care from others? No  Does patient see or hear things that no one else can see or hear? Yes  Does patient act impatient and cranky? Does mood frequently change for no apparent reason? No  Does patient act suspicious without good reason (example: believes that others are stealing from him or her, or planning to harm him or her in some way)? No  Does patient less interested in his or her usual activities or in the activities and plans of others? Yes  Does patient have trouble sleeping at night? unsure    SAFETY:  Hearing and vision issue: Yes, very poor vision  Any gait or balance disorder: No  Uses: no assistive devices  Any falls in the last year: No  Any history of wandering: No  Are there firearms or guns in the home: No  Does patient drive: No  Any driving accidents or citations in the last year: No  Any concerns about patient's ability to drive: Yes    ACP REVIEW:  Does patient have POA: Yes  Does patient have a Living will: Yes  Any legal assistance needed for healthcare planning?: No    Allergies   Allergen Reactions    Morphine GI Intolerance     vomiting       Medications:    Current Outpatient Medications on File Prior to Visit   Medication Sig Dispense Refill    aspirin 81 MG tablet Take 81 mg by mouth daily at bedtime       lisinopril (ZESTRIL) 2.5 mg tablet TAKE ONE TABLET BY MOUTH EVERY DAY 90 tablet 3    metFORMIN (GLUCOPHAGE-XR) 500 mg 24 hr tablet Take 2 tablets (1,000 mg total) by mouth 2 (two) times a day with meals 180 tablet 3    [DISCONTINUED] ferrous sulfate 324 (65 Fe) mg Take 1 tablet (324 mg total) by mouth every other day 45 tablet 2     [DISCONTINUED] rosuvastatin (CRESTOR) 40 MG tablet TAKE ONE TABLET BY MOUTH EVERY DAY 90 tablet 3    [DISCONTINUED] omega-3-acid ethyl esters (LOVAZA) 1 g capsule Take 100 mg by mouth 2 (two) times a day (Patient not taking: Reported on 5/22/2024)      [DISCONTINUED] pantoprazole (PROTONIX) 40 mg tablet Take 1 tablet by mouth every 12 hours for 14 days (Patient not taking: Reported on 5/22/2024) 28 tablet 0     No current facility-administered medications on file prior to visit.       History:  Past Medical History:   Diagnosis Date    Arteriosclerosis of coronary artery 1/13/2014    Arthritis     Benign hypertension 1/18/2018    Carpal tunnel syndrome     Coronary artery disease     Costochondritis     RESOLVED: [id number]    Diabetes mellitus (HCC)     Hematuria     HTN (hypertension)     Hx of CABG 2/7/2014    Hyperlipidemia     Kidney stone     Myocardial infarction (HCC)     Weak carotid pulse      Past Surgical History:   Procedure Laterality Date    CATARACT EXTRACTION Left     COLONOSCOPY      CORONARY ANGIOPLASTY WITH STENT PLACEMENT  06/29/2005    RCA     CORONARY ANGIOPLASTY WITH STENT PLACEMENT  07/05/2005    Cx    CORONARY ARTERY BYPASS GRAFT      LAST ASSESSED: [id number]    LITHOTRIPSY      RENAL    MOUTH SURGERY      DE COLONOSCOPY FLX DX W/COLLJ SPEC WHEN PFRMD N/A 2/22/2019    Procedure: COLONOSCOPY;  Surgeon: Thee Lam MD;  [facility];  Service: Gastroenterology    DE CYSTO/URETERO W/LITHOTRIPSY &INDWELL STENT INSRT Left 3/24/2017    Procedure: CYSTOSCOPY, URETEROSCOPY, HOLMIUM LASER LITHOTRIPSY, BASKET STONE EXTRACTION, RETROGRADE PYELOGRAM, STENT INSERTION;  Surgeon: Shashank Thacker MD;  [facility];  Service: Urology    DE CYSTO/URETERO W/LITHOTRIPSY &INDWELL STENT INSRT Left 7/28/2017    Procedure: CYSTOSCOPY, URETEROSCOPY WITH HOLMIUM LASER LITHOTRIPSY, AND STENT EXCHANGE;  Surgeon: Seb Rodriguez MD;  [facility];  Service: Urology    DE LITHOTRIPSY XTRCORP SHOCK  WAVE Left 9/15/2017    Procedure: LITHROTRIPSY EXTRACORPORAL SHOCKWAVE (ESWL);  Surgeon: Seb Rodriguez MD;  [facility];  Service: Urology    ROTATOR CUFF REPAIR Right     SHOULDER ARTHROSCOPY Left     TOOTH EXTRACTION       Family History   Problem Relation Age of Onset    Diabetes Sister         TYPE 2    Diabetes Mother     Heart disease Mother     Diabetes Brother     Diabetes Sister         TYPE 2    Heart disease Sister     Heart disease Sister     Coronary artery disease Maternal Uncle     Diabetes Maternal Uncle      Social History     Socioeconomic History    Marital status:      Spouse name: Not on file    Number of children: Not on file    Years of education: Not on file    Highest education level: Not on file   Occupational History    Occupation: laboror     Comment:    Tobacco Use    Smoking status: Every Day     Current packs/day: 0.25     Average packs/day: 0.3 packs/day for 44.0 years (11.0 ttl pk-yrs)     Types: Cigarettes    Smokeless tobacco: Never    Tobacco comments:     up to 1ppd since age 19y/o; FORMER SMOKER AS PER ALL SCRIPTS    Vaping Use    Vaping status: Never Used   Substance and Sexual Activity    Alcohol use: Not Currently     Comment: quit 23 years ago; SOCIAL DRINKER AS PER ALL SCRIPTS     Drug use: Never    Sexual activity: Not Currently   Other Topics Concern    Not on file   Social History Narrative    The patient was born in [place]. He graduated from high school and worked for [employer]. He reports that when they shut down, he went to [place]. 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 Most recently he has worked for [employer] as a      Social Determinants of Health     Financial Resource Strain: Low Risk  (2/22/2024)    Overall Financial Resource Strain (CARDIA)     Difficulty of Paying Living Expenses: Not hard at all   Food Insecurity: No Food Insecurity  (2/22/2024)    Hunger Vital Sign     Worried About Running Out of Food in the Last Year: Never true     Ran Out of Food in the Last Year: Never true   Transportation Needs: No Transportation Needs (2/22/2024)    PRAPARE - Transportation     Lack of Transportation (Medical): No     Lack of Transportation (Non-Medical): No   Physical Activity: Inactive (9/16/2020)    Exercise Vital Sign     Days of Exercise per Week: 0 days     Minutes of Exercise per Session: 0 min   Stress: No Stress Concern Present (9/16/2020)    [place] [employer] - Occupational Stress Questionnaire     Feeling of Stress : Not at all   Social Connections: Socially Isolated (9/16/2020)    Social Connection and Isolation Panel [NHANES]     Frequency of Communication with Friends and Family: More than three times a week     Frequency of Social Gatherings with Friends and Family: Three times a week     Attends Christian Services: Never     Active Member of Clubs or Organizations: No     Attends Club or Organization Meetings: Never     Marital Status:    Intimate Partner Violence: Not At Risk (9/16/2020)    Humiliation, Afraid, Rape, and Kick questionnaire     Fear of Current or Ex-Partner: No     Emotionally Abused: No     Physically Abused: No     Sexually Abused: No   Housing Stability: Low Risk  (2/15/2024)    Housing Stability Vital Sign     Unable to Pay for Housing in the Last Year: No     Number of Places Lived in the Last Year: 1     Unstable Housing in the Last Year: No     Past Surgical History:   Procedure Laterality Date    CATARACT EXTRACTION Left     COLONOSCOPY      CORONARY ANGIOPLASTY WITH STENT PLACEMENT  06/29/2005    RCA     CORONARY ANGIOPLASTY WITH STENT PLACEMENT  07/05/2005    Cx    CORONARY ARTERY BYPASS GRAFT      LAST ASSESSED: [id number]    LITHOTRIPSY      RENAL    MOUTH SURGERY      NM COLONOSCOPY FLX DX W/COLLJ SPEC WHEN PFRMD N/A 2/22/2019    Procedure: COLONOSCOPY;  Surgeon: Thee Lam  MD;  [facility];  Service: Gastroenterology    AL CYSTO/URETERO W/LITHOTRIPSY &INDWELL STENT INSRT Left 3/24/2017    Procedure: CYSTOSCOPY, URETEROSCOPY, HOLMIUM LASER LITHOTRIPSY, BASKET STONE EXTRACTION, RETROGRADE PYELOGRAM, STENT INSERTION;  Surgeon: Shashank Thacker MD;  [facility];  Service: Urology    AL CYSTO/URETERO W/LITHOTRIPSY &INDWELL STENT INSRT Left 7/28/2017    Procedure: CYSTOSCOPY, URETEROSCOPY WITH HOLMIUM LASER LITHOTRIPSY, AND STENT EXCHANGE;  Surgeon: Seb Rodriguez MD;  [facility];  Service: Urology    AL LITHOTRIPSY XTRCORP SHOCK WAVE Left 9/15/2017    Procedure: LITHROTRIPSY EXTRACORPORAL SHOCKWAVE (ESWL);  Surgeon: Seb Rodriguez MD;  [facility];  Service: Urology    ROTATOR CUFF REPAIR Right     SHOULDER ARTHROSCOPY Left     TOOTH EXTRACTION         OBJECTIVE:  Physical Exam  Constitutional:       General: He is not in acute distress.  HENT:      Head: Normocephalic and atraumatic.      Right Ear: External ear normal.      Left Ear: External ear normal.      Nose: Nose normal.   Eyes:      General: No scleral icterus.     Conjunctiva/sclera: Conjunctivae normal.   Pulmonary:      Effort: Pulmonary effort is normal. No respiratory distress.   Abdominal:      General: There is no distension.   Skin:     Coloration: Skin is not jaundiced or pale.   Neurological:      General: No focal deficit present.      Mental Status: He is alert.   Psychiatric:         Mood and Affect: Mood normal.      Comments: Paucity of thought content.  Poor insight.         Labs & Imaging:  Lab Results   Component Value Date    WBC 6.04 05/16/2024    HGB 15.1 05/16/2024    HCT 45.0 05/16/2024    MCV 89 05/16/2024     05/16/2024     Lab Results   Component Value Date     10/17/2015    SODIUM 141 02/15/2024    K 4.0 02/15/2024     02/15/2024    CO2 29 02/15/2024    ANIONGAP 6 10/17/2015    AGAP 8 02/15/2024    BUN 7 02/15/2024    CREATININE 0.89 02/15/2024    GLUC 125  02/15/2024    GLUF 125 (H) 02/15/2024    CALCIUM 8.6 02/15/2024    AST 23 05/01/2023    ALT 30 05/01/2023    ALKPHOS 58 05/01/2023    PROT 7.2 10/17/2015    TP 8.1 05/01/2023    BILITOT 0.44 10/17/2015    TBILI 0.60 05/01/2023    EGFR 88 02/15/2024     Lab Results   Component Value Date    HGBA1C 7.1 (A) 02/22/2024     Lab Results   Component Value Date    CHOLESTEROL 158 11/14/2023    CHOLESTEROL 149 09/22/2022    CHOLESTEROL 174 01/13/2022     Lab Results   Component Value Date    HDL 68 11/14/2023    HDL 68 09/22/2022    HDL 75 01/13/2022     Lab Results   Component Value Date    TRIG 159 (H) 11/14/2023    TRIG 78 09/22/2022    TRIG 64 01/13/2022     Lab Results   Component Value Date    NONHDLC 90 11/14/2023     Lab Results   Component Value Date    LDLCALC 58 11/14/2023    LDLCALC 65 09/22/2022    LDLDIRECT 94 10/17/2015     Lab Results   Component Value Date    PVQTTRXN47 551 05/01/2023     Lab Results   Component Value Date    KRJ4EJZCKOZE 1.090 05/01/2023     Lab Results   Component Value Date    SYPHILISAB Non-reactive 05/01/2023     Lab Results   Component Value Date    SUDI24DQZPZT 34.1 01/26/2019          Results for orders placed during the hospital encounter of 06/07/23    MRI brain NeuroQuant wo and w contrast    Narrative  MRI BRAIN WITH AND WITHOUT CONTRAST, NeuroQuant IMAGING    INDICATION: F03.A0: Unspecified dementia, mild, without behavioral disturbance, psychotic disturbance, mood disturbance, and anxiety.    COMPARISON:   None.    TECHNIQUE:  Multiplanar, multisequence imaging of the brain was performed before and after gadolinium administration.  Sagittal 3D volumetric imaging processed by [employer] software creating General Morphology and Age Related Atrophy reports.      IV CONTRAST:  9 mL of Gadobutrol injection (SINGLE-DOSE)    IMAGE QUALITY:  Diagnostic.    FINDINGS:    BRAIN PARENCHYMA:  There is no discrete mass, mass effect or midline shift.  Brainstem and cerebellum demonstrate  normal signal. There is no intracranial hemorrhage.  There is no evidence of acute infarction and diffusion imaging is unremarkable.  Post  contrast imaging is normal. Nonspecific  foci of T2/FLAIR hyperintensities involving periventricular and subcortical white matter, most compatible with mild microangiopathic change.    Old microhemorrhage in the inferior right parietal lobe (series 8 image 85) and in the inferomedial right cerebellum (series 8 image 26)      QUANTITATIVE:    Exam Quality: Adequate for volumetric analysis.    3D Volumetric Data:  Hippocampal Occupancy Score (HOC):                   0.59  Percentile for similar age:                                   1  Lower HOC scores are associated with progression of MCI to AD.    Total hippocampal volume (cc):                           5.39  Percentile for similar age:                              2    Entorhinal cortex (cc)                                            4.15  Percentile for similar age:                                   2    Superior Lateral ventricular volume (cc):             47.19  Percentile for similar age:                             85    Inferior lateral ventricular volume (cc):                    3.81  Percentile for similar age:                                99    Quantitative conclusions:  Hippocampal Volume:                       Low volume  Entorhinal Volume:                            Low volume  Superior Lateral Ventricle Volume:     Normal Volume  Inferior Lateral Ventricle Volume:       High Volume    Concordance between qualitative and quantitative hippocampal volume assessment: Concordant.    Change in brain volumes: No previous volumetric study for comparison    Mean hippocampal volume loss among normal elderly: 0.7% per year, (-0.3 to 1.7;  Marc 2008; also Jaspal 2010).    VENTRICLES:  Normal for the patient's age.    SELLA AND PITUITARY GLAND:  Normal.    ORBITS: Prior left orbital cataract surgery.    PARANASAL SINUSES:  Mild ethmoidal sinus mucosal thickening..    VASCULATURE:  Evaluation of the major intracranial vasculature demonstrates appropriate flow voids.    CALVARIUM AND SKULL BASE:  Normal.    EXTRACRANIAL SOFT TISSUES:  Normal.    Impression  1.  Mild chronic microangiopathy.  2.  NeuroQuant analysis was performed: Low hippocampal volume and enlargement of the adjacent inferior lateral ventricles suggestive of local ex-vacuo dilatation.  Findings support medial temporal lobe focused neurodegenerative etiology in right clinical  setting.      Virtual Regular Visit     Verification of patient location: Patient is located in the following state in which I hold an active license [place].  The patient was identified by name and date of birth. Patient was informed that this is a telemedicine visit and that the visit is being conducted through [employer] and patient was informed that this is a secure, HIPAA-compliant platform. Patient agrees to proceed.  My office door was closed. No one else was in the room.  Patient acknowledged consent and understanding of privacy and security of the video platform. The patient has agreed to participate and understands they can discontinue the visit at any time. Patient is aware this is a billable service.     Patient and family verbally agrees to participate in Virtual Care Services. Patient is aware that Virtual Care Services could be limited without vital signs or the ability to perform a full hands-on physical exam. Patient understands that he/she or the provider may request at any time to terminate the video visit and request the patient to seek care or treatment in person.

## 2024-05-22 NOTE — ASSESSMENT & PLAN NOTE
Recommend restarting baby aspirin along with statin.  LDL has been doing quite well.  Considering increasing frailty and age may reduce rosuvastatin to 10 mg daily

## 2024-05-22 NOTE — ASSESSMENT & PLAN NOTE
Bed: 04  Expected date:   Expected time:   Means of arrival:   Comments:  triage   Sadly patient's disease has progressed greatly over the last year.  He now is in a moderately severe stage with difficulty managing his own toileting and incontinence issues.  He is well now having active hallucinations and severely disordered thought patterns.  Despite this he has had very limited help in the house with only 5 hours of aide services.  He requires 24/7 assistance and should not have access to his own medications as he already has over an underdosed his own medications and does not understand or have the ability to keep track of pills in a calendar.    Decision-making capacity: Does not have capacity for medical or financial decisions    Staging: Moderately Severe Alzheimer's Dementia (FAST stage 6c)    Medications Review: This point may stop iron supplement his anemia is resolved as well as iron deficiency resolved.  He should restart on baby aspirin daily.  May reduce rosuvastatin dose as lipid panel is doing very well.    Caregiver Review: With severe caregiver stress of friend Supriya.  POA/son not able to provide necessary caregiving.  Patient requires 24/7 assistance.

## 2024-05-22 NOTE — PROGRESS NOTES
Outgoing call   05/22/2024    CMOC left voicemail to [employer] at [phone] to help with the Grievance.     CMOC will meet with pt and help with his virtual medical appt scheduled for 9:am today.     Addendum:   CMOC assisted with [facility]iatric visit today. CMOC brought Supriya on the line to speak to Dr. Jarrell Giraldo. Please see his notes from 05/22/2024.     MISSY Mehta from Geriatric is involved and will be assisting in possibly placing patient in a 24/7 facility.  Per Ольга notes call placed to [facility] Protective services today for safety concerns.  MISSY Rolon fax letter from doctor at Geriatric office to [facility] [employer]. Ольга spoke to Nato Koch  from [employer]. Please see her notes from 05/22/2024. Missy will continue to communicate with us.       MISSY Lim was present at the beginning of the call. She was able to ask patient & aid to ask some questions.     CMOC will continue to f/u with clemente from [employer].     The Power of  has been given to the front clerical staff to scanned in the correct place for the network doctors to be able to locate.     Next outreach is schedule for 05/29/2024.

## 2024-05-22 NOTE — LETTER
May 22, 2024         Patient: Juan Henley   YOB: 1955   Date of Visit: 2024       To Whom It May Concern:    Per my assessment of Juan HenleyRELL 1955, he has a diagnosis of Alzhemier's Dementia with associated psychotic symptoms, now at a moderately severe stage, with expected progression of disease.  He requires 24/7 care and does not have capacity for medical or financial decisions.       Sincerely,        Jarrell Giraldo MD

## 2024-05-22 NOTE — ASSESSMENT & PLAN NOTE
A1c has been improving.  May continue with metformin therapy but likely can reduce dose to 500 mg twice a day soon.

## 2024-05-22 NOTE — ASSESSMENT & PLAN NOTE
Secondary to H. pylori infection which was treated.  Likely resolved.  Needs repeat H. pylori stool test to verify resolution of infection.  Anemia now resolved.  May stop iron therapy

## 2024-05-23 ENCOUNTER — APPOINTMENT (EMERGENCY)
Dept: RADIOLOGY | Facility: HOSPITAL | Age: 69
DRG: 057 | End: 2024-05-23
Payer: MEDICARE

## 2024-05-23 ENCOUNTER — TELEPHONE (OUTPATIENT)
Age: 69
End: 2024-05-23

## 2024-05-23 ENCOUNTER — PATIENT OUTREACH (OUTPATIENT)
Age: 69
End: 2024-05-23

## 2024-05-23 ENCOUNTER — HOSPITAL ENCOUNTER (INPATIENT)
Facility: HOSPITAL | Age: 69
LOS: 7 days | Discharge: NURSING, REHAB OR LONG-TERM CARE | DRG: 057 | End: 2024-05-30
Attending: EMERGENCY MEDICINE | Admitting: HOSPITALIST
Payer: MEDICARE

## 2024-05-23 DIAGNOSIS — F03.90 DEMENTIA (HCC): ICD-10-CM

## 2024-05-23 DIAGNOSIS — N17.9 AKI (ACUTE KIDNEY INJURY) (HCC): ICD-10-CM

## 2024-05-23 DIAGNOSIS — F02.82: ICD-10-CM

## 2024-05-23 DIAGNOSIS — R41.82 ALTERED MENTAL STATUS: Primary | ICD-10-CM

## 2024-05-23 DIAGNOSIS — G30.9: ICD-10-CM

## 2024-05-23 DIAGNOSIS — E11.65 TYPE 2 DIABETES MELLITUS WITH HYPERGLYCEMIA, WITHOUT LONG-TERM CURRENT USE OF INSULIN (HCC): Chronic | ICD-10-CM

## 2024-05-23 DIAGNOSIS — E11.9 DM (DIABETES MELLITUS) (HCC): ICD-10-CM

## 2024-05-23 DIAGNOSIS — N39.0 UTI (URINARY TRACT INFECTION): ICD-10-CM

## 2024-05-23 PROBLEM — R79.89 ELEVATED SERUM CREATININE: Status: ACTIVE | Noted: 2024-05-23

## 2024-05-23 LAB
ALBUMIN SERPL BCP-MCNC: 3.9 G/DL (ref 3.5–5)
ALP SERPL-CCNC: 62 U/L (ref 34–104)
ALT SERPL W P-5'-P-CCNC: 19 U/L (ref 7–52)
ANION GAP SERPL CALCULATED.3IONS-SCNC: 7 MMOL/L (ref 4–13)
AST SERPL W P-5'-P-CCNC: 15 U/L (ref 13–39)
ATRIAL RATE: 70 BPM
BACTERIA UR QL AUTO: ABNORMAL /HPF
BASE EX.OXY STD BLDV CALC-SCNC: 36.7 % (ref 60–80)
BASE EXCESS BLDV CALC-SCNC: 2.7 MMOL/L
BASOPHILS # BLD AUTO: 0.04 THOUSANDS/ÂΜL (ref 0–0.1)
BASOPHILS NFR BLD AUTO: 1 % (ref 0–1)
BILIRUB SERPL-MCNC: 0.35 MG/DL (ref 0.2–1)
BILIRUB UR QL STRIP: NEGATIVE
BUN SERPL-MCNC: 12 MG/DL (ref 5–25)
CALCIUM SERPL-MCNC: 9 MG/DL (ref 8.4–10.2)
CHLORIDE SERPL-SCNC: 104 MMOL/L (ref 96–108)
CLARITY UR: CLEAR
CO2 SERPL-SCNC: 26 MMOL/L (ref 21–32)
COLOR UR: YELLOW
COLOR, POC: NORMAL
CREAT SERPL-MCNC: 1.33 MG/DL (ref 0.6–1.3)
DME PARACHUTE DELIVERY DATE REQUESTED: NORMAL
DME PARACHUTE ITEM DESCRIPTION: NORMAL
DME PARACHUTE ORDER STATUS: NORMAL
DME PARACHUTE SUPPLIER NAME: NORMAL
DME PARACHUTE SUPPLIER PHONE: NORMAL
EOSINOPHIL # BLD AUTO: 0.07 THOUSAND/ÂΜL (ref 0–0.61)
EOSINOPHIL NFR BLD AUTO: 1 % (ref 0–6)
ERYTHROCYTE [DISTWIDTH] IN BLOOD BY AUTOMATED COUNT: 13.6 % (ref 11.6–15.1)
GFR SERPL CREATININE-BSD FRML MDRD: 54 ML/MIN/1.73SQ M
GLUCOSE SERPL-MCNC: 220 MG/DL (ref 65–140)
GLUCOSE SERPL-MCNC: 335 MG/DL (ref 65–140)
GLUCOSE UR STRIP-MCNC: ABNORMAL MG/DL
HCO3 BLDV-SCNC: 28.8 MMOL/L (ref 24–30)
HCT VFR BLD AUTO: 37.1 % (ref 36.5–49.3)
HGB BLD-MCNC: 12.1 G/DL (ref 12–17)
HGB UR QL STRIP.AUTO: ABNORMAL
IMM GRANULOCYTES # BLD AUTO: 0.03 THOUSAND/UL (ref 0–0.2)
IMM GRANULOCYTES NFR BLD AUTO: 1 % (ref 0–2)
KETONES UR STRIP-MCNC: ABNORMAL MG/DL
LEUKOCYTE ESTERASE UR QL STRIP: ABNORMAL
LYMPHOCYTES # BLD AUTO: 1.38 THOUSANDS/ÂΜL (ref 0.6–4.47)
LYMPHOCYTES NFR BLD AUTO: 22 % (ref 14–44)
MCH RBC QN AUTO: 29.5 PG (ref 26.8–34.3)
MCHC RBC AUTO-ENTMCNC: 32.6 G/DL (ref 31.4–37.4)
MCV RBC AUTO: 91 FL (ref 82–98)
MONOCYTES # BLD AUTO: 0.43 THOUSAND/ÂΜL (ref 0.17–1.22)
MONOCYTES NFR BLD AUTO: 7 % (ref 4–12)
MUCOUS THREADS UR QL AUTO: ABNORMAL
NEUTROPHILS # BLD AUTO: 4.23 THOUSANDS/ÂΜL (ref 1.85–7.62)
NEUTS SEG NFR BLD AUTO: 68 % (ref 43–75)
NITRITE UR QL STRIP: POSITIVE
NON-SQ EPI CELLS URNS QL MICRO: ABNORMAL /HPF
NRBC BLD AUTO-RTO: 0 /100 WBCS
O2 CT BLDV-SCNC: 6.4 ML/DL
P AXIS: 75 DEGREES
PCO2 BLDV: 50.3 MM HG (ref 42–50)
PH BLDV: 7.38 [PH] (ref 7.3–7.4)
PH UR STRIP.AUTO: 6.5 [PH] (ref 4.5–8)
PLATELET # BLD AUTO: 281 THOUSANDS/UL (ref 149–390)
PMV BLD AUTO: 10.2 FL (ref 8.9–12.7)
PO2 BLDV: 23.5 MM HG (ref 35–45)
POTASSIUM SERPL-SCNC: 4 MMOL/L (ref 3.5–5.3)
PR INTERVAL: 172 MS
PROT SERPL-MCNC: 6.7 G/DL (ref 6.4–8.4)
PROT UR STRIP-MCNC: ABNORMAL MG/DL
QRS AXIS: 9 DEGREES
QRSD INTERVAL: 148 MS
QT INTERVAL: 406 MS
QTC INTERVAL: 438 MS
RBC # BLD AUTO: 4.1 MILLION/UL (ref 3.88–5.62)
RBC #/AREA URNS AUTO: ABNORMAL /HPF
SODIUM SERPL-SCNC: 137 MMOL/L (ref 135–147)
SP GR UR STRIP.AUTO: 1.02 (ref 1–1.03)
T WAVE AXIS: 60 DEGREES
TSH SERPL DL<=0.05 MIU/L-ACNC: 1.25 UIU/ML (ref 0.45–4.5)
UROBILINOGEN UR QL STRIP.AUTO: 0.2 E.U./DL
VENTRICULAR RATE: 70 BPM
WBC # BLD AUTO: 6.18 THOUSAND/UL (ref 4.31–10.16)
WBC #/AREA URNS AUTO: ABNORMAL /HPF

## 2024-05-23 PROCEDURE — NC001 PR NO CHARGE: Performed by: HOSPITALIST

## 2024-05-23 PROCEDURE — 93010 ELECTROCARDIOGRAM REPORT: CPT | Performed by: INTERNAL MEDICINE

## 2024-05-23 PROCEDURE — 99285 EMERGENCY DEPT VISIT HI MDM: CPT

## 2024-05-23 PROCEDURE — 71045 X-RAY EXAM CHEST 1 VIEW: CPT

## 2024-05-23 PROCEDURE — 36415 COLL VENOUS BLD VENIPUNCTURE: CPT

## 2024-05-23 PROCEDURE — 82948 REAGENT STRIP/BLOOD GLUCOSE: CPT

## 2024-05-23 PROCEDURE — 99285 EMERGENCY DEPT VISIT HI MDM: CPT | Performed by: EMERGENCY MEDICINE

## 2024-05-23 PROCEDURE — 82805 BLOOD GASES W/O2 SATURATION: CPT

## 2024-05-23 PROCEDURE — 84443 ASSAY THYROID STIM HORMONE: CPT

## 2024-05-23 PROCEDURE — 87186 SC STD MICRODIL/AGAR DIL: CPT

## 2024-05-23 PROCEDURE — 85025 COMPLETE CBC W/AUTO DIFF WBC: CPT

## 2024-05-23 PROCEDURE — 93005 ELECTROCARDIOGRAM TRACING: CPT

## 2024-05-23 PROCEDURE — 70450 CT HEAD/BRAIN W/O DYE: CPT

## 2024-05-23 PROCEDURE — 87086 URINE CULTURE/COLONY COUNT: CPT

## 2024-05-23 PROCEDURE — 96365 THER/PROPH/DIAG IV INF INIT: CPT

## 2024-05-23 PROCEDURE — 80053 COMPREHEN METABOLIC PANEL: CPT

## 2024-05-23 PROCEDURE — 81001 URINALYSIS AUTO W/SCOPE: CPT

## 2024-05-23 RX ORDER — INSULIN LISPRO 100 [IU]/ML
1-6 INJECTION, SOLUTION INTRAVENOUS; SUBCUTANEOUS
Status: DISCONTINUED | OUTPATIENT
Start: 2024-05-23 | End: 2024-05-30 | Stop reason: HOSPADM

## 2024-05-23 RX ORDER — ASPIRIN 81 MG/1
81 TABLET, CHEWABLE ORAL DAILY
Status: DISCONTINUED | OUTPATIENT
Start: 2024-05-24 | End: 2024-05-30 | Stop reason: HOSPADM

## 2024-05-23 RX ORDER — LISINOPRIL 2.5 MG/1
2.5 TABLET ORAL DAILY
Status: DISCONTINUED | OUTPATIENT
Start: 2024-05-24 | End: 2024-05-30 | Stop reason: HOSPADM

## 2024-05-23 RX ORDER — PRAVASTATIN SODIUM 80 MG/1
80 TABLET ORAL
Status: DISCONTINUED | OUTPATIENT
Start: 2024-05-24 | End: 2024-05-30 | Stop reason: HOSPADM

## 2024-05-23 RX ORDER — HEPARIN SODIUM 5000 [USP'U]/ML
5000 INJECTION, SOLUTION INTRAVENOUS; SUBCUTANEOUS EVERY 8 HOURS SCHEDULED
Status: DISCONTINUED | OUTPATIENT
Start: 2024-05-23 | End: 2024-05-29

## 2024-05-23 RX ADMIN — HEPARIN SODIUM 5000 UNITS: 5000 INJECTION INTRAVENOUS; SUBCUTANEOUS at 21:47

## 2024-05-23 RX ADMIN — CEFTRIAXONE SODIUM 1000 MG: 10 INJECTION, POWDER, FOR SOLUTION INTRAVENOUS at 16:16

## 2024-05-23 RX ADMIN — INSULIN LISPRO 2 UNITS: 100 INJECTION, SOLUTION INTRAVENOUS; SUBCUTANEOUS at 21:47

## 2024-05-23 NOTE — PROGRESS NOTES
MISSY received IB ADT alert indicating patient in ED. MISSY completed chart review. As per chart patient brought in to ED for altered mental status. Pt brought in via ems after office of aging called to get him evaluated. Per ems, pt went to his PCP yesterday and the provider felt that the pt requires 24/7 care d/t worsening Alzheimer's/dementia. Office of Aging was contacted. [facility] will continue to follow patient for updates and assist as appropriate.     Addendum  [facility] received second IB ADT alert indicating patient admitted. [facility] will continue to follow patient for updates and assist as appropriate.

## 2024-05-23 NOTE — PROGRESS NOTES
Niki, the script is written but your note does not say where to send it. I will leave it with Cecilia until we are advised

## 2024-05-23 NOTE — ASSESSMENT & PLAN NOTE
Per chart review and discussion with son who is POA. Patient's disease has progressed greatly over the last year. He now is in a moderately severe stage with difficulty managing his own toileting and incontinence issues. He is well now having active hallucinations and severely disordered thought patterns. Despite this he has had very limited help in the house with only 5 hours of aide services. He requires 24/7 assistance and should not have access to his own medications as he does not understand or have the ability to keep track of pills in a calendar.    - Consulted Case management for placement, appreciate recommendations  - PT/OT

## 2024-05-23 NOTE — ASSESSMENT & PLAN NOTE
Lab Results   Component Value Date    HGBA1C 7.1 (A) 02/22/2024       Recent Labs     05/28/24  1600 05/28/24  2112 05/29/24  0633 05/29/24  1033   POCGLU 141* 277* 164* 236*       Blood Sugar Average: Last 72 hrs:  (P) [id number]  On metformin 1000 mg twice daily. Recent A1c of 2/2024 is 7.1    - SSI   - Continue metformin since no acute illness and just pending placement

## 2024-05-23 NOTE — TELEPHONE ENCOUNTER
PARESHW received a call from [facility] : Viki MCCRARY  will be faxing request form for documentation regarding concerns reported.  asked for provider's number, LSW provided office number. LSW stated that LSW provided a detailed report to protective services, LSW asked if she received all concerns reported,  confirmed.

## 2024-05-23 NOTE — ED PROVIDER NOTES
History  Chief Complaint   Patient presents with    Altered Mental Status     Pt brought in via ems after office of aging called to get him evaluated. Per ems, pt went to his PCP yesterday and the provider felt that the pt requires 24/7 care d/t worsening alzheimers/dementia. Office of aging was contacted. EMS checked a blood sugar- it was 400.      Patient 68-year-old male past medical history of dementia diabetes hypertension CAD presenting via EMS for evaluation of altered mental status.  Per EMS they report that patient was evaluated by his geriatrician yesterday and they had concerns that patient's dementia disease has progressed to the point that he can no longer care for himself as patient lives alone does not have any family in the area.   was sent to patient's home today and he was found to be confused EMS was called to bring patient to the hospital for evaluation.  Blood glucose elevated in 300s for EMS.  They state that he is confused and is not answering questions appropriately.  Upon history taking with the patient he denies any specific complaints he knows his name and where he is but is not answering any other questions appropriately.  History is limited secondary to altered mental status/dementia          Prior to Admission Medications   Prescriptions Last Dose Informant Patient Reported? Taking?   aspirin 81 MG tablet  Self Yes No   Sig: Take 81 mg by mouth daily at bedtime    lisinopril (ZESTRIL) 2.5 mg tablet   No No   Sig: TAKE ONE TABLET BY MOUTH EVERY DAY   metFORMIN (GLUCOPHAGE-XR) 500 mg 24 hr tablet   No No   Sig: Take 2 tablets (1,000 mg total) by mouth 2 (two) times a day with meals   rosuvastatin (CRESTOR) 10 MG tablet   No No   Sig: Take 1 tablet (10 mg total) by mouth daily      Facility-Administered Medications: None       Past Medical History:   Diagnosis Date    Arteriosclerosis of coronary artery 1/13/2014    Arthritis     Benign hypertension 1/18/2018    Carpal  tunnel syndrome     Coronary artery disease     Costochondritis     RESOLVED: [id number]    Diabetes mellitus (HCC)     Hematuria     HTN (hypertension)     Hx of CABG 2/7/2014    Hyperlipidemia     Kidney stone     Myocardial infarction (HCC)     Weak carotid pulse        Past Surgical History:   Procedure Laterality Date    CATARACT EXTRACTION Left     COLONOSCOPY      CORONARY ANGIOPLASTY WITH STENT PLACEMENT  06/29/2005    RCA     CORONARY ANGIOPLASTY WITH STENT PLACEMENT  07/05/2005    Cx    CORONARY ARTERY BYPASS GRAFT      LAST ASSESSED: [id number]    LITHOTRIPSY      RENAL    MOUTH SURGERY      SD COLONOSCOPY FLX DX W/COLLJ SPEC WHEN PFRMD N/A 2/22/2019    Procedure: COLONOSCOPY;  Surgeon: Thee Lam MD;  Location: [facility];  Service: Gastroenterology    SD CYSTO/URETERO W/LITHOTRIPSY &INDWELL STENT INSRT Left 3/24/2017    Procedure: CYSTOSCOPY, URETEROSCOPY, HOLMIUM LASER LITHOTRIPSY, BASKET STONE EXTRACTION, RETROGRADE PYELOGRAM, STENT INSERTION;  Surgeon: Shashank Thacker MD;  Location: [facility];  Service: Urology    SD CYSTO/URETERO W/LITHOTRIPSY &INDWELL STENT INSRT Left 7/28/2017    Procedure: CYSTOSCOPY, URETEROSCOPY WITH HOLMIUM LASER LITHOTRIPSY, AND STENT EXCHANGE;  Surgeon: Seb Rodriguez MD;  [facility];  Service: Urology    SD LITHOTRIPSY XTRCORP SHOCK WAVE Left 9/15/2017    Procedure: LITHROTRIPSY EXTRACORPORAL SHOCKWAVE (ESWL);  Surgeon: Seb Rodriguez MD;  [facility];  Service: Urology    ROTATOR CUFF REPAIR Right     SHOULDER ARTHROSCOPY Left     TOOTH EXTRACTION         Family History   Problem Relation Age of Onset    Diabetes Sister         TYPE 2    Diabetes Mother     Heart disease Mother     Diabetes Brother     Diabetes Sister         TYPE 2    Heart disease Sister     Heart disease Sister     Coronary artery disease Maternal Uncle     Diabetes Maternal Uncle      I have reviewed and agree with the history as documented.    E-Cigarette/Vaping    E-Cigarette Use Never  User      E-Cigarette/Vaping Substances    Nicotine No     THC No     CBD No     Flavoring No     Other No     Unknown No      Social History     Tobacco Use    Smoking status: Every Day     Current packs/day: 0.25     Average packs/day: 0.3 packs/day for 44.0 years (11.0 ttl pk-yrs)     Types: Cigarettes    Smokeless tobacco: Never    Tobacco comments:     up to 1ppd since age 19y/o; FORMER SMOKER AS PER ALL SCRIPTS    Vaping Use    Vaping status: Never Used   Substance Use Topics    Alcohol use: Not Currently     Comment: quit 23 years ago; SOCIAL DRINKER AS PER ALL SCRIPTS     Drug use: Never        Review of Systems   Unable to perform ROS: Dementia   All other systems reviewed and are negative.      Physical Exam  ED Triage Vitals [05/23/24 1422]   Temp Pulse Respirations Blood Pressure SpO2   -- 95 18 (!) 178/81 99 %      Temp src Heart Rate Source Patient Position - Orthostatic VS BP Location FiO2 (%)   -- Monitor Lying Right arm --      Pain Score       --             Orthostatic Vital Signs  Vitals:    05/23/24 1422   BP: (!) 178/81   Pulse: 95   Patient Position - Orthostatic VS: Lying       Physical Exam  Vitals and nursing note reviewed.   Constitutional:       General: He is not in acute distress.     Appearance: He is well-developed.   HENT:      Head: Normocephalic and atraumatic.      Mouth/Throat:      Mouth: Mucous membranes are moist.   Eyes:      Extraocular Movements: Extraocular movements intact.      Conjunctiva/sclera: Conjunctivae normal.   Cardiovascular:      Rate and Rhythm: Normal rate and regular rhythm.      Heart sounds: No murmur heard.  Pulmonary:      Effort: Pulmonary effort is normal. No respiratory distress.      Breath sounds: Normal breath sounds.   Abdominal:      Palpations: Abdomen is soft.      Tenderness: There is no abdominal tenderness.   Musculoskeletal:         General: No swelling.      Cervical back: Neck supple.   Skin:     General: Skin is warm and dry.       Capillary Refill: Capillary refill takes less than 2 seconds.   Neurological:      General: No focal deficit present.      Mental Status: He is alert.      Comments: Alert and oriented x 2 (person, place)         ED Medications  Medications   ceftriaxone (ROCEPHIN) 1 g/50 mL in dextrose IVPB (1,000 mg Intravenous New Bag 5/23/24 1616)       Diagnostic Studies  Results Reviewed       Procedure Component Value Units Date/Time    Blood gas, venous [[id number]] Collected: 05/23/24 1708    Lab Status: No result Specimen: Blood from Line, Venous Updated: 05/23/24 1708    TSH, 3rd generation with Free T4 reflex [[id number]]  (Normal) Collected: 05/23/24 1538    Lab Status: Final result Specimen: Blood from Arm, Left Updated: 05/23/24 1656     TSH 3RD GENERATON 1.254 uIU/mL     Comprehensive metabolic panel [[id number]]  (Abnormal) Collected: 05/23/24 1538    Lab Status: Final result Specimen: Blood from Arm, Left Updated: 05/23/24 1645     Sodium 137 mmol/L      Potassium 4.0 mmol/L      Chloride 104 mmol/L      CO2 26 mmol/L      ANION GAP 7 mmol/L      BUN 12 mg/dL      Creatinine 1.33 mg/dL      Glucose 335 mg/dL      Calcium 9.0 mg/dL      AST 15 U/L      ALT 19 U/L      Alkaline Phosphatase 62 U/L      Total Protein 6.7 g/dL      Albumin 3.9 g/dL      Total Bilirubin 0.35 mg/dL      eGFR 54 ml/min/1.73sq m     Narrative:      National Kidney Disease Foundation guidelines for Chronic Kidney Disease (CKD):     Stage 1 with normal or high GFR (GFR > 90 mL/min/1.73 square meters)    Stage 2 Mild CKD (GFR = 60-89 mL/min/1.73 square meters)    Stage 3A Moderate CKD (GFR = 45-59 mL/min/1.73 square meters)    Stage 3B Moderate CKD (GFR = 30-44 mL/min/1.73 square meters)    Stage 4 Severe CKD (GFR = 15-29 mL/min/1.73 square meters)    Stage 5 End Stage CKD (GFR <15 mL/min/1.73 square meters)  Note: GFR calculation is accurate only with a steady state creatinine    Urine Microscopic [[id number]]  (Abnormal) Collected: 05/23/24 0650     Lab Status: Final result Specimen: Urine, Clean Catch Updated: 05/23/24 1626     RBC, UA Innumerable /hpf      WBC, UA Innumerable /hpf      Epithelial Cells Occasional /hpf      Bacteria, UA None Seen /hpf      MUCUS THREADS Occasional    Urine culture [[id number]] Collected: 05/23/24 1555    Lab Status: In process Specimen: Urine, Clean Catch Updated: 05/23/24 1626    CBC and differential [[id number]] Collected: 05/23/24 1538    Lab Status: Final result Specimen: Blood from Arm, Left Updated: 05/23/24 1621     WBC 6.18 Thousand/uL      RBC 4.10 Million/uL      Hemoglobin 12.1 g/dL      Hematocrit 37.1 %      MCV 91 fL      MCH 29.5 pg      MCHC 32.6 g/dL      RDW 13.6 %      MPV 10.2 fL      Platelets 281 Thousands/uL      nRBC 0 /100 WBCs      Segmented % 68 %      Immature Grans % 1 %      Lymphocytes % 22 %      Monocytes % 7 %      Eosinophils Relative 1 %      Basophils Relative 1 %      Absolute Neutrophils 4.23 Thousands/µL      Absolute Immature Grans 0.03 Thousand/uL      Absolute Lymphocytes 1.38 Thousands/µL      Absolute Monocytes 0.43 Thousand/µL      Eosinophils Absolute 0.07 Thousand/µL      Basophils Absolute 0.04 Thousands/µL     POCT urinalysis dipstick [[id number]]  (Normal) Resulted: 05/23/24 1617    Lab Status: Final result Specimen: Urine Updated: 05/23/24 1617     Color, UA Dk Yellow     Clarity, UA --     EXT Leukocytes, UA --     Nitrite, UA --     Protein, UA -- mg/dl      Glucose, UA --     Ketones, UA -- mg/dl      EXT Urobilinogen, UA --      Bilirubin, UA --     Blood, UA --    Urine Macroscopic, POC [[id number]]  (Abnormal) Collected: 05/23/24 1555    Lab Status: Final result Specimen: Urine Updated: 05/23/24 1556     Color, UA Yellow     Clarity, UA Clear     pH, UA 6.5     Leukocytes, UA Trace     Nitrite, UA Positive     Protein, UA 30 (1+) mg/dl      Glucose, UA >=1000 (1%) mg/dl      Ketones, UA Trace mg/dl      Urobilinogen, UA 0.2 E.U./dl      Bilirubin, UA Negative     Occult  Blood, UA Large     Specific Gravity, UA 1.025    Narrative:      CLINITEK RESULT                   CT head without contrast   Final Result by Joaquin Forde MD (05/23 9789)      No acute intracranial abnormality.                  Workstation performed: [id number]         XR chest 1 view portable   ED Interpretation by Cordell Vargas DO (05/23 1614)   Wet read - sternotomy wires noted, no acute cardiopulmonary dz            Procedures  ECG 12 Lead Documentation Only    Date/Time: 5/23/2024 6:47 PM    Performed by: Cordell Vargas DO  Authorized by: Cordell Vargas DO    Indications / Diagnosis:  Ams  ECG reviewed by me, the ED Provider: yes    Patient location:  ED  Previous ECG:     Previous ECG:  Compared to current    Similarity:  No change  Interpretation:     Interpretation: abnormal    Rate:     ECG rate:  70    ECG rate assessment: normal    Rhythm:     Rhythm: sinus rhythm    Ectopy:     Ectopy: none    QRS:     QRS axis:  Normal    QRS intervals:  Normal  Conduction:     Conduction: abnormal      Abnormal conduction: complete RBBB    ST segments:     ST segments:  Normal  T waves:     T waves: normal          ED Course                                       Medical Decision Making  Patient is a 68-year-old male presenting for evaluation of altered mental status    Differential: Dementia versus UTI versus pneumonia versus metabolic disturbance or derangement versus intracranial pathology    Plan: Labs CT head EKG.  Likely admission    Spoke with Chuck Ennis () @ 1434 on 5/23/2024 who provided additional history given patient's altered mental status.  States that patient has had decline in his baseline mental status unable to care for self at home.  Has additional documentation along with Dr. Giraldo from geriatric telehealth medicine visit yesterday 5/22/2024.  Chart reviewed that documentation provided additional history and context of the patient's presentation today    CT head does not  appear to show any acute bleed or mass.  On UA.  Will give dose of Rocephin.  Patient also has an RAMBO, likely due to poor p.o. intake.  EKG as above.  Discussed with medicine who accepts patient for admission.  Patient hemodynamically stable at the time of admission    Amount and/or Complexity of Data Reviewed  Labs: ordered.  Radiology: ordered and independent interpretation performed.    Risk  Decision regarding hospitalization.          Disposition  Final diagnoses:   Altered mental status   Dementia (HCC)   DM (diabetes mellitus) (HCC)   UTI (urinary tract infection)   RAMBO (acute kidney injury) (HCC)     Time reflects when diagnosis was documented in both MDM as applicable and the Disposition within this note       Time User Action Codes Description Comment    5/23/2024  4:06 PM Cordell Vargas [R41.82] Altered mental status     5/23/2024  4:06 PM Cordell Vargas [F03.90] Dementia (HCC)     5/23/2024  4:06 PM Cordell Vargas [E11.9] DM (diabetes mellitus) (HCC)     5/23/2024  4:06 PM Cordell Vargas [N39.0] UTI (urinary tract infection)     5/23/2024  4:54 PM Cordell Vargas [N17.9] RAMBO (acute kidney injury) ([facility])           ED Disposition       ED Disposition   Admit    Condition   Stable    Date/Time   Thu May 23, 2024  4:06 PM    Comment   --             Follow-up Information    None         Patient's Medications   Discharge Prescriptions    No medications on file     No discharge procedures on file.    PDMP Review       None             ED Provider  Attending physically available and evaluated Juan Henley. I managed the patient along with the ED Attending.    Electronically Signed by           Cordell Vargas DO  05/24/24 4016

## 2024-05-23 NOTE — ASSESSMENT & PLAN NOTE
Creatinine presentation 1.33.  Baseline creatinine of .9-1.1    - Monitor urine output  - Avoid nephrotoxic agents

## 2024-05-23 NOTE — TELEPHONE ENCOUNTER
PARESHW received a call from Dr. Vargas ER resident at [facility]. Provider asking for background information as EMS was called by APS and patient brought to ER. LSW stated that we made a report yesterday of concerns reported during the visit with patient & friend. LSW stated that protective services will call EMS if they feel it is unsafe for patient to be in the home. LSW stated that there was reports from other  per chart review of feces all over the home, concerns of possible blood in feces, patient hallucinating, & disorganized. LSW stated that patient can not care for himself, manage medications, has very low cognitive score on testing and was deemed incapacitated as of yesterday. LSW stated that family is interested in nursing home placement, and hopefully case management could possibly send referrals from the hospital to SNF placement as provider stated he will likely be admitted. Provider to complete work up to see if there is current medical concerns. LSW stated that provider can see our note from yesterday from Dr. Giraldo & PARESHW for additional information.     LSW to remain available as needed.

## 2024-05-23 NOTE — ED ATTENDING ATTESTATION
5/23/2024  I, Rupa Riley MD, saw and evaluated the patient. I have discussed the patient with the resident/non-physician practitioner and agree with the resident's/non-physician practitioner's findings, Plan of Care, and MDM as documented in the resident's/non-physician practitioner's note, except where noted. All available labs and Radiology studies were reviewed.  I was present for key portions of any procedure(s) performed by the resident/non-physician practitioner and I was immediately available to provide assistance.       At this point I agree with the current assessment done in the Emergency Department.  I have conducted an independent evaluation of this patient a history and physical is as follows:    ED Course         Critical Care Time  Procedures    69 yo male with hx of dementia, dm, seen by geriatrician yesterday and there is concern pt can't take care of himself and he lives alone. Per ems pt is unable to answer questions. Pt with more confusion from baseline.  Pt with no complaints, bs 400.  No signs of trauma.  Vss, afebrile, lungs cta, rrr, abodmen soff nontender, no neuro deficits.  Labs, tsh, ct head, EKG, urine

## 2024-05-24 ENCOUNTER — PATIENT OUTREACH (OUTPATIENT)
Dept: INTERNAL MEDICINE CLINIC | Facility: CLINIC | Age: 69
End: 2024-05-24

## 2024-05-24 PROBLEM — R79.89 ELEVATED SERUM CREATININE: Status: RESOLVED | Noted: 2024-05-23 | Resolved: 2024-05-24

## 2024-05-24 PROBLEM — F02.82: Status: ACTIVE | Noted: 2021-10-25

## 2024-05-24 PROBLEM — G30.9: Status: ACTIVE | Noted: 2021-10-25

## 2024-05-24 LAB
ANION GAP SERPL CALCULATED.3IONS-SCNC: 8 MMOL/L (ref 4–13)
BUN SERPL-MCNC: 10 MG/DL (ref 5–25)
CALCIUM SERPL-MCNC: 9.3 MG/DL (ref 8.4–10.2)
CHLORIDE SERPL-SCNC: 103 MMOL/L (ref 96–108)
CO2 SERPL-SCNC: 29 MMOL/L (ref 21–32)
CREAT SERPL-MCNC: 0.95 MG/DL (ref 0.6–1.3)
ERYTHROCYTE [DISTWIDTH] IN BLOOD BY AUTOMATED COUNT: 13.8 % (ref 11.6–15.1)
GFR SERPL CREATININE-BSD FRML MDRD: 82 ML/MIN/1.73SQ M
GLUCOSE SERPL-MCNC: 170 MG/DL (ref 65–140)
GLUCOSE SERPL-MCNC: 187 MG/DL (ref 65–140)
GLUCOSE SERPL-MCNC: 207 MG/DL (ref 65–140)
GLUCOSE SERPL-MCNC: 270 MG/DL (ref 65–140)
GLUCOSE SERPL-MCNC: 312 MG/DL (ref 65–140)
HCT VFR BLD AUTO: 38.7 % (ref 36.5–49.3)
HGB BLD-MCNC: 12.5 G/DL (ref 12–17)
MCH RBC QN AUTO: 29.5 PG (ref 26.8–34.3)
MCHC RBC AUTO-ENTMCNC: 32.3 G/DL (ref 31.4–37.4)
MCV RBC AUTO: 91 FL (ref 82–98)
PLATELET # BLD AUTO: 274 THOUSANDS/UL (ref 149–390)
PMV BLD AUTO: 10.2 FL (ref 8.9–12.7)
POTASSIUM SERPL-SCNC: 4.2 MMOL/L (ref 3.5–5.3)
RBC # BLD AUTO: 4.24 MILLION/UL (ref 3.88–5.62)
SODIUM SERPL-SCNC: 140 MMOL/L (ref 135–147)
WBC # BLD AUTO: 6.6 THOUSAND/UL (ref 4.31–10.16)

## 2024-05-24 PROCEDURE — 97166 OT EVAL MOD COMPLEX 45 MIN: CPT

## 2024-05-24 PROCEDURE — 85027 COMPLETE CBC AUTOMATED: CPT

## 2024-05-24 PROCEDURE — 99232 SBSQ HOSP IP/OBS MODERATE 35: CPT | Performed by: HOSPITALIST

## 2024-05-24 PROCEDURE — 80048 BASIC METABOLIC PNL TOTAL CA: CPT

## 2024-05-24 PROCEDURE — 82948 REAGENT STRIP/BLOOD GLUCOSE: CPT

## 2024-05-24 PROCEDURE — 97162 PT EVAL MOD COMPLEX 30 MIN: CPT

## 2024-05-24 RX ADMIN — HEPARIN SODIUM 5000 UNITS: 5000 INJECTION INTRAVENOUS; SUBCUTANEOUS at 21:08

## 2024-05-24 RX ADMIN — HEPARIN SODIUM 5000 UNITS: 5000 INJECTION INTRAVENOUS; SUBCUTANEOUS at 05:30

## 2024-05-24 RX ADMIN — HEPARIN SODIUM 5000 UNITS: 5000 INJECTION INTRAVENOUS; SUBCUTANEOUS at 15:44

## 2024-05-24 RX ADMIN — LISINOPRIL 2.5 MG: 2.5 TABLET ORAL at 08:53

## 2024-05-24 RX ADMIN — PRAVASTATIN SODIUM 80 MG: 80 TABLET ORAL at 15:44

## 2024-05-24 RX ADMIN — INSULIN LISPRO 1 UNITS: 100 INJECTION, SOLUTION INTRAVENOUS; SUBCUTANEOUS at 07:30

## 2024-05-24 RX ADMIN — INSULIN LISPRO 2 UNITS: 100 INJECTION, SOLUTION INTRAVENOUS; SUBCUTANEOUS at 17:19

## 2024-05-24 RX ADMIN — INSULIN LISPRO 5 UNITS: 100 INJECTION, SOLUTION INTRAVENOUS; SUBCUTANEOUS at 11:08

## 2024-05-24 RX ADMIN — INSULIN LISPRO 4 UNITS: 100 INJECTION, SOLUTION INTRAVENOUS; SUBCUTANEOUS at 21:08

## 2024-05-24 RX ADMIN — ASPIRIN 81 MG CHEWABLE TABLET 81 MG: 81 TABLET CHEWABLE at 08:53

## 2024-05-24 NOTE — PROGRESS NOTES
INTERNAL MEDICINE RESIDENCY SENIOR ADMISSION NOTE     Name: Juan Henley   Age & Sex: 68 y.o. male   MRN: [id number]  Unit/Bed#: [facility] 703-01   Encounter: [id number]  Primary Care Provider: Nataliya Gamboa PA-C    Admit to team: SOD Team A    Patient seen and examined. Reviewed H&P per Dr. Mahan . Agree with the assessment and plan with any exception/addition as noted below:    Principal Problem:    Moderate late onset Alzheimer's dementia with psychotic disturbance (HCC)  Active Problems:    Coronary artery disease involving coronary bypass graft of native heart without angina pectoris    Type 2 diabetes mellitus with hyperglycemia, without long-term current use of insulin (HCC)    UTI (urinary tract infection)    Elevated serum creatinine    Juan is a 68 yr old M with PMH of Alzhiemers dementia, T2DM, CABG, H pylori gastritis who presents from geriatrics office as it was noticed he was deteriorating and with AMS. Office of aging was contacted and patient was admitted to the ED. Pleasantly confused, denies any symptoms. Patient is unable to care for himself.  Last clinic visit in Feb, noted to have cognitive impairment, and other documents showing inability to care for self.    Vitals stable. Aox1.   CV- RRR, no murmurs  Pulm- clear  Abd- soft nontender    A/P:  AMS in the setting of Alzheimers and UTI  Unclear if at baseline  -treat underlying infection  -SSI for glucose control, diabetic diet    Elevated Cr   UTI  UA with nitrites and WBC  -Empiric tx with CTX  -Follow cultures for sensitivities  -CBC    4. T2DM, A1C 7.1  -SSI    5. CAD s/p CABG  -continue ASA/statin    6.  Ambulatory dysfunction  -Case management placement    Code Status: Level 1 - Full Code  Admission Status: INPATIENT   Disposition: Patient requires Med/Surg  Expected Length of Stay: 2      Peter Avila MD  Internal Medicine Residency, PGY-2  [facility]

## 2024-05-24 NOTE — ASSESSMENT & PLAN NOTE
UA with nitrites and innumerable WBC    -Ceftriaxone  -Follow-up urine cultures  -Trend fever, WBC curve

## 2024-05-24 NOTE — PHYSICAL THERAPY NOTE
Physical Therapy Evaluation     Patient's Name: Juan Henley    Admitting Diagnosis  UTI (urinary tract infection) [N39.0]  Altered mental status [R41.82]  DM (diabetes mellitus) ([facility]) [E11.9]  Dementia ([facility]) [F03.90]  RAMBO (acute kidney injury) ([facility]) [N17.9]    Problem List  Patient Active Problem List   Diagnosis    Coronary artery disease involving coronary bypass graft of native heart without angina pectoris    Mixed hyperlipidemia    Microalbuminuria    Onychomycosis    Erectile dysfunction    Primary osteoarthritis involving multiple joints    Carpal tunnel syndrome, bilateral    Blindness of left eye    Type 2 diabetes mellitus with hyperglycemia, without long-term current use of insulin ([facility])    Moderate late onset Alzheimer's dementia with psychotic disturbance ([facility])    Iron deficiency anemia due to chronic blood loss    Cortical age-related cataract of right eye    Personal history of nicotine dependence    Abdominal aortic aneurysm (AAA) 3.0 cm to 5.5 cm in diameter in male ([facility])    Helicobacter pylori gastritis    Urinary and fecal incontinence    UTI (urinary tract infection)       Past Medical History  Past Medical History:   Diagnosis Date    Arteriosclerosis of coronary artery 1/13/2014    Arthritis     Benign hypertension 1/18/2018    Carpal tunnel syndrome     Coronary artery disease     Costochondritis     RESOLVED: [id number]    Diabetes mellitus ([facility])     Hematuria     HTN (hypertension)     Hx of CABG 2/7/2014    Hyperlipidemia     Kidney stone     Myocardial infarction ([facility])     Weak carotid pulse        Past Surgical History  Past Surgical History:   Procedure Laterality Date    CATARACT EXTRACTION Left     COLONOSCOPY      CORONARY ANGIOPLASTY WITH STENT PLACEMENT  06/29/2005    RCA     CORONARY ANGIOPLASTY WITH STENT PLACEMENT  07/05/2005    Cx    CORONARY ARTERY BYPASS GRAFT      LAST ASSESSED: [id number]    LITHOTRIPSY      RENAL    MOUTH SURGERY      NY COLONOSCOPY FLX DX W/COLLJ  SPEC WHEN PFRMD N/A 2/22/2019    Procedure: COLONOSCOPY;  Surgeon: Thee Lam MD;  Location: [facility];  Service: Gastroenterology    KS CYSTO/URETERO W/LITHOTRIPSY &INDWELL STENT INSRT Left 3/24/2017    Procedure: CYSTOSCOPY, URETEROSCOPY, HOLMIUM LASER LITHOTRIPSY, BASKET STONE EXTRACTION, RETROGRADE PYELOGRAM, STENT INSERTION;  Surgeon: Shashank Thacker MD;  Location: [facility];  Service: Urology    KS CYSTO/URETERO W/LITHOTRIPSY &INDWELL STENT INSRT Left 7/28/2017    Procedure: CYSTOSCOPY, URETEROSCOPY WITH HOLMIUM LASER LITHOTRIPSY, AND STENT EXCHANGE;  Surgeon: Seb Rodriguez MD;  [facility];  Service: Urology    KS LITHOTRIPSY XTRCORP SHOCK WAVE Left 9/15/2017    Procedure: LITHROTRIPSY EXTRACORPORAL SHOCKWAVE (ESWL);  Surgeon: Seb Rodriguez MD;  [facility];  Service: Urology    ROTATOR CUFF REPAIR Right     SHOULDER ARTHROSCOPY Left     TOOTH EXTRACTION            05/24/24 0859   PT Last Visit   PT Visit Date 05/24/24   Note Type   Note type Evaluation   Pain Assessment   Pain Assessment Tool 0-10   Pain Score No Pain   Restrictions/Precautions   Weight Bearing Precautions Per Order No   Other Precautions Cognitive;Bed Alarm;Chair Alarm   Home Living   Additional Comments pt poor historian, unable to obtain home living information   Prior Function   Comments pt poor historian, unable to obtain prior function information   General   Family/Caregiver Present No   Cognition   Overall Cognitive Status Impaired   Arousal/Participation Responsive   Orientation Level Disoriented X4   Memory Decreased long term memory;Decreased recall of biographical information;Decreased short term memory;Decreased recall of precautions;Decreased recall of recent events   Following Commands Follows one step commands with increased time or repetition   Subjective   Subjective pt pleasantly confused and cooperative throughout therapy session. pt received supine in bed   RLE Assessment   RLE Assessment WFL   LLE  Assessment   LLE Assessment WFL   Bed Mobility   Supine to Sit 5  Supervision   Additional items Impulsive;Increased time required;Verbal cues   Sit to Supine 5  Supervision   Additional items Increased time required;Impulsive;Verbal cues   Transfers   Sit to Stand 5  Supervision   Additional items Increased time required;Impulsive;Verbal cues   Stand to Sit 5  Supervision   Additional items Increased time required;Verbal cues;Impulsive   Additional Comments transfers w/o AD   Ambulation/Elevation   Gait pattern Improper Weight shift;Wide YESICA   Gait Assistance 5  Supervision   Additional items Verbal cues   Assistive Device None   Distance 200'   Stair Management Assistance Not tested   Balance   Static Sitting Fair +   Dynamic Sitting Fair +   Static Standing Fair +   Dynamic Standing Fair   Ambulatory Fair   Endurance Deficit   Endurance Deficit No   Activity Tolerance   Activity Tolerance Patient tolerated treatment well   Medical Staff Made Aware OT Ely, co-eval due to medical complexity and multiple comorbidities   Nurse Made Aware RN cleared and updated   Assessment   Prognosis Good   Problem List Impaired balance;Decreased mobility;Decreased cognition;Impaired judgement;Decreased safety awareness   Assessment Pt seen for moderate (evolving) complexity PT evaluation. Moderate eval due to Ongoing medical management for primary dx, Cog status Patient is a 68 y.o. 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 Pt presents to [facility] with increased  .  At baseline, pt resides alone in house and was independent prior to hospital admission. Currently, upon initial examination, pt  is requiring  supervision   for bed mobility skills;  supervision   for functional transfers and  supervision   for ambulation with no AD. Pt was left supine in bed at the end of PT session with all needs in reach. Pt with no questions or concerns  regarding d/c home; appears to be functioning at/ near baseline mobility levels. Pt with no further acute inpatient PT needs at this time- please re-consult if needed. PT to discharge pt at this time. Encourage pt to ambulate at least 3-4x/day with restorative/ nursing staff while remaining in hospital. The patient's AM-PAC Basic Mobility Inpatient Short Form Raw Score is 24, Standardized Score is 57.68. Based on AM-PAC scoring and patient presentation, PT currently recommending No Post Acute Rehab Needs. Please also refer to the recommendation of the Physical Therapist for safe discharge planning.   Barriers to Discharge Decreased caregiver support   Goals   Patient Goals to go home   Plan   Treatment/Interventions   (eval only, dc IPPT)   PT Frequency   (eval only, dc IPPT)   Discharge Recommendation   Rehab Resource Intensity Level, PT No post-acute rehabilitation needs   AM-PAC Basic Mobility Inpatient   Turning in Flat Bed Without Bedrails 4   Lying on Back to Sitting on Edge of Flat Bed Without Bedrails 4   Moving Bed to Chair 4   Standing Up From Chair Using Arms 4   Walk in Room 4   Climb 3-5 Stairs With Railing 4   Basic Mobility Inpatient Raw Score 24   Basic Mobility Standardized Score 57.68   [facility] Highest Level Of Mobility   -HLM Goal 8: Walk 250 feet or more   JH-HLM Achieved 7: Walk 25 feet or more   End of Consult   Patient Position at End of Consult Supine;All needs within reach;Bed/Chair alarm activated           Jarrell Velazco, PT

## 2024-05-24 NOTE — PROGRESS NOTES
CMOC received ADT alert patient is admitted to [facility] on 05/23/2024 for altered mental status.    In-patient case management has been consulted for recommendations.     Addendum:   CMOC received incoming call from Supriya today. Supriya states she is a bit upset because the doctor should have told her he was placing a call to protective services for an evaluation for Juan. She states she has a hard time getting information from the RN at the hospital. Nurse asked Supriya if pt had a dementia problem. She was a bit upset over this as she should read the notes that are in the chart.     Nurset told Supriya that patient is anxious in the hospital. Supriya will be visiting him today after her medical appointment.     Supriya states they are calling Ulises in [place] for all decisions.  Supriya feels they should be calling her as she is the care taker and is here locally.     CMOC told Supriya doctors have made the decisions on patient best interested. This is not a reflection on the family and how they were taking care of patient. Supriya feels the doctors may look down on the family for patients care.     Supriya states they prefer for patient to get 24/7 in his home rather than a nursing home.     [facility] has notified the MISSY Lim to call her to provide support.     OC provided supportive listening.     [facility] along with MISSY Lim spoke to  Nato Koch [phone]. He states in his personal opinion he does not think the patient will get 24/7 care for outpatient home care. Nato will f/u with [place] [employer] to see what the status of the grievance is and when it will take place.     [facility] brought MISSY Lim and Supriya on the line.  told Supriya in his professional opinion it is unlikely that pt will get 24/7 hour care.     MISSY Lim told Supriya to speak to inpatient case management in the hospital.     Please see MISSY Lim notes.     Next outreach is schedule for  05/31/2024    Addendum:   CMOC received incoming call from Oscar Camarillounk [phone] from the [place] [employer]. Oscar conducted an intake to get information for Mr. Henley. They stated they are not able to help with the 24/7 care hours but they can help with obtaining 51 hours of care approved.     Oscar will be calling Supriya today.     CMOC spoke to Supriya to make sure she is aware of the call.     Next outreach is schedule for 05/31/2024

## 2024-05-24 NOTE — PROGRESS NOTES
OP/CM  IN Basket received re: Pt admission.  Chart reviewed.   Case discussed with [facility] Ольга Mehta. Pt determined to need 24/7 care and not safe at home.     IN PT CM consulted to assist with NH Placement.  OP/CM Team to follow and assist as indicated.    SW received a call from Shireen PIMENTEL who had spoken to Supriya Pt's Friend.  She had been upset re not knowing about pt's Grievance with [place] [employer] as family needs to make decision re Nursing home Placement.  Shireen PIMENTEL was able to reach Juan August/  [phone] and we made a 4 way call with Supriya, Mr Koch, Shireen PIMENTEL and SW.  Mr Koch did explain that he agree pt needs 24/7  supervision and he has been trying to assist with the APPEAL/Grievance but in his experience he has not seen the Insurance approved pt in his physical condition for 24 /7 care at home.  Pt can still fed himself and ambulate.   In his experience 24/7 care is reserved for those bed bound with invasive medical needs which pt does not have.  He agrees the supervision is needed but it is not a reason for the Waiver to give 24/7.    Supriya to discuss same with pt's sons who will make the decision.    MISSY strongly encouraged her and family to discuss with IN /PT CM and if pt is desired that they pursue same while in the hospital as this will be the safer course.

## 2024-05-24 NOTE — CASE MANAGEMENT
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 (HCC)   Patient Active Problem List    Diagnosis Date Noted Date Diagnosed    UTI (urinary tract infection) 2024     Helicobacter pylori gastritis 2024     Urinary and fecal incontinence 2024     Abdominal aortic aneurysm (AAA) 3.0 cm to 5.5 cm in diameter in male (HCC) 2023     Personal history of nicotine dependence 2023     Cortical age-related cataract of right eye 2023     Iron deficiency anemia due to chronic blood loss 2023     Moderate late onset Alzheimer's dementia with psychotic disturbance (HCC) 10/25/2021     Type 2 diabetes mellitus with hyperglycemia, without long-term current use of insulin ([facility]) 2021     Blindness of left eye 2020     Carpal tunnel syndrome, bilateral      Primary osteoarthritis involving multiple joints 01/15/2019     Erectile dysfunction 2018     Coronary artery disease involving coronary bypass graft of native heart without angina pectoris 2015     Microalbuminuria 2015     Onychomycosis 2015     Mixed hyperlipidemia 2014       LOS (days): 1  Geometric Mean LOS (GMLOS) (days): 4  Days to GMLOS:3.1     OBJECTIVE:    Risk of Unplanned Readmission Score: 7.96         Current admission status: Inpatient       Preferred Pharmacy:   [id number]  [place], [place] - [address]  [address]  [place] [address]  Phone: [phone] Fax: [phone]    Primary Care Provider: Nataliya Gamboa PA-C    Primary Insurance: MEDICARE  Secondary Insurance: [place] [employer]    ASSESSMENT:  Active Health Care Proxies       [facility]  Agent - Son   Primary Phone: [phone] (Mobile)                 Advance Directives  Does patient have a Health Care POA?: Yes  Does patient have Advance Directives?: No  Primary Contact: Ulises Henley (son) [phone]              Patient Information  Admitted from:: Home  Mental Status: Confused  During Assessment patient was accompanied by: Not accompanied during assessment  Assessment information provided by:: Son  Primary Caregiver: Private caregiver  Caregiver's Relationship to Patient:: Other (Specify) (Private caregiver 6 days/week- 5 hours per week)  Support Systems: Private Caregivers, Son  County of Residence: [place]  What city do you live in?: [place]  Home entry access options. Select all that apply.: Stairs  Number of steps to enter home.:  ([address])  Do the steps have railings?: Yes  Type of Current Residence: Apartment  Floor Level: 2  Upon entering residence, is there a bedroom on the main floor (no further steps)?: Yes  Upon entering residence, is there a bathroom on the main floor (no further steps)?: Yes  Living Arrangements: Lives Alone    Activities of Daily Living Prior to Admission  Functional Status: Assistance  Completes ADLs independently?: No  Ambulates independently?: Yes  Does patient use assisted devices?: No  Does patient currently own DME?: No  Does the patient have a history of Short-Term Rehab?: No  Does patient have a history of HHC?: Yes  Does patient currently have HHC?: Yes    Current Home Health Care  Type of Current Home Care Services: Home health aide, Meals on Wheels  Current Home Health Follow-Up Provider:: PCP    Patient Information Continued  Income Source: Pension/FPC  Does patient have prescription coverage?: Yes  Does patient receive dialysis treatments?: No  Does patient have a history of substance abuse?: No  Does patient have a history of Mental Health Diagnosis?: No         Means of Transportation  Means of Transport to Appts:: Family  transport      Social Determinants of Health (SDOH)      Flowsheet Row Most Recent Value   Housing Stability    In the last 12 months, was there a time when you were not able to pay the mortgage or rent on time? N   In the past 12 months, how many times have you moved where you were living? 1   At any time in the past 12 months, were you homeless or living in a shelter (including now)? N   Transportation Needs    In the past 12 months, has lack of transportation kept you from medical appointments or from getting medications? no   In the past 12 months, has lack of transportation kept you from meetings, work, or from getting things needed for daily living? No   Food Insecurity    Within the past 12 months, you worried that your food would run out before you got the money to buy more. Never true   Within the past 12 months, the food you bought just didn't last and you didn't have money to get more. Never true   Utilities    In the past 12 months has the electric, gas, oil, or water company threatened to shut off services in your home? No            DISCHARGE DETAILS:    Discharge planning discussed with:: Vickey Montgomery  Freedom of Choice: Yes  Comments - Freedom of Choice: Discussed FOC  CM contacted family/caregiver?: Yes  Were Treatment Team discharge recommendations reviewed with patient/caregiver?: Yes  Did patient/caregiver verbalize understanding of patient care needs?: Yes  Were patient/caregiver advised of the risks associated with not following Treatment Team discharge recommendations?: Yes    Contacts  Patient Contacts: Ulises (Son/POA)  Relationship to Patient:: Family  Contact Method: Phone  Phone Number: Ulises: [phone]  Reason/Outcome: Continuity of Care, Emergency Contact, Discharge Planning    Requested Home Health Care         Is the patient interested in HHC at discharge?: No    DME Referral Provided  Referral made for DME?: No         Treatment Team Recommendation: SNF  CM reviewed d/c planning  process including the following: identifying help at home, patient preference for d/c planning needs, Discharge Lounge, [facility] Meds to Bed program, availability of treatment team to discuss questions or concerns patient and/or family may have regarding understanding medications and recognizing signs and symptoms once discharged.  CM also encouraged patient to follow up with all recommended appointments after discharge. Patient advised of importance for patient and family to participate in managing patient’s medical well being.     This CM placed TC to son Ulises- patient lives alone with HHA who comes to home 6 days/week, 5 hours per day. Patient lives [address] with [address]. Patient does not need or use assistive devices to ambulate, family drives him to appointments. Ulises (POA) will be driving from [place] today to see patient and assist with DC planning. His goal is to place patient into long-term care

## 2024-05-24 NOTE — PLAN OF CARE
Problem: PAIN - ADULT  Goal: Verbalizes/displays adequate comfort level or baseline comfort level  Description: Interventions:  - Encourage patient to monitor pain and request assistance  - Assess pain using appropriate pain scale  - Administer analgesics based on type and severity of pain and evaluate response  - Implement non-pharmacological measures as appropriate and evaluate response  - Consider cultural and social influences on pain and pain management  - Notify physician/advanced practitioner if interventions unsuccessful or patient reports new pain  Outcome: Progressing     Problem: INFECTION - ADULT  Goal: Absence or prevention of progression during hospitalization  Description: INTERVENTIONS:  - Assess and monitor for signs and symptoms of infection  - Monitor lab/diagnostic results  - Monitor all insertion sites, i.e. indwelling lines, tubes, and drains  - Monitor endotracheal if appropriate and nasal secretions for changes in amount and color  - [place] appropriate cooling/warming therapies per order  - Administer medications as ordered  - Instruct and encourage patient and family to use good hand hygiene technique  - Identify and instruct in appropriate isolation precautions for identified infection/condition  Outcome: Progressing     Problem: SAFETY ADULT  Goal: Patient will remain free of falls  Description: INTERVENTIONS:  - Educate patient/family on patient safety including physical limitations  - Instruct patient to call for assistance with activity   - Consult OT/PT to assist with strengthening/mobility   - Keep Call bell within reach  - Keep bed low and locked with side rails adjusted as appropriate  - Keep care items and personal belongings within reach  - Initiate and maintain comfort rounds  - Make Fall Risk Sign visible to staff  - Initiate/Maintain fall alarms  - Apply yellow socks and bracelet for high fall risk patients  - Consider moving patient to room near nurses station  Outcome:  Progressing  Goal: Maintain or return to baseline ADL function  Description: INTERVENTIONS:  -  Assess patient's ability to carry out ADLs; assess patient's baseline for ADL function and identify physical deficits which impact ability to perform ADLs (bathing, care of mouth/teeth, toileting, grooming, dressing, etc.)  - Assess/evaluate cause of self-care deficits   - Assess range of motion  - Assess patient's mobility; develop plan if impaired  - Assess patient's need for assistive devices and provide as appropriate  - Encourage maximum independence but intervene and supervise when necessary  - Involve family in performance of ADLs  - Assess for home care needs following discharge   - Consider OT consult to assist with ADL evaluation and planning for discharge  - Provide patient education as appropriate  Outcome: Progressing  Goal: Maintains/Returns to pre admission functional level  Description: INTERVENTIONS:  - Perform AM-PAC 6 Click Basic Mobility/ Daily Activity assessment daily.  - Set and communicate daily mobility goal to care team and patient/family/caregiver.   - Out of bed for toileting  - Record patient progress and toleration of activity level   Outcome: Progressing     Problem: DISCHARGE PLANNING  Goal: Discharge to home or other facility with appropriate resources  Description: INTERVENTIONS:  - Identify barriers to discharge w/patient and caregiver  - Arrange for needed discharge resources and transportation as appropriate  - Identify discharge learning needs (meds, wound care, etc.)  - Arrange for interpretive services to assist at discharge as needed  - Refer to Case Management Department for coordinating discharge planning if the patient needs post-hospital services based on physician/advanced practitioner order or complex needs related to functional status, cognitive ability, or social support system  Outcome: Progressing     Problem: Knowledge Deficit  Goal: Patient/family/caregiver demonstrates  understanding of disease process, treatment plan, medications, and discharge instructions  Description: Complete learning assessment and assess knowledge base.  Interventions:  - Provide teaching at level of understanding  - Provide teaching via preferred learning methods  Outcome: Progressing

## 2024-05-24 NOTE — OCCUPATIONAL THERAPY NOTE
Occupational Therapy Evaluation     Patient Name: Juan Henley  Today's Date: 5/24/2024  Problem List  Principal Problem:    Alzheimer's dementia with psychotic disturbance (HCC)  Active Problems:    Coronary artery disease involving coronary bypass graft of native heart without angina pectoris    Type 2 diabetes mellitus with hyperglycemia, without long-term current use of insulin (HCC)    Past Medical History  Past Medical History:   Diagnosis Date    Arteriosclerosis of coronary artery 1/13/2014    Arthritis     Benign hypertension 1/18/2018    Carpal tunnel syndrome     Coronary artery disease     Costochondritis     RESOLVED: [id number]    Diabetes mellitus (HCC)     Hematuria     HTN (hypertension)     Hx of CABG 2/7/2014    Hyperlipidemia     Kidney stone     Myocardial infarction (HCC)     Weak carotid pulse      Past Surgical History  Past Surgical History:   Procedure Laterality Date    CATARACT EXTRACTION Left     COLONOSCOPY      CORONARY ANGIOPLASTY WITH STENT PLACEMENT  06/29/2005    RCA     CORONARY ANGIOPLASTY WITH STENT PLACEMENT  07/05/2005    Cx    CORONARY ARTERY BYPASS GRAFT      LAST ASSESSED: [id number]    LITHOTRIPSY      RENAL    MOUTH SURGERY      KY COLONOSCOPY FLX DX W/COLLJ SPEC WHEN PFRMD N/A 2/22/2019    Procedure: COLONOSCOPY;  Surgeon: Thee Lam MD;  Location: [facility];  Service: Gastroenterology    KY CYSTO/URETERO W/LITHOTRIPSY &INDWELL STENT INSRT Left 3/24/2017    Procedure: CYSTOSCOPY, URETEROSCOPY, HOLMIUM LASER LITHOTRIPSY, BASKET STONE EXTRACTION, RETROGRADE PYELOGRAM, STENT INSERTION;  Surgeon: Shashank Thacker MD;  Location: [facility];  Service: Urology    KY CYSTO/URETERO W/LITHOTRIPSY &INDWELL STENT INSRT Left 7/28/2017    Procedure: CYSTOSCOPY, URETEROSCOPY WITH HOLMIUM LASER LITHOTRIPSY, AND STENT EXCHANGE;  Surgeon: Seb Rodriguez MD;  [facility];  Service: Urology    KY LITHOTRIPSY XTRCORP SHOCK WAVE Left 9/15/2017    Procedure: LITHROTRIPSY  EXTRACORPORAL SHOCKWAVE (ESWL);  Surgeon: Seb Rodriguez MD;  [facility];  Service: Urology    ROTATOR CUFF REPAIR Right     SHOULDER ARTHROSCOPY Left     TOOTH EXTRACTION        05/24/24 0845   OT Last Visit   OT Visit Date 05/24/24   Note Type   Note type Evaluation   Pain Assessment   Pain Assessment Tool 0-10   Pain Score No Pain   Restrictions/Precautions   Weight Bearing Precautions Per Order No   Other Precautions Cognitive;Chair Alarm;Bed Alarm;Telemetry;Fall Risk;Impulsive   Home Living   Type of Home Apartment   Home Layout Two level;Stairs to enter with rails  (pt lives in a 2nd floor apartment with 20STE (-) elevator)   Prior Function   Level of Jenkins Needs assistance with ADLs;Needs assistance with IADLS   Lives With (S)  Alone   Receives Help From Family;Personal care attendant  ([facility] 6 hours a day/5 days a week)   Falls in the last 6 months  (pt uanble to report)   Vocational Retired   Comments pt is a poor historian.   Lifestyle   Autonomy Assistance with ADL's/IaDL's   Reciprocal Relationships HHA, son (driving to [place] on Saturday to assist)   Service to Others retired   Intrinsic Gratification walking   General   Family/Caregiver Present No   ADL   Eating Assistance 5  Supervision/Setup   Grooming Assistance 5  Supervision/Setup   UB Bathing Assistance 5  Supervision/Setup   LB Bathing Assistance 5  Supervision/Setup   UB Dressing Assistance 5  Supervision/Setup   LB Dressing Assistance 5  Supervision/Setup   Toileting Assistance  5  Supervision/Setup   Bed Mobility   Supine to Sit 5  Supervision   Sit to Supine 5  Supervision   Transfers   Sit to Stand 5  Supervision   Stand to Sit 5  Supervision   Additional Comments (-)AD   Functional Mobility   Functional Mobility 5  Supervision   Additional Comments S without AD household distance functional mobility, good activity tolerance, impulsive with decreased judgement.   Balance   Static Sitting Good   Dynamic Sitting Fair +   Static  Standing Fair   Dynamic Standing Fair   Ambulatory Fair   Activity Tolerance   Activity Tolerance Patient tolerated treatment well   Medical Staff Made Aware PT Jarrell due to the patient's co-morbidities, clinically unstable presentation, and present impairments which are a regression from the patient's baseline.    Nurse Made Aware RN cleared pt for therapy   RUE Assessment   RUE Assessment WFL   LUE Assessment   LUE Assessment WFL   Vision-Basic Assessment   Current Vision Wears glasses all the time   Cognition   Overall Cognitive Status (S)  Impaired  (Baseline alzheimer's dementia with psychotic disturbance)   Arousal/Participation Alert;Responsive;Cooperative   Attention Attends with cues to redirect   Orientation Level Oriented to person;Disoriented to place;Disoriented to time;Disoriented to situation  (first name only)   Memory Decreased recall of precautions;Decreased recall of recent events;Decreased short term memory;Decreased recall of biographical information;Decreased long term memory   Following Commands Follows one step commands with increased time or repetition   Comments pt with baseline dementia, poor memory (able to recall 1/2 sons names, disoriented to last name/bday and date/place) tangential speech/disorgainzed pattern -difficulty to comprehend topic discussing. +impulsive with decreased safety/self awareness with evaluation.   Assessment   Prognosis Fair   Assessment Pt is a 68 y.o. male who was admitted to [facility] on 5/23/2024 with Pt brought in via ems after office of aging called to get him evaluated. Per ems, pt went to his PCP yesterday and the provider felt that the pt requires 24/7 care d/t worsening alzheimers/dementia. + Alzheimer's dementia with psychotic disturbance (HCC), elevated serum creatinine, UTI, diabetes . Patient   has a past medical history of Arteriosclerosis of coronary artery (1/13/2014), Arthritis, Benign hypertension (1/18/2018), Carpal tunnel  syndrome, Coronary artery disease, Costochondritis, Diabetes mellitus (HCC), Hematuria, HTN (hypertension), CABG (2/7/2014), Hyperlipidemia, Kidney stone, Myocardial infarction (HCC), and Weak carotid pulse. At baseline pt was completing assistance with ADL's/IADL's, no AD with functional mobility -family assists with driving . Pt lives alone in a 2nd floor apartment with 20 ARIADNA  . Currently pt requires  for overall ADLS and  for functional mobility/transfers. Pt currently presents with impairments in the following categories -steps to enter environment, limited home support, behavioral pattern, difficulty performing ADLS, difficulty performing IADLS , limited insight into deficits, compliance, flat affect, and decreased initiation and engagement  activity tolerance, sitting balance/tolerance, memory, insight, safety , judgement , attention , and sequencing . These impairments, as well as pt's fatigue  limit pt's ability to safely engage in all baseline areas of occupation, includingeating, grooming, bathing, dressing, toileting, functional mobility/transfers, community mobility, laundry , house maintenance, medication management, meal prep, cleaning, social participation , and leisure activities  From OT standpoint, recommend min level III with a recommendation of 24/7 Supervision upon D/C. OT will continue to follow to address the below stated goals.   Discharge Recommendation   Rehab Resource Intensity Level, OT III (Minimum Resource Intensity)  (recommending 24/7 2* significant cognitive deficits)   Equipment Recommended Bedside commode;Shower/Tub chair with back ($)   Commode Type Standard   AM-PAC Daily Activity Inpatient   Lower Body Dressing 3   Bathing 3   Toileting 3   Upper Body Dressing 3   Grooming 3   Eating 3   Daily Activity Raw Score 18   Daily Activity Standardized Score (Calc for Raw Score >=11) 38.66   AM-PAC Applied Cognition Inpatient   Following a Speech/Presentation 1   Understanding Ordinary  Conversation 3   Taking Medications 1   Remembering Where Things Are Placed or Put Away 1   Remembering List of 4-5 Errands 1   Taking Care of Complicated Tasks 1   Applied Cognition Raw Score 8   Applied Cognition Standardized Score 19.32   End of Consult   Patient Position at End of Consult Seated edge of bed;All needs within reach  (with [facility] staff present)   Nurse Communication Nurse aware of consult   Ely Holguin OTR/L

## 2024-05-24 NOTE — PROGRESS NOTES
INTERNAL MEDICINE RESIDENCY PROGRESS NOTE     Name: Juan Henley   Age & Sex: 68 y.o. male   MRN: [id number]  [facility]   Encounter: [id number]  Team: SOD Team A    PATIENT INFORMATION     Name: Juan Henley   Age & Sex: 68 y.o. male   MRN: [id number]  Hospital Stay Days: 1    ASSESSMENT/PLAN     Principal Problem:    Alzheimer's dementia with psychotic disturbance (HCC)  Active Problems:    Coronary artery disease involving coronary bypass graft of native heart without angina pectoris    Type 2 diabetes mellitus with hyperglycemia, without long-term current use of insulin (HCC)      * Alzheimer's dementia with psychotic disturbance (HCC)  Assessment & Plan  Per chart review and discussion with son who is POA. Patient's disease has progressed greatly over the last year. He now is in a moderately severe stage with difficulty managing his own toileting and incontinence issues. He is well now having active hallucinations and severely disordered thought patterns. Despite this he has had very limited help in the house with only 5 hours of aide services. He requires 24/7 assistance and should not have access to his own medications as he does not understand or have the ability to keep track of pills in a calendar.    - Consulted Case management for placement, appreciate recommendations  - PT/OT      Type 2 diabetes mellitus with hyperglycemia, without long-term current use of insulin (HCC)  Assessment & Plan  Lab Results   Component Value Date    HGBA1C 7.1 (A) 02/22/2024       Recent Labs     05/23/24  2116 05/24/24  0628 05/24/24  1042   POCGLU 220* 187* 312*       Blood Sugar Average: Last 72 hrs:  (P) [id number]  On metformin 1000 mg twice daily. Recent A1c of 2/2024 is 7.1  Presented with sugar of 335. Per patient's son does not take his meds consistently and will eat a lot.    - SSI   - Hold home metformin      Coronary artery disease involving coronary bypass graft of native heart  without angina pectoris  Assessment & Plan  History of CABG     Continue baby aspirin and statin    Elevated serum creatinine-resolved as of 2024  Assessment & Plan  Creatinine presentation 1.33.  Baseline creatinine of .9-1.1    - Monitor urine output  - Avoid nephrotoxic agents        Disposition: pending placement     SUBJECTIVE     Patient seen and examined. No acute events overnight. Denies chest pain, sob, fever, chills, nausea, vomiting, fever, chills, dysuria. Patient is Aox1 with pleasant affect    OBJECTIVE     Vitals:    24 1422 24 1844 24 2116 24 0712   BP: (!) 178/81 160/94 151/88 154/88   BP Location: Right arm      Pulse: 95 63 58 75   Resp: 18   17   Temp:  98.6 °F (37 °C) 98 °F (36.7 °C) 98.2 °F (36.8 °C)   SpO2: 99% 98% 98% 96%      Temperature:   Temp (24hrs), Av.3 °F (36.8 °C), Min:98 °F (36.7 °C), Max:98.6 °F (37 °C)    Temperature: 98.2 °F (36.8 °C)  Intake & Output:  I/O          0701   0700  0701   0700  0701   0700    IV Piggyback  50     Total Intake  50     Net  +50                  Weights:        There is no height or weight on file to calculate BMI.  Weight (last 2 days)       None          Physical Exam  Vitals and nursing note reviewed.   Constitutional:       General: He is not in acute distress.     Appearance: He is well-developed.   HENT:      Head: Normocephalic and atraumatic.   Eyes:      Conjunctiva/sclera: Conjunctivae normal.   Cardiovascular:      Rate and Rhythm: Normal rate and regular rhythm.      Heart sounds: No murmur heard.  Pulmonary:      Effort: Pulmonary effort is normal. No respiratory distress.      Breath sounds: Normal breath sounds.   Abdominal:      General: Abdomen is flat. There is no distension.      Palpations: Abdomen is soft.      Tenderness: There is no abdominal tenderness.      Comments: No suprapubic tenderness   Musculoskeletal:         General: No swelling.      Cervical back:  "Neck supple.   Skin:     General: Skin is warm and dry.      Capillary Refill: Capillary refill takes less than 2 seconds.   Neurological:      Mental Status: He is alert.   Psychiatric:         Mood and Affect: Mood normal.      Comments: Aox1. Pleasant       LABORATORY DATA     Labs: I have personally reviewed pertinent reports.  Results from last 7 days   Lab Units 05/24/24  0502 05/23/24  1538   WBC Thousand/uL 6.60 6.18   HEMOGLOBIN g/dL 12.5 12.1   HEMATOCRIT % 38.7 37.1   PLATELETS Thousands/uL 274 281   SEGS PCT %  --  68   MONO PCT %  --  7   EOS PCT %  --  1      Results from last 7 days   Lab Units 05/24/24  0502 05/23/24  1538   POTASSIUM mmol/L 4.2 4.0   CHLORIDE mmol/L 103 104   CO2 mmol/L 29 26   BUN mg/dL 10 12   CREATININE mg/dL 0.95 1.33*   CALCIUM mg/dL 9.3 9.0   ALK PHOS U/L  --  62   ALT U/L  --  19   AST U/L  --  15                            IMAGING & DIAGNOSTIC TESTING     Radiology Results: I have personally reviewed pertinent reports.  CT head without contrast    Result Date: 5/23/2024  Impression: No acute intracranial abnormality. Workstation performed: [id number]     Other Diagnostic Testing: I have personally reviewed pertinent reports.    ACTIVE MEDICATIONS     Current Facility-Administered Medications   Medication Dose Route Frequency    aspirin chewable tablet 81 mg  81 mg Oral Daily    heparin (porcine) subcutaneous injection 5,000 Units  5,000 Units Subcutaneous Q8H [facility]    insulin lispro (HumALOG/ADMELOG) 100 units/mL subcutaneous injection 1-6 Units  1-6 Units Subcutaneous 4x Daily (AC & HS)    lisinopril (ZESTRIL) tablet 2.5 mg  2.5 mg Oral Daily    pravastatin (PRAVACHOL) tablet 80 mg  80 mg Oral Daily With Dinner       VTE Pharmacologic Prophylaxis: Heparin  VTE Mechanical Prophylaxis: sequential compression device    Portions of the record may have been created with voice recognition software.  Occasional wrong word or \"sound a like\" substitutions may have occurred due to the " inherent limitations of voice recognition software.  Read the chart carefully and recognize, using context, where substitutions have occurred.  ==  Daisy Mahan DO  [facility]  Internal Medicine Residency PGY-1

## 2024-05-24 NOTE — H&P
INTERNAL MEDICINE RESIDENCY ADMISSION H&P     Name: Juan Henley   Age & Sex: 68 y.o. male   MRN: [id number]  Unit/Bed#: [facility] 703-01   Encounter: [id number]  Primary Care Provider: Nataliya Gamboa PA-C    Code Status: Level 1 - Full Code  Admission Status: INPATIENT   Disposition: Patient requires Med/Surg    Admit to team: SOD Team A    ASSESSMENT/PLAN     Principal Problem:    Moderate late onset Alzheimer's dementia with psychotic disturbance (HCC)  Active Problems:    Coronary artery disease involving coronary bypass graft of native heart without angina pectoris    Type 2 diabetes mellitus with hyperglycemia, without long-term current use of insulin (HCC)    UTI (urinary tract infection)    Elevated serum creatinine      * Moderate late onset Alzheimer's dementia with psychotic disturbance (HCC)  Assessment & Plan  Per chart review and discussion with son who is POA. Patient's disease has progressed greatly over the last year. He now is in a moderately severe stage with difficulty managing his own toileting and incontinence issues. He is well now having active hallucinations and severely disordered thought patterns. Despite this he has had very limited help in the house with only 5 hours of aide services. He requires 24/7 assistance and should not have access to his own medications as he does not understand or have the ability to keep track of pills in a calendar.    - Consulted Case management for placement, appreciate recommendations  - PT/OT      Elevated serum creatinine  Assessment & Plan  Creatinine presentation 1.33.  Baseline creatinine of .9-1.1    - Monitor urine output  - Avoid nephrotoxic agents  - Continue abx for UTI    UTI (urinary tract infection)  Assessment & Plan  UA with nitrites and innumerable WBC    -Ceftriaxone  -Follow-up urine cultures  -Trend fever, WBC curve    Type 2 diabetes mellitus with hyperglycemia, without long-term current use of insulin (HCC)  Assessment &  "Plan  Lab Results   Component Value Date    HGBA1C 7.1 (A) 02/22/2024       No results for input(s): \"POCGLU\" in the last 72 hours.    Blood Sugar Average: Last 72 hrs:    On metformin 1000 mg twice daily. Recent A1c of 2/2024 is 7.1  Presented with sugar of 335. Per patient's son does not take his meds consistently and will eat a lot.    - SSI   - Hold home metformin      Coronary artery disease involving coronary bypass graft of native heart without angina pectoris  Assessment & Plan  History of CABG 2014    Continue baby aspirin and statin        VTE Pharmacologic Prophylaxis: Heparin  VTE Mechanical Prophylaxis: sequential compression device    CHIEF COMPLAINT     Chief Complaint   Patient presents with    Altered Mental Status     Pt brought in via ems after office of aging called to get him evaluated. Per ems, pt went to his PCP yesterday and the provider felt that the pt requires 24/7 care d/t worsening alzheimers/dementia. Office of aging was contacted. EMS checked a blood sugar- it was 400.       HISTORY OF PRESENT ILLNESS     This is a 68-year-old male with past medical history of Alzheimer's, type 2 diabetes, CABG in 2014 presenting via EMS after geriatrics office was concerned on telemedicine appointment that the patient was deteriorating and not appropriate to live alone.  Patient is in a pleasant mood and is not sure why he is here.  He denies chest pain, fever, chills, dysuria, nausea, vomiting, constipation, diarrhea, shortness of breath, hematuria.  Patient is alert and oriented x 1.  Upon presentation to the ED patient was hemodynamically stable, hypertensive to systolics of 170 and on room air.  Labs remarkable for mildly elevated creatinine at 1.33 and UA with nitrites and innumerable WBCs.  Was given 1 dose of ceftriaxone in the ED.    I called the patient's son and updated him about his admission.  Patient's son who is the POA lives in [place] but will be driving back to [place] on " Saturday.  The patient's son agrees that he is not appropriate to live at home on his own and does not know how to consistently take medications and adequately take care of himself.  Case management is consulted, appreciate recommendations    REVIEW OF SYSTEMS     Review of Systems   Constitutional:  Negative for chills and fever.   HENT:  Negative for ear pain and sore throat.    Eyes:  Negative for pain and visual disturbance.   Respiratory:  Negative for cough and shortness of breath.    Cardiovascular:  Negative for chest pain and palpitations.   Gastrointestinal:  Negative for abdominal pain and vomiting.   Genitourinary:  Negative for dysuria and hematuria.   Musculoskeletal:  Negative for arthralgias and back pain.   Skin:  Negative for color change and rash.   Neurological:  Negative for seizures and syncope.   All other systems reviewed and are negative.    OBJECTIVE     Vitals:    24 1422 24 1844   BP: (!) 178/81 160/94   BP Location: Right arm    Pulse: 95 63   Resp: 18    Temp:  98.6 °F (37 °C)   SpO2: 99% 98%      Temperature:   Temp (24hrs), Av.6 °F (37 °C), Min:98.6 °F (37 °C), Max:98.6 °F (37 °C)    Temperature: 98.6 °F (37 °C)  Intake & Output:  I/O          0701   0700  07 0700    IV Piggyback  50    Total Intake  50    Net  +50                Weights:        There is no height or weight on file to calculate BMI.  Weight (last 2 days)       None          Physical Exam  Vitals and nursing note reviewed.   Constitutional:       General: He is not in acute distress.     Appearance: He is well-developed.   HENT:      Head: Normocephalic and atraumatic.   Eyes:      Conjunctiva/sclera: Conjunctivae normal.   Cardiovascular:      Rate and Rhythm: Normal rate and regular rhythm.      Heart sounds: No murmur heard.  Pulmonary:      Effort: Pulmonary effort is normal. No respiratory distress.      Breath sounds: Normal breath sounds.   Abdominal:      General: Abdomen  is flat. There is no distension.      Palpations: Abdomen is soft.      Tenderness: There is no abdominal tenderness.      Comments: No suprapubic tenderness   Musculoskeletal:         General: No swelling.      Cervical back: Neck supple.   Skin:     General: Skin is warm and dry.      Capillary Refill: Capillary refill takes less than 2 seconds.   Neurological:      Mental Status: He is alert.   Psychiatric:         Mood and Affect: Mood normal.      Comments: Aox1. Pleasant       PAST MEDICAL HISTORY     Past Medical History:   Diagnosis Date    Arteriosclerosis of coronary artery 1/13/2014    Arthritis     Benign hypertension 1/18/2018    Carpal tunnel syndrome     Coronary artery disease     Costochondritis     RESOLVED: [id number]    Diabetes mellitus (HCC)     Hematuria     HTN (hypertension)     Hx of CABG 2/7/2014    Hyperlipidemia     Kidney stone     Myocardial infarction (HCC)     Weak carotid pulse      PAST SURGICAL HISTORY     Past Surgical History:   Procedure Laterality Date    CATARACT EXTRACTION Left     COLONOSCOPY      CORONARY ANGIOPLASTY WITH STENT PLACEMENT  06/29/2005    RCA     CORONARY ANGIOPLASTY WITH STENT PLACEMENT  07/05/2005    Cx    CORONARY ARTERY BYPASS GRAFT      LAST ASSESSED: [id number]    LITHOTRIPSY      RENAL    MOUTH SURGERY      SD COLONOSCOPY FLX DX W/COLLJ SPEC WHEN PFRMD N/A 2/22/2019    Procedure: COLONOSCOPY;  Surgeon: Thee Lam MD;  Location: [facility];  Service: Gastroenterology    SD CYSTO/URETERO W/LITHOTRIPSY &INDWELL STENT INSRT Left 3/24/2017    Procedure: CYSTOSCOPY, URETEROSCOPY, HOLMIUM LASER LITHOTRIPSY, BASKET STONE EXTRACTION, RETROGRADE PYELOGRAM, STENT INSERTION;  Surgeon: Shashank Thacker MD;  Location: [facility];  Service: Urology    SD CYSTO/URETERO W/LITHOTRIPSY &INDWELL STENT INSRT Left 7/28/2017    Procedure: CYSTOSCOPY, URETEROSCOPY WITH HOLMIUM LASER LITHOTRIPSY, AND STENT EXCHANGE;  Surgeon: Seb Rodriguez MD;  Location: [facility];  Service:  Urology    ID LITHOTRIPSY XTRCORP SHOCK WAVE Left 9/15/2017    Procedure: LITHROTRIPSY EXTRACORPORAL SHOCKWAVE (ESWL);  Surgeon: Seb Rodriguez MD;  Location: [facility];  Service: Urology    ROTATOR CUFF REPAIR Right     SHOULDER ARTHROSCOPY Left     TOOTH EXTRACTION       SOCIAL & FAMILY HISTORY     Social History     Substance and Sexual Activity   Alcohol Use Not Currently    Comment: quit 23 years ago; SOCIAL DRINKER AS PER ALL SCRIPTS        Social History     Substance and Sexual Activity   Drug Use Never     Social History     Tobacco Use   Smoking Status Every Day    Current packs/day: 0.25    Average packs/day: 0.3 packs/day for 44.0 years (11.0 ttl pk-yrs)    Types: Cigarettes   Smokeless Tobacco Never   Tobacco Comments    up to 1ppd since age 17y/o; FORMER SMOKER AS PER ALL SCRIPTS      Family History   Problem Relation Age of Onset    Diabetes Sister         TYPE 2    Diabetes Mother     Heart disease Mother     Diabetes Brother     Diabetes Sister         TYPE 2    Heart disease Sister     Heart disease Sister     Coronary artery disease Maternal Uncle     Diabetes Maternal Uncle      LABORATORY DATA     Labs: I have personally reviewed pertinent reports.    Results from last 7 days   Lab Units 05/23/24  1538   WBC Thousand/uL 6.18   HEMOGLOBIN g/dL 12.1   HEMATOCRIT % 37.1   PLATELETS Thousands/uL 281   SEGS PCT % 68   MONO PCT % 7   EOS PCT % 1      Results from last 7 days   Lab Units 05/23/24  1538   POTASSIUM mmol/L 4.0   CHLORIDE mmol/L 104   CO2 mmol/L 26   BUN mg/dL 12   CREATININE mg/dL 1.33*   CALCIUM mg/dL 9.0   ALK PHOS U/L 62   ALT U/L 19   AST U/L 15                          Micro:  Lab Results   Component Value Date    URINECX <10,000 cfu/ml 10/13/2017    URINECX No Growth <1000 cfu/mL 01/19/2017    URINECX No Growth <1000 cfu/mL 08/23/2016     IMAGING & DIAGNOSTIC TESTS     Imaging: I have personally reviewed pertinent reports.    CT head without contrast    Result Date:  5/23/2024  Impression: No acute intracranial abnormality. Workstation performed: [id number]     EKG, Pathology, and Other Studies: I have personally reviewed pertinent reports.     ALLERGIES     Allergies   Allergen Reactions    Morphine GI Intolerance     vomiting     MEDICATIONS PRIOR TO ARRIVAL     Prior to Admission medications    Medication Sig Start Date End Date Taking? Authorizing Provider   aspirin 81 MG tablet Take 81 mg by mouth daily at bedtime     Historical Provider, MD   lisinopril (ZESTRIL) 2.5 mg tablet TAKE ONE TABLET BY MOUTH EVERY DAY 3/19/24   Nataliya Gamboa PA-C   rosuvastatin (CRESTOR) 10 MG tablet Take 1 tablet (10 mg total) by mouth daily 5/22/24   Jarrell Giraldo MD   metFORMIN (GLUCOPHAGE-XR) 500 mg 24 hr tablet Take 2 tablets (1,000 mg total) by mouth 2 (two) times a day with meals 4/9/24 5/23/24  Nataliya Gamboa PA-C     MEDICATIONS ADMINISTERED IN LAST 24 HOURS     Medication Administration - last 24 hours from 05/22/2024 2005 to 05/23/2024 2005         Date/Time Order Dose Route Action Action by     05/23/2024 1646 EDT ceftriaxone (ROCEPHIN) 1 g/50 mL in dextrose IVPB 0 mg Intravenous Stopped Nicolette Man RN     05/23/2024 1616 EDT ceftriaxone (ROCEPHIN) 1 g/50 mL in dextrose IVPB 1,000 mg Intravenous New Bag Nicolette Man RN     05/23/2024 1945 EDT heparin (porcine) subcutaneous injection 5,000 Units 5,000 Units Subcutaneous Not Given Isaías Ramos RN          CURRENT MEDICATIONS     Current Facility-Administered Medications   Medication Dose Route Frequency Provider Last Rate    [START ON 5/24/2024] aspirin  81 mg Oral Daily Villa Ragab, DO      [START ON 5/24/2024] cefTRIAXone  1,000 mg Intravenous Q24H Villa Ragab, DO      heparin (porcine)  5,000 Units Subcutaneous Q8H [facility] Villa Ragab, DO      insulin lispro  1-6 Units Subcutaneous 4x Daily (AC & HS) Villa Ragab, DO      [START ON 5/24/2024] lisinopril  2.5 mg Oral Daily Villa Ragab, DO      [START ON  "5/24/2024] pravastatin  80 mg Oral Daily With Dinner Daisy Mahan DO               Admission Time  I spent 45 minutes admitting the patient.  This involved direct patient contact where I performed a full history and physical, reviewing previous records, and reviewing laboratory and other diagnostic studies.    Portions of the record may have been created with voice recognition software.  Occasional wrong word or \"sound a like\" substitutions may have occurred due to the inherent limitations of voice recognition software.  Read the chart carefully and recognize, using context, where substitutions have occurred.    ==  Daisy Mahan DO  [facility]  Internal Medicine Residency PGY-1    "

## 2024-05-25 LAB
BACTERIA UR CULT: ABNORMAL
GLUCOSE SERPL-MCNC: 152 MG/DL (ref 65–140)
GLUCOSE SERPL-MCNC: 208 MG/DL (ref 65–140)
GLUCOSE SERPL-MCNC: 239 MG/DL (ref 65–140)
GLUCOSE SERPL-MCNC: 265 MG/DL (ref 65–140)

## 2024-05-25 PROCEDURE — 99232 SBSQ HOSP IP/OBS MODERATE 35: CPT | Performed by: HOSPITALIST

## 2024-05-25 PROCEDURE — 82948 REAGENT STRIP/BLOOD GLUCOSE: CPT

## 2024-05-25 RX ORDER — INSULIN GLARGINE 100 [IU]/ML
5 INJECTION, SOLUTION SUBCUTANEOUS
Status: DISCONTINUED | OUTPATIENT
Start: 2024-05-25 | End: 2024-05-28

## 2024-05-25 RX ORDER — POLYETHYLENE GLYCOL 3350 17 G/17G
17 POWDER, FOR SOLUTION ORAL DAILY
Status: DISCONTINUED | OUTPATIENT
Start: 2024-05-25 | End: 2024-05-30 | Stop reason: HOSPADM

## 2024-05-25 RX ORDER — AMOXICILLIN 250 MG
1 CAPSULE ORAL
Status: DISCONTINUED | OUTPATIENT
Start: 2024-05-25 | End: 2024-05-28

## 2024-05-25 RX ADMIN — POLYETHYLENE GLYCOL 3350 17 G: 17 POWDER, FOR SOLUTION ORAL at 08:28

## 2024-05-25 RX ADMIN — PRAVASTATIN SODIUM 80 MG: 80 TABLET ORAL at 16:41

## 2024-05-25 RX ADMIN — INSULIN LISPRO 2 UNITS: 100 INJECTION, SOLUTION INTRAVENOUS; SUBCUTANEOUS at 16:41

## 2024-05-25 RX ADMIN — ASPIRIN 81 MG CHEWABLE TABLET 81 MG: 81 TABLET CHEWABLE at 08:28

## 2024-05-25 RX ADMIN — INSULIN LISPRO 3 UNITS: 100 INJECTION, SOLUTION INTRAVENOUS; SUBCUTANEOUS at 12:00

## 2024-05-25 RX ADMIN — HEPARIN SODIUM 5000 UNITS: 5000 INJECTION INTRAVENOUS; SUBCUTANEOUS at 14:25

## 2024-05-25 RX ADMIN — HEPARIN SODIUM 5000 UNITS: 5000 INJECTION INTRAVENOUS; SUBCUTANEOUS at 21:23

## 2024-05-25 RX ADMIN — INSULIN LISPRO 3 UNITS: 100 INJECTION, SOLUTION INTRAVENOUS; SUBCUTANEOUS at 08:29

## 2024-05-25 RX ADMIN — SENNOSIDES AND DOCUSATE SODIUM 1 TABLET: 50; 8.6 TABLET ORAL at 21:24

## 2024-05-25 RX ADMIN — INSULIN LISPRO 1 UNITS: 100 INJECTION, SOLUTION INTRAVENOUS; SUBCUTANEOUS at 21:24

## 2024-05-25 RX ADMIN — INSULIN GLARGINE 5 UNITS: 100 INJECTION, SOLUTION SUBCUTANEOUS at 21:24

## 2024-05-25 RX ADMIN — HEPARIN SODIUM 5000 UNITS: 5000 INJECTION INTRAVENOUS; SUBCUTANEOUS at 05:28

## 2024-05-25 RX ADMIN — LISINOPRIL 2.5 MG: 2.5 TABLET ORAL at 08:28

## 2024-05-25 NOTE — PROGRESS NOTES
INTERNAL MEDICINE RESIDENCY PROGRESS NOTE     Name: Juan Henley   Age & Sex: 68 y.o. male   MRN: [id number]  [facility]   Encounter: [id number]  Team: SOD Team A    PATIENT INFORMATION     Name: Juan Henley   Age & Sex: 68 y.o. male   MRN: [id number]  Hospital Stay Days: 2    ASSESSMENT/PLAN     Principal Problem:    Alzheimer's dementia with psychotic disturbance (HCC)  Active Problems:    Coronary artery disease involving coronary bypass graft of native heart without angina pectoris    Type 2 diabetes mellitus with hyperglycemia, without long-term current use of insulin (HCC)      * Alzheimer's dementia with psychotic disturbance (HCC)  Assessment & Plan  Per chart review and discussion with son who is POA. Patient's disease has progressed greatly over the last year. He now is in a moderately severe stage with difficulty managing his own toileting and incontinence issues. He is well now having active hallucinations and severely disordered thought patterns. Despite this he has had very limited help in the house with only 5 hours of aide services. He requires 24/7 assistance and should not have access to his own medications as he does not understand or have the ability to keep track of pills in a calendar.    - Consulted Case management for placement, appreciate recommendations  - PT/OT      Type 2 diabetes mellitus with hyperglycemia, without long-term current use of insulin (HCC)  Assessment & Plan  Lab Results   Component Value Date    HGBA1C 7.1 (A) 02/22/2024       Recent Labs     05/23/24  2116 05/24/24  0628 05/24/24  1042   POCGLU 220* 187* 312*       Blood Sugar Average: Last 72 hrs:  (P) [id number]  On metformin 1000 mg twice daily. Recent A1c of 2/2024 is 7.1  Presented with sugar of 335. Per patient's son does not take his meds consistently and will eat a lot.    - SSI   - Hold home metformin      Coronary artery disease involving coronary bypass graft of native heart  without angina pectoris  Assessment & Plan  History of CABG     Continue baby aspirin and statin    Elevated serum creatinine-resolved as of 2024  Assessment & Plan  Creatinine presentation 1.33.  Baseline creatinine of .9-1.1    - Monitor urine output  - Avoid nephrotoxic agents        Disposition: pending long term placement     SUBJECTIVE     Patient seen and examined. No acute events overnight. Denies chest pain, sob, nausea, vomiting, constipation, diarrhea. No bowel movement documented. Will start bowel regimen.     OBJECTIVE     Vitals:    24 2116 24 0712 24 1518 24   BP: 151/88 154/88 152/84 151/72   BP Location:   Right arm Right arm   Pulse: 58 75 69    Resp:  17 17 17   Temp: 98 °F (36.7 °C) 98.2 °F (36.8 °C) (!) 97.4 °F (36.3 °C) 97.6 °F (36.4 °C)   TempSrc:   Oral Oral   SpO2: 98% 96% 98%       Temperature:   Temp (24hrs), Av.7 °F (36.5 °C), Min:97.4 °F (36.3 °C), Max:98.2 °F (36.8 °C)    Temperature: 97.6 °F (36.4 °C)  Intake & Output:  I/O          0701   0700  0701   0700  0701   0700    P.O.  399     IV Piggyback 50      Total Intake 50 399     Net +50 +399            Unmeasured Urine Occurrence  1 x           Weights:        There is no height or weight on file to calculate BMI.  Weight (last 2 days)       None          Physical Exam  Vitals and nursing note reviewed.   Constitutional:       General: He is not in acute distress.     Appearance: He is well-developed.   HENT:      Head: Normocephalic and atraumatic.   Eyes:      Conjunctiva/sclera: Conjunctivae normal.   Cardiovascular:      Rate and Rhythm: Normal rate and regular rhythm.      Heart sounds: No murmur heard.  Pulmonary:      Effort: Pulmonary effort is normal. No respiratory distress.      Breath sounds: Normal breath sounds.   Abdominal:      General: Abdomen is flat. There is no distension.      Palpations: Abdomen is soft.      Tenderness: There is no  abdominal tenderness.   Musculoskeletal:         General: No swelling.      Cervical back: Neck supple.   Skin:     General: Skin is warm and dry.      Capillary Refill: Capillary refill takes less than 2 seconds.   Neurological:      General: No focal deficit present.      Mental Status: He is alert.   Psychiatric:         Mood and Affect: Mood normal.      Comments: Aox1. Pleasant       LABORATORY DATA     Labs: I have personally reviewed pertinent reports.  Results from last 7 days   Lab Units 05/24/24  0502 05/23/24  1538   WBC Thousand/uL 6.60 6.18   HEMOGLOBIN g/dL 12.5 12.1   HEMATOCRIT % 38.7 37.1   PLATELETS Thousands/uL 274 281   SEGS PCT %  --  68   MONO PCT %  --  7   EOS PCT %  --  1      Results from last 7 days   Lab Units 05/24/24  0502 05/23/24  1538   POTASSIUM mmol/L 4.2 4.0   CHLORIDE mmol/L 103 104   CO2 mmol/L 29 26   BUN mg/dL 10 12   CREATININE mg/dL 0.95 1.33*   CALCIUM mg/dL 9.3 9.0   ALK PHOS U/L  --  62   ALT U/L  --  19   AST U/L  --  15                            IMAGING & DIAGNOSTIC TESTING     Radiology Results: I have personally reviewed pertinent reports.  XR chest 1 view portable    Result Date: 5/24/2024  Impression: No acute cardiopulmonary disease. Workstation performed: [id number]     CT head without contrast    Result Date: 5/23/2024  Impression: No acute intracranial abnormality. Workstation performed: [id number]     Other Diagnostic Testing: I have personally reviewed pertinent reports.    ACTIVE MEDICATIONS     Current Facility-Administered Medications   Medication Dose Route Frequency    aspirin chewable tablet 81 mg  81 mg Oral Daily    heparin (porcine) subcutaneous injection 5,000 Units  5,000 Units Subcutaneous Q8H [facility]    insulin lispro (HumALOG/ADMELOG) 100 units/mL subcutaneous injection 1-6 Units  1-6 Units Subcutaneous 4x Daily (AC & HS)    lisinopril (ZESTRIL) tablet 2.5 mg  2.5 mg Oral Daily    pravastatin (PRAVACHOL) tablet 80 mg  80 mg Oral Daily With Dinner  "   senna-docusate sodium (SENOKOT S) 8.6-50 mg per tablet 1 tablet  1 tablet Oral HS       VTE Pharmacologic Prophylaxis: Heparin  VTE Mechanical Prophylaxis: sequential compression device    Portions of the record may have been created with voice recognition software.  Occasional wrong word or \"sound a like\" substitutions may have occurred due to the inherent limitations of voice recognition software.  Read the chart carefully and recognize, using context, where substitutions have occurred.  ==  Daisy Mahan DO  [facility]  Internal Medicine Residency PGY-1       "

## 2024-05-25 NOTE — PLAN OF CARE
Problem: PAIN - ADULT  Goal: Verbalizes/displays adequate comfort level or baseline comfort level  Description: Interventions:  - Encourage patient to monitor pain and request assistance  - Assess pain using appropriate pain scale  - Administer analgesics based on type and severity of pain and evaluate response  - Implement non-pharmacological measures as appropriate and evaluate response  - Consider cultural and social influences on pain and pain management  - Notify physician/advanced practitioner if interventions unsuccessful or patient reports new pain  Outcome: Progressing     Problem: INFECTION - ADULT  Goal: Absence or prevention of progression during hospitalization  Description: INTERVENTIONS:  - Assess and monitor for signs and symptoms of infection  - Monitor lab/diagnostic results  - Monitor all insertion sites, i.e. indwelling lines, tubes, and drains  - Monitor endotracheal if appropriate and nasal secretions for changes in amount and color  - Morton appropriate cooling/warming therapies per order  - Administer medications as ordered  - Instruct and encourage patient and family to use good hand hygiene technique  - Identify and instruct in appropriate isolation precautions for identified infection/condition  Outcome: Progressing

## 2024-05-26 LAB
GLUCOSE SERPL-MCNC: 157 MG/DL (ref 65–140)
GLUCOSE SERPL-MCNC: 166 MG/DL (ref 65–140)
GLUCOSE SERPL-MCNC: 215 MG/DL (ref 65–140)
GLUCOSE SERPL-MCNC: 233 MG/DL (ref 65–140)

## 2024-05-26 PROCEDURE — 82948 REAGENT STRIP/BLOOD GLUCOSE: CPT

## 2024-05-26 PROCEDURE — 99232 SBSQ HOSP IP/OBS MODERATE 35: CPT | Performed by: HOSPITALIST

## 2024-05-26 RX ADMIN — HEPARIN SODIUM 5000 UNITS: 5000 INJECTION INTRAVENOUS; SUBCUTANEOUS at 21:27

## 2024-05-26 RX ADMIN — PRAVASTATIN SODIUM 80 MG: 80 TABLET ORAL at 16:38

## 2024-05-26 RX ADMIN — ASPIRIN 81 MG CHEWABLE TABLET 81 MG: 81 TABLET CHEWABLE at 07:52

## 2024-05-26 RX ADMIN — INSULIN LISPRO 1 UNITS: 100 INJECTION, SOLUTION INTRAVENOUS; SUBCUTANEOUS at 21:29

## 2024-05-26 RX ADMIN — SENNOSIDES AND DOCUSATE SODIUM 1 TABLET: 50; 8.6 TABLET ORAL at 21:27

## 2024-05-26 RX ADMIN — INSULIN LISPRO 2 UNITS: 100 INJECTION, SOLUTION INTRAVENOUS; SUBCUTANEOUS at 11:00

## 2024-05-26 RX ADMIN — INSULIN LISPRO 2 UNITS: 100 INJECTION, SOLUTION INTRAVENOUS; SUBCUTANEOUS at 16:38

## 2024-05-26 RX ADMIN — LISINOPRIL 2.5 MG: 2.5 TABLET ORAL at 07:52

## 2024-05-26 RX ADMIN — INSULIN GLARGINE 5 UNITS: 100 INJECTION, SOLUTION SUBCUTANEOUS at 21:29

## 2024-05-26 RX ADMIN — HEPARIN SODIUM 5000 UNITS: 5000 INJECTION INTRAVENOUS; SUBCUTANEOUS at 05:07

## 2024-05-26 RX ADMIN — HEPARIN SODIUM 5000 UNITS: 5000 INJECTION INTRAVENOUS; SUBCUTANEOUS at 13:16

## 2024-05-26 RX ADMIN — INSULIN LISPRO 1 UNITS: 100 INJECTION, SOLUTION INTRAVENOUS; SUBCUTANEOUS at 07:49

## 2024-05-26 NOTE — PLAN OF CARE
Problem: PAIN - ADULT  Goal: Verbalizes/displays adequate comfort level or baseline comfort level  Description: Interventions:  - Encourage patient to monitor pain and request assistance  - Assess pain using appropriate pain scale  - Administer analgesics based on type and severity of pain and evaluate response  - Implement non-pharmacological measures as appropriate and evaluate response  - Consider cultural and social influences on pain and pain management  - Notify physician/advanced practitioner if interventions unsuccessful or patient reports new pain  Outcome: Progressing     Problem: INFECTION - ADULT  Goal: Absence or prevention of progression during hospitalization  Description: INTERVENTIONS:  - Assess and monitor for signs and symptoms of infection  - Monitor lab/diagnostic results  - Monitor all insertion sites, i.e. indwelling lines, tubes, and drains  - Monitor endotracheal if appropriate and nasal secretions for changes in amount and color  - [place] appropriate cooling/warming therapies per order  - Administer medications as ordered  - Instruct and encourage patient and family to use good hand hygiene technique  - Identify and instruct in appropriate isolation precautions for identified infection/condition  Outcome: Progressing     Problem: SAFETY ADULT  Goal: Patient will remain free of falls  Description: INTERVENTIONS:  - Educate patient/family on patient safety including physical limitations  - Instruct patient to call for assistance with activity   - Consult OT/PT to assist with strengthening/mobility   - Keep Call bell within reach  - Keep bed low and locked with side rails adjusted as appropriate  - Keep care items and personal belongings within reach  - Initiate and maintain comfort rounds  - Make Fall Risk Sign visible to staff  - Initiate/Maintain fall alarms  - Apply yellow socks and bracelet for high fall risk patients  - Consider moving patient to room near nurses station  Outcome:  Progressing  Goal: Maintain or return to baseline ADL function  Description: INTERVENTIONS:  -  Assess patient's ability to carry out ADLs; assess patient's baseline for ADL function and identify physical deficits which impact ability to perform ADLs (bathing, care of mouth/teeth, toileting, grooming, dressing, etc.)  - Assess/evaluate cause of self-care deficits   - Assess range of motion  - Assess patient's mobility; develop plan if impaired  - Assess patient's need for assistive devices and provide as appropriate  - Encourage maximum independence but intervene and supervise when necessary  - Involve family in performance of ADLs  - Assess for home care needs following discharge   - Consider OT consult to assist with ADL evaluation and planning for discharge  - Provide patient education as appropriate  Outcome: Progressing  Goal: Maintains/Returns to pre admission functional level  Description: INTERVENTIONS:  - Perform AM-PAC 6 Click Basic Mobility/ Daily Activity assessment daily.  - Set and communicate daily mobility goal to care team and patient/family/caregiver.   - Out of bed for toileting  - Record patient progress and toleration of activity level   Outcome: Progressing     Problem: DISCHARGE PLANNING  Goal: Discharge to home or other facility with appropriate resources  Description: INTERVENTIONS:  - Identify barriers to discharge w/patient and caregiver  - Arrange for needed discharge resources and transportation as appropriate  - Identify discharge learning needs (meds, wound care, etc.)  - Arrange for interpretive services to assist at discharge as needed  - Refer to Case Management Department for coordinating discharge planning if the patient needs post-hospital services based on physician/advanced practitioner order or complex needs related to functional status, cognitive ability, or social support system  Outcome: Progressing     Problem: Knowledge Deficit  Goal: Patient/family/caregiver demonstrates  understanding of disease process, treatment plan, medications, and discharge instructions  Description: Complete learning assessment and assess knowledge base.  Interventions:  - Provide teaching at level of understanding  - Provide teaching via preferred learning methods  Outcome: Progressing

## 2024-05-26 NOTE — CASE MANAGEMENT
Case Management Discharge Planning Note    Patient name Juan Henley  Location [facility] 703/[facility] 703-01 MRN [id number]  : 1955 Date 2024       Current Admission Date: 2024  Current Admission Diagnosis:Alzheimer's dementia with psychotic disturbance (HCC)   Patient Active Problem List    Diagnosis Date Noted Date Diagnosed    Helicobacter pylori gastritis 2024     Urinary and fecal incontinence 2024     Abdominal aortic aneurysm (AAA) 3.0 cm to 5.5 cm in diameter in male (HCC) 2023     Personal history of nicotine dependence 2023     Cortical age-related cataract of right eye 2023     Iron deficiency anemia due to chronic blood loss 2023     Alzheimer's dementia with psychotic disturbance (HCC) 10/25/2021     Type 2 diabetes mellitus with hyperglycemia, without long-term current use of insulin ([facility]) 2021     Blindness of left eye 2020     Carpal tunnel syndrome, bilateral      Primary osteoarthritis involving multiple joints 01/15/2019     Erectile dysfunction 2018     Coronary artery disease involving coronary bypass graft of native heart without angina pectoris 2015     Microalbuminuria 2015     Onychomycosis 2015     Mixed hyperlipidemia 2014       LOS (days): 3  Geometric Mean LOS (GMLOS) (days): 4  Days to GMLOS:1.3     OBJECTIVE:  Risk of Unplanned Readmission Score: 8.39         Current admission status: Inpatient   Preferred Pharmacy:   [id number]  [place], [place] - [address]  [address]  [place] [address]  Phone: [phone] Fax: [phone]    Primary Care Provider: Nataliya Gamboa PA-C    Primary Insurance: MEDICARE  Secondary Insurance: [place] [facility]    DISCHARGE DETAILS:    Discharge planning discussed with:: Ulises gonzalez  Freedom of Choice: Yes  Comments - Freedom of Choice: Met with Ulises at bedside. Currently needs LTC bed. Ulises  needs to check iwth a friend aobut which rehabs to not send his father to. He has a friend in the area that doesn't recomend a SNF but he doesn't know which SNF it is.  CM contacted family/caregiver?: Yes  Were Treatment Team discharge recommendations reviewed with patient/caregiver?: Yes  Did patient/caregiver verbalize understanding of patient care needs?: N/A- going to facility  Were patient/caregiver advised of the risks associated with not following Treatment Team discharge recommendations?: Yes                   Other Referral/Resources/Interventions Provided:  Interventions: SNF            Discharge Destination Plan:: SNF

## 2024-05-26 NOTE — PLAN OF CARE
Problem: PAIN - ADULT  Goal: Verbalizes/displays adequate comfort level or baseline comfort level  Description: Interventions:  - Encourage patient to monitor pain and request assistance  - Assess pain using appropriate pain scale  - Administer analgesics based on type and severity of pain and evaluate response  - Implement non-pharmacological measures as appropriate and evaluate response  - Consider cultural and social influences on pain and pain management  - Notify physician/advanced practitioner if interventions unsuccessful or patient reports new pain  Outcome: Progressing     Problem: INFECTION - ADULT  Goal: Absence or prevention of progression during hospitalization  Description: INTERVENTIONS:  - Assess and monitor for signs and symptoms of infection  - Monitor lab/diagnostic results  - Monitor all insertion sites, i.e. indwelling lines, tubes, and drains  - Monitor endotracheal if appropriate and nasal secretions for changes in amount and color  - [place] appropriate cooling/warming therapies per order  - Administer medications as ordered  - Instruct and encourage patient and family to use good hand hygiene technique  - Identify and instruct in appropriate isolation precautions for identified infection/condition  Outcome: Progressing     Problem: SAFETY ADULT  Goal: Patient will remain free of falls  Description: INTERVENTIONS:  - Educate patient/family on patient safety including physical limitations  - Instruct patient to call for assistance with activity   - Consult OT/PT to assist with strengthening/mobility   - Keep Call bell within reach  - Keep bed low and locked with side rails adjusted as appropriate  - Keep care items and personal belongings within reach  - Initiate and maintain comfort rounds  - Make Fall Risk Sign visible to staff  - Initiate/Maintain fall alarms  - Apply yellow socks and bracelet for high fall risk patients  - Consider moving patient to room near nurses station  Outcome:  Progressing  Goal: Maintain or return to baseline ADL function  Description: INTERVENTIONS:  -  Assess patient's ability to carry out ADLs; assess patient's baseline for ADL function and identify physical deficits which impact ability to perform ADLs (bathing, care of mouth/teeth, toileting, grooming, dressing, etc.)  - Assess/evaluate cause of self-care deficits   - Assess range of motion  - Assess patient's mobility; develop plan if impaired  - Assess patient's need for assistive devices and provide as appropriate  - Encourage maximum independence but intervene and supervise when necessary  - Involve family in performance of ADLs  - Assess for home care needs following discharge   - Consider OT consult to assist with ADL evaluation and planning for discharge  - Provide patient education as appropriate  Outcome: Progressing  Goal: Maintains/Returns to pre admission functional level  Description: INTERVENTIONS:  - Perform AM-PAC 6 Click Basic Mobility/ Daily Activity assessment daily.  - Set and communicate daily mobility goal to care team and patient/family/caregiver.   - Out of bed for toileting  - Record patient progress and toleration of activity level   Outcome: Progressing     Problem: DISCHARGE PLANNING  Goal: Discharge to home or other facility with appropriate resources  Description: INTERVENTIONS:  - Identify barriers to discharge w/patient and caregiver  - Arrange for needed discharge resources and transportation as appropriate  - Identify discharge learning needs (meds, wound care, etc.)  - Arrange for interpretive services to assist at discharge as needed  - Refer to Case Management Department for coordinating discharge planning if the patient needs post-hospital services based on physician/advanced practitioner order or complex needs related to functional status, cognitive ability, or social support system  Outcome: Progressing     Problem: Knowledge Deficit  Goal: Patient/family/caregiver demonstrates  understanding of disease process, treatment plan, medications, and discharge instructions  Description: Complete learning assessment and assess knowledge base.  Interventions:  - Provide teaching at level of understanding  - Provide teaching via preferred learning methods  Outcome: Progressing

## 2024-05-26 NOTE — PROGRESS NOTES
INTERNAL MEDICINE RESIDENCY PROGRESS NOTE     Name: Juan Henley   Age & Sex: 68 y.o. male   MRN: [id number]  [facility]   Encounter: [id number]  Team: SOD Team A    PATIENT INFORMATION     Name: Juan Henley   Age & Sex: 68 y.o. male   MRN: [id number]  Hospital Stay Days: 3    ASSESSMENT/PLAN     Principal Problem:    Alzheimer's dementia with psychotic disturbance (HCC)  Active Problems:    Coronary artery disease involving coronary bypass graft of native heart without angina pectoris    Type 2 diabetes mellitus with hyperglycemia, without long-term current use of insulin (HCC)      Type 2 diabetes mellitus with hyperglycemia, without long-term current use of insulin (HCC)  Assessment & Plan  Lab Results   Component Value Date    HGBA1C 7.1 (A) 02/22/2024       Recent Labs     05/23/24  2116 05/24/24  0628 05/24/24  1042   POCGLU 220* 187* 312*       Blood Sugar Average: Last 72 hrs:  (P) [id number]  On metformin 1000 mg twice daily. Recent A1c of 2/2024 is 7.1  Presented with sugar of 335. Per patient's son does not take his meds consistently and will eat a lot.    - SSI   - Hold home metformin      Coronary artery disease involving coronary bypass graft of native heart without angina pectoris  Assessment & Plan  History of CABG 2014    Continue baby aspirin and statin    * Alzheimer's dementia with psychotic disturbance (HCC)  Assessment & Plan  Per chart review and discussion with son who is POA. Patient's disease has progressed greatly over the last year. He now is in a moderately severe stage with difficulty managing his own toileting and incontinence issues. He is well now having active hallucinations and severely disordered thought patterns. Despite this he has had very limited help in the house with only 5 hours of aide services. He requires 24/7 assistance and should not have access to his own medications as he does not understand or have the ability to keep track of  pills in a calendar.    - Consulted Case management for placement, appreciate recommendations  - PT/OT      Elevated serum creatinine-resolved as of 2024  Assessment & Plan  Creatinine presentation 1.33.  Baseline creatinine of .9-1.1    - Monitor urine output  - Avoid nephrotoxic agents        Disposition: pending rehab placement     SUBJECTIVE     Patient seen and examined. No acute events overnight. Patient mentions feeling well without any acute complaints. Patient pleasantly confused. Eating and drinking well.    OBJECTIVE     Vitals:    24 1519 24 2118 24 2200 24 0736   BP: 128/67 137/70  141/70   Pulse: 64 68  72   Resp:  16     Temp: 98.7 °F (37.1 °C) 98.3 °F (36.8 °C)  98.6 °F (37 °C)   TempSrc:  Oral     SpO2: 96% 94% 97% 98%      Temperature:   Temp (24hrs), Av.5 °F (36.9 °C), Min:98.3 °F (36.8 °C), Max:98.7 °F (37.1 °C)    Temperature: 98.6 °F (37 °C)  Intake & Output:  I/O          0701   0700  0701   0700  0701   0700    P.O. 399 440     IV Piggyback       Total Intake 399 440     Net +399 +440            Unmeasured Urine Occurrence 1 x 1 x           Weights:        There is no height or weight on file to calculate BMI.  Weight (last 2 days)       None          Physical Exam  Constitutional:       General: He is not in acute distress.     Appearance: Normal appearance. He is not diaphoretic.   HENT:      Head: Normocephalic and atraumatic.      Mouth/Throat:      Mouth: Mucous membranes are moist.   Eyes:      Pupils: Pupils are equal, round, and reactive to light.   Cardiovascular:      Rate and Rhythm: Normal rate and regular rhythm.      Pulses: Normal pulses.      Heart sounds: Normal heart sounds.   Pulmonary:      Effort: Pulmonary effort is normal.      Breath sounds: Normal breath sounds.   Abdominal:      General: Bowel sounds are normal.      Palpations: Abdomen is soft.   Musculoskeletal:      Right lower leg: No edema.      Left  lower leg: No edema.   Skin:     General: Skin is warm.      Capillary Refill: Capillary refill takes less than 2 seconds.      Coloration: Skin is not jaundiced or pale.      Findings: No rash.   Neurological:      General: No focal deficit present.      Mental Status: He is alert. He is disoriented.   Psychiatric:         Mood and Affect: Mood normal.         Behavior: Behavior normal.       LABORATORY DATA     Labs: I have personally reviewed pertinent reports.  Results from last 7 days   Lab Units 05/24/24  0502 05/23/24  1538   WBC Thousand/uL 6.60 6.18   HEMOGLOBIN g/dL 12.5 12.1   HEMATOCRIT % 38.7 37.1   PLATELETS Thousands/uL 274 281   SEGS PCT %  --  68   MONO PCT %  --  7   EOS PCT %  --  1      Results from last 7 days   Lab Units 05/24/24  0502 05/23/24  1538   POTASSIUM mmol/L 4.2 4.0   CHLORIDE mmol/L 103 104   CO2 mmol/L 29 26   BUN mg/dL 10 12   CREATININE mg/dL 0.95 1.33*   CALCIUM mg/dL 9.3 9.0   ALK PHOS U/L  --  62   ALT U/L  --  19   AST U/L  --  15                            IMAGING & DIAGNOSTIC TESTING     Radiology Results: I have personally reviewed pertinent reports.  XR chest 1 view portable    Result Date: 5/24/2024  Impression: No acute cardiopulmonary disease. Workstation performed: [id number]     CT head without contrast    Result Date: 5/23/2024  Impression: No acute intracranial abnormality. Workstation performed: [id number]     Other Diagnostic Testing: I have personally reviewed pertinent reports.    ACTIVE MEDICATIONS     Current Facility-Administered Medications   Medication Dose Route Frequency    aspirin chewable tablet 81 mg  81 mg Oral Daily    heparin (porcine) subcutaneous injection 5,000 Units  5,000 Units Subcutaneous Q8H PIO    insulin glargine (LANTUS) subcutaneous injection 5 Units 0.05 mL  5 Units Subcutaneous HS    insulin lispro (HumALOG/ADMELOG) 100 units/mL subcutaneous injection 1-6 Units  1-6 Units Subcutaneous 4x Daily (AC & HS)    lisinopril (ZESTRIL)  "tablet 2.5 mg  2.5 mg Oral Daily    polyethylene glycol (MIRALAX) packet 17 g  17 g Oral Daily    pravastatin (PRAVACHOL) tablet 80 mg  80 mg Oral Daily With Dinner    senna-docusate sodium (SENOKOT S) 8.6-50 mg per tablet 1 tablet  1 tablet Oral HS       VTE Pharmacologic Prophylaxis: Heparin  VTE Mechanical Prophylaxis: sequential compression device    Portions of the record may have been created with voice recognition software.  Occasional wrong word or \"sound a like\" substitutions may have occurred due to the inherent limitations of voice recognition software.  Read the chart carefully and recognize, using context, where substitutions have occurred.  ==  Mitesh Maldonado MD  [facility]  Internal Medicine Residency PGY-2       "

## 2024-05-27 LAB
GLUCOSE SERPL-MCNC: 166 MG/DL (ref 65–140)
GLUCOSE SERPL-MCNC: 179 MG/DL (ref 65–140)
GLUCOSE SERPL-MCNC: 194 MG/DL (ref 65–140)
GLUCOSE SERPL-MCNC: 219 MG/DL (ref 65–140)

## 2024-05-27 PROCEDURE — 82948 REAGENT STRIP/BLOOD GLUCOSE: CPT

## 2024-05-27 RX ADMIN — LISINOPRIL 2.5 MG: 2.5 TABLET ORAL at 08:10

## 2024-05-27 RX ADMIN — INSULIN LISPRO 1 UNITS: 100 INJECTION, SOLUTION INTRAVENOUS; SUBCUTANEOUS at 07:33

## 2024-05-27 RX ADMIN — INSULIN LISPRO 1 UNITS: 100 INJECTION, SOLUTION INTRAVENOUS; SUBCUTANEOUS at 22:55

## 2024-05-27 RX ADMIN — POLYETHYLENE GLYCOL 3350 17 G: 17 POWDER, FOR SOLUTION ORAL at 08:10

## 2024-05-27 RX ADMIN — ASPIRIN 81 MG CHEWABLE TABLET 81 MG: 81 TABLET CHEWABLE at 08:10

## 2024-05-27 RX ADMIN — INSULIN GLARGINE 5 UNITS: 100 INJECTION, SOLUTION SUBCUTANEOUS at 22:54

## 2024-05-27 RX ADMIN — SENNOSIDES AND DOCUSATE SODIUM 1 TABLET: 50; 8.6 TABLET ORAL at 22:54

## 2024-05-27 RX ADMIN — INSULIN LISPRO 2 UNITS: 100 INJECTION, SOLUTION INTRAVENOUS; SUBCUTANEOUS at 16:30

## 2024-05-27 RX ADMIN — HEPARIN SODIUM 5000 UNITS: 5000 INJECTION INTRAVENOUS; SUBCUTANEOUS at 13:47

## 2024-05-27 RX ADMIN — PRAVASTATIN SODIUM 80 MG: 80 TABLET ORAL at 16:30

## 2024-05-27 RX ADMIN — HEPARIN SODIUM 5000 UNITS: 5000 INJECTION INTRAVENOUS; SUBCUTANEOUS at 06:22

## 2024-05-27 RX ADMIN — HEPARIN SODIUM 5000 UNITS: 5000 INJECTION INTRAVENOUS; SUBCUTANEOUS at 22:54

## 2024-05-27 RX ADMIN — INSULIN LISPRO 2 UNITS: 100 INJECTION, SOLUTION INTRAVENOUS; SUBCUTANEOUS at 11:11

## 2024-05-27 NOTE — PROGRESS NOTES
INTERNAL MEDICINE RESIDENCY PROGRESS NOTE     Name: Juan Henley   Age & Sex: 68 y.o. male   MRN: [id number]  Unit/Bed#: [facility] 703-01   Encounter: [id number]  Team: SOD Team A    PATIENT INFORMATION     Name: Juan Henley   Age & Sex: 68 y.o. male   MRN: [id number]  Hospital Stay Days: 4    ASSESSMENT/PLAN     Principal Problem:    Alzheimer's dementia with psychotic disturbance (HCC)  Active Problems:    Coronary artery disease involving coronary bypass graft of native heart without angina pectoris    Type 2 diabetes mellitus with hyperglycemia, without long-term current use of insulin (HCC)      * Alzheimer's dementia with psychotic disturbance (HCC)  Assessment & Plan  Per chart review and discussion with son who is POA. Patient's disease has progressed greatly over the last year. He now is in a moderately severe stage with difficulty managing his own toileting and incontinence issues. He is well now having active hallucinations and severely disordered thought patterns. Despite this he has had very limited help in the house with only 5 hours of aide services. He requires 24/7 assistance and should not have access to his own medications as he does not understand or have the ability to keep track of pills in a calendar.    - Consulted Case management for placement, appreciate recommendations  - PT/OT      Type 2 diabetes mellitus with hyperglycemia, without long-term current use of insulin (HCC)  Assessment & Plan  Lab Results   Component Value Date    HGBA1C 7.1 (A) 02/22/2024       Recent Labs     05/23/24  2116 05/24/24  0628 05/24/24  1042   POCGLU 220* 187* 312*       Blood Sugar Average: Last 72 hrs:  (P) [id number]  On metformin 1000 mg twice daily. Recent A1c of 2/2024 is 7.1  Presented with sugar of 335. Per patient's son does not take his meds consistently and will eat a lot.    - SSI   -Started 5 units of Lantus nightly on 5/25  - Hold home metformin      Coronary artery disease  involving coronary bypass graft of native heart without angina pectoris  Assessment & Plan  History of CABG     Continue baby aspirin and statin    Elevated serum creatinine-resolved as of 2024  Assessment & Plan  Creatinine presentation 1.33.  Baseline creatinine of .9-1.1    - Monitor urine output  - Avoid nephrotoxic agents        Disposition: Pending long-term placement    SUBJECTIVE     Patient seen and examined. No acute events overnight.  Denies chest pain, fever, chills, nausea, vomiting, dysuria, constipation, diarrhea.  Alert and oriented x 1 pleasant but would like to go home.    OBJECTIVE     Vitals:    24 0736 24 1517 24 2057 24 0717   BP: 141/70 136/69 141/83 147/87   Pulse: 72 66 61 75   Resp:  16     Temp: 98.6 °F (37 °C) 98.3 °F (36.8 °C) 97.5 °F (36.4 °C) 98.4 °F (36.9 °C)   TempSrc:       SpO2: 98% 97% 99% 95%      Temperature:   Temp (24hrs), Av.1 °F (36.7 °C), Min:97.5 °F (36.4 °C), Max:98.4 °F (36.9 °C)    Temperature: 98.4 °F (36.9 °C)  Intake & Output:  I/O          0701   0700  0701   0700  0701   0700    P.O. 440      Total Intake 440      Net +440             Unmeasured Urine Occurrence 1 x            Weights:        There is no height or weight on file to calculate BMI.  Weight (last 2 days)       None          Physical Exam  Vitals and nursing note reviewed.   Constitutional:       General: He is not in acute distress.     Appearance: He is well-developed.   HENT:      Head: Normocephalic and atraumatic.   Eyes:      Conjunctiva/sclera: Conjunctivae normal.   Cardiovascular:      Rate and Rhythm: Normal rate and regular rhythm.      Heart sounds: No murmur heard.  Pulmonary:      Effort: Pulmonary effort is normal. No respiratory distress.      Breath sounds: Normal breath sounds.   Abdominal:      General: Abdomen is flat. There is no distension.      Palpations: Abdomen is soft.      Tenderness: There is no abdominal  tenderness.   Musculoskeletal:         General: No swelling.      Cervical back: Neck supple.   Skin:     General: Skin is warm and dry.      Capillary Refill: Capillary refill takes less than 2 seconds.   Neurological:      General: No focal deficit present.      Mental Status: He is alert.   Psychiatric:         Mood and Affect: Mood normal.      Comments: Aox1. Pleasant       LABORATORY DATA     Labs: I have personally reviewed pertinent reports.  Results from last 7 days   Lab Units 05/24/24  0502 05/23/24  1538   WBC Thousand/uL 6.60 6.18   HEMOGLOBIN g/dL 12.5 12.1   HEMATOCRIT % 38.7 37.1   PLATELETS Thousands/uL 274 281   SEGS PCT %  --  68   MONO PCT %  --  7   EOS PCT %  --  1      Results from last 7 days   Lab Units 05/24/24  0502 05/23/24  1538   POTASSIUM mmol/L 4.2 4.0   CHLORIDE mmol/L 103 104   CO2 mmol/L 29 26   BUN mg/dL 10 12   CREATININE mg/dL 0.95 1.33*   CALCIUM mg/dL 9.3 9.0   ALK PHOS U/L  --  62   ALT U/L  --  19   AST U/L  --  15                            IMAGING & DIAGNOSTIC TESTING     Radiology Results: I have personally reviewed pertinent reports.  XR chest 1 view portable    Result Date: 5/24/2024  Impression: No acute cardiopulmonary disease. Workstation performed: [id number]     CT head without contrast    Result Date: 5/23/2024  Impression: No acute intracranial abnormality. Workstation performed: [id number]     Other Diagnostic Testing: I have personally reviewed pertinent reports.    ACTIVE MEDICATIONS     Current Facility-Administered Medications   Medication Dose Route Frequency    aspirin chewable tablet 81 mg  81 mg Oral Daily    heparin (porcine) subcutaneous injection 5,000 Units  5,000 Units Subcutaneous Q8H PIO    insulin glargine (LANTUS) subcutaneous injection 5 Units 0.05 mL  5 Units Subcutaneous HS    insulin lispro (HumALOG/ADMELOG) 100 units/mL subcutaneous injection 1-6 Units  1-6 Units Subcutaneous 4x Daily (AC & HS)    lisinopril (ZESTRIL) tablet 2.5 mg  2.5  "mg Oral Daily    polyethylene glycol (MIRALAX) packet 17 g  17 g Oral Daily    pravastatin (PRAVACHOL) tablet 80 mg  80 mg Oral Daily With Dinner    senna-docusate sodium (SENOKOT S) 8.6-50 mg per tablet 1 tablet  1 tablet Oral HS       VTE Pharmacologic Prophylaxis: Heparin  VTE Mechanical Prophylaxis: sequential compression device    Portions of the record may have been created with voice recognition software.  Occasional wrong word or \"sound a like\" substitutions may have occurred due to the inherent limitations of voice recognition software.  Read the chart carefully and recognize, using context, where substitutions have occurred.  ==  Daisy Mahan DO  [facility]  Internal Medicine Residency PGY-1       "

## 2024-05-27 NOTE — PLAN OF CARE
Problem: PAIN - ADULT  Goal: Verbalizes/displays adequate comfort level or baseline comfort level  Description: Interventions:  - Encourage patient to monitor pain and request assistance  - Assess pain using appropriate pain scale  - Administer analgesics based on type and severity of pain and evaluate response  - Implement non-pharmacological measures as appropriate and evaluate response  - Consider cultural and social influences on pain and pain management  - Notify physician/advanced practitioner if interventions unsuccessful or patient reports new pain  Outcome: Progressing     Problem: SAFETY ADULT  Goal: Patient will remain free of falls  Description: INTERVENTIONS:  - Educate patient/family on patient safety including physical limitations  - Instruct patient to call for assistance with activity   - Consult OT/PT to assist with strengthening/mobility   - Keep Call bell within reach  - Keep bed low and locked with side rails adjusted as appropriate  - Keep care items and personal belongings within reach  - Initiate and maintain comfort rounds  - Make Fall Risk Sign visible to staff  - Initiate/Maintain fall alarms  - Apply yellow socks and bracelet for high fall risk patients  - Consider moving patient to room near nurses station  Outcome: Progressing  Goal: Maintain or return to baseline ADL function  Description: INTERVENTIONS:  -  Assess patient's ability to carry out ADLs; assess patient's baseline for ADL function and identify physical deficits which impact ability to perform ADLs (bathing, care of mouth/teeth, toileting, grooming, dressing, etc.)  - Assess/evaluate cause of self-care deficits   - Assess range of motion  - Assess patient's mobility; develop plan if impaired  - Assess patient's need for assistive devices and provide as appropriate  - Encourage maximum independence but intervene and supervise when necessary  - Involve family in performance of ADLs  - Assess for home care needs following  discharge   - Consider OT consult to assist with ADL evaluation and planning for discharge  - Provide patient education as appropriate  Outcome: Progressing  Goal: Maintains/Returns to pre admission functional level  Description: INTERVENTIONS:  - Perform AM-PAC 6 Click Basic Mobility/ Daily Activity assessment daily.  - Set and communicate daily mobility goal to care team and patient/family/caregiver.   - Out of bed for toileting  - Record patient progress and toleration of activity level   Outcome: Progressing

## 2024-05-27 NOTE — PLAN OF CARE
Problem: SAFETY ADULT  Goal: Patient will remain free of falls  Description: INTERVENTIONS:  - Educate patient/family on patient safety including physical limitations  - Instruct patient to call for assistance with activity   - Consult OT/PT to assist with strengthening/mobility   - Keep Call bell within reach  - Keep bed low and locked with side rails adjusted as appropriate  - Keep care items and personal belongings within reach  - Initiate and maintain comfort rounds  - Make Fall Risk Sign visible to staff  - Initiate/Maintain fall alarms  - Apply yellow socks and bracelet for high fall risk patients  - Consider moving patient to room near nurses station  Outcome: Progressing     Problem: PAIN - ADULT  Goal: Verbalizes/displays adequate comfort level or baseline comfort level  Description: Interventions:  - Encourage patient to monitor pain and request assistance  - Assess pain using appropriate pain scale  - Administer analgesics based on type and severity of pain and evaluate response  - Implement non-pharmacological measures as appropriate and evaluate response  - Consider cultural and social influences on pain and pain management  - Notify physician/advanced practitioner if interventions unsuccessful or patient reports new pain  Outcome: Progressing     Problem: NEUROSENSORY - ADULT  Goal: Achieves maximal functionality and self care  Description: INTERVENTIONS  - Monitor swallowing and airway patency with patient fatigue and changes in neurological status  - Encourage and assist patient to increase activity and self care.   - Encourage visually impaired, hearing impaired and aphasic patients to use assistive/communication devices  Outcome: Progressing     Problem: METABOLIC, FLUID AND ELECTROLYTES - ADULT  Goal: Glucose maintained within target range  Description: INTERVENTIONS:  - Monitor Blood Glucose as ordered  - Assess for signs and symptoms of hyperglycemia and hypoglycemia  - Administer ordered  medications to maintain glucose within target range  - Assess nutritional intake and initiate nutrition service referral as needed  Outcome: Progressing     Problem: MUSCULOSKELETAL - ADULT  Goal: Maintain or return mobility to safest level of function  Description: INTERVENTIONS:  - Assess patient's ability to carry out ADLs; assess patient's baseline for ADL function and identify physical deficits which impact ability to perform ADLs (bathing, care of mouth/teeth, toileting, grooming, dressing, etc.)  - Assess/evaluate cause of self-care deficits   - Assess range of motion  - Assess patient's mobility  - Assess patient's need for assistive devices and provide as appropriate  - Encourage maximum independence but intervene and supervise when necessary  - Involve family in performance of ADLs  - Assess for home care needs following discharge   - Consider OT consult to assist with ADL evaluation and planning for discharge  - Provide patient education as appropriate  Outcome: Progressing     Problem: DISCHARGE PLANNING  Goal: Discharge to home or other facility with appropriate resources  Description: INTERVENTIONS:  - Identify barriers to discharge w/patient and caregiver  - Arrange for needed discharge resources and transportation as appropriate  - Identify discharge learning needs (meds, wound care, etc.)  - Arrange for interpretive services to assist at discharge as needed  - Refer to Case Management Department for coordinating discharge planning if the patient needs post-hospital services based on physician/advanced practitioner order or complex needs related to functional status, cognitive ability, or social support system  Outcome: Progressing

## 2024-05-27 NOTE — PLAN OF CARE
Problem: PAIN - ADULT  Goal: Verbalizes/displays adequate comfort level or baseline comfort level  Description: Interventions:  - Encourage patient to monitor pain and request assistance  - Assess pain using appropriate pain scale  - Administer analgesics based on type and severity of pain and evaluate response  - Implement non-pharmacological measures as appropriate and evaluate response  - Consider cultural and social influences on pain and pain management  - Notify physician/advanced practitioner if interventions unsuccessful or patient reports new pain  Outcome: Progressing     Problem: INFECTION - ADULT  Goal: Absence or prevention of progression during hospitalization  Description: INTERVENTIONS:  - Assess and monitor for signs and symptoms of infection  - Monitor lab/diagnostic results  - Monitor all insertion sites, i.e. indwelling lines, tubes, and drains  - Monitor endotracheal if appropriate and nasal secretions for changes in amount and color  - [place] appropriate cooling/warming therapies per order  - Administer medications as ordered  - Instruct and encourage patient and family to use good hand hygiene technique  - Identify and instruct in appropriate isolation precautions for identified infection/condition  Outcome: Progressing     Problem: SAFETY ADULT  Goal: Patient will remain free of falls  Description: INTERVENTIONS:  - Educate patient/family on patient safety including physical limitations  - Instruct patient to call for assistance with activity   - Consult OT/PT to assist with strengthening/mobility   - Keep Call bell within reach  - Keep bed low and locked with side rails adjusted as appropriate  - Keep care items and personal belongings within reach  - Initiate and maintain comfort rounds  - Make Fall Risk Sign visible to staff  - Initiate/Maintain fall alarms  - Apply yellow socks and bracelet for high fall risk patients  - Consider moving patient to room near nurses station  Outcome:  Progressing  Goal: Maintain or return to baseline ADL function  Description: INTERVENTIONS:  -  Assess patient's ability to carry out ADLs; assess patient's baseline for ADL function and identify physical deficits which impact ability to perform ADLs (bathing, care of mouth/teeth, toileting, grooming, dressing, etc.)  - Assess/evaluate cause of self-care deficits   - Assess range of motion  - Assess patient's mobility; develop plan if impaired  - Assess patient's need for assistive devices and provide as appropriate  - Encourage maximum independence but intervene and supervise when necessary  - Involve family in performance of ADLs  - Assess for home care needs following discharge   - Consider OT consult to assist with ADL evaluation and planning for discharge  - Provide patient education as appropriate  Outcome: Progressing  Goal: Maintains/Returns to pre admission functional level  Description: INTERVENTIONS:  - Perform AM-PAC 6 Click Basic Mobility/ Daily Activity assessment daily.  - Set and communicate daily mobility goal to care team and patient/family/caregiver.   - Out of bed for toileting  - Record patient progress and toleration of activity level   Outcome: Progressing     Problem: DISCHARGE PLANNING  Goal: Discharge to home or other facility with appropriate resources  Description: INTERVENTIONS:  - Identify barriers to discharge w/patient and caregiver  - Arrange for needed discharge resources and transportation as appropriate  - Identify discharge learning needs (meds, wound care, etc.)  - Arrange for interpretive services to assist at discharge as needed  - Refer to Case Management Department for coordinating discharge planning if the patient needs post-hospital services based on physician/advanced practitioner order or complex needs related to functional status, cognitive ability, or social support system  Outcome: Progressing     Problem: Knowledge Deficit  Goal: Patient/family/caregiver demonstrates  understanding of disease process, treatment plan, medications, and discharge instructions  Description: Complete learning assessment and assess knowledge base.  Interventions:  - Provide teaching at level of understanding  - Provide teaching via preferred learning methods  Outcome: Progressing

## 2024-05-27 NOTE — RESTORATIVE TECHNICIAN NOTE
Restorative Technician Note      Patient Name: Juan Henley     Note Type: Mobility  Patient Position Upon Consult: Supine  Activity Performed: Ambulated; Dangled; Stood  Assistive Device: Other (Comment) (none)  Education Provided: Yes  Patient Position at End of Consult: Supine; All needs within reach; Bed/Chair alarm activated    Mi OGDEN, Restorative Technician,

## 2024-05-28 LAB
GLUCOSE SERPL-MCNC: 134 MG/DL (ref 65–140)
GLUCOSE SERPL-MCNC: 141 MG/DL (ref 65–140)
GLUCOSE SERPL-MCNC: 211 MG/DL (ref 65–140)
GLUCOSE SERPL-MCNC: 277 MG/DL (ref 65–140)

## 2024-05-28 PROCEDURE — 82948 REAGENT STRIP/BLOOD GLUCOSE: CPT

## 2024-05-28 RX ORDER — AMOXICILLIN 250 MG
2 CAPSULE ORAL
Status: DISCONTINUED | OUTPATIENT
Start: 2024-05-28 | End: 2024-05-30 | Stop reason: HOSPADM

## 2024-05-28 RX ORDER — METFORMIN HYDROCHLORIDE 500 MG/1
1000 TABLET, EXTENDED RELEASE ORAL
Status: DISCONTINUED | OUTPATIENT
Start: 2024-05-28 | End: 2024-05-29

## 2024-05-28 RX ADMIN — HEPARIN SODIUM 5000 UNITS: 5000 INJECTION INTRAVENOUS; SUBCUTANEOUS at 15:01

## 2024-05-28 RX ADMIN — LISINOPRIL 2.5 MG: 2.5 TABLET ORAL at 08:15

## 2024-05-28 RX ADMIN — PRAVASTATIN SODIUM 80 MG: 80 TABLET ORAL at 16:39

## 2024-05-28 RX ADMIN — ASPIRIN 81 MG CHEWABLE TABLET 81 MG: 81 TABLET CHEWABLE at 08:15

## 2024-05-28 RX ADMIN — INSULIN LISPRO 2 UNITS: 100 INJECTION, SOLUTION INTRAVENOUS; SUBCUTANEOUS at 11:17

## 2024-05-28 RX ADMIN — POLYETHYLENE GLYCOL 3350 17 G: 17 POWDER, FOR SOLUTION ORAL at 08:15

## 2024-05-28 RX ADMIN — METFORMIN HYDROCHLORIDE 1000 MG: 500 TABLET, EXTENDED RELEASE ORAL at 16:39

## 2024-05-28 RX ADMIN — INSULIN LISPRO 3 UNITS: 100 INJECTION, SOLUTION INTRAVENOUS; SUBCUTANEOUS at 21:23

## 2024-05-28 RX ADMIN — HEPARIN SODIUM 5000 UNITS: 5000 INJECTION INTRAVENOUS; SUBCUTANEOUS at 06:33

## 2024-05-28 NOTE — PLAN OF CARE
Problem: PAIN - ADULT  Goal: Verbalizes/displays adequate comfort level or baseline comfort level  Description: Interventions:  - Encourage patient to monitor pain and request assistance  - Assess pain using appropriate pain scale  - Administer analgesics based on type and severity of pain and evaluate response  - Implement non-pharmacological measures as appropriate and evaluate response  - Consider cultural and social influences on pain and pain management  - Notify physician/advanced practitioner if interventions unsuccessful or patient reports new pain  Outcome: Progressing     Problem: INFECTION - ADULT  Goal: Absence or prevention of progression during hospitalization  Description: INTERVENTIONS:  - Assess and monitor for signs and symptoms of infection  - Monitor lab/diagnostic results  - Monitor all insertion sites, i.e. indwelling lines, tubes, and drains  - Monitor endotracheal if appropriate and nasal secretions for changes in amount and color  - [place] appropriate cooling/warming therapies per order  - Administer medications as ordered  - Instruct and encourage patient and family to use good hand hygiene technique  - Identify and instruct in appropriate isolation precautions for identified infection/condition  Outcome: Progressing     Problem: SAFETY ADULT  Goal: Patient will remain free of falls  Description: INTERVENTIONS:  - Educate patient/family on patient safety including physical limitations  - Instruct patient to call for assistance with activity   - Consult OT/PT to assist with strengthening/mobility   - Keep Call bell within reach  - Keep bed low and locked with side rails adjusted as appropriate  - Keep care items and personal belongings within reach  - Initiate and maintain comfort rounds  - Make Fall Risk Sign visible to staff  - Initiate/Maintain fall alarms  - Apply yellow socks and bracelet for high fall risk patients  - Consider moving patient to room near nurses station  Outcome:  Progressing  Goal: Maintain or return to baseline ADL function  Description: INTERVENTIONS:  -  Assess patient's ability to carry out ADLs; assess patient's baseline for ADL function and identify physical deficits which impact ability to perform ADLs (bathing, care of mouth/teeth, toileting, grooming, dressing, etc.)  - Assess/evaluate cause of self-care deficits   - Assess range of motion  - Assess patient's mobility; develop plan if impaired  - Assess patient's need for assistive devices and provide as appropriate  - Encourage maximum independence but intervene and supervise when necessary  - Involve family in performance of ADLs  - Assess for home care needs following discharge   - Consider OT consult to assist with ADL evaluation and planning for discharge  - Provide patient education as appropriate  Outcome: Progressing  Goal: Maintains/Returns to pre admission functional level  Description: INTERVENTIONS:  - Perform AM-PAC 6 Click Basic Mobility/ Daily Activity assessment daily.  - Set and communicate daily mobility goal to care team and patient/family/caregiver.   - Out of bed for toileting  - Record patient progress and toleration of activity level   Outcome: Progressing     Problem: DISCHARGE PLANNING  Goal: Discharge to home or other facility with appropriate resources  Description: INTERVENTIONS:  - Identify barriers to discharge w/patient and caregiver  - Arrange for needed discharge resources and transportation as appropriate  - Identify discharge learning needs (meds, wound care, etc.)  - Arrange for interpretive services to assist at discharge as needed  - Refer to Case Management Department for coordinating discharge planning if the patient needs post-hospital services based on physician/advanced practitioner order or complex needs related to functional status, cognitive ability, or social support system  Outcome: Progressing     Problem: Knowledge Deficit  Goal: Patient/family/caregiver demonstrates  understanding of disease process, treatment plan, medications, and discharge instructions  Description: Complete learning assessment and assess knowledge base.  Interventions:  - Provide teaching at level of understanding  - Provide teaching via preferred learning methods  Outcome: Progressing     Problem: METABOLIC, FLUID AND ELECTROLYTES - ADULT  Goal: Electrolytes maintained within normal limits  Description: INTERVENTIONS:  - Monitor labs and assess patient for signs and symptoms of electrolyte imbalances  - Administer electrolyte replacement as ordered  - Monitor response to electrolyte replacements, including repeat lab results as appropriate  - Instruct patient on fluid and nutrition as appropriate  Outcome: Progressing  Goal: Glucose maintained within target range  Description: INTERVENTIONS:  - Monitor Blood Glucose as ordered  - Assess for signs and symptoms of hyperglycemia and hypoglycemia  - Administer ordered medications to maintain glucose within target range  - Assess nutritional intake and initiate nutrition service referral as needed  Outcome: Progressing

## 2024-05-28 NOTE — PROGRESS NOTES
INTERNAL MEDICINE RESIDENCY PROGRESS NOTE     Name: Juan Henley   Age & Sex: 68 y.o. male   MRN: [id number]  Unit/Bed#: [facility] 703-01   Encounter: [id number]  Team: SOD Team A    PATIENT INFORMATION     Name: Juan Henley   Age & Sex: 68 y.o. male   MRN: [id number]  Hospital Stay Days: 5    ASSESSMENT/PLAN     Principal Problem:    Alzheimer's dementia with psychotic disturbance (HCC)  Active Problems:    Coronary artery disease involving coronary bypass graft of native heart without angina pectoris    Type 2 diabetes mellitus with hyperglycemia, without long-term current use of insulin (HCC)      * Alzheimer's dementia with psychotic disturbance (HCC)  Assessment & Plan  Per chart review and discussion with son who is POA. Patient's disease has progressed greatly over the last year. He now is in a moderately severe stage with difficulty managing his own toileting and incontinence issues. He is well now having active hallucinations and severely disordered thought patterns. Despite this he has had very limited help in the house with only 5 hours of aide services. He requires 24/7 assistance and should not have access to his own medications as he does not understand or have the ability to keep track of pills in a calendar.    - Consulted Case management for placement, appreciate recommendations  - PT/OT      Type 2 diabetes mellitus with hyperglycemia, without long-term current use of insulin (HCC)  Assessment & Plan  Lab Results   Component Value Date    HGBA1C 7.1 (A) 02/22/2024       Recent Labs     05/23/24  2116 05/24/24  0628 05/24/24  1042   POCGLU 220* 187* 312*       Blood Sugar Average: Last 72 hrs:  (P) [id number]  On metformin 1000 mg twice daily. Recent A1c of 2/2024 is 7.1  Presented with sugar of 335. Per patient's son does not take his meds consistently and will eat a lot.    - SSI   -Started 5 units of Lantus nightly on 5/25  - Hold home metformin      Coronary artery disease  involving coronary bypass graft of native heart without angina pectoris  Assessment & Plan  History of CABG     Continue baby aspirin and statin    Elevated serum creatinine-resolved as of 2024  Assessment & Plan  Creatinine presentation 1.33.  Baseline creatinine of .9-1.1    - Monitor urine output  - Avoid nephrotoxic agents        Disposition: pending placement     SUBJECTIVE     Patient seen and examined. No acute events overnight. Denies dysuria, chest pain, sob, fever, chills, constipation, diarrhea. Continues to be Aox1 pleasant.    OBJECTIVE     Vitals:    24 1454 24 2057 24 2253 24 0719   BP: 138/82 137/83 134/80 131/79   Pulse: 70 62 69 72   Resp:       Temp: 97.8 °F (36.6 °C) 98.5 °F (36.9 °C) 98.2 °F (36.8 °C) 98.1 °F (36.7 °C)   TempSrc:       SpO2: 97% 97% 99% 97%      Temperature:   Temp (24hrs), Av.2 °F (36.8 °C), Min:97.8 °F (36.6 °C), Max:98.5 °F (36.9 °C)    Temperature: 98.1 °F (36.7 °C)  Intake & Output:  I/O          0701   0700  0701   0700  0701   0700    P.O.  240     Total Intake  240     Net  +240            Unmeasured Urine Occurrence  1 x           Weights:        There is no height or weight on file to calculate BMI.  Weight (last 2 days)       None          Physical Exam  Vitals and nursing note reviewed.   Constitutional:       General: He is not in acute distress.     Appearance: He is well-developed. He is not ill-appearing.   HENT:      Head: Normocephalic and atraumatic.   Eyes:      Conjunctiva/sclera: Conjunctivae normal.   Cardiovascular:      Rate and Rhythm: Normal rate and regular rhythm.      Heart sounds: No murmur heard.  Pulmonary:      Effort: Pulmonary effort is normal. No respiratory distress.      Breath sounds: Normal breath sounds.   Abdominal:      General: Abdomen is flat. There is no distension.      Palpations: Abdomen is soft.      Tenderness: There is no abdominal tenderness.    Musculoskeletal:         General: No swelling.      Cervical back: Neck supple.   Skin:     General: Skin is warm and dry.      Capillary Refill: Capillary refill takes less than 2 seconds.   Neurological:      General: No focal deficit present.      Mental Status: He is alert.   Psychiatric:         Mood and Affect: Mood normal.      Comments: Aox1. Pleasant       LABORATORY DATA     Labs: I have personally reviewed pertinent reports.  Results from last 7 days   Lab Units 05/24/24  0502 05/23/24  1538   WBC Thousand/uL 6.60 6.18   HEMOGLOBIN g/dL 12.5 12.1   HEMATOCRIT % 38.7 37.1   PLATELETS Thousands/uL 274 281   SEGS PCT %  --  68   MONO PCT %  --  7   EOS PCT %  --  1      Results from last 7 days   Lab Units 05/24/24  0502 05/23/24  1538   POTASSIUM mmol/L 4.2 4.0   CHLORIDE mmol/L 103 104   CO2 mmol/L 29 26   BUN mg/dL 10 12   CREATININE mg/dL 0.95 1.33*   CALCIUM mg/dL 9.3 9.0   ALK PHOS U/L  --  62   ALT U/L  --  19   AST U/L  --  15                            IMAGING & DIAGNOSTIC TESTING     Radiology Results: I have personally reviewed pertinent reports.  XR chest 1 view portable    Result Date: 5/24/2024  Impression: No acute cardiopulmonary disease. Workstation performed: [id number]     CT head without contrast    Result Date: 5/23/2024  Impression: No acute intracranial abnormality. Workstation performed: [id number]     Other Diagnostic Testing: I have personally reviewed pertinent reports.    ACTIVE MEDICATIONS     Current Facility-Administered Medications   Medication Dose Route Frequency    aspirin chewable tablet 81 mg  81 mg Oral Daily    heparin (porcine) subcutaneous injection 5,000 Units  5,000 Units Subcutaneous Q8H PIO    insulin glargine (LANTUS) subcutaneous injection 5 Units 0.05 mL  5 Units Subcutaneous HS    insulin lispro (HumALOG/ADMELOG) 100 units/mL subcutaneous injection 1-6 Units  1-6 Units Subcutaneous 4x Daily (AC & HS)    lisinopril (ZESTRIL) tablet 2.5 mg  2.5 mg Oral Daily  "   polyethylene glycol (MIRALAX) packet 17 g  17 g Oral Daily    pravastatin (PRAVACHOL) tablet 80 mg  80 mg Oral Daily With Dinner    senna-docusate sodium (SENOKOT S) 8.6-50 mg per tablet 2 tablet  2 tablet Oral HS       VTE Pharmacologic Prophylaxis: Heparin  VTE Mechanical Prophylaxis: sequential compression device    Portions of the record may have been created with voice recognition software.  Occasional wrong word or \"sound a like\" substitutions may have occurred due to the inherent limitations of voice recognition software.  Read the chart carefully and recognize, using context, where substitutions have occurred.  ==  Daisy Mahan DO  [facility]  Internal Medicine Residency PGY-1       "

## 2024-05-29 ENCOUNTER — PATIENT OUTREACH (OUTPATIENT)
Dept: INTERNAL MEDICINE CLINIC | Facility: CLINIC | Age: 69
End: 2024-05-29

## 2024-05-29 LAB
DME PARACHUTE DELIVERY DATE ACTUAL: NORMAL
DME PARACHUTE DELIVERY DATE REQUESTED: NORMAL
DME PARACHUTE ITEM DESCRIPTION: NORMAL
DME PARACHUTE ORDER STATUS: NORMAL
DME PARACHUTE SUPPLIER NAME: NORMAL
DME PARACHUTE SUPPLIER PHONE: NORMAL
GLUCOSE SERPL-MCNC: 157 MG/DL (ref 65–140)
GLUCOSE SERPL-MCNC: 164 MG/DL (ref 65–140)
GLUCOSE SERPL-MCNC: 236 MG/DL (ref 65–140)
GLUCOSE SERPL-MCNC: 242 MG/DL (ref 65–140)

## 2024-05-29 PROCEDURE — 82948 REAGENT STRIP/BLOOD GLUCOSE: CPT

## 2024-05-29 RX ORDER — METFORMIN HYDROCHLORIDE 500 MG/1
1000 TABLET, EXTENDED RELEASE ORAL 2 TIMES DAILY WITH MEALS
Status: DISCONTINUED | OUTPATIENT
Start: 2024-05-29 | End: 2024-05-30 | Stop reason: HOSPADM

## 2024-05-29 RX ORDER — ENOXAPARIN SODIUM 100 MG/ML
40 INJECTION SUBCUTANEOUS
Status: DISCONTINUED | OUTPATIENT
Start: 2024-05-29 | End: 2024-05-30 | Stop reason: HOSPADM

## 2024-05-29 RX ADMIN — METFORMIN HYDROCHLORIDE 1000 MG: 500 TABLET, EXTENDED RELEASE ORAL at 17:42

## 2024-05-29 RX ADMIN — INSULIN LISPRO 3 UNITS: 100 INJECTION, SOLUTION INTRAVENOUS; SUBCUTANEOUS at 21:41

## 2024-05-29 RX ADMIN — INSULIN LISPRO 3 UNITS: 100 INJECTION, SOLUTION INTRAVENOUS; SUBCUTANEOUS at 12:39

## 2024-05-29 RX ADMIN — INSULIN LISPRO 1 UNITS: 100 INJECTION, SOLUTION INTRAVENOUS; SUBCUTANEOUS at 07:51

## 2024-05-29 RX ADMIN — INSULIN LISPRO 1 UNITS: 100 INJECTION, SOLUTION INTRAVENOUS; SUBCUTANEOUS at 17:42

## 2024-05-29 RX ADMIN — PRAVASTATIN SODIUM 80 MG: 80 TABLET ORAL at 17:42

## 2024-05-29 RX ADMIN — ASPIRIN 81 MG CHEWABLE TABLET 81 MG: 81 TABLET CHEWABLE at 07:52

## 2024-05-29 RX ADMIN — ENOXAPARIN SODIUM 40 MG: 40 INJECTION SUBCUTANEOUS at 12:39

## 2024-05-29 NOTE — PLAN OF CARE
Problem: PAIN - ADULT  Goal: Verbalizes/displays adequate comfort level or baseline comfort level  Description: Interventions:  - Encourage patient to monitor pain and request assistance  - Assess pain using appropriate pain scale  - Administer analgesics based on type and severity of pain and evaluate response  - Implement non-pharmacological measures as appropriate and evaluate response  - Consider cultural and social influences on pain and pain management  - Notify physician/advanced practitioner if interventions unsuccessful or patient reports new pain  Outcome: Progressing     Problem: INFECTION - ADULT  Goal: Absence or prevention of progression during hospitalization  Description: INTERVENTIONS:  - Assess and monitor for signs and symptoms of infection  - Monitor lab/diagnostic results  - Monitor all insertion sites, i.e. indwelling lines, tubes, and drains  - Monitor endotracheal if appropriate and nasal secretions for changes in amount and color  - [place] appropriate cooling/warming therapies per order  - Administer medications as ordered  - Instruct and encourage patient and family to use good hand hygiene technique  - Identify and instruct in appropriate isolation precautions for identified infection/condition  Outcome: Progressing     Problem: SAFETY ADULT  Goal: Patient will remain free of falls  Description: INTERVENTIONS:  - Educate patient/family on patient safety including physical limitations  - Instruct patient to call for assistance with activity   - Consult OT/PT to assist with strengthening/mobility   - Keep Call bell within reach  - Keep bed low and locked with side rails adjusted as appropriate  - Keep care items and personal belongings within reach  - Initiate and maintain comfort rounds  - Make Fall Risk Sign visible to staff  - Initiate/Maintain fall alarms  - Apply yellow socks and bracelet for high fall risk patients  - Consider moving patient to room near nurses station  Outcome:  Progressing  Goal: Maintain or return to baseline ADL function  Description: INTERVENTIONS:  -  Assess patient's ability to carry out ADLs; assess patient's baseline for ADL function and identify physical deficits which impact ability to perform ADLs (bathing, care of mouth/teeth, toileting, grooming, dressing, etc.)  - Assess/evaluate cause of self-care deficits   - Assess range of motion  - Assess patient's mobility; develop plan if impaired  - Assess patient's need for assistive devices and provide as appropriate  - Encourage maximum independence but intervene and supervise when necessary  - Involve family in performance of ADLs  - Assess for home care needs following discharge   - Consider OT consult to assist with ADL evaluation and planning for discharge  - Provide patient education as appropriate  Outcome: Progressing  Goal: Maintains/Returns to pre admission functional level  Description: INTERVENTIONS:  - Perform AM-PAC 6 Click Basic Mobility/ Daily Activity assessment daily.  - Set and communicate daily mobility goal to care team and patient/family/caregiver.   - Out of bed for toileting  - Record patient progress and toleration of activity level   Outcome: Progressing     Problem: DISCHARGE PLANNING  Goal: Discharge to home or other facility with appropriate resources  Description: INTERVENTIONS:  - Identify barriers to discharge w/patient and caregiver  - Arrange for needed discharge resources and transportation as appropriate  - Identify discharge learning needs (meds, wound care, etc.)  - Arrange for interpretive services to assist at discharge as needed  - Refer to Case Management Department for coordinating discharge planning if the patient needs post-hospital services based on physician/advanced practitioner order or complex needs related to functional status, cognitive ability, or social support system  Outcome: Progressing     Problem: Knowledge Deficit  Goal: Patient/family/caregiver demonstrates  understanding of disease process, treatment plan, medications, and discharge instructions  Description: Complete learning assessment and assess knowledge base.  Interventions:  - Provide teaching at level of understanding  - Provide teaching via preferred learning methods  Outcome: Progressing     Problem: NEUROSENSORY - ADULT  Goal: Achieves maximal functionality and self care  Description: INTERVENTIONS  - Monitor swallowing and airway patency with patient fatigue and changes in neurological status  - Encourage and assist patient to increase activity and self care.   - Encourage visually impaired, hearing impaired and aphasic patients to use assistive/communication devices  Outcome: Progressing     Problem: METABOLIC, FLUID AND ELECTROLYTES - ADULT  Goal: Electrolytes maintained within normal limits  Description: INTERVENTIONS:  - Monitor labs and assess patient for signs and symptoms of electrolyte imbalances  - Administer electrolyte replacement as ordered  - Monitor response to electrolyte replacements, including repeat lab results as appropriate  - Instruct patient on fluid and nutrition as appropriate  Outcome: Progressing  Goal: Glucose maintained within target range  Description: INTERVENTIONS:  - Monitor Blood Glucose as ordered  - Assess for signs and symptoms of hyperglycemia and hypoglycemia  - Administer ordered medications to maintain glucose within target range  - Assess nutritional intake and initiate nutrition service referral as needed  Outcome: Progressing

## 2024-05-29 NOTE — PLAN OF CARE
Problem: PAIN - ADULT  Goal: Verbalizes/displays adequate comfort level or baseline comfort level  Description: Interventions:  - Encourage patient to monitor pain and request assistance  - Assess pain using appropriate pain scale  - Administer analgesics based on type and severity of pain and evaluate response  - Implement non-pharmacological measures as appropriate and evaluate response  - Consider cultural and social influences on pain and pain management  - Notify physician/advanced practitioner if interventions unsuccessful or patient reports new pain  Outcome: Progressing     Problem: INFECTION - ADULT  Goal: Absence or prevention of progression during hospitalization  Description: INTERVENTIONS:  - Assess and monitor for signs and symptoms of infection  - Monitor lab/diagnostic results  - Monitor all insertion sites, i.e. indwelling lines, tubes, and drains  - Monitor endotracheal if appropriate and nasal secretions for changes in amount and color  - [place] appropriate cooling/warming therapies per order  - Administer medications as ordered  - Instruct and encourage patient and family to use good hand hygiene technique  - Identify and instruct in appropriate isolation precautions for identified infection/condition  Outcome: Progressing     Problem: DISCHARGE PLANNING  Goal: Discharge to home or other facility with appropriate resources  Description: INTERVENTIONS:  - Identify barriers to discharge w/patient and caregiver  - Arrange for needed discharge resources and transportation as appropriate  - Identify discharge learning needs (meds, wound care, etc.)  - Arrange for interpretive services to assist at discharge as needed  - Refer to Case Management Department for coordinating discharge planning if the patient needs post-hospital services based on physician/advanced practitioner order or complex needs related to functional status, cognitive ability, or social support system  Outcome: Progressing      Problem: Knowledge Deficit  Goal: Patient/family/caregiver demonstrates understanding of disease process, treatment plan, medications, and discharge instructions  Description: Complete learning assessment and assess knowledge base.  Interventions:  - Provide teaching at level of understanding  - Provide teaching via preferred learning methods  Outcome: Progressing     Problem: NEUROSENSORY - ADULT  Goal: Achieves maximal functionality and self care  Description: INTERVENTIONS  - Monitor swallowing and airway patency with patient fatigue and changes in neurological status  - Encourage and assist patient to increase activity and self care.   - Encourage visually impaired, hearing impaired and aphasic patients to use assistive/communication devices  Outcome: Progressing     Problem: METABOLIC, FLUID AND ELECTROLYTES - ADULT  Goal: Electrolytes maintained within normal limits  Description: INTERVENTIONS:  - Monitor labs and assess patient for signs and symptoms of electrolyte imbalances  - Administer electrolyte replacement as ordered  - Monitor response to electrolyte replacements, including repeat lab results as appropriate  - Instruct patient on fluid and nutrition as appropriate  Outcome: Progressing  Goal: Glucose maintained within target range  Description: INTERVENTIONS:  - Monitor Blood Glucose as ordered  - Assess for signs and symptoms of hyperglycemia and hypoglycemia  - Administer ordered medications to maintain glucose within target range  - Assess nutritional intake and initiate nutrition service referral as needed  Outcome: Progressing     Problem: MUSCULOSKELETAL - ADULT  Goal: Maintain or return mobility to safest level of function  Description: INTERVENTIONS:  - Assess patient's ability to carry out ADLs; assess patient's baseline for ADL function and identify physical deficits which impact ability to perform ADLs (bathing, care of mouth/teeth, toileting, grooming, dressing, etc.)  - Assess/evaluate  cause of self-care deficits   - Assess range of motion  - Assess patient's mobility  - Assess patient's need for assistive devices and provide as appropriate  - Encourage maximum independence but intervene and supervise when necessary  - Involve family in performance of ADLs  - Assess for home care needs following discharge   - Consider OT consult to assist with ADL evaluation and planning for discharge  - Provide patient education as appropriate  Outcome: Progressing

## 2024-05-29 NOTE — CASE MANAGEMENT
Case Management Discharge Planning Note    Patient name Juan Henley  Location [facility]03/[facility] 703-01 MRN [id number]  : 1955 Date 2024       Current Admission Date: 2024  Current Admission Diagnosis:Alzheimer's dementia with psychotic disturbance (HCC)   Patient Active Problem List    Diagnosis Date Noted Date Diagnosed    Helicobacter pylori gastritis 2024     Urinary and fecal incontinence 2024     Abdominal aortic aneurysm (AAA) 3.0 cm to 5.5 cm in diameter in male (HCC) 2023     Personal history of nicotine dependence 2023     Cortical age-related cataract of right eye 2023     Iron deficiency anemia due to chronic blood loss 2023     Alzheimer's dementia with psychotic disturbance (HCC) 10/25/2021     Type 2 diabetes mellitus with hyperglycemia, without long-term current use of insulin ([facility]) 2021     Blindness of left eye 2020     Carpal tunnel syndrome, bilateral      Primary osteoarthritis involving multiple joints 01/15/2019     Erectile dysfunction 2018     Coronary artery disease involving coronary bypass graft of native heart without angina pectoris 2015     Microalbuminuria 2015     Onychomycosis 2015     Mixed hyperlipidemia 2014       LOS (days): 6  Geometric Mean LOS (GMLOS) (days): 4  Days to GMLOS:-2     OBJECTIVE:  Risk of Unplanned Readmission Score: 8.85         Current admission status: Inpatient   Preferred Pharmacy:   [id number]  [place], [place] - [address]  [address]  [place] [address]  Phone: [phone] Fax: [phone]    Primary Care Provider: Nataliya Gamboa PA-C    Primary Insurance: MEDICARE  Secondary Insurance: [place] [employer] [facility]    DISCHARGE DETAILS:    Discharge planning discussed with:: Ulises HANCOCK contacted family/caregiver?: Yes  Were Treatment Team discharge recommendations reviewed with  patient/caregiver?: Yes  Did patient/caregiver verbalize understanding of patient care needs?: Yes  Were patient/caregiver advised of the risks associated with not following Treatment Team discharge recommendations?: Yes    Contacts  Patient Contacts: Ulises (Son/POA)    Requested Home Health Care         Is the patient interested in HHC at discharge?: No    DME Referral Provided  Referral made for DME?: No    Other Referral/Resources/Interventions Provided:  Interventions: Short Term Rehab, SNF  Referral Comments: Patient accepted by Diane goddard's sopn Ulises visited facility and spoke to az Schwarz's director.  Bed reserved through [facility]- for transfer tomorrow         Treatment Team Recommendation: SNF, Short Term Rehab

## 2024-05-29 NOTE — PROGRESS NOTES
INTERNAL MEDICINE RESIDENCY PROGRESS NOTE     Name: Juan Henley   Age & Sex: 68 y.o. male   MRN: [id number]  [facility]   Encounter: [id number]  Team: SOD Team A    PATIENT INFORMATION     Name: Juan Henley   Age & Sex: 68 y.o. male   MRN: [id number]  Hospital Stay Days: 6    ASSESSMENT/PLAN     Principal Problem:    Alzheimer's dementia with psychotic disturbance (HCC)  Active Problems:    Coronary artery disease involving coronary bypass graft of native heart without angina pectoris    Type 2 diabetes mellitus with hyperglycemia, without long-term current use of insulin (HCC)      Type 2 diabetes mellitus with hyperglycemia, without long-term current use of insulin (HCC)  Assessment & Plan  Lab Results   Component Value Date    HGBA1C 7.1 (A) 02/22/2024       Recent Labs     05/28/24  1600 05/28/24  2112 05/29/24  0633 05/29/24  1033   POCGLU 141* 277* 164* 236*       Blood Sugar Average: Last 72 hrs:  (P) 192.4235922777915412  On metformin 1000 mg twice daily. Recent A1c of 2/2024 is 7.1    - SSI   - Continue metformin since no acute illness and just pending placement       Coronary artery disease involving coronary bypass graft of native heart without angina pectoris  Assessment & Plan  History of CABG 2014    Continue baby aspirin and statin    * Alzheimer's dementia with psychotic disturbance (HCC)  Assessment & Plan  Per chart review and discussion with son who is POA. Patient's disease has progressed greatly over the last year. He now is in a moderately severe stage with difficulty managing his own toileting and incontinence issues. He is well now having active hallucinations and severely disordered thought patterns. Despite this he has had very limited help in the house with only 5 hours of aide services. He requires 24/7 assistance and should not have access to his own medications as he does not understand or have the ability to keep track of pills in a calendar.    - Consulted  Case management for placement, appreciate recommendations  - PT/OT      Elevated serum creatinine-resolved as of 2024  Assessment & Plan  Creatinine presentation 1.33.  Baseline creatinine of .9-1.1    - Monitor urine output  - Avoid nephrotoxic agents        Disposition: Pending placement     SUBJECTIVE     Patient seen and examined. No acute events overnight. Patient pleasantly confused and mentions wants to go home but otherwise no complaints.     OBJECTIVE     Vitals:    24 2253 24 0719 24 1438 24 0709   BP: 134/80 131/79 121/71 95/78   Pulse: 69 72 61 79   Resp:       Temp: 98.2 °F (36.8 °C) 98.1 °F (36.7 °C) 98.3 °F (36.8 °C) 97.6 °F (36.4 °C)   TempSrc:       SpO2: 99% 97% 97% 97%      Temperature:   Temp (24hrs), Av °F (36.7 °C), Min:97.6 °F (36.4 °C), Max:98.3 °F (36.8 °C)    Temperature: 97.6 °F (36.4 °C)  Intake & Output:  I/O          0701   0700  0701   0700  0701   0700    P.O. 480 220 360    Total Intake 480 220 360    Urine  250     Total Output  250     Net +480 -30 +360           Unmeasured Urine Occurrence 1 x 1 x           Weights:        There is no height or weight on file to calculate BMI.  Weight (last 2 days)       None          Physical Exam  Constitutional:       General: He is not in acute distress.     Appearance: Normal appearance. He is not diaphoretic.   HENT:      Head: Normocephalic and atraumatic.      Mouth/Throat:      Mouth: Mucous membranes are moist.   Eyes:      Pupils: Pupils are equal, round, and reactive to light.   Cardiovascular:      Rate and Rhythm: Normal rate and regular rhythm.      Pulses: Normal pulses.      Heart sounds: Normal heart sounds.   Pulmonary:      Effort: Pulmonary effort is normal.      Breath sounds: Normal breath sounds.   Abdominal:      General: Bowel sounds are normal.      Palpations: Abdomen is soft.   Musculoskeletal:      Right lower leg: No edema.      Left lower leg: No edema.    Skin:     General: Skin is warm.      Capillary Refill: Capillary refill takes less than 2 seconds.      Coloration: Skin is not jaundiced or pale.      Findings: No rash.   Neurological:      General: No focal deficit present.      Mental Status: He is alert. He is disoriented.   Psychiatric:         Mood and Affect: Mood normal.         Behavior: Behavior normal.       LABORATORY DATA     Labs: I have personally reviewed pertinent reports.  Results from last 7 days   Lab Units 05/24/24  0502 05/23/24  1538   WBC Thousand/uL 6.60 6.18   HEMOGLOBIN g/dL 12.5 12.1   HEMATOCRIT % 38.7 37.1   PLATELETS Thousands/uL 274 281   SEGS PCT %  --  68   MONO PCT %  --  7   EOS PCT %  --  1      Results from last 7 days   Lab Units 05/24/24  0502 05/23/24  1538   POTASSIUM mmol/L 4.2 4.0   CHLORIDE mmol/L 103 104   CO2 mmol/L 29 26   BUN mg/dL 10 12   CREATININE mg/dL 0.95 1.33*   CALCIUM mg/dL 9.3 9.0   ALK PHOS U/L  --  62   ALT U/L  --  19   AST U/L  --  15                            IMAGING & DIAGNOSTIC TESTING     Radiology Results: I have personally reviewed pertinent reports.  XR chest 1 view portable    Result Date: 5/24/2024  Impression: No acute cardiopulmonary disease. Workstation performed: [id number]     CT head without contrast    Result Date: 5/23/2024  Impression: No acute intracranial abnormality. Workstation performed: [id number]     Other Diagnostic Testing: I have personally reviewed pertinent reports.    ACTIVE MEDICATIONS     Current Facility-Administered Medications   Medication Dose Route Frequency    aspirin chewable tablet 81 mg  81 mg Oral Daily    heparin (porcine) subcutaneous injection 5,000 Units  5,000 Units Subcutaneous Q8H [facility]    insulin lispro (HumALOG/ADMELOG) 100 units/mL subcutaneous injection 1-6 Units  1-6 Units Subcutaneous 4x Daily (AC & HS)    lisinopril (ZESTRIL) tablet 2.5 mg  2.5 mg Oral Daily    metFORMIN (GLUCOPHAGE-XR) 24 hr tablet 1,000 mg  1,000 mg Oral Daily With Dinner     "polyethylene glycol (MIRALAX) packet 17 g  17 g Oral Daily    pravastatin (PRAVACHOL) tablet 80 mg  80 mg Oral Daily With Dinner    senna-docusate sodium (SENOKOT S) 8.6-50 mg per tablet 2 tablet  2 tablet Oral HS       VTE Pharmacologic Prophylaxis: Enoxaparin (Lovenox)  VTE Mechanical Prophylaxis: sequential compression device    Portions of the record may have been created with voice recognition software.  Occasional wrong word or \"sound a like\" substitutions may have occurred due to the inherent limitations of voice recognition software.  Read the chart carefully and recognize, using context, where substitutions have occurred.  ==  Mitesh Maldonado MD  [facility]  Internal Medicine Residency PGY-2       "

## 2024-05-29 NOTE — PROGRESS NOTES
MISSY and Shireen OC returned a call to pt's son Ulises as he had called Shireen re questions re Waiver and NH Placement.  We reviewed that the Waiver most likely would NOT provide 24 /7 care.  He also expressed desire to be able to see the LTCF that have been offered by IN PT CM.    He has mentioned [facility] [phone] MISSY did assist with a call to Admissions there and left a message for Ivy to call son so he can tour their facility.    He definitely is in favor of placement as he understands it will be safest for pt.   An email was received from pt's son Ulises asking for clarification re which LTCF is available?    MISSY received call from Ivy Navarro who was currently with pt's son.  She asked when pt's LOC assessment was done as as per [facility] note July 3, 2024. She is reaching out to their business office to see if this will work.  Son likes the facility and they will try to offer a bed.    MISSY has reached out to IN PT CM Melida Montague RN/CM and was connected Simona Perez /KARISSA who is also assisting with placement.    MISSY updated then the pt's son is currently at [facility] and are meeting with Ivy Navarro Admission's Director.    IN PT/CM to f/u with [facility] Admissions.  They are aware pt in need of 24 hour supervision.    OP /CM to remain available to assist as indicated.

## 2024-05-30 ENCOUNTER — PATIENT OUTREACH (OUTPATIENT)
Age: 69
End: 2024-05-30

## 2024-05-30 VITALS
RESPIRATION RATE: 16 BRPM | HEART RATE: 75 BPM | DIASTOLIC BLOOD PRESSURE: 68 MMHG | TEMPERATURE: 98 F | OXYGEN SATURATION: 96 % | SYSTOLIC BLOOD PRESSURE: 129 MMHG

## 2024-05-30 LAB
GLUCOSE SERPL-MCNC: 169 MG/DL (ref 65–140)
GLUCOSE SERPL-MCNC: 178 MG/DL (ref 65–140)

## 2024-05-30 PROCEDURE — 82948 REAGENT STRIP/BLOOD GLUCOSE: CPT

## 2024-05-30 RX ORDER — POLYETHYLENE GLYCOL 3350 17 G/17G
17 POWDER, FOR SOLUTION ORAL DAILY
Start: 2024-05-31 | End: 2024-06-14

## 2024-05-30 RX ORDER — AMOXICILLIN 250 MG
2 CAPSULE ORAL
Qty: 60 TABLET | Refills: 0 | Status: SHIPPED | OUTPATIENT
Start: 2024-05-30 | End: 2024-05-30

## 2024-05-30 RX ORDER — AMOXICILLIN 250 MG
2 CAPSULE ORAL
Start: 2024-05-30 | End: 2024-06-29

## 2024-05-30 RX ORDER — POLYETHYLENE GLYCOL 3350 17 G/17G
17 POWDER, FOR SOLUTION ORAL DAILY
Qty: 238 G | Refills: 0 | Status: SHIPPED | OUTPATIENT
Start: 2024-05-31 | End: 2024-05-30

## 2024-05-30 RX ADMIN — LISINOPRIL 2.5 MG: 2.5 TABLET ORAL at 08:04

## 2024-05-30 RX ADMIN — METFORMIN HYDROCHLORIDE 1000 MG: 500 TABLET, EXTENDED RELEASE ORAL at 08:05

## 2024-05-30 RX ADMIN — ENOXAPARIN SODIUM 40 MG: 40 INJECTION SUBCUTANEOUS at 08:04

## 2024-05-30 RX ADMIN — INSULIN LISPRO 1 UNITS: 100 INJECTION, SOLUTION INTRAVENOUS; SUBCUTANEOUS at 08:58

## 2024-05-30 RX ADMIN — ASPIRIN 81 MG CHEWABLE TABLET 81 MG: 81 TABLET CHEWABLE at 08:04

## 2024-05-30 RX ADMIN — INSULIN LISPRO 1 UNITS: 100 INJECTION, SOLUTION INTRAVENOUS; SUBCUTANEOUS at 11:22

## 2024-05-30 NOTE — CASE MANAGEMENT
Case Management Discharge Planning Note    Patient name Juan Henley  Location [facility] 703/[facility] 703-01 MRN [id number]  : 1955 Date 2024       Current Admission Date: 2024  Current Admission Diagnosis:Alzheimer's dementia with psychotic disturbance (HCC)   Patient Active Problem List    Diagnosis Date Noted Date Diagnosed    Helicobacter pylori gastritis 2024     Urinary and fecal incontinence 2024     Abdominal aortic aneurysm (AAA) 3.0 cm to 5.5 cm in diameter in male (HCC) 2023     Personal history of nicotine dependence 2023     Cortical age-related cataract of right eye 2023     Iron deficiency anemia due to chronic blood loss 2023     Alzheimer's dementia with psychotic disturbance (HCC) 10/25/2021     Type 2 diabetes mellitus with hyperglycemia, without long-term current use of insulin ([facility]) 2021     Blindness of left eye 2020     Carpal tunnel syndrome, bilateral      Primary osteoarthritis involving multiple joints 01/15/2019     Erectile dysfunction 2018     Coronary artery disease involving coronary bypass graft of native heart without angina pectoris 2015     Microalbuminuria 2015     Onychomycosis 2015     Mixed hyperlipidemia 2014       LOS (days): 7  Geometric Mean LOS (GMLOS) (days): 4  Days to GMLOS:-2.8     OBJECTIVE:  Risk of Unplanned Readmission Score: 9.28         Current admission status: Inpatient   Preferred Pharmacy:   [facility] [place], [place] - [address]  [address]  [place] [address]  Phone: [phone] Fax: [phone]    Primary Care Provider: Nataliya Gamboa PA-C    Primary Insurance: MEDICARE  Secondary Insurance: [place] [employer] [facility]    DISCHARGE DETAILS:    Discharge planning discussed with:: Ulises HANCOCK contacted family/caregiver?: Yes  Were Treatment Team discharge recommendations reviewed with  patient/caregiver?: Yes  Did patient/caregiver verbalize understanding of patient care needs?: Yes  Were patient/caregiver advised of the risks associated with not following Treatment Team discharge recommendations?: Yes         Requested Home Health Care         Is the patient interested in HHC at discharge?: No    DME Referral Provided  Referral made for DME?: No    Other Referral/Resources/Interventions Provided:  Interventions: SNF, Short Term Rehab  Referral Comments: Patient for transfer to [facility] at 1:00 via [facility]- nitza's son Ulises made aware and is in agreement         Treatment Team Recommendation: Short Term Rehab, SNF  Discharge Destination Plan:: Short Term Rehab, SNF  Transport at Discharge : Wheelchair van  Dispatcher Contacted: Yes  Number/Name of Dispatcher: [facility]  Transported by (Company and Unit #): [facility]  ETA of Transport (Date): 05/30/24  ETA of Transport (Time): 1300     Transfer Mode: Wheelchair        IMM Given (Date):: 05/30/24  IMM Given to:: Family   IMM reviewed with patient, patient's caregiver agrees with discharge determination.

## 2024-05-30 NOTE — DISCHARGE SUMMARY
INTERNAL MEDICINE RESIDENCY DISCHARGE SUMMARY     Juan Henley   68 y.o. male  MRN: [id number]  Room/Bed: [facility] 703/[facility] 703-01     [facility] 7   Encounter: [id number]    Principal Problem:    Alzheimer's dementia with psychotic disturbance (HCC)  Active Problems:    Coronary artery disease involving coronary bypass graft of native heart without angina pectoris    Type 2 diabetes mellitus with hyperglycemia, without long-term current use of insulin (HCC)      * Alzheimer's dementia with psychotic disturbance (HCC)  Assessment & Plan  Per chart review and discussion with son who is POA. Patient's disease has progressed greatly over the last year. He now is in a moderately severe stage with difficulty managing his own toileting and incontinence issues. He is well now having active hallucinations and severely disordered thought patterns. Despite this he has had very limited help in the house with only 5 hours of aide services. He requires 24/7 assistance and should not have access to his own medications as he does not understand or have the ability to keep track of pills in a calendar.    - Consulted Case management for placement, appreciate recommendations  - PT/OT      Type 2 diabetes mellitus with hyperglycemia, without long-term current use of insulin (HCC)  Assessment & Plan  Lab Results   Component Value Date    HGBA1C 7.1 (A) 02/22/2024       Recent Labs     05/28/24  1600 05/28/24  2112 05/29/24  0633 05/29/24  1033   POCGLU 141* 277* 164* 236*       Blood Sugar Average: Last 72 hrs:  (P) [id number]  On metformin 1000 mg twice daily. Recent A1c of 2/2024 is 7.1    - SSI   - Continue metformin since no acute illness and just pending placement       Coronary artery disease involving coronary bypass graft of native heart without angina pectoris  Assessment & Plan  History of CABG 2014    Continue baby aspirin and statin    Elevated serum  "creatinine-resolved as of 5/24/2024  Assessment & Plan  Creatinine presentation 1.33.  Baseline creatinine of .9-1.1    - Monitor urine output  - Avoid nephrotoxic agents        DETAILS OF HOSPITAL COURSE     H&P  \"This is a 68-year-old male with past medical history of Alzheimer's, type 2 diabetes, CABG in 2014 presenting via EMS after geriatrics office was concerned on telemedicine appointment that the patient was deteriorating and not appropriate to live alone.  Patient is in a pleasant mood and is not sure why he is here.  He denies chest pain, fever, chills, dysuria, nausea, vomiting, constipation, diarrhea, shortness of breath, hematuria.  Patient is alert and oriented x 1.  Upon presentation to the ED patient was hemodynamically stable, hypertensive to systolics of 170 and on room air.  Labs remarkable for mildly elevated creatinine at 1.33 and UA with nitrites and innumerable WBCs.  Was given 1 dose of ceftriaxone in the ED.     I called the patient's son and updated him about his admission.  Patient's son who is the POA lives in [place] but will be driving back to [place] on Saturday.  The patient's son agrees that he is not appropriate to live at home on his own and does not know how to consistently take medications and adequately take care of himself.  Case management is consulted, appreciate recommendations.\"    Patient had no urinary symptoms and blood sugar was initially controlled with sliding scale and low-dose Lantus nightly.  Patient has been medically stable for discharge for some time now and was transitioned to home metformin.  Patient's son who is the POA determined that the patient needs long-term placement and has decided on Diane with arrange transport for today.    Patient was seen and examined bedside.  No acute events overnight.  Denies chest pain, shortness of breath, dysuria, fever, chills, nausea, vomiting, constipation, diarrhea.  Patient continues to be alert and oriented x 1 " with pleasant mood.    Vitals:    05/30/24 0755   BP: 129/68   Pulse: 75   Resp: 16   Temp:    SpO2: 96%     Physical Exam  Constitutional:       General: He is not in acute distress.     Appearance: Normal appearance. He is not diaphoretic.   HENT:      Head: Normocephalic and atraumatic.      Mouth/Throat:      Mouth: Mucous membranes are moist.   Eyes:      Pupils: Pupils are equal, round, and reactive to light.   Cardiovascular:      Rate and Rhythm: Normal rate and regular rhythm.      Pulses: Normal pulses.      Heart sounds: Normal heart sounds.   Pulmonary:      Effort: Pulmonary effort is normal.      Breath sounds: Normal breath sounds.   Abdominal:      General: Abdomen is flat. Bowel sounds are normal. There is no distension.      Palpations: Abdomen is soft.      Tenderness: There is no abdominal tenderness.   Musculoskeletal:         General: No swelling or tenderness.      Right lower leg: No edema.      Left lower leg: No edema.   Skin:     General: Skin is warm.      Capillary Refill: Capillary refill takes less than 2 seconds.      Coloration: Skin is not jaundiced or pale.      Findings: No rash.   Neurological:      General: No focal deficit present.      Mental Status: He is alert. Mental status is at baseline. He is disoriented.      Motor: No weakness.      Comments: Aox1. Pleasant mood   Psychiatric:         Mood and Affect: Mood normal.         Behavior: Behavior normal.          DISCHARGE INFORMATION     PCP at Discharge: MANNIE Roach    Admitting Provider: Mitesh Vargas MD  Admission Date: 5/23/2024    Discharge Provider: Lois Lynn MD  Discharge Date: 5/30/24    Discharge Disposition: Home/Self Care  Discharge Condition: stable  Discharge with Lines: no    Discharge Diet: regular diet and diabetic diet  Activity Restrictions: none  Test Results Pending at Discharge: none    Discharge Diagnoses:  Principal Problem:    Alzheimer's dementia with psychotic disturbance  (HCC)  Active Problems:    Coronary artery disease involving coronary bypass graft of native heart without angina pectoris    Type 2 diabetes mellitus with hyperglycemia, without long-term current use of insulin (HCC)  Resolved Problems:    Elevated serum creatinine      Consulting Providers:      Diagnostic & Therapeutic Procedures Performed:  XR chest 1 view portable    Result Date: 5/24/2024  Impression: No acute cardiopulmonary disease. Workstation performed: [id number]     CT head without contrast    Result Date: 5/23/2024  Impression: No acute intracranial abnormality. Workstation performed: [id number]       Code Status: Level 1 - Full Code  Advance Directive & Living Will: <no information>  Power of :    POLST:      Medications:  Current Discharge Medication List        Current Discharge Medication List        Current Discharge Medication List        CONTINUE these medications which have NOT CHANGED    Details   aspirin 81 MG tablet Take 81 mg by mouth daily at bedtime       lisinopril (ZESTRIL) 2.5 mg tablet TAKE ONE TABLET BY MOUTH EVERY DAY  Qty: 90 tablet, Refills: 3    Associated Diagnoses: Microalbuminuria      rosuvastatin (CRESTOR) 10 MG tablet Take 1 tablet (10 mg total) by mouth daily  Qty: 90 tablet, Refills: 3    Associated Diagnoses: Coronary artery disease involving coronary bypass graft of native heart without angina pectoris             Allergies:  Allergies   Allergen Reactions    Morphine GI Intolerance     vomiting       FOLLOW-UP     PCP Outpatient Follow-up:  yes  [facility] within 1 week of discharge    Consulting Providers Follow-up:  Not consulted IP but will need to follow up with geriatrician       Active Issues Requiring Follow-up:   none    Discharge Statement:   I spent 45 minutes minutes discharging the patient. This time was spent on the day of discharge. I had direct contact with the patient on the day of discharge. Additional  "documentation is required if more than 30 minutes were spent on discharge.    Portions of the record may have been created with voice recognition software.  Occasional wrong word or \"sound a like\" substitutions may have occurred due to the inherent limitations of voice recognition software.  Read the chart carefully and recognize, using context, where substitutions have occurred.    ==  Daisy Mahan DO  [facility]  Internal Medicine Resident PGY-1    "

## 2024-05-30 NOTE — PLAN OF CARE
Problem: PAIN - ADULT  Goal: Verbalizes/displays adequate comfort level or baseline comfort level  Description: Interventions:  - Encourage patient to monitor pain and request assistance  - Assess pain using appropriate pain scale  - Administer analgesics based on type and severity of pain and evaluate response  - Implement non-pharmacological measures as appropriate and evaluate response  - Consider cultural and social influences on pain and pain management  - Notify physician/advanced practitioner if interventions unsuccessful or patient reports new pain  Outcome: Progressing     Problem: INFECTION - ADULT  Goal: Absence or prevention of progression during hospitalization  Description: INTERVENTIONS:  - Assess and monitor for signs and symptoms of infection  - Monitor lab/diagnostic results  - Monitor all insertion sites, i.e. indwelling lines, tubes, and drains  - Monitor endotracheal if appropriate and nasal secretions for changes in amount and color  - [place] appropriate cooling/warming therapies per order  - Administer medications as ordered  - Instruct and encourage patient and family to use good hand hygiene technique  - Identify and instruct in appropriate isolation precautions for identified infection/condition  Outcome: Progressing     Problem: SAFETY ADULT  Goal: Patient will remain free of falls  Description: INTERVENTIONS:  - Educate patient/family on patient safety including physical limitations  - Instruct patient to call for assistance with activity   - Consult OT/PT to assist with strengthening/mobility   - Keep Call bell within reach  - Keep bed low and locked with side rails adjusted as appropriate  - Keep care items and personal belongings within reach  - Initiate and maintain comfort rounds  - Make Fall Risk Sign visible to staff  - Initiate/Maintain fall alarms  - Apply yellow socks and bracelet for high fall risk patients  - Consider moving patient to room near nurses station  Outcome:  Progressing  Goal: Maintain or return to baseline ADL function  Description: INTERVENTIONS:  -  Assess patient's ability to carry out ADLs; assess patient's baseline for ADL function and identify physical deficits which impact ability to perform ADLs (bathing, care of mouth/teeth, toileting, grooming, dressing, etc.)  - Assess/evaluate cause of self-care deficits   - Assess range of motion  - Assess patient's mobility; develop plan if impaired  - Assess patient's need for assistive devices and provide as appropriate  - Encourage maximum independence but intervene and supervise when necessary  - Involve family in performance of ADLs  - Assess for home care needs following discharge   - Consider OT consult to assist with ADL evaluation and planning for discharge  - Provide patient education as appropriate  Outcome: Progressing  Goal: Maintains/Returns to pre admission functional level  Description: INTERVENTIONS:  - Perform AM-PAC 6 Click Basic Mobility/ Daily Activity assessment daily.  - Set and communicate daily mobility goal to care team and patient/family/caregiver.   - Out of bed for toileting  - Record patient progress and toleration of activity level   Outcome: Progressing     Problem: DISCHARGE PLANNING  Goal: Discharge to home or other facility with appropriate resources  Description: INTERVENTIONS:  - Identify barriers to discharge w/patient and caregiver  - Arrange for needed discharge resources and transportation as appropriate  - Identify discharge learning needs (meds, wound care, etc.)  - Arrange for interpretive services to assist at discharge as needed  - Refer to Case Management Department for coordinating discharge planning if the patient needs post-hospital services based on physician/advanced practitioner order or complex needs related to functional status, cognitive ability, or social support system  Outcome: Progressing     Problem: Knowledge Deficit  Goal: Patient/family/caregiver demonstrates  understanding of disease process, treatment plan, medications, and discharge instructions  Description: Complete learning assessment and assess knowledge base.  Interventions:  - Provide teaching at level of understanding  - Provide teaching via preferred learning methods  Outcome: Progressing     Problem: NEUROSENSORY - ADULT  Goal: Achieves maximal functionality and self care  Description: INTERVENTIONS  - Monitor swallowing and airway patency with patient fatigue and changes in neurological status  - Encourage and assist patient to increase activity and self care.   - Encourage visually impaired, hearing impaired and aphasic patients to use assistive/communication devices  Outcome: Progressing     Problem: METABOLIC, FLUID AND ELECTROLYTES - ADULT  Goal: Electrolytes maintained within normal limits  Description: INTERVENTIONS:  - Monitor labs and assess patient for signs and symptoms of electrolyte imbalances  - Administer electrolyte replacement as ordered  - Monitor response to electrolyte replacements, including repeat lab results as appropriate  - Instruct patient on fluid and nutrition as appropriate  Outcome: Progressing  Goal: Glucose maintained within target range  Description: INTERVENTIONS:  - Monitor Blood Glucose as ordered  - Assess for signs and symptoms of hyperglycemia and hypoglycemia  - Administer ordered medications to maintain glucose within target range  - Assess nutritional intake and initiate nutrition service referral as needed  Outcome: Progressing     Problem: SKIN/TISSUE INTEGRITY - ADULT  Goal: Skin Integrity remains intact(Skin Breakdown Prevention)  Description: Assess:  -Perform Jerod assessment every shift  -Clean and moisturize skin every shift and PRN  -Inspect skin when repositioning, toileting, and assisting with ADLS  -Assess extremities for adequate circulation and sensation     Bed Management:  -Have minimal linens on bed & keep smooth, unwrinkled  -Change linens as needed  when moist or perspiring      Toileting:  -Offer bedside commode  -Assess for incontinence every shift      Activity:  -Mobilize patient 2 times a day  -Encourage activity and walks on unit  -Encourage or provide ROM exercises   -Turn and reposition patient every 2 Hours  -Use appropriate equipment to lift or move patient in bed      Skin Care:  -Avoid use of baby powder, tape, friction and shearing, hot water or constrictive clothing  -Do not massage red bony areas      Outcome: Progressing     Problem: MUSCULOSKELETAL - ADULT  Goal: Maintain or return mobility to safest level of function  Description: INTERVENTIONS:  - Assess patient's ability to carry out ADLs; assess patient's baseline for ADL function and identify physical deficits which impact ability to perform ADLs (bathing, care of mouth/teeth, toileting, grooming, dressing, etc.)  - Assess/evaluate cause of self-care deficits   - Assess range of motion  - Assess patient's mobility  - Assess patient's need for assistive devices and provide as appropriate  - Encourage maximum independence but intervene and supervise when necessary  - Involve family in performance of ADLs  - Assess for home care needs following discharge   - Consider OT consult to assist with ADL evaluation and planning for discharge  - Provide patient education as appropriate  Outcome: Progressing

## 2024-05-30 NOTE — DISCHARGE INSTR - AVS FIRST PAGE
Please call primary care physician to make 1 week follow-up appointment  Please call geriatrician to make 1 week follow-up appointment

## 2024-05-30 NOTE — PROGRESS NOTES
SWCM received ADT IB message informing of patient's discharge on this day. SWCM completed chart review. Per chart, patient discharged to [facility]. Per chart, Patient for transfer to [facility] via [facility]- nitza's son Ulises made aware and is in agreement.    [facility] will remain available to assist family as needed.

## 2024-06-03 ENCOUNTER — PATIENT OUTREACH (OUTPATIENT)
Dept: INTERNAL MEDICINE CLINIC | Facility: CLINIC | Age: 69
End: 2024-06-03

## 2024-06-03 ENCOUNTER — TRANSITIONAL CARE MANAGEMENT (OUTPATIENT)
Dept: INTERNAL MEDICINE CLINIC | Facility: CLINIC | Age: 69
End: 2024-06-03

## 2024-06-03 NOTE — PROGRESS NOTES
Outgoing call   06/03/2024     [facility] spoke to Supriya today. Pt is at [facility]. Supriya states pt is not liking it but he has no choice. He is safe and secured.     At this time [facility] will be closing the case.     Supriya wants to know what happens to the Waiver now and will patient keep his medical assistance as secondary payer.     At this time [facility] will close the case.

## 2024-06-04 ENCOUNTER — PATIENT OUTREACH (OUTPATIENT)
Age: 69
End: 2024-06-04

## 2024-06-04 NOTE — PROGRESS NOTES
SWCM discussion with CMOC, Shireen regarding status of patient needs and updates related to transition of care. As per CMOC, spoke to Supriya, patient's friend and pt at [facility]; he is safe and secured. As per CMOC, CMOC will be closing the case. Supriya had questions regarding next steps with Waiver now and if patient keeps his medical assistance as secondary payer. SWCM to call Supriya to follow up and provide guidance.     SWCM will continue to follow up and assist with needs.

## 2024-06-07 ENCOUNTER — PATIENT OUTREACH (OUTPATIENT)
Dept: INTERNAL MEDICINE CLINIC | Facility: CLINIC | Age: 69
End: 2024-06-07

## 2024-06-07 NOTE — PROGRESS NOTES
SWCM discussion with YEHUDA Lmi related to patient needs / adddressing inquiry from family prior to closing case. As per CMOC, Shireen, Supriya had questions related to patient's  Waiver services and transition of care to SNF.     This SWCM called Supriya to follow up and assist with needs. SWCM unable to reach Supriya. SWCM left detailed message related to patient's transition of care/ Waiver services. SWCM will continue to follow up and remain available to assist with needs.     SWCM to route note to [facility] and CMOC Shireen.

## 2024-06-10 ENCOUNTER — PATIENT OUTREACH (OUTPATIENT)
Dept: INTERNAL MEDICINE CLINIC | Facility: CLINIC | Age: 69
End: 2024-06-10

## 2024-06-10 NOTE — PROGRESS NOTES
SWCM called patient's friend, Supriya, to follow up and assist with needs. SWCM spoke to Supriya. SWCM answered all questions for Supriya. Supriya reports the family is relieved that patient is being taken care of. Supriya reports patient seems content with new environment; patient transitioned to [facility].     Supriya requested all scheduled appointments for patient be canceled as patient is residing in nursing home.   [facility] spoke to CMOC, Shireen, and discussed Supriya's request. CMOC to cancel upcoming appointments.     SW encouraged Supriya to call withquestions/ needs. [facility] informed Supriya case would be closed as patient has transitioned level of care. Supriya expressed understanding and thankful/ appreciative for support provided by everyone at [facility].

## 2024-06-11 ENCOUNTER — TELEPHONE (OUTPATIENT)
Dept: INTERNAL MEDICINE CLINIC | Facility: CLINIC | Age: 69
End: 2024-06-11

## 2024-06-11 ENCOUNTER — PATIENT OUTREACH (OUTPATIENT)
Dept: INTERNAL MEDICINE CLINIC | Facility: CLINIC | Age: 69
End: 2024-06-11

## 2024-06-11 NOTE — PROGRESS NOTES
Outgoing call   06/11/2024    CMOC spoke to Betzaida from Dr. Ramey's office today for office to cancel upcoming appt. Apt was cancelled as requested by family member.     CMOC spoke to [facility] clerical office today and they cancel the upcoming appt.  This was requested by family.     Pt does not have any appointments with [facility] or [facility] at this time.     Please see MISSY Friedman or MISSY Lim notes. Pt resides  in a long term nursing home.

## 2024-06-13 NOTE — TELEPHONE ENCOUNTER
06/12/24 11:51 PM        The office's request has been received, reviewed, and the patient chart updated. The PCP has successfully been removed with a patient attribution note. This message will now be completed.        Thank you  Raya Saravia

## 2025-01-15 NOTE — PROGRESS NOTES
This SWCM, YEHUDA Lim and CMOC Cee met during huddle to discuss patient needs. CM team expressed concerns regarding patient's safety and possible decline. Family indicated possible nursing home placement.   [facility] see [facility] on 5/[facility]. Communication sent to [facility] provider for provider's feedback after OV. YEHUDA Lim and this SWCM to continue to share updates and remain available to assist as needed.    never

## 2025-08-07 NOTE — PROGRESS NOTES
ASSESSMENT AND PLAN:  1  Mild dementia, unspecified dementia type, unspecified whether behavioral, psychotic, or mood disturbance or anxiety ([facility] [address] )  Assessment & Plan:  Patient with very unusual presentation of very poor cognitive skills, likely with significant aphasia which would be most likely secondary to previous CVA  Alternative diagnosis could be primary progressive aphasia or atypical Alzheimer's, however these appear much less likely considering relatively spared functional status compared to very poor cognitive testing  Will refer for MRI Brain NeuroQuant w&w/o contrast and refer to speech therapy for aphasia  Decision-making capacity: Likely with capacity for medical and financial decisions, although difficult to determine considering likely severe aphasia  Would recommend neuropsychology Shilohmaddy egan for capacity evaluation if necessary  Staging: Mild Stage Dementia (unspecified cause)    Medications Review: Reviewed current medications  Appear appropriate for current medical diagnoses  Orders:  -     Ambulatory Referral to [facility] wo and w contrast; Future; Expected date: 05/31/2023  -     Ambulatory Referral to Speech Therapy; Future    2  Iron deficiency anemia secondary to inadequate dietary iron intake  Assessment & Plan:  Would benefit from close GI f/u for EGD/colonoscopy  3  Coronary artery disease involving coronary bypass graft of native heart without angina pectoris  Assessment & Plan:  Appears stable  Continue ASA, rosuvastatin  Unclear why not on beta-blocker therapy  4  Blindness of left eye, unspecified right eye visual impairment category  Assessment & Plan:  Refer to ophthalmology      5  Type 2 diabetes mellitus with hyperglycemia, without long-term current use of insulin ([facility]  Assessment & Plan:  Refer to ophthalmology  Continue metformin        6  Primary osteoarthritis involving multiple joints  Assessment & Non weight bearing right elbow through lower arm.     C-collar at all times.    Plan:  Continue regular walking      7  Aphasia  -     Ambulatory Referral to Speech Therapy; Future    8  Cortical age-related cataract of right eye  Assessment & Plan:  Continue with regular eye care  Due for f/u with [facility]  HPI:    We had the pleasure of evaluating Gonzalez Gustafson who is a 78 y o  male in Geriatric consultation today  Mr Shayna Young is in the office with his friend  He lives alone  He has a son Domonique Garcia in [place], son Patric Peck in [facility], sister in [facility]  Records show diagnosis of dementia through [facility] from Oct 2021  Millie Echevarria was unaware  HISTORY AS PER FRIEND SILVIANO OLIVAREZADO:  Records show that niece and sister were looking after him a couple of years ago, but seemed to be less involved  Has had memory issues for years  Millie Echevarria has been more involved in just the last few months after her  passed away as they share a son together  She reports working on a POA with both sons  Has visited with   She reports they have POA drawn up but not completed yet  She says he received eviction notice  She looked into it and saw that he was writing the checks for the rent, but somehow they were not receiving it and helped solve the issue  She has checked on his pills  At one point made an error, but he usually does a good job with the pill box usually appearing accurate with correct pills and correct days taken  He continues to go to [facility] frequently and uses a debit card  Reports concern for appropriate care as he fridge has yogurt, fruit and juice  COGNITION:  Difficulty finding the right word while speaking: Yes  Requires repeat information or asking the same question repeatedly: No  Fluctuation in alertness: No  Changes in mood or personality:No  Current or previous treatment for depression or anxiety: No    Family member with dementia and what type?  Yes, sisters Andrea Delgado  History of head trauma: No  History of stroke: No  History of alcohol or substance abuse: No    FUNCTIONAL STATUS:  BADLs  Does patient require assistance with:  Bathing: No  Dressing: No  Transferring: No  Continence: No  Toileting: No  Feeding: No    IADLs  Dose patient require assistance with:  Telephone: No  Shopping: Yes  Food Preparation: Yes  Housekeeping: Yes, friend was helping cleaning his bathroom  Laundry: No  Transportation: Yes, had multiple accidents in the past  Medications: No  Finances: Yes, mild assistance    NEUROPSYCH SYMPTOMS:  Does patient get angry or hostile? Resist care from others? Yes, occasionally with son  Does patient see or hear things that no one else can see or hear? No  Does patient act impatient and cranky? Does mood frequently change for no apparent reason? No  Does patient act suspicious without good reason (example: believes that others are stealing from him or her, or planning to harm him or her in some way)? No  Does patient less interested in his or her usual activities or in the activities and plans of others? Yes  Does patient have trouble sleeping at night?  No    SAFETY:  Hearing and vision issue: Yes, very poor vision  Any gait or balance disorder: No  Uses: no assistive devices  Any falls in the last year: No  Any history of wandering: No  Are there firearms or guns in the home: No  Does patient drive: No  Any driving accidents or citations in the last year: No  Any concerns about patient's ability to drive: Yes    ACP REVIEW:  Does patient have POA: No  Does patient have a Living will: No  Any legal assistance needed for healthcare planning?: No    Allergies   Allergen Reactions   • Morphine GI Intolerance     vomiting       Medications:    Current Outpatient Medications on File Prior to Visit   Medication Sig Dispense Refill   • aspirin 81 MG tablet Take 81 mg by mouth daily at bedtime      • docusate sodium (COLACE) 100 mg capsule Take 1 capsule (100 mg total) by mouth 2 (two) times a day 60 capsule 2   • ferrous sulfate 324 (65 Fe) mg Take 1 tablet (324 mg total) by mouth daily     • lisinopril (ZESTRIL) 2 5 mg tablet TAKE ONE TABLET BY MOUTH EVERY DAY 90 tablet 3   • metFORMIN (GLUCOPHAGE-XR) 750 mg 24 hr tablet Take 1 tablet (750 mg total) by mouth 2 (two) times a day 180 tablet 1   • omega-3-acid ethyl esters (LOVAZA) 1 g capsule Take 100 mg by mouth 2 (two) times a day     • rosuvastatin (CRESTOR) 40 MG tablet TAKE ONE TABLET BY MOUTH EVERY DAY 90 tablet 3   • [DISCONTINUED] ferrous sulfate 324 (65 Fe) mg Take 1 tablet (324 mg total) by mouth 2 (two) times a day before meals (Patient taking differently: Take 324 mg by mouth daily) 180 tablet 1     No current facility-administered medications on file prior to visit  History:  Past Medical History:   Diagnosis Date   • Arteriosclerosis of coronary artery 1/13/2014   • Arthritis    • Benign hypertension 1/18/2018   • Carpal tunnel syndrome    • Coronary artery disease    • Costochondritis     RESOLVED: [id number]   • Diabetes mellitus (HCC)    • Hematuria    • HTN (hypertension)    • Hx of CABG 2/7/2014   • Hyperlipidemia    • Kidney stone    • Myocardial infarction [facility])    • Weak carotid pulse      Past Surgical History:   Procedure Laterality Date   • CATARACT EXTRACTION Left    • COLONOSCOPY     • CORONARY ANGIOPLASTY WITH STENT PLACEMENT  06/29/2005    RCA    • CORONARY ANGIOPLASTY WITH STENT PLACEMENT  07/05/2005    Cx   • CORONARY ARTERY BYPASS GRAFT      LAST ASSESSED: [id number]   • LITHOTRIPSY      RENAL   • MOUTH SURGERY     • AR COLONOSCOPY FLX DX W/COLLJ SPEC WHEN PFRMD N/A 2/22/2019    Procedure: COLONOSCOPY;  Surgeon: Ani Donahue MD;  [facility];   Service: Gastroenterology   • AR CYSTO/URETERO W/LITHOTRIPSY &INDWELL STENT INSRT Left 3/24/2017    Procedure: CYSTOSCOPY, URETEROSCOPY, HOLMIUM LASER LITHOTRIPSY, BASKET STONE EXTRACTION, RETROGRADE PYELOGRAM, STENT INSERTION;  Surgeon: Cinda Chairez MD;  Location: [facility] OR;  Service: Urology   • AR CYSTO/URETERO W/LITHOTRIPSY &INDWELL STENT INSRT Left 7/28/2017    Procedure: CYSTOSCOPY, URETEROSCOPY WITH HOLMIUM LASER LITHOTRIPSY, AND STENT EXCHANGE;  Surgeon: Fatuma Argueta MD;  [facility];  Service: Urology   • FL LITHOTRIPSY XTRCORP SHOCK WAVE Left 9/15/2017    Procedure: Armand Panchal SHOCKWAVE (ESWL); Surgeon: Fatuma Argueta MD;  [facility];  Service: Urology   • ROTATOR CUFF REPAIR Right    • SHOULDER ARTHROSCOPY Left    • TOOTH EXTRACTION       Family History   Problem Relation Age of Onset   • Diabetes Sister         TYPE 2   • Diabetes Mother    • Heart disease Mother    • Diabetes Brother    • Diabetes Sister         TYPE 2   • Heart disease Sister    • Heart disease Sister    • Coronary artery disease Maternal Uncle    • Diabetes Maternal Uncle      Social History     Socioeconomic History   • Marital status:      Spouse name: Not on file   • Number of children: Not on file   • Years of education: Not on file   • Highest education level: Not on file   Occupational History   • Occupation: laboror     Comment:    Tobacco Use   • Smoking status: Former     Packs/day: 1 00     Years: 44 00     Total pack years: 44 00     Types: Cigarettes   • Smokeless tobacco: Never   • Tobacco comments:     up to 1ppd since age 15y/o; FORMER SMOKER AS PER ALL SCRIPTS    Vaping Use   • Vaping Use: Never used   Substance and Sexual Activity   • Alcohol use: Not Currently     Comment: quit 23 years ago; SOCIAL DRINKER AS PER ALL SCRIPTS    • Drug use: Never   • Sexual activity: Not Currently   Other Topics Concern   • Not on file   Social History Narrative    The patient was born in [place]  He graduated from high school and worked for [employer]  He reports that when they shut down, he went to [place]  He got his associates degree 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 [employer]   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 [employer]      Social Determinants of Health     Financial Resource Strain: Low Risk  (5/1/2023)    Overall Financial Resource Strain (CARDIA)    • Difficulty of Paying Living Expenses: Not hard at all   Food Insecurity: No Food Insecurity (5/1/2023)    Hunger Vital Sign    • Worried About Running Out of Food in the Last Year: Never true    • Ran Out of Food in the Last Year: Never true   Transportation Needs: Unmet Transportation Needs (5/1/2023)    PRAPARE - Transportation    • Lack of Transportation (Medical): Yes    • Lack of Transportation (Non-Medical): Yes   Physical Activity: Inactive (9/16/2020)    Exercise Vital Sign    • Days of Exercise per Week: 0 days    • Minutes of Exercise per Session: 0 min   Stress: No Stress Concern Present (9/16/2020)    [address]    • Feeling of Stress : Not at all   Social Connections: Socially Isolated (9/16/2020)    Social Connection and Isolation Panel [NHANES]    • Frequency of Communication with Friends and Family: More than three times a week    • Frequency of Social Gatherings with Friends and Family:  Three times a week    • Attends Druze Services: Never    • Active Member of Clubs or Organizations: No    • Attends Club or Organization Meetings: Never    • Marital Status:    Intimate Partner Violence: Not At Risk (9/16/2020)    Humiliation, Afraid, Rape, and Kick questionnaire    • Fear of Current or Ex-Partner: No    • Emotionally Abused: No    • Physically Abused: No    • Sexually Abused: No   Housing Stability: Not on file     Past Surgical History:   Procedure Laterality Date   • CATARACT EXTRACTION Left    • COLONOSCOPY     • CORONARY ANGIOPLASTY WITH STENT PLACEMENT  06/29/2005    RCA    • CORONARY ANGIOPLASTY WITH STENT PLACEMENT  07/05/2005    Cx   • CORONARY ARTERY BYPASS GRAFT      LAST ASSESSED: [id number]   • LITHOTRIPSY      RENAL   • MOUTH SURGERY     • NH COLONOSCOPY FLX DX W/COLLJ "SPEC WHEN PFRMD N/A 2/22/2019    Procedure: COLONOSCOPY;  Surgeon: Bharath David MD;  [facility]; Service: Gastroenterology   • MT CYSTO/URETERO W/LITHOTRIPSY &INDWELL STENT INSRT Left 3/24/2017    Procedure: CYSTOSCOPY, URETEROSCOPY, HOLMIUM LASER LITHOTRIPSY, BASKET STONE EXTRACTION, RETROGRADE PYELOGRAM, STENT INSERTION;  Surgeon: Joe Toure MD;  [facility];  Service: Urology   • MT CYSTO/URETERO W/LITHOTRIPSY &INDWELL STENT INSRT Left 7/28/2017    Procedure: CYSTOSCOPY, URETEROSCOPY WITH HOLMIUM LASER LITHOTRIPSY, AND STENT EXCHANGE;  Surgeon: Jairo Malave MD;  [facility];  Service: Urology   • MT LITHOTRIPSY XTRCORP SHOCK WAVE Left 9/15/2017    Procedure: Crystal Pain SHOCKWAVE (ESWL); Surgeon: Jairo Malave MD;  [facility];  Service: Urology   • ROTATOR CUFF REPAIR Right    • SHOULDER ARTHROSCOPY Left    • TOOTH EXTRACTION         OBJECTIVE:  Vitals:    05/31/23 0954   BP: 144/74   BP Location: Left arm   Patient Position: Sitting   Cuff Size: Standard   Pulse: 76   Temp: 97 9 °F (36 6 °C)   SpO2: 98%   Weight: 91 8 kg (202 lb 6 4 oz)   Height: 5' 7\" (1 702 m)     Body mass index is 31 7 kg/m²  Physical Exam  Constitutional:       General: He is not in acute distress  Appearance: He is well-developed  He is not diaphoretic  HENT:      Head: Normocephalic and atraumatic  Eyes:      General: No scleral icterus  Right eye: No discharge  Left eye: No discharge  Conjunctiva/sclera: Conjunctivae normal       Comments: Right eye with cataract  Cardiovascular:      Rate and Rhythm: Normal rate and regular rhythm  Heart sounds: Normal heart sounds  Pulmonary:      Effort: Pulmonary effort is normal  No respiratory distress  Breath sounds: Normal breath sounds  Abdominal:      General: Bowel sounds are normal  There is no distension  Palpations: Abdomen is soft  Musculoskeletal:         General: No tenderness   " Skin:     General: Skin is warm and dry  Neurological:      General: No focal deficit present  Mental Status: He is alert  Cranial Nerves: No cranial nerve deficit  Gait: Gait normal       Comments: aphasic   Psychiatric:         Mood and Affect: Mood normal          Thought Content:  Thought content normal          MoCA: 2/22 (Blind version)      Labs & Imaging:  Lab Results   Component Value Date    HCT 32 4 (L) 05/01/2023    HGB 9 3 (L) 05/01/2023    MCV 74 (L) 05/01/2023     05/01/2023    WBC 6 19 05/01/2023     Lab Results   Component Value Date    AGAP 5 05/01/2023    ALKPHOS 58 05/01/2023    ALT 30 05/01/2023    ANIONGAP 6 10/17/2015    AST 23 05/01/2023    BILITOT 0 44 10/17/2015    BUN 15 05/01/2023    CALCIUM 9 6 05/01/2023     05/01/2023    CO2 26 05/01/2023    CREATININE 1 06 05/01/2023    EGFR 72 05/01/2023    GLUC 122 10/03/2018    GLUF 237 (H) 05/01/2023    K 4 5 05/01/2023     10/17/2015    PROT 7 2 10/17/2015    SODIUM 136 05/01/2023    TBILI 0 60 05/01/2023    TP 8 1 05/01/2023     Lab Results   Component Value Date    HGBA1C 8 6 (A) 05/01/2023     Lab Results   Component Value Date    CHOLESTEROL 149 09/22/2022    CHOLESTEROL 174 01/13/2022    CHOLESTEROL 153 06/28/2021     Lab Results   Component Value Date    HDL 68 09/22/2022    HDL 75 01/13/2022    HDL 58 06/28/2021     Lab Results   Component Value Date    TRIG 78 09/22/2022    TRIG 64 01/13/2022    TRIG 121 06/28/2021     Lab Results   Component Value Date    LDLCALC 65 09/22/2022    LDLCALC 86 01/13/2022    LDLDIRECT 94 10/17/2015     Lab Results   Component Value Date    GFUPVEDR81 551 05/01/2023     Lab Results   Component Value Date    YJB7SHBPTIJL 1 090 05/01/2023     Lab Results   Component Value Date    [id number] 34 1 01/26/2019 5/1/23: RPR negative    I have spent 90 minutes with Patient and family today including taking history from family, patient, review of records, charting, and counseling regarding diagnosis and management of conditions

## (undated) DEVICE — LASER FIBER HOLMIUM 272MICRON

## (undated) DEVICE — CATH URETERAL 5FR X 70 CM FLEX TIP POLYUR BARD

## (undated) DEVICE — Device

## (undated) DEVICE — BASKET STONE RTRVL ZERO TIP 2.4FR

## (undated) DEVICE — TUBING SUCTION 5MM X 12 FT

## (undated) DEVICE — GAUZE SPONGES,16 PLY: Brand: CURITY

## (undated) DEVICE — CYSTO TUBING TUR Y IRRIGATION

## (undated) DEVICE — GLOVE SRG BIOGEL 6.5

## (undated) DEVICE — GLOVE SRG BIOGEL ORTHOPEDIC 7.5

## (undated) DEVICE — GLOVE INDICATOR PI UNDERGLOVE SZ 7 BLUE

## (undated) DEVICE — SPONGE STICK WITH PVP-I: Brand: KENDALL

## (undated) DEVICE — SKIN MARKER DUAL TIP WITH RULER CAP, FLEXIBLE RULER AND LABELS: Brand: DEVON

## (undated) DEVICE — SHEATH URETERAL ACCESS 12/14FR 35CM PROXIS

## (undated) DEVICE — GLOVE SRG BIOGEL ECLIPSE 7.5

## (undated) DEVICE — CYSTOSCOPY PACK: Brand: CONVERTORS

## (undated) DEVICE — GUIDEWIRE STRGHT TIP 0.035 IN  SOLO PLUS

## (undated) DEVICE — SCD SEQUENTIAL COMPRESSION COMFORT SLEEVE MEDIUM KNEE LENGTH: Brand: KENDALL SCD

## (undated) DEVICE — SURGICAL GOWN, XL SMARTSLEEVE: Brand: CONVERTORS

## (undated) DEVICE — UROCATCH BAG

## (undated) DEVICE — SNAP KOVER: Brand: UNBRANDED

## (undated) DEVICE — STANDARD SURGICAL GOWN, L: Brand: CONVERTORS

## (undated) DEVICE — SPECIMEN CONTAINER STERILE PEEL PACK

## (undated) DEVICE — SYRINGE 10ML LL

## (undated) DEVICE — GLOVE SRG BIOGEL 7

## (undated) DEVICE — PACK TUR